# Patient Record
Sex: FEMALE | Race: WHITE | NOT HISPANIC OR LATINO | Employment: OTHER | URBAN - METROPOLITAN AREA
[De-identification: names, ages, dates, MRNs, and addresses within clinical notes are randomized per-mention and may not be internally consistent; named-entity substitution may affect disease eponyms.]

---

## 2020-01-16 ENCOUNTER — OFFICE VISIT (OUTPATIENT)
Dept: FAMILY MEDICINE | Facility: CLINIC | Age: 75
End: 2020-01-16
Payer: MEDICARE

## 2020-01-16 VITALS
SYSTOLIC BLOOD PRESSURE: 100 MMHG | BODY MASS INDEX: 23.92 KG/M2 | WEIGHT: 110.88 LBS | HEART RATE: 80 BPM | DIASTOLIC BLOOD PRESSURE: 60 MMHG | RESPIRATION RATE: 18 BRPM | HEIGHT: 57 IN | TEMPERATURE: 98 F | OXYGEN SATURATION: 98 %

## 2020-01-16 DIAGNOSIS — G89.29 CHRONIC RADICULAR LUMBAR PAIN: ICD-10-CM

## 2020-01-16 DIAGNOSIS — M54.16 CHRONIC RADICULAR LUMBAR PAIN: ICD-10-CM

## 2020-01-16 DIAGNOSIS — N32.81 OAB (OVERACTIVE BLADDER): ICD-10-CM

## 2020-01-16 DIAGNOSIS — F31.9 BIPOLAR AFFECTIVE DISORDER, REMISSION STATUS UNSPECIFIED: ICD-10-CM

## 2020-01-16 DIAGNOSIS — E78.2 MIXED HYPERLIPIDEMIA: Primary | ICD-10-CM

## 2020-01-16 PROCEDURE — 1126F AMNT PAIN NOTED NONE PRSNT: CPT | Mod: HCNC,S$GLB,, | Performed by: INTERNAL MEDICINE

## 2020-01-16 PROCEDURE — 1159F MED LIST DOCD IN RCRD: CPT | Mod: HCNC,S$GLB,, | Performed by: INTERNAL MEDICINE

## 2020-01-16 PROCEDURE — 99999 PR PBB SHADOW E&M-NEW PATIENT-LVL III: CPT | Mod: PBBFAC,HCNC,, | Performed by: INTERNAL MEDICINE

## 2020-01-16 PROCEDURE — 99499 RISK ADDL DX/OHS AUDIT: ICD-10-PCS | Mod: HCNC,S$GLB,, | Performed by: INTERNAL MEDICINE

## 2020-01-16 PROCEDURE — 99999 PR PBB SHADOW E&M-NEW PATIENT-LVL III: ICD-10-PCS | Mod: PBBFAC,HCNC,, | Performed by: INTERNAL MEDICINE

## 2020-01-16 PROCEDURE — 1101F PT FALLS ASSESS-DOCD LE1/YR: CPT | Mod: HCNC,CPTII,S$GLB, | Performed by: INTERNAL MEDICINE

## 2020-01-16 PROCEDURE — 1159F PR MEDICATION LIST DOCUMENTED IN MEDICAL RECORD: ICD-10-PCS | Mod: HCNC,S$GLB,, | Performed by: INTERNAL MEDICINE

## 2020-01-16 PROCEDURE — 1101F PR PT FALLS ASSESS DOC 0-1 FALLS W/OUT INJ PAST YR: ICD-10-PCS | Mod: HCNC,CPTII,S$GLB, | Performed by: INTERNAL MEDICINE

## 2020-01-16 PROCEDURE — 1126F PR PAIN SEVERITY QUANTIFIED, NO PAIN PRESENT: ICD-10-PCS | Mod: HCNC,S$GLB,, | Performed by: INTERNAL MEDICINE

## 2020-01-16 PROCEDURE — 99214 PR OFFICE/OUTPT VISIT, EST, LEVL IV, 30-39 MIN: ICD-10-PCS | Mod: HCNC,S$GLB,, | Performed by: INTERNAL MEDICINE

## 2020-01-16 PROCEDURE — 99214 OFFICE O/P EST MOD 30 MIN: CPT | Mod: HCNC,S$GLB,, | Performed by: INTERNAL MEDICINE

## 2020-01-16 PROCEDURE — 99499 UNLISTED E&M SERVICE: CPT | Mod: HCNC,S$GLB,, | Performed by: INTERNAL MEDICINE

## 2020-01-16 RX ORDER — OXYBUTYNIN CHLORIDE 5 MG/1
5 TABLET ORAL 2 TIMES DAILY
COMMUNITY
Start: 2019-11-06 | End: 2020-04-06

## 2020-01-16 RX ORDER — HYDROCODONE BITARTRATE AND ACETAMINOPHEN 10; 325 MG/1; MG/1
TABLET ORAL
Status: ON HOLD | COMMUNITY
Start: 2011-12-09 | End: 2020-03-03 | Stop reason: HOSPADM

## 2020-01-16 RX ORDER — SPIRONOLACTONE 50 MG/1
TABLET, FILM COATED ORAL
Status: ON HOLD | COMMUNITY
Start: 2011-12-02 | End: 2020-01-20 | Stop reason: HOSPADM

## 2020-01-16 RX ORDER — HYDROCODONE BITARTRATE AND ACETAMINOPHEN 10; 325 MG/1; MG/1
TABLET ORAL
Status: ON HOLD | COMMUNITY
Start: 2019-12-16 | End: 2020-01-20 | Stop reason: HOSPADM

## 2020-01-16 RX ORDER — FENOFIBRATE 145 MG/1
TABLET, FILM COATED ORAL
COMMUNITY
Start: 2019-12-26 | End: 2020-06-24

## 2020-01-16 RX ORDER — OXCARBAZEPINE 300 MG/1
300 TABLET, FILM COATED ORAL DAILY
COMMUNITY
Start: 2011-03-26

## 2020-01-16 RX ORDER — TOLTERODINE 4 MG/1
4 CAPSULE, EXTENDED RELEASE ORAL DAILY
Status: ON HOLD | COMMUNITY
Start: 2013-01-29 | End: 2020-02-14 | Stop reason: HOSPADM

## 2020-01-16 RX ORDER — CETIRIZINE HYDROCHLORIDE 10 MG/1
TABLET ORAL
COMMUNITY
Start: 2011-12-05

## 2020-01-16 NOTE — PROGRESS NOTES
[facility] Note    Chief Complaint      Chief Complaint   Patient presents with    Follow-up     6 MONTH FOLLOW        History of Present Illness      Marycruz Ramirez is a 74 y.o. female who presents today for   Chief Complaint   Patient presents with    Follow-up     6 MONTH FOLLOW    .  Patient comes to appointment HERE FOR 6 m checkup     Problem List Items Addressed This Visit        Neuro    Chronic radicular lumbar pain    Overview     Dr allen is managing pain cont current regimen             Psychiatric    Bipolar disorder    Overview     Psych managing cont current trileptal            Cardiac/Vascular    Mixed hyperlipidemia - Primary    Overview     Is on tricor currently cont current needs cmp and lipid            Renal/    OAB (overactive bladder)    Overview     Cont ditropan working well                  Past Medical History:  History reviewed. No pertinent past medical history.    Past Surgical History:  Past Surgical History:   Procedure Laterality Date    BACK SURGERY      KNEE SURGERY Bilateral        Family History:  family history is not on file.    Social History:  Social History     Socioeconomic History    Marital status:      Spouse name: Not on file    Number of children: Not on file    Years of education: Not on file    Highest education level: Not on file   Occupational History    Not on file   Social Needs    Financial resource strain: Not on file    Food insecurity:     Worry: Not on file     Inability: Not on file    Transportation needs:     Medical: Not on file     Non-medical: Not on file   Tobacco Use    Smoking status: Current Every Day Smoker    Smokeless tobacco: Never Used   Substance and Sexual Activity    Alcohol use: Yes     Frequency: Never     Drinks per session: 1 or 2    Drug use: Never    Sexual activity: Not Currently   Lifestyle    Physical activity:     Days per week: Not on file     Minutes per session: Not on  file    Stress: Not at all   Relationships    Social connections:     Talks on phone: Not on file     Gets together: Not on file     Attends [employer] service: Not on file     Active member of club or organization: Not on file     Attends meetings of clubs or organizations: Not on file     Relationship status: Not on file   Other Topics Concern    Not on file   Social History Narrative    Not on file       Review of Systems:   ROS      Medications:  Outpatient Encounter Medications as of 1/16/2020   Medication Sig Dispense Refill    cetirizine (ZYRTEC) 10 MG tablet Take by mouth.      fenofibrate (TRICOR) 145 MG tablet       HYDROcodone-acetaminophen (NORCO)  mg per tablet Take by mouth.      HYDROcodone-acetaminophen (NORCO)  mg per tablet       OXcarbazepine (TRILEPTAL) 300 MG Tab Take by mouth.      oxybutynin (DITROPAN) 5 MG Tab       spironolactone (ALDACTONE) 50 MG tablet Take by mouth.      tolterodine (DETROL LA) 4 MG 24 hr capsule Take by mouth.       No facility-administered encounter medications on file as of 1/16/2020.         Allergies:  Review of patient's allergies indicates:  No Known Allergies      Physical Exam      Vitals:    01/16/20 1100   BP: 100/60   Pulse: 80   Resp: 18   Temp: 98.1 °F (36.7 °C)      Body mass index is 24 kg/m².    Physical Exam   Constitutional: She is oriented to person, place, and time. She appears well-developed and well-nourished.   HENT:   Mouth/Throat: Oropharynx is clear and moist.   Eyes: Pupils are equal, round, and reactive to light. EOM are normal.   Neck: Normal range of motion. No thyromegaly present.   Cardiovascular: Normal rate. Exam reveals no gallop and no friction rub.   Murmur heard.   Systolic murmur is present with a grade of 3/6.  Pulmonary/Chest: Breath sounds normal.   Abdominal: Soft. Bowel sounds are normal.   Musculoskeletal: Normal range of motion.   Lymphadenopathy:     She has no cervical adenopathy.   Neurological: She  is alert and oriented to person, place, and time. No cranial nerve deficit.   Skin: Skin is warm. No rash noted.   Psychiatric: She has a normal mood and affect. Her behavior is normal.        Laboratory:  CBC:  No results for input(s): WBC, RBC, HGB, HCT, PLT, MCV, MCH, MCHC in the last 2160 hours.  CMP:  No results for input(s): GLU, CALCIUM, ALBUMIN, PROT, NA, K, CO2, CL, BUN, ALKPHOS, ALT, AST, BILITOT in the last 2160 hours.    Invalid input(s): CREATININ  URINALYSIS:  No results for input(s): COLORU, CLARITYU, SPECGRAV, PHUR, PROTEINUA, GLUCOSEU, BILIRUBINCON, BLOODU, WBCU, RBCU, BACTERIA, MUCUS, NITRITE, LEUKOCYTESUR, UROBILINOGEN, HYALINECASTS in the last 2160 hours.   LIPIDS:  No results for input(s): TSH, HDL, CHOL, TRIG, LDLCALC, CHOLHDL, NONHDLCHOL, TOTALCHOLEST in the last 2160 hours.  TSH:  No results for input(s): TSH in the last 2160 hours.  A1C:  No results for input(s): HGBA1C in the last 2160 hours.    Radiology:        Assessment:     Marycruz Ramirez is a 74 y.o.female with:    Mixed hyperlipidemia  -     Comprehensive metabolic panel; Future; Expected date: 01/16/2020  -     Lipid panel; Future; Expected date: 01/16/2020    Bipolar affective disorder, remission status unspecified    OAB (overactive bladder)  -     CBC auto differential; Future; Expected date: 01/16/2020    Chronic radicular lumbar pain          Plan:     Problem List Items Addressed This Visit        Neuro    Chronic radicular lumbar pain    Overview     Dr allen is managing pain cont current regimen             Psychiatric    Bipolar disorder    Overview     Psych managing cont current trileptal            Cardiac/Vascular    Mixed hyperlipidemia - Primary    Overview     Is on tricor currently cont current needs cmp and lipid            Renal/    OAB (overactive bladder)    Overview     Cont ditropan working well                As above, continue current medications and maintain follow up with specialists.  Return to clinic  in 6 months.    Marcelino Calle  [facility] [place]

## 2020-01-17 ENCOUNTER — TELEPHONE (OUTPATIENT)
Dept: FAMILY MEDICINE | Facility: CLINIC | Age: 75
End: 2020-01-17

## 2020-01-17 ENCOUNTER — HOSPITAL ENCOUNTER (OUTPATIENT)
Facility: HOSPITAL | Age: 75
Discharge: HOME | DRG: 379 | End: 2020-01-20
Attending: EMERGENCY MEDICINE | Admitting: HOSPITALIST
Payer: MEDICARE

## 2020-01-17 DIAGNOSIS — R14.0 ABDOMINAL DISTENSION: ICD-10-CM

## 2020-01-17 DIAGNOSIS — D50.0 IRON DEFICIENCY ANEMIA DUE TO CHRONIC BLOOD LOSS: ICD-10-CM

## 2020-01-17 DIAGNOSIS — D50.9 MICROCYTIC ANEMIA: Primary | ICD-10-CM

## 2020-01-17 DIAGNOSIS — K92.2 GASTROINTESTINAL HEMORRHAGE, UNSPECIFIED GASTROINTESTINAL HEMORRHAGE TYPE: ICD-10-CM

## 2020-01-17 DIAGNOSIS — R73.02 IGT (IMPAIRED GLUCOSE TOLERANCE): Primary | ICD-10-CM

## 2020-01-17 DIAGNOSIS — D64.9 SYMPTOMATIC ANEMIA: ICD-10-CM

## 2020-01-17 LAB
2ND TRIMESTER 4 SCREEN SERPL-IMP: ABNORMAL
ABO + RH BLD: NORMAL
ALBUMIN SERPL BCP-MCNC: 3.6 G/DL (ref 3.5–5.2)
ALBUMIN: 4.4 GRAM/DL (ref 3.5–5)
ALP SERPL-CCNC: 47 UNIT/L (ref 38–126)
ALP SERPL-CCNC: 51 U/L (ref 55–135)
ALT SERPL W P-5'-P-CCNC: 11 UNIT/L (ref 7–56)
ALT SERPL W/O P-5'-P-CCNC: 10 U/L (ref 10–44)
ANION GAP SERPL CALC-SCNC: 18 MEQ/L (ref 9–18)
ANION GAP SERPL CALC-SCNC: 8 MMOL/L (ref 8–16)
ANISOCYTOSIS BLD QL SMEAR: SLIGHT
AST SERPL-CCNC: 15 U/L (ref 10–40)
AST SERPL-CCNC: 16 UNIT/L (ref 7–40)
BASO STIPL BLD QL SMEAR: ABNORMAL
BASOPHILS # BLD AUTO: 0.03 K/UL (ref 0–0.2)
BASOPHILS NFR BLD: 0.3 % (ref 0–1.9)
BILIRUB SERPL-MCNC: 0.3 MG/DL (ref 0.1–1)
BILIRUB SERPL-MCNC: 0.4 MG/DL (ref 0–1.2)
BLD GP AB SCN CELLS X3 SERPL QL: NORMAL
BUN BLD-MCNC: 18 MG/DL (ref 7–21)
BUN SERPL-MCNC: 17 MG/DL (ref 8–23)
BUN/CREAT SERPL: 26 RATIO (ref 6–22)
CALC OSMOLALITY: 282 MOSM/KG (ref 275–295)
CALCIUM SERPL-MCNC: 10.2 MG/DL (ref 8.5–10.3)
CALCIUM SERPL-MCNC: 9.9 MG/DL (ref 8.7–10.5)
CHLORIDE SERPL-SCNC: 102 MEQ/L (ref 98–107)
CHLORIDE SERPL-SCNC: 107 MMOL/L (ref 95–110)
CHOL/HDLC RATIO: 2
CHOLEST SERPL-MSCNC: 102 MG/DL (ref 100–200)
CO2 SERPL-SCNC: 23 MEQ/L (ref 21–31)
CO2 SERPL-SCNC: 24 MMOL/L (ref 23–29)
CREAT SERPL-MCNC: 0.7 MG/DL (ref 0.5–1)
CREAT SERPL-MCNC: 0.7 MG/DL (ref 0.5–1.4)
DACRYOCYTES BLD QL SMEAR: ABNORMAL
DAT IGG-SP REAG RBC-IMP: NORMAL
DIFFERENTIAL METHOD: ABNORMAL
DIFFERENTIAL TYPE: ABNORMAL
EOSINOPHIL # BLD AUTO: 0.2 K/UL (ref 0–0.5)
EOSINOPHIL NFR BLD: 2.4 % (ref 0–8)
ERYTHROCYTE [DISTWIDTH] IN BLOOD BY AUTOMATED COUNT: 20 % (ref 11.5–14.5)
ERYTHROCYTE [DISTWIDTH] IN BLOOD BY AUTOMATED COUNT: 20.8 GRAM/DL (ref 12–15.3)
EST. GFR  (AFRICAN AMERICAN): >60 ML/MIN/1.73 M^2
EST. GFR  (NON AFRICAN AMERICAN): >60 ML/MIN/1.73 M^2
FOLATE SERPL-MCNC: 11.8 NG/ML (ref 4–24)
GFR: 86 ML/MIN/1.73M2
GIANT PLATELETS BLD QL SMEAR: PRESENT
GLUCOSE SERPL-MCNC: 151 MG/DL (ref 70–100)
GLUCOSE SERPL-MCNC: 167 MG/DL (ref 70–110)
HCT VFR BLD AUTO: 21 % (ref 37–47)
HCT VFR BLD AUTO: 21.3 % (ref 37–48.5)
HDLC SERPL-MCNC: 52 MG/DL (ref 40–75)
HGB BLD-MCNC: 5.5 G/DL (ref 12–16)
HGB BLD-MCNC: 6.1 GRAM/DL (ref 12–16)
HYPO: ABNORMAL
HYPOCHROMIA BLD QL SMEAR: ABNORMAL
IMM GRANULOCYTES # BLD AUTO: 0.02 K/UL (ref 0–0.04)
IMM GRANULOCYTES NFR BLD AUTO: 0.2 % (ref 0–0.5)
IRON SERPL-MCNC: 25 UG/DL (ref 30–160)
LDH SERPL L TO P-CCNC: 160 U/L (ref 110–260)
LDLC SERPL CALC-MCNC: 41 MG/DL (ref 0–125)
LYMPHOCYTES # BLD AUTO: 3.1 K/UL (ref 1–4.8)
LYMPHOCYTES NFR BLD: 32.6 % (ref 18–48)
MCH RBC QN AUTO: 19.6 PG (ref 27–31)
MCH RBC QN AUTO: 20 PICOGRAM (ref 27–33)
MCHC RBC AUTO-ENTMCNC: 25.8 G/DL (ref 32–36)
MCHC RBC AUTO-ENTMCNC: 29.2 GRAM/DL (ref 32–36)
MCV RBC AUTO: 68.6 FEMTOLITER (ref 81–99)
MCV RBC AUTO: 76 FL (ref 82–98)
MICROCYTES BLD QL SMEAR: ABNORMAL
MONOCYTES # BLD AUTO: 0.9 K/UL (ref 0.3–1)
MONOCYTES NFR BLD: 9.6 % (ref 4–15)
NEUTROPHILS # BLD AUTO: 5.2 K/UL (ref 1.8–7.7)
NEUTROPHILS NFR BLD: 54.9 % (ref 38–73)
NONHDLC SERPL-MCNC: 50 MG/DL (ref 60–125)
NRBC BLD-RTO: 0 /100 WBC
OVALOCYTES BLD QL SMEAR: ABNORMAL
OVALOCYTES BLD QL SMEAR: ABNORMAL
PLATELET # BLD AUTO: 274 K/UL (ref 150–350)
PLATELET # BLD AUTO: 311 K/UL (ref 150–350)
PLATELET BLD QL SMEAR: ABNORMAL
PLT MORPHOLOGY: ABNORMAL
PMV BLD AUTO: 11.3 FL (ref 9.2–12.9)
PMV BLD AUTO: 9.3 FEMTOLITER (ref 7–10.2)
POIKILOCYTOSIS BLD QL SMEAR: ABNORMAL
POIKILOCYTOSIS BLD QL SMEAR: SLIGHT
POLY: ABNORMAL
POLYCHROMASIA BLD QL SMEAR: ABNORMAL
POTASSIUM SERPL-SCNC: 3.9 MMOL/L (ref 3.5–5.1)
POTASSIUM SERPL-SCNC: 4 MEQ/L (ref 3.5–5)
PROT SERPL-MCNC: 6.4 G/DL (ref 6–8.4)
RBC # BLD AUTO: 2.81 M/UL (ref 4–5.4)
RBC # BLD AUTO: 3.07 MIL/UL (ref 4.2–5.4)
RETICS/RBC NFR AUTO: 2.1 % (ref 0.5–2.5)
SATURATED IRON: 4 % (ref 20–50)
SCHISTOCYTES BLD QL SMEAR: ABNORMAL
SCHISTOCYTES: ABNORMAL
SODIUM BLD-SCNC: 139 MEQ/L (ref 135–145)
SODIUM SERPL-SCNC: 139 MMOL/L (ref 136–145)
TARGETS BLD QL SMEAR: ABNORMAL
TEAR DROP CELLS: ABNORMAL
TOTAL IRON BINDING CAPACITY: 704 UG/DL (ref 250–450)
TOTAL PROTEIN: 6.8 GRAM/DL (ref 6.3–8.2)
TRANSFERRIN SERPL-MCNC: 476 MG/DL (ref 200–375)
TRIGL SERPL-MCNC: 59 MG/DL (ref 30–150)
VIT B12 SERPL-MCNC: 611 PG/ML (ref 210–950)
WBC # BLD AUTO: 8.9 K/UL (ref 4.5–11)
WBC # BLD AUTO: 9.4 K/UL (ref 3.9–12.7)

## 2020-01-17 PROCEDURE — 36430 TRANSFUSION BLD/BLD COMPNT: CPT | Mod: HCNC

## 2020-01-17 PROCEDURE — 83615 LACTATE (LD) (LDH) ENZYME: CPT | Mod: HCNC

## 2020-01-17 PROCEDURE — 63600175 PHARM REV CODE 636 W HCPCS: Mod: HCNC | Performed by: EMERGENCY MEDICINE

## 2020-01-17 PROCEDURE — 25000003 PHARM REV CODE 250: Mod: HCNC | Performed by: EMERGENCY MEDICINE

## 2020-01-17 PROCEDURE — 96361 HYDRATE IV INFUSION ADD-ON: CPT | Mod: HCNC

## 2020-01-17 PROCEDURE — 99285 PR EMERGENCY DEPT VISIT,LEVEL V: ICD-10-PCS | Mod: HCNC,,, | Performed by: EMERGENCY MEDICINE

## 2020-01-17 PROCEDURE — 80053 COMPREHEN METABOLIC PANEL: CPT | Mod: HCNC

## 2020-01-17 PROCEDURE — 11000001 HC ACUTE MED/SURG PRIVATE ROOM: Mod: HCNC

## 2020-01-17 PROCEDURE — G0378 HOSPITAL OBSERVATION PER HR: HCPCS | Mod: HCNC

## 2020-01-17 PROCEDURE — P9021 RED BLOOD CELLS UNIT: HCPCS | Mod: HCNC

## 2020-01-17 PROCEDURE — 99222 PR INITIAL HOSPITAL CARE,LEVL II: ICD-10-PCS | Mod: AI,HCNC,, | Performed by: HOSPITALIST

## 2020-01-17 PROCEDURE — 99285 EMERGENCY DEPT VISIT HI MDM: CPT | Mod: 25,HCNC

## 2020-01-17 PROCEDURE — 82746 ASSAY OF FOLIC ACID SERUM: CPT | Mod: HCNC

## 2020-01-17 PROCEDURE — 86880 COOMBS TEST DIRECT: CPT | Mod: HCNC

## 2020-01-17 PROCEDURE — 86920 COMPATIBILITY TEST SPIN: CPT | Mod: HCNC

## 2020-01-17 PROCEDURE — 85045 AUTOMATED RETICULOCYTE COUNT: CPT | Mod: HCNC

## 2020-01-17 PROCEDURE — 99285 EMERGENCY DEPT VISIT HI MDM: CPT | Mod: HCNC,,, | Performed by: EMERGENCY MEDICINE

## 2020-01-17 PROCEDURE — 85025 COMPLETE CBC W/AUTO DIFF WBC: CPT | Mod: HCNC

## 2020-01-17 PROCEDURE — 83540 ASSAY OF IRON: CPT | Mod: HCNC

## 2020-01-17 PROCEDURE — 86850 RBC ANTIBODY SCREEN: CPT | Mod: HCNC

## 2020-01-17 PROCEDURE — 99222 1ST HOSP IP/OBS MODERATE 55: CPT | Mod: AI,HCNC,, | Performed by: HOSPITALIST

## 2020-01-17 PROCEDURE — 96374 THER/PROPH/DIAG INJ IV PUSH: CPT | Mod: HCNC

## 2020-01-17 PROCEDURE — 82607 VITAMIN B-12: CPT | Mod: HCNC

## 2020-01-17 PROCEDURE — C9113 INJ PANTOPRAZOLE SODIUM, VIA: HCPCS | Mod: HCNC | Performed by: EMERGENCY MEDICINE

## 2020-01-17 RX ORDER — HYDROCODONE BITARTRATE AND ACETAMINOPHEN 10; 325 MG/1; MG/1
1 TABLET ORAL EVERY 6 HOURS PRN
Status: DISCONTINUED | OUTPATIENT
Start: 2020-01-18 | End: 2020-01-20 | Stop reason: HOSPADM

## 2020-01-17 RX ORDER — HYDROCODONE BITARTRATE AND ACETAMINOPHEN 5; 325 MG/1; MG/1
1 TABLET ORAL
Status: COMPLETED | OUTPATIENT
Start: 2020-01-17 | End: 2020-01-17

## 2020-01-17 RX ORDER — SODIUM CHLORIDE 0.9 % (FLUSH) 0.9 %
10 SYRINGE (ML) INJECTION
Status: DISCONTINUED | OUTPATIENT
Start: 2020-01-17 | End: 2020-01-20 | Stop reason: HOSPADM

## 2020-01-17 RX ORDER — OXYBUTYNIN CHLORIDE 5 MG/1
5 TABLET ORAL 2 TIMES DAILY
Status: DISCONTINUED | OUTPATIENT
Start: 2020-01-18 | End: 2020-01-20 | Stop reason: HOSPADM

## 2020-01-17 RX ORDER — PANTOPRAZOLE SODIUM 40 MG/10ML
80 INJECTION, POWDER, LYOPHILIZED, FOR SOLUTION INTRAVENOUS
Status: COMPLETED | OUTPATIENT
Start: 2020-01-17 | End: 2020-01-17

## 2020-01-17 RX ORDER — ONDANSETRON 2 MG/ML
4 INJECTION INTRAMUSCULAR; INTRAVENOUS EVERY 8 HOURS PRN
Status: DISCONTINUED | OUTPATIENT
Start: 2020-01-18 | End: 2020-01-20 | Stop reason: HOSPADM

## 2020-01-17 RX ORDER — HYDROCODONE BITARTRATE AND ACETAMINOPHEN 500; 5 MG/1; MG/1
TABLET ORAL
Status: DISCONTINUED | OUTPATIENT
Start: 2020-01-17 | End: 2020-01-20 | Stop reason: HOSPADM

## 2020-01-17 RX ORDER — TALC
3 POWDER (GRAM) TOPICAL NIGHTLY PRN
Status: DISCONTINUED | OUTPATIENT
Start: 2020-01-18 | End: 2020-01-20 | Stop reason: HOSPADM

## 2020-01-17 RX ORDER — OXCARBAZEPINE 300 MG/1
300 TABLET, FILM COATED ORAL DAILY
Status: DISCONTINUED | OUTPATIENT
Start: 2020-01-18 | End: 2020-01-20 | Stop reason: HOSPADM

## 2020-01-17 RX ORDER — PANTOPRAZOLE SODIUM 40 MG/1
40 TABLET, DELAYED RELEASE ORAL
Status: DISCONTINUED | OUTPATIENT
Start: 2020-01-18 | End: 2020-01-20 | Stop reason: HOSPADM

## 2020-01-17 RX ADMIN — SODIUM CHLORIDE 1000 ML: 0.9 INJECTION, SOLUTION INTRAVENOUS at 05:01

## 2020-01-17 RX ADMIN — HYDROCODONE BITARTRATE AND ACETAMINOPHEN 1 TABLET: 5; 325 TABLET ORAL at 08:01

## 2020-01-17 RX ADMIN — PANTOPRAZOLE SODIUM 80 MG: 40 INJECTION, POWDER, FOR SOLUTION INTRAVENOUS at 08:01

## 2020-01-17 NOTE — ED PROVIDER NOTES
Encounter Date: 1/17/2020       History     Chief Complaint   Patient presents with    Abnormal Lab     low h&h, told to come get transfusion     74-year-old woman with comorbidities of chronic arthritis/pain as well as bipolar disorder presents by direction of her primary care to the ED for further evaluation management of severe anemia noted on outpatient labs today.  The patient describes worsening exertional dyspnea and generalized weakness without any report of hematochezia, hematemesis, or chronic anemia.  She denies any associated fevers, chills, night sweats, diarrhea, or dysuria as well as any melena or abdominal discomfort.        Review of patient's allergies indicates:  No Known Allergies  No past medical history on file.  Past Surgical History:   Procedure Laterality Date    BACK SURGERY      KNEE SURGERY Bilateral      No family history on file.  Social History     Tobacco Use    Smoking status: Current Every Day Smoker    Smokeless tobacco: Never Used   Substance Use Topics    Alcohol use: Yes     Frequency: Never     Drinks per session: 1 or 2    Drug use: Never     Review of Systems   Constitutional: Positive for fatigue. Negative for chills and fever.   HENT: Negative for nosebleeds and trouble swallowing.    Respiratory: Positive for shortness of breath (Exertional). Negative for chest tightness.    Cardiovascular: Positive for leg swelling (Mild, chronic). Negative for chest pain.   Gastrointestinal: Negative for abdominal pain, nausea and vomiting.   Genitourinary: Negative for flank pain and hematuria.   Musculoskeletal: Positive for back pain (Chronic) and neck pain (Chronic).   Skin: Negative for rash and wound.   Neurological: Negative for seizures, syncope and speech difficulty.   Hematological: Does not bruise/bleed easily.   Psychiatric/Behavioral: Negative for confusion and decreased concentration.       Physical Exam     Initial Vitals [01/17/20 1518]   BP Pulse Resp Temp SpO2    (!) 119/57 84 18 98.1 °F (36.7 °C) 99 %      MAP       --         Physical Exam    Vitals reviewed.  Constitutional:   74-year-old  woman, frail, no acute distress noted, mild jaundice   HENT:   Head: Normocephalic and atraumatic.   Dentures in place without evidence of acute intraoral injury   Eyes: EOM are normal. Pupils are equal, round, and reactive to light.   Cardiovascular: Regular rhythm.   4/ 6 CADEN loudest at the left proximal sternal border   Pulmonary/Chest: Breath sounds normal. No stridor. No respiratory distress.   Abdominal: Soft. There is no tenderness. There is no rebound.   Genitourinary: : Acceptable.  Genitourinary Comments: No external hemorrhoids noted; largely empty rectal vault with scant guaiac positive stool   Musculoskeletal:   0 to 1+ nonpitting bilateral lower extremity edema; chronic scoliotic changes noted   Neurological: She is alert and oriented to person, place, and time. GCS score is 15. GCS eye subscore is 4. GCS verbal subscore is 5. GCS motor subscore is 6.   Skin: Skin is warm and dry.   +/- jaundice, mild pallor: desquamation and chronic vascular changes noted to right lower extremity with vascular changes only noted on left   Psychiatric: She has a normal mood and affect. Thought content normal.         ED Course   Procedures  Labs Reviewed   COMPREHENSIVE METABOLIC PANEL - Abnormal; Notable for the following components:       Result Value    Glucose 167 (*)     Alkaline Phosphatase 51 (*)     All other components within normal limits    Narrative:     ADD ON CBCWD [id number] & GUSTABO [id number] PER DR. HANCOCK.  01/17/2020    17:42  ADD ON FOLAT [id number], B12 [id number] & LDH [id number] PER DR HANCOCK.    01/17/2020  17:44     CBC W/ AUTO DIFFERENTIAL - Abnormal; Notable for the following components:    RBC 2.81 (*)     Hemoglobin 5.5 (*)     Hematocrit 21.3 (*)     Mean Corpuscular Volume 76 (*)     Mean Corpuscular Hemoglobin 19.6 (*)     Mean  Corpuscular Hemoglobin Conc 25.8 (*)     RDW 20.0 (*)     All other components within normal limits    Narrative:     ADD ON CBCWD [id number] & GUSTABO [id number] PER DR. MISTRY  01/17/2020    17:42  ADD ON FOLAT [id number], B12 [id number] & LDH [id number] PER DR MISTRY    01/17/2020  17:44    add on ironp [id number] PER DR HANCOCK  01/17/2020  18:04   HGB critical result(s) called and verbal readback obtained from   Dorothy Moulton RN.  by RNS 01/17/2020 18:30   IRON AND TIBC - Abnormal; Notable for the following components:    Iron 25 (*)     Transferrin 476 (*)     TIBC 704 (*)     Saturated Iron 4 (*)     All other components within normal limits    Narrative:     ADD ON CBCWD [id number] & GUSTABO [id number] PER DR. MISTRY  01/17/2020    17:42  ADD ON FOLAT [id number], B12 [id number] & LDH [id number] PER DR MISTRY    01/17/2020  17:44    add on ironp [id number] PER DR HANCOCK  01/17/2020  18:04    CBC W/ AUTO DIFFERENTIAL   FOLATE   IRON AND TIBC   LACTATE DEHYDROGENASE   RETICULOCYTES   VITAMIN B12   RETICULOCYTES    Narrative:     ADD ON CBCWD [id number] & GUSTABO [id number] PER DR. MISTRY  01/17/2020    17:42  ADD ON FOLAT [id number], B12 [id number] & LDH [id number] PER DR MISTRY    01/17/2020  17:44    add on ironp [id number] PER DR HANCOCK  01/17/2020  18:04    FOLATE    Narrative:     ADD ON CBCWD [id number] & GUSTABO [id number] PER DR. MISTRY  01/17/2020    17:42  ADD ON FOLAT [id number], B12 [id number] & LDH [id number] PER DR MISTRY    01/17/2020  17:44    add on ironp [id number] PER DR HANCOCK  01/17/2020  18:04    VITAMIN B12    Narrative:     ADD ON CBCWD [id number] & GUSTABO [id number] PER DR. MISTRY  01/17/2020    17:42  ADD ON FOLAT [id number], B12 [id number] & LDH [id number] PER DR MISTRY    01/17/2020  17:44    add on ironp [id number] PER DR HANCOCK  01/17/2020  18:04    LACTATE DEHYDROGENASE    Narrative:     ADD ON CBCWD [id number] & GUSTABO [id number] PER DR. MISTRY  01/17/2020    17:42  ADD ON FOLAT [id number], B12 [id number] & LDH [id number] PER   KARISSA.    01/17/2020  17:44    add on ironp [id number] PER DR HANCOCK  01/17/2020  18:04    TYPE & SCREEN   DIRECT ANTIGLOBULIN TEST   PREPARE RBC SOFT               Medical Decision Making:   History:   Old Medical Records: I decided to obtain old medical records.  Old Records Summarized: records from clinic visits.  Differential Diagnosis:   Symptomatic anemia, GI bleed, electrolyte derangement, occult malignancy  Clinical Tests:   Lab Tests: Ordered and Reviewed  Other:   I have discussed this case with another health care provider.              Attending Attestation:             Attending ED Notes:   Emergency department evaluation today confirms the presence of significant microcytic anemia in this 74-year-old woman directed here by her managing primary care physician based on outpatient lab findings.  The patient has not in door stay any additional symptoms of hematochezia, melena, or hematemesis, and rectal exam does not reveal evidence of gross melena or hematochezia.  Metabolic profile is largely unremarkable and does not exhibit evidence of grave solid organ dysfunction.  Consent for blood transfusion has been obtained, findings and concerns have been discussed with internal medicine on call, and she will be admitted to their service in fair condition with plans for transfusion of type specific packed RBCs for her presumed acute blood-loss anemia.  Notably, Protonix 80 mg IV has been administered for suspected GI source of bleeding/inflammation.                        Clinical Impression:       ICD-10-CM ICD-9-CM   1. Microcytic anemia D50.9 280.9         Disposition:   Disposition: Admitted  Condition: Fair                     Bunny Hancock MD  01/17/20 2020

## 2020-01-18 PROBLEM — R14.0 ABDOMINAL DISTENSION: Status: ACTIVE | Noted: 2020-01-18

## 2020-01-18 LAB
ANION GAP SERPL CALC-SCNC: 7 MMOL/L (ref 8–16)
BASOPHILS # BLD AUTO: 0.06 K/UL (ref 0–0.2)
BASOPHILS NFR BLD: 0.4 % (ref 0–1.9)
BLD PROD TYP BPU: NORMAL
BLOOD UNIT EXPIRATION DATE: NORMAL
BLOOD UNIT TYPE CODE: 6200
BLOOD UNIT TYPE: NORMAL
BUN SERPL-MCNC: 12 MG/DL (ref 8–23)
CALCIUM SERPL-MCNC: 9.5 MG/DL (ref 8.7–10.5)
CHLORIDE SERPL-SCNC: 112 MMOL/L (ref 95–110)
CO2 SERPL-SCNC: 20 MMOL/L (ref 23–29)
CODING SYSTEM: NORMAL
CREAT SERPL-MCNC: 0.7 MG/DL (ref 0.5–1.4)
DIFFERENTIAL METHOD: ABNORMAL
DISPENSE STATUS: NORMAL
EOSINOPHIL # BLD AUTO: 0.2 K/UL (ref 0–0.5)
EOSINOPHIL NFR BLD: 1.2 % (ref 0–8)
ERYTHROCYTE [DISTWIDTH] IN BLOOD BY AUTOMATED COUNT: 19.9 % (ref 11.5–14.5)
EST. GFR  (AFRICAN AMERICAN): >60 ML/MIN/1.73 M^2
EST. GFR  (NON AFRICAN AMERICAN): >60 ML/MIN/1.73 M^2
GLUCOSE SERPL-MCNC: 110 MG/DL (ref 70–110)
HCT VFR BLD AUTO: 33.6 % (ref 37–48.5)
HGB BLD-MCNC: 9.4 G/DL (ref 12–16)
IMM GRANULOCYTES # BLD AUTO: 0.07 K/UL (ref 0–0.04)
IMM GRANULOCYTES NFR BLD AUTO: 0.4 % (ref 0–0.5)
LYMPHOCYTES # BLD AUTO: 2.4 K/UL (ref 1–4.8)
LYMPHOCYTES NFR BLD: 15.3 % (ref 18–48)
MAGNESIUM SERPL-MCNC: 1.8 MG/DL (ref 1.6–2.6)
MCH RBC QN AUTO: 23.1 PG (ref 27–31)
MCHC RBC AUTO-ENTMCNC: 28 G/DL (ref 32–36)
MCV RBC AUTO: 83 FL (ref 82–98)
MONOCYTES # BLD AUTO: 0.4 K/UL (ref 0.3–1)
MONOCYTES NFR BLD: 2.6 % (ref 4–15)
NEUTROPHILS # BLD AUTO: 12.5 K/UL (ref 1.8–7.7)
NEUTROPHILS NFR BLD: 80.1 % (ref 38–73)
NRBC BLD-RTO: 0 /100 WBC
PHOSPHATE SERPL-MCNC: 2.3 MG/DL (ref 2.7–4.5)
PLATELET # BLD AUTO: 261 K/UL (ref 150–350)
PMV BLD AUTO: 10.8 FL (ref 9.2–12.9)
POTASSIUM SERPL-SCNC: 4.4 MMOL/L (ref 3.5–5.1)
RBC # BLD AUTO: 4.07 M/UL (ref 4–5.4)
SODIUM SERPL-SCNC: 139 MMOL/L (ref 136–145)
TRANS ERYTHROCYTES VOL PATIENT: NORMAL ML
WBC # BLD AUTO: 15.64 K/UL (ref 3.9–12.7)

## 2020-01-18 PROCEDURE — 99231 PR SUBSEQUENT HOSPITAL CARE,LEVL I: ICD-10-PCS | Mod: HCNC,,, | Performed by: INTERNAL MEDICINE

## 2020-01-18 PROCEDURE — 11000001 HC ACUTE MED/SURG PRIVATE ROOM: Mod: HCNC

## 2020-01-18 PROCEDURE — 85025 COMPLETE CBC W/AUTO DIFF WBC: CPT | Mod: HCNC

## 2020-01-18 PROCEDURE — 99232 PR SUBSEQUENT HOSPITAL CARE,LEVL II: ICD-10-PCS | Mod: HCNC,GC,, | Performed by: INTERNAL MEDICINE

## 2020-01-18 PROCEDURE — 94761 N-INVAS EAR/PLS OXIMETRY MLT: CPT | Mod: HCNC

## 2020-01-18 PROCEDURE — 99232 SBSQ HOSP IP/OBS MODERATE 35: CPT | Mod: HCNC,GC,, | Performed by: INTERNAL MEDICINE

## 2020-01-18 PROCEDURE — 25000003 PHARM REV CODE 250: Mod: HCNC | Performed by: PHYSICIAN ASSISTANT

## 2020-01-18 PROCEDURE — 99231 SBSQ HOSP IP/OBS SF/LOW 25: CPT | Mod: HCNC,,, | Performed by: INTERNAL MEDICINE

## 2020-01-18 PROCEDURE — 80048 BASIC METABOLIC PNL TOTAL CA: CPT | Mod: HCNC

## 2020-01-18 PROCEDURE — 83735 ASSAY OF MAGNESIUM: CPT | Mod: HCNC

## 2020-01-18 PROCEDURE — 84100 ASSAY OF PHOSPHORUS: CPT | Mod: HCNC

## 2020-01-18 PROCEDURE — 25000003 PHARM REV CODE 250: Mod: HCNC | Performed by: HOSPITALIST

## 2020-01-18 PROCEDURE — P9021 RED BLOOD CELLS UNIT: HCPCS | Mod: HCNC

## 2020-01-18 PROCEDURE — 25000003 PHARM REV CODE 250: Mod: HCNC | Performed by: INTERNAL MEDICINE

## 2020-01-18 PROCEDURE — G0378 HOSPITAL OBSERVATION PER HR: HCPCS | Mod: HCNC

## 2020-01-18 PROCEDURE — 36415 COLL VENOUS BLD VENIPUNCTURE: CPT | Mod: HCNC

## 2020-01-18 RX ORDER — FENOFIBRATE 145 MG/1
145 TABLET, FILM COATED ORAL DAILY
Status: DISCONTINUED | OUTPATIENT
Start: 2020-01-18 | End: 2020-01-20 | Stop reason: HOSPADM

## 2020-01-18 RX ORDER — AMOXICILLIN 250 MG
1 CAPSULE ORAL 2 TIMES DAILY
Status: DISCONTINUED | OUTPATIENT
Start: 2020-01-18 | End: 2020-01-20 | Stop reason: HOSPADM

## 2020-01-18 RX ORDER — POLYETHYLENE GLYCOL 3350 17 G/17G
17 POWDER, FOR SOLUTION ORAL DAILY
Status: DISCONTINUED | OUTPATIENT
Start: 2020-01-18 | End: 2020-01-20 | Stop reason: HOSPADM

## 2020-01-18 RX ORDER — POLYETHYLENE GLYCOL 3350, SODIUM SULFATE ANHYDROUS, SODIUM BICARBONATE, SODIUM CHLORIDE, POTASSIUM CHLORIDE 236; 22.74; 6.74; 5.86; 2.97 G/4L; G/4L; G/4L; G/4L; G/4L
4000 POWDER, FOR SOLUTION ORAL ONCE
Status: COMPLETED | OUTPATIENT
Start: 2020-01-19 | End: 2020-01-19

## 2020-01-18 RX ORDER — LIDOCAINE 50 MG/G
1 PATCH TOPICAL
Status: DISCONTINUED | OUTPATIENT
Start: 2020-01-18 | End: 2020-01-20 | Stop reason: HOSPADM

## 2020-01-18 RX ORDER — FERROUS SULFATE 325(65) MG
325 TABLET, DELAYED RELEASE (ENTERIC COATED) ORAL DAILY
Status: DISCONTINUED | OUTPATIENT
Start: 2020-01-18 | End: 2020-01-20 | Stop reason: HOSPADM

## 2020-01-18 RX ORDER — SIMETHICONE 80 MG
1 TABLET,CHEWABLE ORAL 3 TIMES DAILY PRN
Status: DISCONTINUED | OUTPATIENT
Start: 2020-01-18 | End: 2020-01-20 | Stop reason: HOSPADM

## 2020-01-18 RX ADMIN — DIBASIC SODIUM PHOSPHATE, MONOBASIC POTASSIUM PHOSPHATE AND MONOBASIC SODIUM PHOSPHATE 2 TABLET: 852; 155; 130 TABLET ORAL at 06:01

## 2020-01-18 RX ADMIN — FERROUS SULFATE TAB EC 325 MG (65 MG FE EQUIVALENT) 325 MG: 325 (65 FE) TABLET DELAYED RESPONSE at 04:01

## 2020-01-18 RX ADMIN — HYDROCODONE BITARTRATE AND ACETAMINOPHEN 1 TABLET: 10; 325 TABLET ORAL at 12:01

## 2020-01-18 RX ADMIN — PANTOPRAZOLE SODIUM 40 MG: 40 TABLET, DELAYED RELEASE ORAL at 04:01

## 2020-01-18 RX ADMIN — OXYBUTYNIN CHLORIDE 5 MG: 5 TABLET ORAL at 09:01

## 2020-01-18 RX ADMIN — LIDOCAINE 1 PATCH: 50 PATCH TOPICAL at 12:01

## 2020-01-18 RX ADMIN — FENOFIBRATE 145 MG: 145 TABLET, FILM COATED ORAL at 04:01

## 2020-01-18 RX ADMIN — SENNOSIDES AND DOCUSATE SODIUM 1 TABLET: 8.6; 5 TABLET ORAL at 04:01

## 2020-01-18 RX ADMIN — POLYETHYLENE GLYCOL 3350 17 G: 17 POWDER, FOR SOLUTION ORAL at 04:01

## 2020-01-18 RX ADMIN — DIBASIC SODIUM PHOSPHATE, MONOBASIC POTASSIUM PHOSPHATE AND MONOBASIC SODIUM PHOSPHATE 2 TABLET: 852; 155; 130 TABLET ORAL at 04:01

## 2020-01-18 RX ADMIN — OXCARBAZEPINE 300 MG: 300 TABLET, FILM COATED ORAL at 09:01

## 2020-01-18 RX ADMIN — SIMETHICONE CHEW TAB 80 MG 80 MG: 80 TABLET ORAL at 12:01

## 2020-01-18 RX ADMIN — DIBASIC SODIUM PHOSPHATE, MONOBASIC POTASSIUM PHOSPHATE AND MONOBASIC SODIUM PHOSPHATE 2 TABLET: 852; 155; 130 TABLET ORAL at 09:01

## 2020-01-18 RX ADMIN — HYDROCODONE BITARTRATE AND ACETAMINOPHEN 1 TABLET: 10; 325 TABLET ORAL at 06:01

## 2020-01-18 RX ADMIN — PANTOPRAZOLE SODIUM 40 MG: 40 TABLET, DELAYED RELEASE ORAL at 06:01

## 2020-01-18 RX ADMIN — SENNOSIDES AND DOCUSATE SODIUM 1 TABLET: 8.6; 5 TABLET ORAL at 09:01

## 2020-01-18 NOTE — ED NOTES
Dr Tang at bedside to reassess pt and discuss results and plan of care. Consent for blood products signed and witnessed.

## 2020-01-18 NOTE — ED TRIAGE NOTES
Pt to the ER from her doctors office after blood she had drawn today during a routine visit revealed a low hemoglobin.  Pt with complaints of generalized weakness, SOB with exertion, and paleness.

## 2020-01-18 NOTE — NURSING
Pt complaining of back pain and severe abdominal fullness. Med team G notified. Dr. Staley to bedside to evaluate. MD to place orders. Will continue to monitor.

## 2020-01-18 NOTE — H&P
"[facility] Medicine  History & Physical    Patient Name: Marycruz Ramirez  MRN: [id number]  Admission Date: 1/17/2020  Attending Physician: Chung Dailey MD   Primary Care Provider: Marcelino Calle MD    [facility] Medicine Team: [facility] MED G Micah Staley MD     Patient information was obtained from patient, spouse/SO and past medical records.    THIS NOTE IS A LATE ENTRY DOCUMENTATION OF CARE PROVIDED PRIOR TO MIDNIGHT 1/17/2020.     Subjective:     Principal Problem:Iron deficiency anemia due to chronic blood loss    Chief Complaint:   Chief Complaint   Patient presents with    Abnormal Lab     low h&h, told to come get transfusion        HPI: 74F sent to ED after outpatient labs revealed anemia.  She denies any classical symptoms but does report having more "senior moments" lately where she loses her train of thought in a conversation or has difficulty finding the right words.  She denies noting any melena or experiencing any abdominal pain.  She has had a colonoscopy before.  Appetite and energy level has been normal.      History reviewed. No pertinent past medical history.    Past Surgical History:   Procedure Laterality Date    BACK SURGERY      KNEE SURGERY Bilateral        Review of patient's allergies indicates:  No Known Allergies    No current facility-administered medications on file prior to encounter.      Current Outpatient Medications on File Prior to Encounter   Medication Sig    cetirizine (ZYRTEC) 10 MG tablet Take by mouth.    fenofibrate (TRICOR) 145 MG tablet     HYDROcodone-acetaminophen (NORCO)  mg per tablet Take by mouth.    HYDROcodone-acetaminophen (NORCO)  mg per tablet     OXcarbazepine (TRILEPTAL) 300 MG Tab Take by mouth.    oxybutynin (DITROPAN) 5 MG Tab     spironolactone (ALDACTONE) 50 MG tablet Take by mouth.    tolterodine (DETROL LA) 4 MG 24 hr capsule Take by mouth.     Family History     None        Tobacco Use "    Smoking status: Current Every Day Smoker    Smokeless tobacco: Never Used   Substance and Sexual Activity    Alcohol use: Yes     Frequency: Never     Drinks per session: 1 or 2    Drug use: Never    Sexual activity: Not Currently     Review of Systems   Constitutional: Negative for activity change, appetite change, fatigue and unexpected weight change.   HENT: Negative for mouth sores, nosebleeds and trouble swallowing.    Eyes: Negative for photophobia and visual disturbance.   Respiratory: Negative for cough, chest tightness and shortness of breath.    Cardiovascular: Negative for chest pain and leg swelling.   Gastrointestinal: Negative for abdominal pain, blood in stool, constipation, nausea and vomiting.   Endocrine: Negative for polydipsia and polyuria.   Genitourinary: Negative for dysuria and hematuria.   Musculoskeletal: Positive for back pain (chronic). Negative for joint swelling.   Skin: Negative for color change and wound.   Neurological: Negative for dizziness, syncope, light-headedness and headaches.     Objective:     Vital Signs (Most Recent):  Temp: 97.8 °F (36.6 °C) (01/18/20 0059)  Pulse: 70 (01/18/20 0059)  Resp: 14 (01/18/20 0059)  BP: 108/66 (01/18/20 0059)  SpO2: 99 % (01/18/20 0059) Vital Signs (24h Range):  Temp:  [97.5 °F (36.4 °C)-98.9 °F (37.2 °C)] 97.8 °F (36.6 °C)  Pulse:  [64-84] 70  Resp:  [14-22] 14  SpO2:  [95 %-100 %] 99 %  BP: (101-121)/(47-66) 108/66     Weight: 54.4 kg (120 lb)  Body mass index is 23.44 kg/m².    Physical Exam   Constitutional: She is oriented to person, place, and time. She appears well-developed and well-nourished. No distress.   HENT:   Head: Normocephalic and atraumatic.   Mouth/Throat: Oropharynx is clear and moist.   Eyes: Pupils are equal, round, and reactive to light. Conjunctivae and EOM are normal. No scleral icterus.   Neck: Normal range of motion. Neck supple. No JVD present.   Cardiovascular: Normal rate, regular rhythm, normal heart  sounds and intact distal pulses. Exam reveals no gallop and no friction rub.   No murmur heard.  Pulmonary/Chest: Effort normal and breath sounds normal.   Abdominal: She exhibits distension. Bowel sounds are decreased. There is generalized tenderness. There is no rigidity, no rebound and no guarding.   On initial evaluation her abdomen was non-tender but appeared slightly distended, and she was about to eat some chicken and rice brought to her from the after-hours tray service.  About an hour later I was called back to see her by her nurse due to complaint of abdominal pain and distension, with notably increased distension and mild diffuse tenderness.   Musculoskeletal: Normal range of motion. She exhibits no edema or tenderness.   Lymphadenopathy:     She has no cervical adenopathy.   Neurological: She is alert and oriented to person, place, and time. No cranial nerve deficit.   Skin: Skin is warm and dry. No erythema.   Nursing note and vitals reviewed.        CRANIAL NERVES     CN III, IV, VI   Pupils are equal, round, and reactive to light.  Extraocular motions are normal.        Significant Labs:   CBC:   Recent Labs   Lab 01/17/20  0833 01/17/20  1707   WBC 8.9 9.40   HGB 6.1* 5.5*   HCT 21.0* 21.3*    274     CMP:   Recent Labs   Lab 01/17/20  0833 01/17/20  1707    139   K 4.0 3.9    107   CO2 23 24   * 167*   BUN 18 17   CREATININE 0.7 0.7   CALCIUM 10.2 9.9   PROT 6.8 6.4   ALBUMIN 4.4 3.6   BILITOT 0.4 0.3   ALKPHOS 47 51*   AST 16 15   ALT 11 10   ANIONGAP 18 8   EGFRNONAA  --  >60.0       Significant Imaging: I have reviewed all pertinent imaging results/findings within the past 24 hours.    Assessment/Plan:     * Iron deficiency anemia due to chronic blood loss  Likely due to slow upper GI bleed.  Being transfused PRBCs; will repeat CBC in am.  Can likely follow daily.  Iron studies consistent with iron depletion, which can be supplemented one oral intake resumed.  Patient  NPO for EGD in event it can be done in am.        Abdominal distension  Likely represents trapped intestinal gas after eating.  Will attempt some simethicone and some local heat to the abdomen, and if this does not work will get X-ray to evaluate for obstruction/ileus      GI bleeding  As likely slow bleed will put on oral PPI bid.  GI consult for EGD.        Chronic radicular lumbar pain  She is on chronic opiate therapy which can be continued as she takes normally; will supplement with local heat as the bed here seems to be less comfortable.        VTE Risk Mitigation (From admission, onward)         Ordered     IP VTE HIGH RISK PATIENT  Once      01/17/20 2347     Place RAMESH hose  Until discontinued      01/17/20 2347     Place RAMESH hose  Until discontinued      01/17/20 2347     Place sequential compression device  Until discontinued      01/17/20 2347     Reason for No Pharmacological VTE Prophylaxis  Once     Question:  Reasons:  Answer:  Active Bleeding    01/17/20 2347                   Micah Staley MD  Department of Hospital Medicine   [facility]

## 2020-01-18 NOTE — ED NOTES
Infusion of blood products initiated as documented.  Pt informed of signs and symptoms of possible adverse reaction to blood products and advised to inform nurse if such symptoms occur.  Pt verbalized understanding.  Nurse to remain at bedside during initial 15 minutes of blood transfusion.

## 2020-01-18 NOTE — CONSULTS
[facility]  Gastroenterology Service  Consult Note    Patient Name: Marycruz Ramirez  MRN: [id number]  Admission Date: 1/17/2020  Hospital Length of Stay: 1 days  Code Status: Full Code   Attending Provider: Chung Dailey MD   Consulting Provider: William Kaur MD  Primary Care Physician: Marcelino Calle MD  Principal Problem:Iron deficiency anemia due to chronic blood loss    Inpatient consult to Gastroenterology  Consult performed by: William Kaur MD  Consult ordered by: Micah Staley MD        Subjective:     HPI: Marycruz Ramirez is a 74 y.o. female with history of bipolar disorder, iron deficiency anemia, chronic back pain on opioids who presents with anemia noted on outpatient labs. GI consulted for anemia.    Noted on labs yesterday to be anemic (do not have those lab values here). Here noted to have hemoglobin down to 5.5 with last value in the 14s in 2007. Patient states she gets labs done every 6 months but was never told she was anemic. Denies any overt bleeding including hematemesis, melena, hematochezia, hematuria, or vaginal bleeding.  Denies lightheadedness, fatigue. Denies any NSAIDs or blood thinners. No abdominal pain. Some weight loss last 3 months, but unable to specify how much. Last colonoscopy was 5-10 years ago, reportedly normal. She is adamant that she does not want one again due to having to take a prep. Has never had an EGD before. She does report some first cousins with thalassemia. On Trileptal but has been on this for many years. No intestinal surgeries.    Here, vital signs normal. Hgb 5.5, receiving blood. MCV 69. BUN 18 and Cr 0.7. Plts 274. Retic 2.1. Iron 25 and % sat 4 (no ferritin). Normal B12 and folate. Tbili 0.3. Joann negative. LD normal.        History reviewed. No pertinent past medical history.    Past Surgical History:   Procedure Laterality Date    BACK SURGERY      KNEE SURGERY Bilateral        History reviewed. No pertinent  family history.    Social History     Socioeconomic History    Marital status:      Spouse name: Not on file    Number of children: Not on file    Years of education: Not on file    Highest education level: Not on file   Occupational History    Not on file   Social Needs    Financial resource strain: Not on file    Food insecurity:     Worry: Not on file     Inability: Not on file    Transportation needs:     Medical: Not on file     Non-medical: Not on file   Tobacco Use    Smoking status: Current Every Day Smoker    Smokeless tobacco: Never Used   Substance and Sexual Activity    Alcohol use: Yes     Frequency: Never     Drinks per session: 1 or 2    Drug use: Never    Sexual activity: Not Currently   Lifestyle    Physical activity:     Days per week: Not on file     Minutes per session: Not on file    Stress: Not at all   Relationships    Social connections:     Talks on phone: Not on file     Gets together: Not on file     Attends Buddhism service: Not on file     Active member of club or organization: Not on file     Attends meetings of clubs or organizations: Not on file     Relationship status: Not on file   Other Topics Concern    Not on file   Social History Narrative    Not on file       No current facility-administered medications on file prior to encounter.      Current Outpatient Medications on File Prior to Encounter   Medication Sig Dispense Refill    cetirizine (ZYRTEC) 10 MG tablet Take by mouth.      fenofibrate (TRICOR) 145 MG tablet       HYDROcodone-acetaminophen (NORCO)  mg per tablet Take by mouth.      HYDROcodone-acetaminophen (NORCO)  mg per tablet       OXcarbazepine (TRILEPTAL) 300 MG Tab Take by mouth.      oxybutynin (DITROPAN) 5 MG Tab       spironolactone (ALDACTONE) 50 MG tablet Take by mouth.      tolterodine (DETROL LA) 4 MG 24 hr capsule Take by mouth.         Review of patient's allergies indicates:  No Known Allergies    Review of  Systems:   Constitutional: no fever, chills or change in weight   Eyes: no visual changes   ENT: no sore throat or dysphagia  Respiratory: no cough or shortness of breath   Cardiovascular: no chest pain or palpitations   Gastrointestinal: as per HPI  Hematologic/Lymphatic: no easy bruising or lymphadenopathy   Musculoskeletal: no arthralgias or myalgias   Neurological: no change in mental status  Behavioral/Psych: no change in mood    Objective:     Vitals:    01/18/20 0700   BP: 103/63   Pulse: 72   Resp: 14   Temp: 99.3 °F (37.4 °C)       General: Alert and Oriented, no distress. Pallor  HEENT: Normocephalic, Atraumatic. No scleral icterus.  Resp: Good air entry bilaterally, no adventitious sounds  Cardiac: S1 and S2 normal  Abdomen: Normoactive bowel sounds. Non-distended. Normal tympany. Soft. Non-tender. No peritoneal signs.  Extremities: No peripheral edema.   Neurologic: No gross neurological Deficits  Psych: Calm, cooperative. Normal mood and affect.    Significant Labs:  Recent Labs   Lab 01/17/20  0833 01/17/20  1707   HGB 6.1* 5.5*       Lab Results   Component Value Date    WBC 9.40 01/17/2020    HGB 5.5 (LL) 01/17/2020    HCT 21.3 (L) 01/17/2020    MCV 76 (L) 01/17/2020     01/17/2020       Lab Results   Component Value Date     01/17/2020    K 3.9 01/17/2020     01/17/2020    CO2 24 01/17/2020    BUN 17 01/17/2020    CREATININE 0.7 01/17/2020    CALCIUM 9.9 01/17/2020    ANIONGAP 8 01/17/2020    ESTGFRAFRICA >60.0 01/17/2020    EGFRNONAA >60.0 01/17/2020       Lab Results   Component Value Date    ALT 10 01/17/2020    AST 15 01/17/2020    ALKPHOS 51 (L) 01/17/2020    BILITOT 0.3 01/17/2020       No results found for: INR    Significant Imaging:  Reviewed pertinent radiology findings.       Assessment/Plan:     Marycruz Ramirez is a 74 y.o. female with history of bipolar disorder, iron deficiency anemia, chronic back pain on opioids who presents with asymptomatic anemia without overt  bleeding. GI consulted for anemia.    Hemodynamically stable and no overt bleeding. Microcytic. Here, Hgb 5.5 with last labs in 2007 hemoglobin 14s. Responded more than appropriately to blood transfusion. Last colonoscopy over 5 years ago and patient is adamant that she does not want one again. Never had EGD.  Evidence of iron deficiency anemia, but would evaluate for other causes of microcytic anemia (thalassemia). Retic count also relatively low.    Problem List:  1. Anemia, microcytic    Plan:  1. Recommend EGD / Colonoscopy. Will plan for Monday. Clear liquids tomorrow. Prep tomorrow evening and NPO at midnight prior to procedure.  2. Further workup of anemia per primary given low retic count.  3. Monitor H&H, if stable / responds appropriately to transfusion, ok to discharge from GI perspective    Thank you for involving us in the care of Marycruz Ramirez. Please call with any additional questions, concerns or changes in the patient's clinical status.    William Kaur MD  Gastroenterology Fellow PGY IV   [facility]

## 2020-01-18 NOTE — ED NOTES
LOC: The patient is awake, alert, and oriented to place, time, situation. Affect is appropriate.  Speech is appropriate and clear.     APPEARANCE: Patient resting comfortably in no acute distress.  Patient is clean and well groomed.    SKIN: The skin is warm and dry; color consistent with ethnicity though pt appears slightly pale.  Patient has normal skin turgor and moist mucus membranes.  Skin intact; no breakdown or bruising noted.     MUSCULOSKELETAL: Patient moving upper and lower extremities without difficulty.  Complains of generalized weakness.     RESPIRATORY: Airway is open and patent. Respirations spontaneous, even, easy, and non-labored.  Patient has a normal effort and rate.  No accessory muscle use noted. Denies cough. Pt reporting some SOB with exertion.     CARDIAC:  Normal rhythm and rate noted.  No peripheral edema noted. No complaints of chest pain.      ABDOMEN: Soft and non tender to palpation.  No distention noted. Pt denies N/V/D; denies melena or hematochezia.    NEUROLOGIC: Eyes open spontaneously.  Behavior appropriate to situation.  Follows commands; facial expression symmetrical.  Purposeful motor response noted; normal sensation in all extremities.

## 2020-01-18 NOTE — ASSESSMENT & PLAN NOTE
She is on chronic opiate therapy which can be continued as she takes normally; will supplement with local heat as the bed here seems to be less comfortable.

## 2020-01-18 NOTE — PLAN OF CARE
Pt turns and repositions independently.  No skin breakdown noted. Pt pain and safety monitored q 1-2 hrs this shift .Bed locked and in lowest position. Rails elevated x 2. Brakes on. Call light and personal belongings in reach. Pt family at bedside. Will continue monitor.

## 2020-01-18 NOTE — H&P (VIEW-ONLY)
[facility]  Gastroenterology Service  Consult Note    Patient Name: Marycruz Ramirez  MRN: [id number]  Admission Date: 1/17/2020  Hospital Length of Stay: 1 days  Code Status: Full Code   Attending Provider: Chung Dailey MD   Consulting Provider: William Kaur MD  Primary Care Physician: Marcelino Calle MD  Principal Problem:Iron deficiency anemia due to chronic blood loss    Inpatient consult to Gastroenterology  Consult performed by: William Kaur MD  Consult ordered by: Micah Staley MD        Subjective:     HPI: Marycruz Ramirez is a 74 y.o. female with history of bipolar disorder, iron deficiency anemia, chronic back pain on opioids who presents with anemia noted on outpatient labs. GI consulted for anemia.    Noted on labs yesterday to be anemic (do not have those lab values here). Here noted to have hemoglobin down to 5.5 with last value in the 14s in 2007. Patient states she gets labs done every 6 months but was never told she was anemic. Denies any overt bleeding including hematemesis, melena, hematochezia, hematuria, or vaginal bleeding.  Denies lightheadedness, fatigue. Denies any NSAIDs or blood thinners. No abdominal pain. Some weight loss last 3 months, but unable to specify how much. Last colonoscopy was 5-10 years ago, reportedly normal. She is adamant that she does not want one again due to having to take a prep. Has never had an EGD before. She does report some first cousins with thalassemia. On Trileptal but has been on this for many years. No intestinal surgeries.    Here, vital signs normal. Hgb 5.5, receiving blood. MCV 69. BUN 18 and Cr 0.7. Plts 274. Retic 2.1. Iron 25 and % sat 4 (no ferritin). Normal B12 and folate. Tbili 0.3. Joann negative. LD normal.        History reviewed. No pertinent past medical history.    Past Surgical History:   Procedure Laterality Date    BACK SURGERY      KNEE SURGERY Bilateral        History reviewed. No pertinent  family history.    Social History     Socioeconomic History    Marital status:      Spouse name: Not on file    Number of children: Not on file    Years of education: Not on file    Highest education level: Not on file   Occupational History    Not on file   Social Needs    Financial resource strain: Not on file    Food insecurity:     Worry: Not on file     Inability: Not on file    Transportation needs:     Medical: Not on file     Non-medical: Not on file   Tobacco Use    Smoking status: Current Every Day Smoker    Smokeless tobacco: Never Used   Substance and Sexual Activity    Alcohol use: Yes     Frequency: Never     Drinks per session: 1 or 2    Drug use: Never    Sexual activity: Not Currently   Lifestyle    Physical activity:     Days per week: Not on file     Minutes per session: Not on file    Stress: Not at all   Relationships    Social connections:     Talks on phone: Not on file     Gets together: Not on file     Attends Gnosticism service: Not on file     Active member of club or organization: Not on file     Attends meetings of clubs or organizations: Not on file     Relationship status: Not on file   Other Topics Concern    Not on file   Social History Narrative    Not on file       No current facility-administered medications on file prior to encounter.      Current Outpatient Medications on File Prior to Encounter   Medication Sig Dispense Refill    cetirizine (ZYRTEC) 10 MG tablet Take by mouth.      fenofibrate (TRICOR) 145 MG tablet       HYDROcodone-acetaminophen (NORCO)  mg per tablet Take by mouth.      HYDROcodone-acetaminophen (NORCO)  mg per tablet       OXcarbazepine (TRILEPTAL) 300 MG Tab Take by mouth.      oxybutynin (DITROPAN) 5 MG Tab       spironolactone (ALDACTONE) 50 MG tablet Take by mouth.      tolterodine (DETROL LA) 4 MG 24 hr capsule Take by mouth.         Review of patient's allergies indicates:  No Known Allergies    Review of  Systems:   Constitutional: no fever, chills or change in weight   Eyes: no visual changes   ENT: no sore throat or dysphagia  Respiratory: no cough or shortness of breath   Cardiovascular: no chest pain or palpitations   Gastrointestinal: as per HPI  Hematologic/Lymphatic: no easy bruising or lymphadenopathy   Musculoskeletal: no arthralgias or myalgias   Neurological: no change in mental status  Behavioral/Psych: no change in mood    Objective:     Vitals:    01/18/20 0700   BP: 103/63   Pulse: 72   Resp: 14   Temp: 99.3 °F (37.4 °C)       General: Alert and Oriented, no distress. Pallor  HEENT: Normocephalic, Atraumatic. No scleral icterus.  Resp: Good air entry bilaterally, no adventitious sounds  Cardiac: S1 and S2 normal  Abdomen: Normoactive bowel sounds. Non-distended. Normal tympany. Soft. Non-tender. No peritoneal signs.  Extremities: No peripheral edema.   Neurologic: No gross neurological Deficits  Psych: Calm, cooperative. Normal mood and affect.    Significant Labs:  Recent Labs   Lab 01/17/20  0833 01/17/20  1707   HGB 6.1* 5.5*       Lab Results   Component Value Date    WBC 9.40 01/17/2020    HGB 5.5 (LL) 01/17/2020    HCT 21.3 (L) 01/17/2020    MCV 76 (L) 01/17/2020     01/17/2020       Lab Results   Component Value Date     01/17/2020    K 3.9 01/17/2020     01/17/2020    CO2 24 01/17/2020    BUN 17 01/17/2020    CREATININE 0.7 01/17/2020    CALCIUM 9.9 01/17/2020    ANIONGAP 8 01/17/2020    ESTGFRAFRICA >60.0 01/17/2020    EGFRNONAA >60.0 01/17/2020       Lab Results   Component Value Date    ALT 10 01/17/2020    AST 15 01/17/2020    ALKPHOS 51 (L) 01/17/2020    BILITOT 0.3 01/17/2020       No results found for: INR    Significant Imaging:  Reviewed pertinent radiology findings.       Assessment/Plan:     Marycruz Ramirez is a 74 y.o. female with history of bipolar disorder, iron deficiency anemia, chronic back pain on opioids who presents with asymptomatic anemia without overt  bleeding. GI consulted for anemia.    Hemodynamically stable and no overt bleeding. Microcytic. Here, Hgb 5.5 with last labs in 2007 hemoglobin 14s. Responded more than appropriately to blood transfusion. Last colonoscopy over 5 years ago and patient is adamant that she does not want one again. Never had EGD.  Evidence of iron deficiency anemia, but would evaluate for other causes of microcytic anemia (thalassemia). Retic count also relatively low.    Problem List:  1. Anemia, microcytic    Plan:  1. Recommend EGD / Colonoscopy. Will plan for Monday. Clear liquids tomorrow. Prep tomorrow evening and NPO at midnight prior to procedure.  2. Further workup of anemia per primary given low retic count.  3. Monitor H&H, if stable / responds appropriately to transfusion, ok to discharge from GI perspective    Thank you for involving us in the care of Marycruz Ramirez. Please call with any additional questions, concerns or changes in the patient's clinical status.    William Kaur MD  Gastroenterology Fellow PGY IV   [facility]

## 2020-01-18 NOTE — ED NOTES
Rectal exam performed per Dr Tang with nurse at bedside to chaperone.  Pt tolerated well; no blood noted per Hemoccult testing.

## 2020-01-18 NOTE — ASSESSMENT & PLAN NOTE
Likely represents trapped intestinal gas after eating.  Will attempt some simethicone and some local heat to the abdomen, and if this does not work will get X-ray to evaluate for obstruction/ileus

## 2020-01-18 NOTE — ED NOTES
Pt ambulatory to the restroom independently accompanied by spouse; steady gait noted.  Pt returned to room 23 without incident.  Pt replaced on continuous cardiac and pulse ox monitoring with non-invasion blood pressure to cycle every 15 minutes.  Bed locked in lowest position; side rails up and locked x 2; call light, bedside table, and personal belongings within reach. Pt instructed to alert nurse for assistance and before attempting to get out of bed; verbalizes understanding. Spouse remains at bedside; will continue to monitor pt.

## 2020-01-18 NOTE — ASSESSMENT & PLAN NOTE
Likely due to slow upper GI bleed.  Being transfused PRBCs; will repeat CBC in am.  Can likely follow daily.  Iron studies consistent with iron depletion, which can be supplemented one oral intake resumed.  Patient NPO for EGD in event it can be done in am.

## 2020-01-18 NOTE — ED NOTES
Pt awake and alert; resting quietly on stretcher.  Pt remains on continuous cardiac and pulse ox monitoring with non-invasive blood pressure to cycle every 30 minutes.  VS stable; NSR noted. Pt denies pain at this time; no acute distress or discomfort reported or observed.  Pt denies restroom needs at this time; is able to reposition self on stretcher. Bed locked in lowest position; side rails up and locked x 2; call light, bedside table, and personal belongings within reach. Room assessed for safety measures and cleanliness; no action needed at this time. Pt updated on wait for lab results; instructed to alert nurse for assistance and before attempting to get out of bed; verbalizes understanding. Pt denies needs or complaints at this time. Spouse remains at bedside; will continue to monitor pt.

## 2020-01-18 NOTE — HPI
"74F sent to ED after outpatient labs revealed anemia.  She denies any classical symptoms but does report having more "senior moments" lately where she loses her train of thought in a conversation or has difficulty finding the right words.  She denies noting any melena or experiencing any abdominal pain.  She has had a colonoscopy before.  Appetite and energy level has been normal.    "

## 2020-01-18 NOTE — PROGRESS NOTES
"                                                    [facility] Hospital Medicine                                                                     Progress Note     Team: [facility] MED G Chung Dailey MD   Admit Date: 1/17/2020   Hospital Day: 1  EVONNE: 1/19/2020   Code status: Full Code   Principal Problem: Iron deficiency anemia due to chronic blood loss     SUMMARY:     74F sent to ED after outpatient labs revealed anemia.  She denies any classical symptoms but does report having more "senior moments" lately where she loses her train of thought in a conversation or has difficulty finding the right words.  She denies noting any melena or experiencing any abdominal pain.  She has had a colonoscopy before. Appetite and energy level has been normal.     In the ED patient was HDS and afebrile. Hgb 5.5. Given 3 units of pRBC 1/17. Admitted to hospital medicine for symptomatic anemia requiring transfusion. GI consulted for possible GIB. GI rec EGD/Colonospy. Currently GI discussing options with patient.     SUBJECTIVE:     Pt was seen and examined at bedside. Pt had no acute events overnight, and no new complaints this morning. Pt remained hemodynamically stable and afebrile. Tolerated blood transfusions well. Feels much better this morning. Asking for diet. Denies any melena or bloody BM today, and patient unsure if she noted this OP. GI consulted for possible GIB. GI rec EGD/Colonospy. Currently GI discussing options with patient. Denied any nausea, vomiting, diarrhea, or trouble urinating. Pt denies any fevers, chills, headaches, chest pain, SOB, cough. Pt has been able to ambulate without any distress.     ROS (Positive in Bold, otherwise negative)  Pain Scale: 0 /10   Constitutional: fever, chills, night sweats  CV: chest pain, edema, palpitations  Resp: SOB, cough, sputum production  GI: changes in appetite, NVDC, " pain, melena, hematochezia, GERD, hematemesis  : Dysuria, hematuria, urinary urgency, frequency  MSK: chronic arthralgia/myalgia, joint swelling  SKIN: rashes, pruritis, petechiae   Neuro/Psych: FND, anxiety, depression      OBJECTIVE:     Vitals:  Temp:  [97.5 °F (36.4 °C)-99.3 °F (37.4 °C)]   Pulse:  [64-84]   Resp:  [14-22]   BP: (101-121)/(47-70)   SpO2:  [94 %-100 %]      I & O (Last 24H):     Intake/Output Summary (Last 24 hours) at 1/18/2020 1425  Last data filed at 1/18/2020 0500  Gross per 24 hour   Intake 1884.17 ml   Output --   Net 1884.17 ml         GEN:causcian female  in no acute distress. Nontoxic. Resting in bed. Cooperative.   HEENT: NCAT. PERRL. EOMI. Conjunctivae/corneas clear, sclera Anicteric.  CVS: RRR. Normal s1 s2 no murmur, click, rub or gallop  LUNG: CTAB. Normal respiratory effort. No wheezes, rhonchi, or crackles.  ABD: Normoactive BS, soft, NT, distended, no masses or organomegaly.  EXT: No edema. No cyanosis. Full ROM.  SKIN: color, texture, turgor normal. No rashes or lesions  NEURO: Alert, oriented x 4, Spont mvt of all extremities with no focal deficits noted.      All recent labs and imaging has been reviewed.     Recent Results (from the past 24 hour(s))   Comprehensive metabolic panel    Collection Time: 01/17/20  5:07 PM   Result Value Ref Range    Sodium 139 136 - 145 mmol/L    Potassium 3.9 3.5 - 5.1 mmol/L    Chloride 107 95 - 110 mmol/L    CO2 24 23 - 29 mmol/L    Glucose 167 (H) 70 - 110 mg/dL    BUN, Bld 17 8 - 23 mg/dL    Creatinine 0.7 0.5 - 1.4 mg/dL    Calcium 9.9 8.7 - 10.5 mg/dL    Total Protein 6.4 6.0 - 8.4 g/dL    Albumin 3.6 3.5 - 5.2 g/dL    Total Bilirubin 0.3 0.1 - 1.0 mg/dL    Alkaline Phosphatase 51 (L) 55 - 135 U/L    AST 15 10 - 40 U/L    ALT 10 10 - 44 U/L    Anion Gap 8 8 - 16 mmol/L    eGFR if African American >60.0 >60 mL/min/1.73 m^2    eGFR if non African American >60.0 >60 mL/min/1.73 m^2   Type & Screen    Collection Time: 01/17/20  5:07 PM    Result Value Ref Range    Group & Rh A POS     Indirect Joann NEG    CBC auto differential    Collection Time: 01/17/20  5:07 PM   Result Value Ref Range    WBC 9.40 3.90 - 12.70 K/uL    RBC 2.81 (L) 4.00 - 5.40 M/uL    Hemoglobin 5.5 (LL) 12.0 - 16.0 g/dL    Hematocrit 21.3 (L) 37.0 - 48.5 %    Mean Corpuscular Volume 76 (L) 82 - 98 fL    Mean Corpuscular Hemoglobin 19.6 (L) 27.0 - 31.0 pg    Mean Corpuscular Hemoglobin Conc 25.8 (L) 32.0 - 36.0 g/dL    RDW 20.0 (H) 11.5 - 14.5 %    Platelets 274 150 - 350 K/uL    MPV 11.3 9.2 - 12.9 fL    Immature Granulocytes 0.2 0.0 - 0.5 %    Gran # (ANC) 5.2 1.8 - 7.7 K/uL    Immature Grans (Abs) 0.02 0.00 - 0.04 K/uL    Lymph # 3.1 1.0 - 4.8 K/uL    Mono # 0.9 0.3 - 1.0 K/uL    Eos # 0.2 0.0 - 0.5 K/uL    Baso # 0.03 0.00 - 0.20 K/uL    nRBC 0 0 /100 WBC    Gran% 54.9 38.0 - 73.0 %    Lymph% 32.6 18.0 - 48.0 %    Mono% 9.6 4.0 - 15.0 %    Eosinophil% 2.4 0.0 - 8.0 %    Basophil% 0.3 0.0 - 1.9 %    Platelet Estimate Appears normal     Aniso Slight     Poik Slight     Poly Occasional     Hypo Occasional     Ovalocytes Occasional     Target Cells Occasional     Tear Drop Cells Occasional     Basophilic Stippling Occasional     Large/Giant Platelets Present     Fragmented Cells Occasional     Differential Method Automated    Reticulocytes    Collection Time: 01/17/20  5:07 PM   Result Value Ref Range    Retic 2.1 0.5 - 2.5 %   Folate    Collection Time: 01/17/20  5:07 PM   Result Value Ref Range    Folate 11.8 4.0 - 24.0 ng/mL   Vitamin B12    Collection Time: 01/17/20  5:07 PM   Result Value Ref Range    Vitamin B-12 611 210 - 950 pg/mL   Lactate dehydrogenase    Collection Time: 01/17/20  5:07 PM   Result Value Ref Range     110 - 260 U/L   Iron and TIBC    Collection Time: 01/17/20  5:07 PM   Result Value Ref Range    Iron 25 (L) 30 - 160 ug/dL    Transferrin 476 (H) 200 - 375 mg/dL    TIBC 704 (H) 250 - 450 ug/dL    Saturated Iron 4 (L) 20 - 50 %   Direct  antiglobulin test    Collection Time: 01/17/20  5:07 PM   Result Value Ref Range    Direct Joann (DONTE) NEG    Prepare RBC 3 Units; Emergency    Collection Time: 01/17/20  5:07 PM   Result Value Ref Range    UNIT NUMBER [id number]     Product Code E6970Y56     DISPENSE STATUS ISSUED     CODING SYSTEM [id number]     Unit Blood Type Code 6200     Unit Blood Type A POS     Unit Expiration [id number]     UNIT NUMBER [id number]     Product Code M8679T36     DISPENSE STATUS TRANSFUSED     CODING SYSTEM LPKQ578     Unit Blood Type Code 6200     Unit Blood Type A POS     Unit Expiration [id number]     UNIT NUMBER [id number]     Product Code Y2320I24     DISPENSE STATUS ISSUED     CODING SYSTEM GURS351     Unit Blood Type Code 6200     Unit Blood Type A POS     Unit Expiration [id number]    Basic Metabolic Panel (BMP)    Collection Time: 01/18/20  8:37 AM   Result Value Ref Range    Sodium 139 136 - 145 mmol/L    Potassium 4.4 3.5 - 5.1 mmol/L    Chloride 112 (H) 95 - 110 mmol/L    CO2 20 (L) 23 - 29 mmol/L    Glucose 110 70 - 110 mg/dL    BUN, Bld 12 8 - 23 mg/dL    Creatinine 0.7 0.5 - 1.4 mg/dL    Calcium 9.5 8.7 - 10.5 mg/dL    Anion Gap 7 (L) 8 - 16 mmol/L    eGFR if African American >60.0 >60 mL/min/1.73 m^2    eGFR if non African American >60.0 >60 mL/min/1.73 m^2   Magnesium    Collection Time: 01/18/20  8:37 AM   Result Value Ref Range    Magnesium 1.8 1.6 - 2.6 mg/dL   Phosphorus    Collection Time: 01/18/20  8:37 AM   Result Value Ref Range    Phosphorus 2.3 (L) 2.7 - 4.5 mg/dL   CBC with Automated Differential    Collection Time: 01/18/20  8:37 AM   Result Value Ref Range    WBC 15.64 (H) 3.90 - 12.70 K/uL    RBC 4.07 4.00 - 5.40 M/uL    Hemoglobin 9.4 (L) 12.0 - 16.0 g/dL    Hematocrit 33.6 (L) 37.0 - 48.5 %    Mean Corpuscular Volume 83 82 - 98 fL    Mean Corpuscular Hemoglobin 23.1 (L) 27.0 - 31.0 pg    Mean Corpuscular Hemoglobin Conc 28.0 (L) 32.0 - 36.0 g/dL    RDW 19.9 (H) 11.5 - 14.5 %     Platelets 261 150 - 350 K/uL    MPV 10.8 9.2 - 12.9 fL    Immature Granulocytes 0.4 0.0 - 0.5 %    Gran # (ANC) 12.5 (H) 1.8 - 7.7 K/uL    Immature Grans (Abs) 0.07 (H) 0.00 - 0.04 K/uL    Lymph # 2.4 1.0 - 4.8 K/uL    Mono # 0.4 0.3 - 1.0 K/uL    Eos # 0.2 0.0 - 0.5 K/uL    Baso # 0.06 0.00 - 0.20 K/uL    nRBC 0 0 /100 WBC    Gran% 80.1 (H) 38.0 - 73.0 %    Lymph% 15.3 (L) 18.0 - 48.0 %    Mono% 2.6 (L) 4.0 - 15.0 %    Eosinophil% 1.2 0.0 - 8.0 %    Basophil% 0.4 0.0 - 1.9 %    Differential Method Automated        No results for input(s): POCTGLUCOSE in the last 168 hours.    No results found for: HGBA1C     Active Hospital Problems    Diagnosis  POA    *Iron deficiency anemia due to chronic blood loss [D50.0]  Yes    Abdominal distension [R14.0]  Yes    GI bleeding [K92.2]  Yes    Chronic radicular lumbar pain [M54.16, G89.29]  Yes     Dr allen is managing pain cont current regimen         Resolved Hospital Problems   No resolved problems to display.          ASSESSMENT AND PLAN:     Symptomatic anemia   Iron deficiency anemia  Suspected chronic blood loss anemia  Suspected possible GI bleeding  - Presenting with Hgb 5.5 s/p 3 units of pRBC 1/17. Repeat hgb 9.4  - Patient unsure if she has melena or bloody stool at this time  - BUN/Cr ratio preserved at this time  - Iron panel consistent with SANDRO, last colonoscopy as per patient was ~6 years ago and wnl as per patient   - Continue oral PPI for now  - FOBT pending  - DONTE neg, TB, LDH, B12, Folate wnl  - Retic index low - BM hypoproliferation   - GI consulted, rec tentative EGD/colonoscopy  - Okay for diet as per GI for today, GI to discuss with patient regarding inpatient vs outpatient colonoscopy  - Daily iron tabs, will not give IV iron since patient received 3 units of blood here   - Serial Hgb and transfuse as needed    Leukocytosis  - suspecting reactive at this time   - no overt signs of infection at this time    Hypophosphatemia  - replete and recheck      Abdominal distension  - Simethicone PRN  - KUB ordered  - She is passing gas and abdomen is benign  - Note she is on chronic narcotics at home ho     Chronic radicular lumbar pain  - She is on chronic opiate therapy which can be continued as she takes normally; will supplement with local heat as the bed here seems to be less comfortable.  - Scheduled stool softeners     Bipolar disorder  - continue home trileptal  - OP psych     Mixed hyperlipemia   - on home tricor, continue        Prophylaxis- SCDs  Code Status- Full Code   Discharge plan and follow up - d/c home once medically stable    Chung Dailey MD  Hospital Medicine Staff  Pager [phone]

## 2020-01-18 NOTE — SUBJECTIVE & OBJECTIVE
History reviewed. No pertinent past medical history.    Past Surgical History:   Procedure Laterality Date    BACK SURGERY      KNEE SURGERY Bilateral        Review of patient's allergies indicates:  No Known Allergies    No current facility-administered medications on file prior to encounter.      Current Outpatient Medications on File Prior to Encounter   Medication Sig    cetirizine (ZYRTEC) 10 MG tablet Take by mouth.    fenofibrate (TRICOR) 145 MG tablet     HYDROcodone-acetaminophen (NORCO)  mg per tablet Take by mouth.    HYDROcodone-acetaminophen (NORCO)  mg per tablet     OXcarbazepine (TRILEPTAL) 300 MG Tab Take by mouth.    oxybutynin (DITROPAN) 5 MG Tab     spironolactone (ALDACTONE) 50 MG tablet Take by mouth.    tolterodine (DETROL LA) 4 MG 24 hr capsule Take by mouth.     Family History     None        Tobacco Use    Smoking status: Current Every Day Smoker    Smokeless tobacco: Never Used   Substance and Sexual Activity    Alcohol use: Yes     Frequency: Never     Drinks per session: 1 or 2    Drug use: Never    Sexual activity: Not Currently     Review of Systems   Constitutional: Negative for activity change, appetite change, fatigue and unexpected weight change.   HENT: Negative for mouth sores, nosebleeds and trouble swallowing.    Eyes: Negative for photophobia and visual disturbance.   Respiratory: Negative for cough, chest tightness and shortness of breath.    Cardiovascular: Negative for chest pain and leg swelling.   Gastrointestinal: Negative for abdominal pain, blood in stool, constipation, nausea and vomiting.   Endocrine: Negative for polydipsia and polyuria.   Genitourinary: Negative for dysuria and hematuria.   Musculoskeletal: Positive for back pain (chronic). Negative for joint swelling.   Skin: Negative for color change and wound.   Neurological: Negative for dizziness, syncope, light-headedness and headaches.     Objective:     Vital Signs (Most  Recent):  Temp: 97.8 °F (36.6 °C) (01/18/20 0059)  Pulse: 70 (01/18/20 0059)  Resp: 14 (01/18/20 0059)  BP: 108/66 (01/18/20 0059)  SpO2: 99 % (01/18/20 0059) Vital Signs (24h Range):  Temp:  [97.5 °F (36.4 °C)-98.9 °F (37.2 °C)] 97.8 °F (36.6 °C)  Pulse:  [64-84] 70  Resp:  [14-22] 14  SpO2:  [95 %-100 %] 99 %  BP: (101-121)/(47-66) 108/66     Weight: 54.4 kg (120 lb)  Body mass index is 23.44 kg/m².    Physical Exam   Constitutional: She is oriented to person, place, and time. She appears well-developed and well-nourished. No distress.   HENT:   Head: Normocephalic and atraumatic.   Mouth/Throat: Oropharynx is clear and moist.   Eyes: Pupils are equal, round, and reactive to light. Conjunctivae and EOM are normal. No scleral icterus.   Neck: Normal range of motion. Neck supple. No JVD present.   Cardiovascular: Normal rate, regular rhythm, normal heart sounds and intact distal pulses. Exam reveals no gallop and no friction rub.   No murmur heard.  Pulmonary/Chest: Effort normal and breath sounds normal.   Abdominal: She exhibits distension. Bowel sounds are decreased. There is generalized tenderness. There is no rigidity, no rebound and no guarding.   On initial evaluation her abdomen was non-tender but appeared slightly distended, and she was about to eat some chicken and rice brought to her from the after-hours tray service.  About an hour later I was called back to see her by her nurse due to complaint of abdominal pain and distension, with notably increased distension and mild diffuse tenderness.   Musculoskeletal: Normal range of motion. She exhibits no edema or tenderness.   Lymphadenopathy:     She has no cervical adenopathy.   Neurological: She is alert and oriented to person, place, and time. No cranial nerve deficit.   Skin: Skin is warm and dry. No erythema.   Nursing note and vitals reviewed.        CRANIAL NERVES     CN III, IV, VI   Pupils are equal, round, and reactive to light.  Extraocular motions  are normal.        Significant Labs:   CBC:   Recent Labs   Lab 01/17/20  0833 01/17/20  1707   WBC 8.9 9.40   HGB 6.1* 5.5*   HCT 21.0* 21.3*    274     CMP:   Recent Labs   Lab 01/17/20  0833 01/17/20  1707    139   K 4.0 3.9    107   CO2 23 24   * 167*   BUN 18 17   CREATININE 0.7 0.7   CALCIUM 10.2 9.9   PROT 6.8 6.4   ALBUMIN 4.4 3.6   BILITOT 0.4 0.3   ALKPHOS 47 51*   AST 16 15   ALT 11 10   ANIONGAP 18 8   EGFRNONAA  --  >60.0       Significant Imaging: I have reviewed all pertinent imaging results/findings within the past 24 hours.

## 2020-01-18 NOTE — ED NOTES
Pt placed on continuous cardiac and pulse ox monitoring with blood pressure to cycle every 30 minutes.  VS stable; NSR noted.  Bed locked in lowest position; side rails up and locked x 2; call light, bedside table, and personal belongings within reach.  Pt instructed to alert nurse for assistance before attempting to get out of bed; verbalizes understanding.  Pt denies needs or complaints at this time. Spouse remains at bedside; will continue to monitor pt.

## 2020-01-19 LAB
ANION GAP SERPL CALC-SCNC: 8 MMOL/L (ref 8–16)
BASOPHILS # BLD AUTO: 0.04 K/UL (ref 0–0.2)
BASOPHILS NFR BLD: 0.4 % (ref 0–1.9)
BUN SERPL-MCNC: 9 MG/DL (ref 8–23)
CALCIUM SERPL-MCNC: 8.7 MG/DL (ref 8.7–10.5)
CHLORIDE SERPL-SCNC: 109 MMOL/L (ref 95–110)
CO2 SERPL-SCNC: 22 MMOL/L (ref 23–29)
CREAT SERPL-MCNC: 0.6 MG/DL (ref 0.5–1.4)
DIFFERENTIAL METHOD: ABNORMAL
EOSINOPHIL # BLD AUTO: 0.2 K/UL (ref 0–0.5)
EOSINOPHIL NFR BLD: 1.6 % (ref 0–8)
ERYTHROCYTE [DISTWIDTH] IN BLOOD BY AUTOMATED COUNT: 19.5 % (ref 11.5–14.5)
EST. GFR  (AFRICAN AMERICAN): >60 ML/MIN/1.73 M^2
EST. GFR  (NON AFRICAN AMERICAN): >60 ML/MIN/1.73 M^2
GLUCOSE SERPL-MCNC: 103 MG/DL (ref 70–110)
HCT VFR BLD AUTO: 30.9 % (ref 37–48.5)
HGB BLD-MCNC: 8.9 G/DL (ref 12–16)
IMM GRANULOCYTES # BLD AUTO: 0.04 K/UL (ref 0–0.04)
IMM GRANULOCYTES NFR BLD AUTO: 0.4 % (ref 0–0.5)
LYMPHOCYTES # BLD AUTO: 1.7 K/UL (ref 1–4.8)
LYMPHOCYTES NFR BLD: 16.5 % (ref 18–48)
MAGNESIUM SERPL-MCNC: 1.8 MG/DL (ref 1.6–2.6)
MCH RBC QN AUTO: 23.2 PG (ref 27–31)
MCHC RBC AUTO-ENTMCNC: 28.8 G/DL (ref 32–36)
MCV RBC AUTO: 81 FL (ref 82–98)
MONOCYTES # BLD AUTO: 0.8 K/UL (ref 0.3–1)
MONOCYTES NFR BLD: 7.7 % (ref 4–15)
NEUTROPHILS # BLD AUTO: 7.4 K/UL (ref 1.8–7.7)
NEUTROPHILS NFR BLD: 73.4 % (ref 38–73)
NRBC BLD-RTO: 0 /100 WBC
PHOSPHATE SERPL-MCNC: 3.2 MG/DL (ref 2.7–4.5)
PLATELET # BLD AUTO: 223 K/UL (ref 150–350)
PMV BLD AUTO: 11.4 FL (ref 9.2–12.9)
POTASSIUM SERPL-SCNC: 3.5 MMOL/L (ref 3.5–5.1)
RBC # BLD AUTO: 3.83 M/UL (ref 4–5.4)
SODIUM SERPL-SCNC: 139 MMOL/L (ref 136–145)
WBC # BLD AUTO: 10.06 K/UL (ref 3.9–12.7)

## 2020-01-19 PROCEDURE — 85025 COMPLETE CBC W/AUTO DIFF WBC: CPT | Mod: HCNC

## 2020-01-19 PROCEDURE — 99231 SBSQ HOSP IP/OBS SF/LOW 25: CPT | Mod: HCNC,,, | Performed by: INTERNAL MEDICINE

## 2020-01-19 PROCEDURE — 25000003 PHARM REV CODE 250: Mod: HCNC | Performed by: PHYSICIAN ASSISTANT

## 2020-01-19 PROCEDURE — 83735 ASSAY OF MAGNESIUM: CPT | Mod: HCNC

## 2020-01-19 PROCEDURE — 84100 ASSAY OF PHOSPHORUS: CPT | Mod: HCNC

## 2020-01-19 PROCEDURE — G0378 HOSPITAL OBSERVATION PER HR: HCPCS | Mod: HCNC

## 2020-01-19 PROCEDURE — 11000001 HC ACUTE MED/SURG PRIVATE ROOM: Mod: HCNC

## 2020-01-19 PROCEDURE — 25000003 PHARM REV CODE 250: Mod: HCNC | Performed by: HOSPITALIST

## 2020-01-19 PROCEDURE — 82272 OCCULT BLD FECES 1-3 TESTS: CPT | Mod: HCNC

## 2020-01-19 PROCEDURE — 80048 BASIC METABOLIC PNL TOTAL CA: CPT | Mod: HCNC

## 2020-01-19 PROCEDURE — 36415 COLL VENOUS BLD VENIPUNCTURE: CPT | Mod: HCNC

## 2020-01-19 PROCEDURE — 25000003 PHARM REV CODE 250: Mod: HCNC | Performed by: INTERNAL MEDICINE

## 2020-01-19 PROCEDURE — 99231 PR SUBSEQUENT HOSPITAL CARE,LEVL I: ICD-10-PCS | Mod: HCNC,,, | Performed by: INTERNAL MEDICINE

## 2020-01-19 PROCEDURE — 63700000 PHARM REV CODE 250 ALT 637 W/O HCPCS: Mod: HCNC | Performed by: INTERNAL MEDICINE

## 2020-01-19 RX ORDER — POTASSIUM CHLORIDE 20 MEQ/15ML
40 SOLUTION ORAL ONCE
Status: COMPLETED | OUTPATIENT
Start: 2020-01-19 | End: 2020-01-19

## 2020-01-19 RX ORDER — GLYCERIN 1 G/1
1 SUPPOSITORY RECTAL ONCE
Status: COMPLETED | OUTPATIENT
Start: 2020-01-19 | End: 2020-01-19

## 2020-01-19 RX ADMIN — POLYETHYLENE GLYCOL 3350, SODIUM SULFATE ANHYDROUS, SODIUM BICARBONATE, SODIUM CHLORIDE, POTASSIUM CHLORIDE 4000 ML: 236; 22.74; 6.74; 5.86; 2.97 POWDER, FOR SOLUTION ORAL at 03:01

## 2020-01-19 RX ADMIN — GLYCERIN 1 SUPPOSITORY: 2 SUPPOSITORY RECTAL at 04:01

## 2020-01-19 RX ADMIN — HYDROCODONE BITARTRATE AND ACETAMINOPHEN 1 TABLET: 10; 325 TABLET ORAL at 02:01

## 2020-01-19 RX ADMIN — FENOFIBRATE 145 MG: 145 TABLET, FILM COATED ORAL at 08:01

## 2020-01-19 RX ADMIN — OXYBUTYNIN CHLORIDE 5 MG: 5 TABLET ORAL at 08:01

## 2020-01-19 RX ADMIN — SENNOSIDES AND DOCUSATE SODIUM 1 TABLET: 8.6; 5 TABLET ORAL at 08:01

## 2020-01-19 RX ADMIN — POTASSIUM CHLORIDE 40 MEQ: 20 SOLUTION ORAL at 04:01

## 2020-01-19 RX ADMIN — HYDROCODONE BITARTRATE AND ACETAMINOPHEN 1 TABLET: 10; 325 TABLET ORAL at 07:01

## 2020-01-19 RX ADMIN — HYDROCODONE BITARTRATE AND ACETAMINOPHEN 1 TABLET: 10; 325 TABLET ORAL at 01:01

## 2020-01-19 RX ADMIN — POLYETHYLENE GLYCOL 3350 17 G: 17 POWDER, FOR SOLUTION ORAL at 08:01

## 2020-01-19 RX ADMIN — LIDOCAINE 1 PATCH: 50 PATCH TOPICAL at 12:01

## 2020-01-19 RX ADMIN — SENNOSIDES AND DOCUSATE SODIUM 1 TABLET: 8.6; 5 TABLET ORAL at 09:01

## 2020-01-19 RX ADMIN — HYDROCODONE BITARTRATE AND ACETAMINOPHEN 1 TABLET: 10; 325 TABLET ORAL at 09:01

## 2020-01-19 RX ADMIN — PANTOPRAZOLE SODIUM 40 MG: 40 TABLET, DELAYED RELEASE ORAL at 07:01

## 2020-01-19 RX ADMIN — OXYBUTYNIN CHLORIDE 5 MG: 5 TABLET ORAL at 09:01

## 2020-01-19 RX ADMIN — OXCARBAZEPINE 300 MG: 300 TABLET, FILM COATED ORAL at 08:01

## 2020-01-19 RX ADMIN — FERROUS SULFATE TAB EC 325 MG (65 MG FE EQUIVALENT) 325 MG: 325 (65 FE) TABLET DELAYED RESPONSE at 08:01

## 2020-01-19 RX ADMIN — PANTOPRAZOLE SODIUM 40 MG: 40 TABLET, DELAYED RELEASE ORAL at 03:01

## 2020-01-19 NOTE — PLAN OF CARE
Pt turns and repositions independently. No skin breakdown noted. Pt pain and safety monitored q 1-2 hrs this shift . Bed locked and in lowest position. Rails elevated x 3. Brakes on. Call light and personal belongings in reach. Pt is frequently ambulates for a walk to the [place].  at bedside. Will continue monitor.

## 2020-01-19 NOTE — PROGRESS NOTES
"                                                    [facility] Hospital Medicine                                                                     Progress Note     Team: [facility] HOSP MED G Chung Dailey MD   Admit Date: 1/17/2020   Hospital Day: 2  EVONNE: 1/19/2020   Code status: Full Code   Principal Problem: Iron deficiency anemia due to chronic blood loss     SUMMARY:     74F sent to ED after outpatient labs revealed anemia.  She denies any classical symptoms but does report having more "senior moments" lately where she loses her train of thought in a conversation or has difficulty finding the right words.  She denies noting any melena or experiencing any abdominal pain.  She has had a colonoscopy before. Appetite and energy level has been normal.     In the ED patient was HDS and afebrile. Hgb 5.5. Given 3 units of pRBC 1/17. Admitted to hospital medicine for symptomatic anemia requiring transfusion. GI consulted for possible GIB. GI rec EGD/Colonospy, planned for 1/20. Hgb remains 5.5 --> 9.4 post transfusion --> 8.9. HDS and afebrile.      SUBJECTIVE:     Pt was seen and examined at bedside. Pt had no acute events overnight, and no new complaints this morning. Pt remained hemodynamically stable and afebrile. Had mild panic attack up on waking up today, otherwise stable. No BM for the past 2 days. No bleeding endorsed. Planned for endoscopy tomorrow, clears today and npo at midnight.     ROS (Positive in Bold, otherwise negative)  Pain Scale: 0 /10   Constitutional: fever, chills, night sweats  CV: chest pain, edema, palpitations  Resp: SOB, cough, sputum production  GI: changes in appetite, NVDC, pain, melena, hematochezia, GERD, hematemesis  : Dysuria, hematuria, urinary urgency, frequency  MSK: chronic arthralgia/myalgia, joint swelling  SKIN: rashes, pruritis, petechiae   Neuro/Psych: FND, anxiety, " depression      OBJECTIVE:     Vitals:  Temp:  [98.1 °F (36.7 °C)-98.5 °F (36.9 °C)]   Pulse:  [59-83]   Resp:  [8-21]   BP: (111-122)/(57-70)   SpO2:  [95 %-98 %]      I & O (Last 24H):     Intake/Output Summary (Last 24 hours) at 1/19/2020 1411  Last data filed at 1/19/2020 0600  Gross per 24 hour   Intake 240 ml   Output --   Net 240 ml         GEN:causcian female  in no acute distress. Nontoxic. Resting in bed. Cooperative.   HEENT: NCAT. PERRL. EOMI. Conjunctivae/corneas clear, sclera Anicteric.  CVS: RRR. Normal s1 s2 no murmur, click, rub or gallop  LUNG: CTAB. Normal respiratory effort. No wheezes, rhonchi, or crackles.  ABD: Normoactive BS, soft, NT, distended, no masses or organomegaly.  EXT: No edema. No cyanosis. Full ROM.  SKIN: color, texture, turgor normal. No rashes or lesions  NEURO: Alert, oriented x 4, Spont mvt of all extremities with no focal deficits noted.      All recent labs and imaging has been reviewed.     Recent Results (from the past 24 hour(s))   Basic Metabolic Panel (BMP)    Collection Time: 01/19/20  6:53 AM   Result Value Ref Range    Sodium 139 136 - 145 mmol/L    Potassium 3.5 3.5 - 5.1 mmol/L    Chloride 109 95 - 110 mmol/L    CO2 22 (L) 23 - 29 mmol/L    Glucose 103 70 - 110 mg/dL    BUN, Bld 9 8 - 23 mg/dL    Creatinine 0.6 0.5 - 1.4 mg/dL    Calcium 8.7 8.7 - 10.5 mg/dL    Anion Gap 8 8 - 16 mmol/L    eGFR if African American >60.0 >60 mL/min/1.73 m^2    eGFR if non African American >60.0 >60 mL/min/1.73 m^2   Magnesium    Collection Time: 01/19/20  6:53 AM   Result Value Ref Range    Magnesium 1.8 1.6 - 2.6 mg/dL   Phosphorus    Collection Time: 01/19/20  6:53 AM   Result Value Ref Range    Phosphorus 3.2 2.7 - 4.5 mg/dL   CBC with Automated Differential    Collection Time: 01/19/20  6:53 AM   Result Value Ref Range    WBC 10.06 3.90 - 12.70 K/uL    RBC 3.83 (L) 4.00 - 5.40 M/uL    Hemoglobin 8.9 (L) 12.0 - 16.0 g/dL    Hematocrit 30.9 (L) 37.0 - 48.5 %    Mean Corpuscular  Volume 81 (L) 82 - 98 fL    Mean Corpuscular Hemoglobin 23.2 (L) 27.0 - 31.0 pg    Mean Corpuscular Hemoglobin Conc 28.8 (L) 32.0 - 36.0 g/dL    RDW 19.5 (H) 11.5 - 14.5 %    Platelets 223 150 - 350 K/uL    MPV 11.4 9.2 - 12.9 fL    Immature Granulocytes 0.4 0.0 - 0.5 %    Gran # (ANC) 7.4 1.8 - 7.7 K/uL    Immature Grans (Abs) 0.04 0.00 - 0.04 K/uL    Lymph # 1.7 1.0 - 4.8 K/uL    Mono # 0.8 0.3 - 1.0 K/uL    Eos # 0.2 0.0 - 0.5 K/uL    Baso # 0.04 0.00 - 0.20 K/uL    nRBC 0 0 /100 WBC    Gran% 73.4 (H) 38.0 - 73.0 %    Lymph% 16.5 (L) 18.0 - 48.0 %    Mono% 7.7 4.0 - 15.0 %    Eosinophil% 1.6 0.0 - 8.0 %    Basophil% 0.4 0.0 - 1.9 %    Differential Method Automated        No results for input(s): POCTGLUCOSE in the last 168 hours.    No results found for: HGBA1C     Active Hospital Problems    Diagnosis  POA    *Iron deficiency anemia due to chronic blood loss [D50.0]  Yes    Abdominal distension [R14.0]  Yes    GI bleeding [K92.2]  Yes    Chronic radicular lumbar pain [M54.16, G89.29]  Yes     Dr allen is managing pain cont current regimen         Resolved Hospital Problems   No resolved problems to display.          ASSESSMENT AND PLAN:     Symptomatic anemia   Iron deficiency anemia  Suspected chronic blood loss anemia  Suspected possible GI bleeding  - Presenting with Hgb 5.5 s/p 3 units of pRBC 1/17. Repeat hgb 9.4  - Patient unsure if she has melena or bloody stool at this time, no BM in the past 2 days  - BUN/Cr ratio preserved at this time  - Iron panel consistent with SANDRO, last colonoscopy as per patient was ~6 years ago and wnl as per patient   - Continue oral PPI for now  - FOBT pending  - DONTE neg, TB, LDH, B12, Folate wnl  - Retic index low - BM hypoproliferation  - GI consulted, planned for EGD/colonoscopy 1/20  - Daily iron tabs, will not give IV iron since patient received 3 units of blood here   - Serial Hgb and transfuse as needed    Leukocytosis - resolved  - suspecting reactive at this  time   - no overt signs of infection at this time    Hypophosphatemia - resolved  - replete and recheck     Abdominal distension  - Simethicone PRN  - KUB ordered - non acute  - She is passing gas and abdomen is benign  - Note she is on chronic narcotics at home ho  - Stool softeners, stimulants and suppository ordered PRN  - Bowel prep this evening     Chronic radicular lumbar pain  - She is on chronic opiate therapy which can be continued as she takes normally; will supplement with local heat as the bed here seems to be less comfortable.  - Scheduled stool softeners     Bipolar disorder  - continue home trileptal  - OP psych     Mixed hyperlipemia   - on home tricor, continue        Prophylaxis- SCDs  Code Status- Full Code   Discharge plan and follow up - d/c home once medically stable    Chung Dailey MD  Hospital Medicine Staff  Pager [phone]

## 2020-01-19 NOTE — PROGRESS NOTES
Alert and oriented. Fall and injury free. Complain of pain to lower back. Pain medicine given. Relief noted. Bed low and locked. Side rails up x 2. Call bell in reach.

## 2020-01-20 ENCOUNTER — ANESTHESIA (OUTPATIENT)
Dept: ENDOSCOPY | Facility: HOSPITAL | Age: 75
DRG: 379 | End: 2020-01-20
Payer: MEDICARE

## 2020-01-20 ENCOUNTER — ANESTHESIA EVENT (OUTPATIENT)
Dept: ENDOSCOPY | Facility: HOSPITAL | Age: 75
DRG: 379 | End: 2020-01-20
Payer: MEDICARE

## 2020-01-20 VITALS
SYSTOLIC BLOOD PRESSURE: 140 MMHG | OXYGEN SATURATION: 100 % | HEART RATE: 65 BPM | BODY MASS INDEX: 23.56 KG/M2 | DIASTOLIC BLOOD PRESSURE: 63 MMHG | TEMPERATURE: 98 F | WEIGHT: 120 LBS | HEIGHT: 60 IN | RESPIRATION RATE: 18 BRPM

## 2020-01-20 LAB
ANION GAP SERPL CALC-SCNC: 4 MMOL/L (ref 8–16)
BASOPHILS # BLD AUTO: 0.03 K/UL (ref 0–0.2)
BASOPHILS NFR BLD: 0.4 % (ref 0–1.9)
BUN SERPL-MCNC: 6 MG/DL (ref 8–23)
CALCIUM SERPL-MCNC: 9.1 MG/DL (ref 8.7–10.5)
CHLORIDE SERPL-SCNC: 109 MMOL/L (ref 95–110)
CO2 SERPL-SCNC: 26 MMOL/L (ref 23–29)
CREAT SERPL-MCNC: 0.6 MG/DL (ref 0.5–1.4)
DIFFERENTIAL METHOD: ABNORMAL
EOSINOPHIL # BLD AUTO: 0.2 K/UL (ref 0–0.5)
EOSINOPHIL NFR BLD: 2.9 % (ref 0–8)
ERYTHROCYTE [DISTWIDTH] IN BLOOD BY AUTOMATED COUNT: 19.9 % (ref 11.5–14.5)
EST. GFR  (AFRICAN AMERICAN): >60 ML/MIN/1.73 M^2
EST. GFR  (NON AFRICAN AMERICAN): >60 ML/MIN/1.73 M^2
GLUCOSE SERPL-MCNC: 85 MG/DL (ref 70–110)
HCT VFR BLD AUTO: 30.9 % (ref 37–48.5)
HGB BLD-MCNC: 8.6 G/DL (ref 12–16)
IMM GRANULOCYTES # BLD AUTO: 0.03 K/UL (ref 0–0.04)
IMM GRANULOCYTES NFR BLD AUTO: 0.4 % (ref 0–0.5)
LYMPHOCYTES # BLD AUTO: 1.9 K/UL (ref 1–4.8)
LYMPHOCYTES NFR BLD: 25.5 % (ref 18–48)
MAGNESIUM SERPL-MCNC: 1.9 MG/DL (ref 1.6–2.6)
MCH RBC QN AUTO: 23 PG (ref 27–31)
MCHC RBC AUTO-ENTMCNC: 27.8 G/DL (ref 32–36)
MCV RBC AUTO: 83 FL (ref 82–98)
MONOCYTES # BLD AUTO: 0.9 K/UL (ref 0.3–1)
MONOCYTES NFR BLD: 12.1 % (ref 4–15)
NEUTROPHILS # BLD AUTO: 4.3 K/UL (ref 1.8–7.7)
NEUTROPHILS NFR BLD: 58.7 % (ref 38–73)
NRBC BLD-RTO: 0 /100 WBC
OB PNL STL: POSITIVE
PHOSPHATE SERPL-MCNC: 2.8 MG/DL (ref 2.7–4.5)
PLATELET # BLD AUTO: 198 K/UL (ref 150–350)
PMV BLD AUTO: 10.5 FL (ref 9.2–12.9)
POTASSIUM SERPL-SCNC: 3.9 MMOL/L (ref 3.5–5.1)
RBC # BLD AUTO: 3.74 M/UL (ref 4–5.4)
SODIUM SERPL-SCNC: 139 MMOL/L (ref 136–145)
WBC # BLD AUTO: 7.29 K/UL (ref 3.9–12.7)

## 2020-01-20 PROCEDURE — 27201012 HC FORCEPS, HOT/COLD, DISP: Mod: HCNC | Performed by: INTERNAL MEDICINE

## 2020-01-20 PROCEDURE — D9220A PRA ANESTHESIA: Mod: HCNC,CRNA,, | Performed by: NURSE ANESTHETIST, CERTIFIED REGISTERED

## 2020-01-20 PROCEDURE — 88305 TISSUE EXAM BY PATHOLOGIST: ICD-10-PCS | Mod: 26,HCNC,, | Performed by: PATHOLOGY

## 2020-01-20 PROCEDURE — G0378 HOSPITAL OBSERVATION PER HR: HCPCS | Mod: HCNC

## 2020-01-20 PROCEDURE — 11000001 HC ACUTE MED/SURG PRIVATE ROOM: Mod: HCNC

## 2020-01-20 PROCEDURE — 45385 PR COLONOSCOPY,REMV LESN,SNARE: ICD-10-PCS | Mod: HCNC,GC,, | Performed by: INTERNAL MEDICINE

## 2020-01-20 PROCEDURE — 99239 PR HOSPITAL DISCHARGE DAY,>30 MIN: ICD-10-PCS | Mod: HCNC,,, | Performed by: INTERNAL MEDICINE

## 2020-01-20 PROCEDURE — 83735 ASSAY OF MAGNESIUM: CPT | Mod: HCNC

## 2020-01-20 PROCEDURE — 43239 EGD BIOPSY SINGLE/MULTIPLE: CPT | Mod: HCNC,51,, | Performed by: INTERNAL MEDICINE

## 2020-01-20 PROCEDURE — 99239 HOSP IP/OBS DSCHRG MGMT >30: CPT | Mod: HCNC,,, | Performed by: INTERNAL MEDICINE

## 2020-01-20 PROCEDURE — 85025 COMPLETE CBC W/AUTO DIFF WBC: CPT | Mod: HCNC

## 2020-01-20 PROCEDURE — D9220A PRA ANESTHESIA: Mod: HCNC,ANES,, | Performed by: ANESTHESIOLOGY

## 2020-01-20 PROCEDURE — 27201089 HC SNARE, DISP (ANY): Mod: HCNC | Performed by: INTERNAL MEDICINE

## 2020-01-20 PROCEDURE — 36415 COLL VENOUS BLD VENIPUNCTURE: CPT | Mod: HCNC

## 2020-01-20 PROCEDURE — 45385 COLONOSCOPY W/LESION REMOVAL: CPT | Mod: HCNC,GC,, | Performed by: INTERNAL MEDICINE

## 2020-01-20 PROCEDURE — D9220A PRA ANESTHESIA: ICD-10-PCS | Mod: HCNC,ANES,, | Performed by: ANESTHESIOLOGY

## 2020-01-20 PROCEDURE — 37000009 HC ANESTHESIA EA ADD 15 MINS: Mod: HCNC | Performed by: INTERNAL MEDICINE

## 2020-01-20 PROCEDURE — 25000003 PHARM REV CODE 250: Mod: HCNC | Performed by: NURSE ANESTHETIST, CERTIFIED REGISTERED

## 2020-01-20 PROCEDURE — 25000003 PHARM REV CODE 250: Mod: HCNC | Performed by: HOSPITALIST

## 2020-01-20 PROCEDURE — D9220A PRA ANESTHESIA: ICD-10-PCS | Mod: HCNC,CRNA,, | Performed by: NURSE ANESTHETIST, CERTIFIED REGISTERED

## 2020-01-20 PROCEDURE — 84100 ASSAY OF PHOSPHORUS: CPT | Mod: HCNC

## 2020-01-20 PROCEDURE — 63600175 PHARM REV CODE 636 W HCPCS: Mod: HCNC | Performed by: NURSE ANESTHETIST, CERTIFIED REGISTERED

## 2020-01-20 PROCEDURE — 25000003 PHARM REV CODE 250: Mod: HCNC | Performed by: PHYSICIAN ASSISTANT

## 2020-01-20 PROCEDURE — 80048 BASIC METABOLIC PNL TOTAL CA: CPT | Mod: HCNC

## 2020-01-20 PROCEDURE — 88305 TISSUE EXAM BY PATHOLOGIST: CPT | Mod: 26,HCNC,, | Performed by: PATHOLOGY

## 2020-01-20 PROCEDURE — 25000003 PHARM REV CODE 250: Mod: HCNC | Performed by: INTERNAL MEDICINE

## 2020-01-20 PROCEDURE — 45385 COLONOSCOPY W/LESION REMOVAL: CPT | Mod: HCNC | Performed by: INTERNAL MEDICINE

## 2020-01-20 PROCEDURE — 37000008 HC ANESTHESIA 1ST 15 MINUTES: Mod: HCNC | Performed by: INTERNAL MEDICINE

## 2020-01-20 PROCEDURE — 43239 EGD BIOPSY SINGLE/MULTIPLE: CPT | Mod: HCNC | Performed by: INTERNAL MEDICINE

## 2020-01-20 PROCEDURE — 43239 PR EGD, FLEX, W/BIOPSY, SGL/MULTI: ICD-10-PCS | Mod: HCNC,51,, | Performed by: INTERNAL MEDICINE

## 2020-01-20 PROCEDURE — 88305 TISSUE EXAM BY PATHOLOGIST: CPT | Mod: HCNC | Performed by: PATHOLOGY

## 2020-01-20 RX ORDER — SODIUM CHLORIDE 9 MG/ML
INJECTION, SOLUTION INTRAVENOUS CONTINUOUS PRN
Status: DISCONTINUED | OUTPATIENT
Start: 2020-01-20 | End: 2020-01-20

## 2020-01-20 RX ORDER — PROPOFOL 10 MG/ML
VIAL (ML) INTRAVENOUS
Status: DISCONTINUED | OUTPATIENT
Start: 2020-01-20 | End: 2020-01-20

## 2020-01-20 RX ORDER — FERROUS SULFATE 325(65) MG
325 TABLET ORAL DAILY
Qty: 60 TABLET | Refills: 9 | Status: SHIPPED | OUTPATIENT
Start: 2020-01-21

## 2020-01-20 RX ORDER — PANTOPRAZOLE SODIUM 40 MG/1
40 TABLET, DELAYED RELEASE ORAL
Qty: 60 TABLET | Refills: 11 | Status: SHIPPED | OUTPATIENT
Start: 2020-01-20 | End: 2021-01-19

## 2020-01-20 RX ORDER — LIDOCAINE HCL/PF 100 MG/5ML
SYRINGE (ML) INTRAVENOUS
Status: DISCONTINUED | OUTPATIENT
Start: 2020-01-20 | End: 2020-01-20

## 2020-01-20 RX ORDER — ONDANSETRON 2 MG/ML
4 INJECTION INTRAMUSCULAR; INTRAVENOUS ONCE
Status: DISCONTINUED | OUTPATIENT
Start: 2020-01-20 | End: 2020-01-20 | Stop reason: HOSPADM

## 2020-01-20 RX ORDER — GLYCOPYRROLATE 0.2 MG/ML
INJECTION INTRAMUSCULAR; INTRAVENOUS
Status: DISCONTINUED | OUTPATIENT
Start: 2020-01-20 | End: 2020-01-20

## 2020-01-20 RX ADMIN — PROPOFOL 30 MG: 10 INJECTION, EMULSION INTRAVENOUS at 03:01

## 2020-01-20 RX ADMIN — FERROUS SULFATE TAB EC 325 MG (65 MG FE EQUIVALENT) 325 MG: 325 (65 FE) TABLET DELAYED RESPONSE at 09:01

## 2020-01-20 RX ADMIN — OXYBUTYNIN CHLORIDE 5 MG: 5 TABLET ORAL at 09:01

## 2020-01-20 RX ADMIN — PROPOFOL 30 MG: 10 INJECTION, EMULSION INTRAVENOUS at 02:01

## 2020-01-20 RX ADMIN — PROPOFOL 70 MG: 10 INJECTION, EMULSION INTRAVENOUS at 02:01

## 2020-01-20 RX ADMIN — LIDOCAINE 1 PATCH: 50 PATCH TOPICAL at 01:01

## 2020-01-20 RX ADMIN — PROPOFOL 20 MG: 10 INJECTION, EMULSION INTRAVENOUS at 03:01

## 2020-01-20 RX ADMIN — GLYCOPYRROLATE 0.2 MG: 0.2 INJECTION, SOLUTION INTRAMUSCULAR; INTRAVENOUS at 02:01

## 2020-01-20 RX ADMIN — PROPOFOL 40 MG: 10 INJECTION, EMULSION INTRAVENOUS at 03:01

## 2020-01-20 RX ADMIN — FENOFIBRATE 145 MG: 145 TABLET, FILM COATED ORAL at 09:01

## 2020-01-20 RX ADMIN — SODIUM CHLORIDE: 0.9 INJECTION, SOLUTION INTRAVENOUS at 01:01

## 2020-01-20 RX ADMIN — SENNOSIDES AND DOCUSATE SODIUM 1 TABLET: 8.6; 5 TABLET ORAL at 09:01

## 2020-01-20 RX ADMIN — HYDROCODONE BITARTRATE AND ACETAMINOPHEN 1 TABLET: 10; 325 TABLET ORAL at 05:01

## 2020-01-20 RX ADMIN — HYDROCODONE BITARTRATE AND ACETAMINOPHEN 1 TABLET: 10; 325 TABLET ORAL at 12:01

## 2020-01-20 RX ADMIN — LIDOCAINE HYDROCHLORIDE 50 MG: 20 INJECTION, SOLUTION INTRAVENOUS at 02:01

## 2020-01-20 RX ADMIN — OXCARBAZEPINE 300 MG: 300 TABLET, FILM COATED ORAL at 09:01

## 2020-01-20 NOTE — PROVATION PATIENT INSTRUCTIONS
Discharge Summary/Instructions after an Endoscopic Procedure  Patient Name: Marycruz Ramirez  Patient MRN: [id number]  Patient YOB: 1945 Monday, January 20, 2020  Micah Patel MD  RESTRICTIONS:  During your procedure today, you received medications for sedation.  These   medications may affect your judgment, balance and coordination.  Therefore,   for 24 hours, you have the following restrictions:   - DO NOT drive a car, operate machinery, make legal/financial decisions,   sign important papers or drink alcohol.    ACTIVITY:  Today: no heavy lifting, straining or running due to procedural   sedation/anesthesia.  The following day: return to full activity including work.  DIET:  Eat and drink normally unless instructed otherwise.     TREATMENT FOR COMMON SIDE EFFECTS:  - Mild abdominal pain, nausea, belching, bloating or excessive gas:  rest,   eat lightly and use a heating pad.  - Sore Throat: treat with throat lozenges and/or gargle with warm salt   water.  - Because air was used during the procedure, expelling large amounts of air   from your rectum or belching is normal.  - If a bowel prep was taken, you may not have a bowel movement for 1-3 days.    This is normal.  SYMPTOMS TO WATCH FOR AND REPORT TO YOUR PHYSICIAN:  1. Abdominal pain or bloating, other than gas cramps.  2. Chest pain.  3. Back pain.  4. Signs of infection such as: chills or fever occurring within 24 hours   after the procedure.  5. Rectal bleeding, which would show as bright red, maroon, or black stools.   (A tablespoon of blood from the rectum is not serious, especially if   hemorrhoids are present.)  6. Vomiting.  7. Weakness or dizziness.  GO DIRECTLY TO THE NEAREST EMERGENCY ROOM IF YOU HAVE ANY OF THE FOLLOWING:      Difficulty breathing              Chills and/or fever over 101 F   Persistent vomiting and/or vomiting blood   Severe abdominal pain   Severe chest pain   Black, tarry stools   Bleeding- more than one  tablespoon   Any other symptom or condition that you feel may need urgent attention  Your doctor recommends these additional instructions:  If any biopsies were taken, your doctors clinic will contact you in 1 to 2   weeks with any results.  - Return patient to hospital crum for ongoing care.   - Advance diet as tolerated.   - Continue present medications.   - Await pathology results.   - Telephone GI clinic for pathology results in 2 weeks.   - Repeat colonoscopy in 5 years for surveillance.   - Return to GI clinic at appointment to be scheduled to discuss follow-up of   anemia.   For questions, problems or results please call your physician - Micah Patel MD at Work:  [phone].  [facility], EMERGENCY ROOM PHONE NUMBER: [phone]  IF A COMPLICATION OR EMERGENCY SITUATION ARISES AND YOU ARE UNABLE TO REACH   YOUR PHYSICIAN - GO DIRECTLY TO THE EMERGENCY ROOM.  Micah Patel MD  1/20/2020 4:17:57 PM  This report has been verified and signed electronically.  [employer]

## 2020-01-20 NOTE — ANESTHESIA POSTPROCEDURE EVALUATION
Anesthesia Post Evaluation    Patient: Marycruz Ramirez    Procedure(s) Performed: Procedure(s) (LRB):  EGD (ESOPHAGOGASTRODUODENOSCOPY) (N/A)  COLONOSCOPY (N/A)    Final Anesthesia Type: general    Patient location during evaluation: [facility]  Patient participation: Yes- Able to Participate  Level of consciousness: awake and alert  Post-procedure vital signs: reviewed and stable  Pain management: adequate  Airway patency: patent    PONV status at discharge: No PONV  Anesthetic complications: no      Cardiovascular status: blood pressure returned to baseline  Respiratory status: unassisted  Hydration status: euvolemic  Follow-up not needed.          Vitals Value Taken Time   /63 1/20/2020  5:01 PM   Temp 36.6 °C (97.9 °F) 1/20/2020  5:00 PM   Pulse 68 1/20/2020  5:02 PM   Resp 26 1/20/2020  5:01 PM   SpO2 100 % 1/20/2020  5:02 PM   Vitals shown include unvalidated device data.      Event Time     Out of Recovery 16:02:00          Pain/Chapin Score: Pain Rating Prior to Med Admin: 8 (1/20/2020 12:42 PM)  Pain Rating Post Med Admin: 4 (1/20/2020  1:42 PM)

## 2020-01-20 NOTE — ANESTHESIA PREPROCEDURE EVALUATION
[facility]  Anesthesia Pre-Operative Evaluation       Patient Name: Marycruz Ramirez  YOB: 1945  MRN: [id number]  [facility]: [id number]      Code Status: Full Code   Date of Procedure: 1/20/2020  Procedure: Procedure(s) (LRB):  EGD (ESOPHAGOGASTRODUODENOSCOPY) (N/A)  COLONOSCOPY (N/A)  Anesthesia: General  Pre-Operative Diagnosis: Final diagnoses:  [D50.9] Microcytic anemia (Primary)  Proceduralist/Surgeon(s) and Role:     * Micah Patel MD - Primary    SUBJECTIVE:   Marycruz Ramirez is a 74 y.o. female w/ a significant PMHx of bipolar disorder, iron deficiency anemia, chronic back pain on opioids who presents with anemia noted on outpatient labs. GI consulted for anemia.    Noted to have hemoglobin down to 5.5 with last value in the 14s in 2007. Patient states she gets labs done every 6 months but was never told she was anemic. Denies any overt bleeding including hematemesis, melena, hematochezia, hematuria, or vaginal bleeding.  Denies lightheadedness, fatigue. Denies any NSAIDs or blood thinners. No abdominal pain. Some weight loss last 3 months, but unable to specify how much. Last colonoscopy was 5-10 years ago, reportedly normal. Has never had an EGD before. She does report some first cousins with thalassemia. On Trileptal but has been on this for many years. No intestinal surgeries.    Patient now presents for the above procedure(s). Pt appropriately NPO.     LDA:        Peripheral IV - Single Lumen 01/17/20 1707 18 G Right Forearm (Active)   Number of days: 2            Peripheral IV - Single Lumen 01/17/20 2059 18 G Right Antecubital (Active)   Number of days: 2      Anesthesia Evaluation      Airway   Mallampati: III  TM distance: Normal, at least 6 cm  Neck ROM: Normal ROM  Dental    (+) In tact    Pulmonary    Cardiovascular   Exercise tolerance: good    Neuro/Psych    (+) psychiatric history    GI/Hepatic/Renal      Endo/Other    Abdominal                      ALLERGIES:   Review of patient's allergies indicates:  No Known Allergies  MEDICATIONS:     Current Outpatient Medications on File Prior to Encounter   Medication Sig Dispense Refill Last Dose    cetirizine (ZYRTEC) 10 MG tablet Take by mouth.   Taking    fenofibrate (TRICOR) 145 MG tablet    Taking    HYDROcodone-acetaminophen (NORCO)  mg per tablet Take by mouth.   Taking    HYDROcodone-acetaminophen (NORCO)  mg per tablet    Taking    OXcarbazepine (TRILEPTAL) 300 MG Tab Take by mouth.   Taking    oxybutynin (DITROPAN) 5 MG Tab    Taking    spironolactone (ALDACTONE) 50 MG tablet Take by mouth.   Taking    tolterodine (DETROL LA) 4 MG 24 hr capsule Take by mouth.   Taking     Current Facility-Administered Medications   Medication Dose Route Frequency Provider Last Rate Last Dose    0.9%  NaCl infusion (for blood administration)   Intravenous Q24H PRN Bunny Tang MD        fenofibrate tablet 145 mg  145 mg Oral Daily Chung Dailey MD   145 mg at 01/20/20 0958    ferrous sulfate EC tablet 325 mg  325 mg Oral Daily Chung Dailey MD   325 mg at 01/20/20 0958    HYDROcodone-acetaminophen  mg per tablet 1 tablet  1 tablet Oral Q6H PRN Micah Staley MD   1 tablet at 01/20/20 1242    lidocaine 5 % patch 1 patch  1 patch Transdermal Q24H Hammad Acosta PA-C   1 patch at 01/20/20 0119    melatonin tablet 3 mg  3 mg Oral Nightly PRN Micah Staley MD        ondansetron injection 4 mg  4 mg Intravenous Q8H PRN Micah Staley MD        OXcarbazepine tablet 300 mg  300 mg Oral Daily Micah Staley MD   300 mg at 01/20/20 0958    oxybutynin tablet 5 mg  5 mg Oral BID Micah Staley MD   5 mg at 01/20/20 0958    pantoprazole EC tablet 40 mg  40 mg Oral BID AC Micah Staley MD   40 mg at 01/19/20 1544    polyethylene glycol packet 17 g  17 g Oral Daily Chung Dailey MD   17 g at 01/19/20 0833    senna-docusate  "8.6-50 mg per tablet 1 tablet  1 tablet Oral BID Chung Dailey MD   1 tablet at 01/20/20 0959    simethicone chewable tablet 80 mg  1 tablet Oral TID PRN Micah Staley MD   80 mg at 01/18/20 0040    sodium chloride 0.9% flush 10 mL  10 mL Intravenous PRN Bunny Tang MD              History:     Active Hospital Problems    Diagnosis  POA    *Iron deficiency anemia due to chronic blood loss [D50.0]  Yes    Abdominal distension [R14.0]  Yes    GI bleeding [K92.2]  Yes    Chronic radicular lumbar pain [M54.16, G89.29]  Yes     Dr allen is managing pain cont current regimen         Resolved Hospital Problems   No resolved problems to display.     Patient Active Problem List   Diagnosis    Mixed hyperlipidemia    Chronic radicular lumbar pain    OAB (overactive bladder)    Bipolar disorder    Iron deficiency anemia due to chronic blood loss    GI bleeding    Abdominal distension      History reviewed. No pertinent past medical history.  Past Surgical History:   Procedure Laterality Date    BACK SURGERY      KNEE SURGERY Bilateral      Alcohol use:    Social History     Substance and Sexual Activity   Alcohol Use Yes    Frequency: Never    Drinks per session: 1 or 2          OBJECTIVE:   Last 3 sets of Vitals    Vitals - 1 value per visit 1/17/2020 1/18/2020 1/20/2020   SYSTOLIC - - 129   DIASTOLIC - - 60   PULSE - - 63   TEMPERATURE - - 97.4   RESPIRATIONS - - 17   SPO2 - - 99   Weight (lb) - 120 -   Weight (kg) - 54.43 -   HEIGHT - 5' 0" -   BODY MASS INDEX 23.44 - -   VISIT REPORT - - -   Pain Score  - - -       Vital Signs (Most Recent):  Temp: 36.3 °C (97.4 °F) (01/20/20 1111)  Pulse: 63 (01/20/20 1111)  Resp: 17 (01/20/20 1111)  BP: 129/60 (01/20/20 1111)  SpO2: 99 % (01/20/20 1111) Vital Signs Range (Last 24H):  Temp:  [36.3 °C (97.4 °F)-36.9 °C (98.4 °F)]   Pulse:  [56-75]   Resp:  [14-20]   BP: (106-129)/(51-61)   SpO2:  [98 %-99 %]          Intake/Output Summary (Last 24 " hours) at 1/20/2020 1410  Last data filed at 1/20/2020 0514  Gross per 24 hour   Intake --   Output 1720 ml   Net -1720 ml       Body mass index is 23.44 kg/m².     Significant Labs:  Lab Results   Component Value Date    WBC 7.29 01/20/2020    HGB 8.6 (L) 01/20/2020    HCT 30.9 (L) 01/20/2020     01/20/2020    CHOL 102 01/17/2020    TRIG 59 01/17/2020    HDL 52 01/17/2020    ALT 10 01/17/2020    AST 15 01/17/2020     01/20/2020    K 3.9 01/20/2020     01/20/2020    CREATININE 0.6 01/20/2020    BUN 6 (L) 01/20/2020    CO2 26 01/20/2020    TSH 0.31 (L) 08/31/2007     No results found for: PREGTESTUR, PREGSERUM, HCG, HCGQUANT   No LMP recorded. Patient is postmenopausal.    EKG:   No results found for this or any previous visit.    TTE:  No results found for this or any previous visit.  No results found for: EF  No results found for this or any previous visit.  SHO:  No results found for this or any previous visit.  Stress Test:  No results found for this or any previous visit.   LHC:  No results found for this or any previous visit.   PFT:  No results found for: FEV1, FVC, ELD5UVN, TLC, DLCO     ASSESSMENT/PLAN:     Anesthesia Evaluation    I have reviewed the Patient Summary Reports.    I have reviewed the Nursing Notes.   I have reviewed the Medications.     Review of Systems  Anesthesia Hx:  No problems with previous Anesthesia    Hematology/Oncology:  Hematology Normal   Oncology Normal     EENT/Dental:EENT/Dental Normal   Cardiovascular:  Cardiovascular Normal Exercise tolerance: good     Pulmonary:  Pulmonary Normal    Renal/:  Renal/ Normal     Hepatic/GI:  Hepatic/GI Normal    Musculoskeletal:  Musculoskeletal Normal    Neurological:  Neurology Normal    Endocrine:  Endocrine Normal    Dermatological:  Skin Normal    Psych:   Psychiatric History Bipolar disorder         Physical Exam  General:  Well nourished    Airway/Jaw/Neck:  Airway Findings: Mouth Opening: Normal Tongue: Normal   General Airway Assessment: Adult  Mallampati: III  Improves to II with phonation.  TM Distance: Normal, at least 6 cm  Jaw/Neck Findings:  Neck ROM: Normal ROM     Eyes/Ears/Nose:  EYES/EARS/NOSE FINDINGS: Normal   Dental:  Dental Findings: In tact   Chest/Lungs:  Chest/Lungs Findings: Clear to auscultation     Heart/Vascular:  Heart Findings: Rhythm: Regular Rhythm  Sounds: Normal  Heart murmur: negative Vascular Findings: Normal    Abdomen:  Abdomen Findings: Normal    Musculoskeletal:  Musculoskeletal Findings: Normal   Skin:  Skin Findings: Normal    Mental Status:  Mental Status Findings:  Cooperative, Alert and Oriented         Anesthesia Plan  Type of Anesthesia, risks & benefits discussed:  Anesthesia Type:  general, MAC  Patient's Preference:   Intra-op Monitoring Plan: standard ASA monitors  Intra-op Monitoring Plan Comments:   Post Op Pain Control Plan: per primary service following discharge from PACU  Post Op Pain Control Plan Comments:   Induction:   IV  Beta Blocker:  Patient is not currently on a Beta-Blocker (No further documentation required).       Informed Consent: Patient understands risks and agrees with Anesthesia plan.  Questions answered. Anesthesia consent signed with patient.  ASA Score: 2     Day of Surgery Review of History & Physical:     H&P completed by Anesthesiologist.   Anesthesia Plan Notes: Chart reviewed, patient interviewed and examined.  The anesthetic plan was explained.  Risks, benefits, and alternatives were discussed. Questions were answered and the consent was signed.        LATASHA Grayson M.D.         Ready For Surgery From Anesthesia Perspective.

## 2020-01-20 NOTE — NURSING TRANSFER
Nursing Transfer Note      1/20/2020     Transfer DOSC    Transfer via stretcher      Transported by transporter    Medicines sent: none    Chart send with patient: yes  Notified: EDWINA Blackwell

## 2020-01-20 NOTE — PROGRESS NOTES
"                                                    [facility] Hospital Medicine                                                                     Progress Note     Team: [facility] HOSP MED G Chung Dailey MD   Admit Date: 1/17/2020   Hospital Day: 3  EVONNE: 1/20/2020   Code status: Full Code   Principal Problem: Iron deficiency anemia due to chronic blood loss     SUMMARY:     74F sent to ED after outpatient labs revealed anemia.  She denies any classical symptoms but does report having more "senior moments" lately where she loses her train of thought in a conversation or has difficulty finding the right words.  She denies noting any melena or experiencing any abdominal pain.  She has had a colonoscopy before. Appetite and energy level has been normal.     In the ED patient was HDS and afebrile. Hgb 5.5. Given 3 units of pRBC 1/17. Admitted to hospital medicine for symptomatic anemia requiring transfusion. GI consulted for possible GIB. GI rec EGD/Colonospy, planned for 1/20. Hgb remains 5.5 --> 9.4 post transfusion --> 8.9 --> 8.6. HDS and afebrile.      SUBJECTIVE:     Pt was seen and examined at bedside. Pt had no acute events overnight, and no new complaints this morning. Pt remained hemodynamically stable and afebrile. Had another panic attack this AM around 4, easily redirectable, otherwise stable. Multiple BMs overnight from bowel prep. No blood or melena in stool. No bleeding endorsed. Planned for endoscopy todau, remains NPO.     ROS (Positive in Bold, otherwise negative)  Pain Scale: 0 /10   Constitutional: fever, chills, night sweats  CV: chest pain, edema, palpitations  Resp: SOB, cough, sputum production  GI: changes in appetite, NVDC, pain, melena, hematochezia, GERD, hematemesis  : Dysuria, hematuria, urinary urgency, frequency  MSK: chronic arthralgia/myalgia, joint swelling  SKIN: rashes, pruritis, " petechiae   Neuro/Psych: FND, anxiety, depression      OBJECTIVE:     Vitals:  Temp:  [97.4 °F (36.3 °C)-98.4 °F (36.9 °C)]   Pulse:  [56-75]   Resp:  [14-20]   BP: (106-129)/(51-61)   SpO2:  [98 %-99 %]      I & O (Last 24H):     Intake/Output Summary (Last 24 hours) at 1/20/2020 1335  Last data filed at 1/20/2020 0514  Gross per 24 hour   Intake --   Output 1720 ml   Net -1720 ml         GEN:causcian female  in no acute distress. Nontoxic. Resting in bed. Cooperative.   HEENT: NCAT. PERRL. EOMI. Conjunctivae/corneas clear, sclera Anicteric.  CVS: RRR. Normal s1 s2 no murmur, click, rub or gallop  LUNG: CTAB. Normal respiratory effort. No wheezes, rhonchi, or crackles.  ABD: Normoactive BS, soft, NT, distended, no masses or organomegaly.  EXT: No edema. No cyanosis. Full ROM.  SKIN: color, texture, turgor normal. No rashes or lesions  NEURO: Alert, oriented x 4, Spont mvt of all extremities with no focal deficits noted.      All recent labs and imaging has been reviewed.     Recent Results (from the past 24 hour(s))   Occult blood x 1, stool    Collection Time: 01/19/20  9:04 PM   Result Value Ref Range    Occult Blood Positive (A) Negative   Basic Metabolic Panel (BMP)    Collection Time: 01/20/20  2:51 AM   Result Value Ref Range    Sodium 139 136 - 145 mmol/L    Potassium 3.9 3.5 - 5.1 mmol/L    Chloride 109 95 - 110 mmol/L    CO2 26 23 - 29 mmol/L    Glucose 85 70 - 110 mg/dL    BUN, Bld 6 (L) 8 - 23 mg/dL    Creatinine 0.6 0.5 - 1.4 mg/dL    Calcium 9.1 8.7 - 10.5 mg/dL    Anion Gap 4 (L) 8 - 16 mmol/L    eGFR if African American >60.0 >60 mL/min/1.73 m^2    eGFR if non African American >60.0 >60 mL/min/1.73 m^2   Magnesium    Collection Time: 01/20/20  2:51 AM   Result Value Ref Range    Magnesium 1.9 1.6 - 2.6 mg/dL   Phosphorus    Collection Time: 01/20/20  2:51 AM   Result Value Ref Range    Phosphorus 2.8 2.7 - 4.5 mg/dL   CBC with Automated Differential    Collection Time: 01/20/20  2:51 AM   Result  Value Ref Range    WBC 7.29 3.90 - 12.70 K/uL    RBC 3.74 (L) 4.00 - 5.40 M/uL    Hemoglobin 8.6 (L) 12.0 - 16.0 g/dL    Hematocrit 30.9 (L) 37.0 - 48.5 %    Mean Corpuscular Volume 83 82 - 98 fL    Mean Corpuscular Hemoglobin 23.0 (L) 27.0 - 31.0 pg    Mean Corpuscular Hemoglobin Conc 27.8 (L) 32.0 - 36.0 g/dL    RDW 19.9 (H) 11.5 - 14.5 %    Platelets 198 150 - 350 K/uL    MPV 10.5 9.2 - 12.9 fL    Immature Granulocytes 0.4 0.0 - 0.5 %    Gran # (ANC) 4.3 1.8 - 7.7 K/uL    Immature Grans (Abs) 0.03 0.00 - 0.04 K/uL    Lymph # 1.9 1.0 - 4.8 K/uL    Mono # 0.9 0.3 - 1.0 K/uL    Eos # 0.2 0.0 - 0.5 K/uL    Baso # 0.03 0.00 - 0.20 K/uL    nRBC 0 0 /100 WBC    Gran% 58.7 38.0 - 73.0 %    Lymph% 25.5 18.0 - 48.0 %    Mono% 12.1 4.0 - 15.0 %    Eosinophil% 2.9 0.0 - 8.0 %    Basophil% 0.4 0.0 - 1.9 %    Differential Method Automated        No results for input(s): POCTGLUCOSE in the last 168 hours.    No results found for: HGBA1C     Active Hospital Problems    Diagnosis  POA    *Iron deficiency anemia due to chronic blood loss [D50.0]  Yes    Abdominal distension [R14.0]  Yes    GI bleeding [K92.2]  Yes    Chronic radicular lumbar pain [M54.16, G89.29]  Yes     Dr allen is managing pain cont current regimen         Resolved Hospital Problems   No resolved problems to display.          ASSESSMENT AND PLAN:     Symptomatic anemia   Iron deficiency anemia  Suspected chronic blood loss anemia  Suspected possible GI bleeding  - Presenting with Hgb 5.5 s/p 3 units of pRBC 1/17. Repeat hgb 9.4  - Patient unsure if she has melena or bloody stool at this time  - BUN/Cr ratio preserved at this time  - Iron panel consistent with SANDRO, last colonoscopy as per patient was ~6 years ago and wnl as per patient   - Continue oral PPI for now  - FOBT positive   - DONTE neg, TB, LDH, B12, Folate wnl  - Retic index low - BM hypoproliferation  - GI consulted, planned for EGD/colonoscopy 1/20  - Daily iron tabs, will not give IV iron  since patient received 3 units of blood here   - Serial Hgb and transfuse as needed    Leukocytosis - resolved  - suspecting reactive at this time   - no overt signs of infection at this time    Hypophosphatemia - resolved  - replete and recheck     Abdominal distension  - Simethicone PRN  - KUB ordered - non acute  - She is passing gas and abdomen is benign  - Note she is on chronic narcotics at home ho  - Stool softeners, stimulants and suppository ordered PRN  - Bowel prep as above     Chronic radicular lumbar pain  - She is on chronic opiate therapy which can be continued as she takes normally; will supplement with local heat as the bed here seems to be less comfortable.  - Scheduled stool softeners     Bipolar disorder  - continue home trileptal  - OP psych     Mixed hyperlipemia   - on home tricor, continue        Prophylaxis- SCDs  Code Status- Full Code   Discharge plan and follow up - d/c home once medically stable    Chung Dailey MD  Hospital Medicine Staff  Pager [phone]

## 2020-01-20 NOTE — DISCHARGE INSTRUCTIONS

## 2020-01-20 NOTE — PLAN OF CARE
1220  Patient off the unit when CM rounded.     1600  Patient off the unit having an EGD/c-scope done when CM rounded.     Will continue to follow.

## 2020-01-20 NOTE — PLAN OF CARE
Problem: Fall Injury Risk  Goal: Absence of Fall and Fall-Related Injury  Outcome: Met     Problem: Adult Inpatient Plan of Care  Goal: Plan of Care Review  Outcome: Met     Problem: Adult Inpatient Plan of Care  Goal: Plan of Care Review  Outcome: Met     Problem: Adult Inpatient Plan of Care  Goal: Plan of Care Review  Outcome: Met  Goal: Patient-Specific Goal (Individualization)  Outcome: Met  Goal: Absence of Hospital-Acquired Illness or Injury  Outcome: Met  Goal: Optimal Comfort and Wellbeing  Outcome: Met  Goal: Readiness for Transition of Care  Outcome: Met  Goal: Rounds/Family Conference  Outcome: Met    Patient discharged to home with spouse. Iv removed with tip intact. Discharge papers reviewed with patient and spouse. Both verbalized understanding. Awaiting transport

## 2020-01-20 NOTE — TRANSFER OF CARE
Anesthesia Transfer of Care Note    Patient: Marycruz Ramirez    Procedure(s) Performed: Procedure(s) (LRB):  EGD (ESOPHAGOGASTRODUODENOSCOPY) (N/A)  COLONOSCOPY (N/A)    Patient location: [facility]    Anesthesia Type: general    Transport from OR: Transported from OR on 2-3 L/min O2 by NC with adequate spontaneous ventilation    Post pain: adequate analgesia    Post assessment: no apparent anesthetic complications    Post vital signs: stable    Level of consciousness: awake    Nausea/Vomiting: no nausea/vomiting    Complications: none    Transfer of care protocol was followed      Last vitals:   Visit Vitals  BP (!) 129/59   Pulse (!) 53   Temp 36.7 °C (98.1 °F)   Resp 15   Ht 5' (1.524 m)   Wt 54.4 kg (119 lb 15.9 oz)   SpO2 99%   Breastfeeding? No   BMI 23.44 kg/m²

## 2020-01-20 NOTE — INTERVAL H&P NOTE
Pre-Procedure H and P Addendum    Patient seen and examined.  History and exam unchanged from prior history and physical.      Procedure: EGD and colonoscopy  Indication: SANDRO  ASA Class: per anesthesiology  Airway: normal  Neck Mobility: full range of motion  Mallampatti score: per anesthesia  History of anesthesia problems: no  Family history of anesthesia problems: no  Anesthesia Plan: MAC    Anesthesia/Surgery risks, benefits and alternative options discussed and understood by patient/family.          Active Hospital Problems    Diagnosis  POA    *Iron deficiency anemia due to chronic blood loss [D50.0]  Yes    Abdominal distension [R14.0]  Yes    GI bleeding [K92.2]  Yes    Chronic radicular lumbar pain [M54.16, G89.29]  Yes     Dr allen is managing pain cont current regimen         Resolved Hospital Problems   No resolved problems to display.

## 2020-01-20 NOTE — PLAN OF CARE
Pt slept throughout evening.  0400, pt awoke, disoriented to place, yelling, and anxious.  Pt easily reoriented.      Pt c/o chronic back pain, controlled with PRN Norco and lidocaine patch.      In PM, pt drank 1000 mls of the bowel prep.  Pt had two liquid brown BMs, not clear.  Pt and  educated that pt's BMs must be clear for the colonoscopy in AM.  Pt reports she will do the best she can with the prep.  Will wake pt up at 0400 to begin second half of bowel prep.    0600 Pt drank additional 1000 mls bowel prep.  BMs not yet clear, but becoming more watery.    Pt ambulating to bathroom with standby assist and cane.

## 2020-01-20 NOTE — [employer] PATIENT INSTRUCTIONS
Discharge Summary/Instructions after an Endoscopic Procedure  Patient Name: Marycruz Ramirez  Patient MRN: [id number]  Patient YOB: 1945 Monday, January 20, 2020  Micah Patel MD  RESTRICTIONS:  During your procedure today, you received medications for sedation.  These   medications may affect your judgment, balance and coordination.  Therefore,   for 24 hours, you have the following restrictions:   - DO NOT drive a car, operate machinery, make legal/financial decisions,   sign important papers or drink alcohol.    ACTIVITY:  Today: no heavy lifting, straining or running due to procedural   sedation/anesthesia.  The following day: return to full activity including work.  DIET:  Eat and drink normally unless instructed otherwise.     TREATMENT FOR COMMON SIDE EFFECTS:  - Mild abdominal pain, nausea, belching, bloating or excessive gas:  rest,   eat lightly and use a heating pad.  - Sore Throat: treat with throat lozenges and/or gargle with warm salt   water.  - Because air was used during the procedure, expelling large amounts of air   from your rectum or belching is normal.  - If a bowel prep was taken, you may not have a bowel movement for 1-3 days.    This is normal.  SYMPTOMS TO WATCH FOR AND REPORT TO YOUR PHYSICIAN:  1. Abdominal pain or bloating, other than gas cramps.  2. Chest pain.  3. Back pain.  4. Signs of infection such as: chills or fever occurring within 24 hours   after the procedure.  5. Rectal bleeding, which would show as bright red, maroon, or black stools.   (A tablespoon of blood from the rectum is not serious, especially if   hemorrhoids are present.)  6. Vomiting.  7. Weakness or dizziness.  GO DIRECTLY TO THE NEAREST EMERGENCY ROOM IF YOU HAVE ANY OF THE FOLLOWING:      Difficulty breathing              Chills and/or fever over 101 F   Persistent vomiting and/or vomiting blood   Severe abdominal pain   Severe chest pain   Black, tarry stools   Bleeding- more than one  tablespoon   Any other symptom or condition that you feel may need urgent attention  Your doctor recommends these additional instructions:  If any biopsies were taken, your doctors clinic will contact you in 1 to 2   weeks with any results.  - Await pathology results.   - Telephone GI clinic for pathology results in 2 weeks.   - Use a proton pump inhibitor PO daily.   - Perform a colonoscopy now.   - See colonoscopy report for further details.  For questions, problems or results please call your physician - Micah Patel MD at Work:  [phone].  [facility], EMERGENCY ROOM PHONE NUMBER: [phone]  IF A COMPLICATION OR EMERGENCY SITUATION ARISES AND YOU ARE UNABLE TO REACH   YOUR PHYSICIAN - GO DIRECTLY TO THE EMERGENCY ROOM.  Micah Patel MD  1/20/2020 4:10:20 PM  This report has been verified and signed electronically.  [employer]

## 2020-01-21 NOTE — PLAN OF CARE
01/21/20 0744   Final Note   Assessment Type Final Discharge Note     Patient discharged home with no needs on 1/20/2020 prior to this CM completing a discharge planning assessment. CM informed the patient of a hospital follow up appointment scheduled with Dr. Calle (PCP) on 1/27/2020 at 0930.

## 2020-01-22 ENCOUNTER — PATIENT OUTREACH (OUTPATIENT)
Dept: ADMINISTRATIVE | Facility: CLINIC | Age: 75
End: 2020-01-22

## 2020-01-22 NOTE — TELEPHONE ENCOUNTER
C3 nurse attempted to contact patient. No answer.  C3 nurse attempted to contact Marycruz Ramirez for a TCC post hospital discharge follow up call. No answer at phone number listed and no voicemail available. The patient has a HOSFU appointment with Marcelino Calle MD on 1/27/2020 @ 0930. Message sent to Physician staff.    1511  C3 nurse attempted to contact patient. No answer. The following message was left for the patient to return the call:  Good afternoon, my name is Dorota and I am a registered nurse calling on behalf of [facility] from the [facility]ination [facility].  This is a Transitional Care call for Marycruz Ramirez.  When you have a moment please contact us at [phone] between 8 and 4, Monday through Friday. If you have questions or issues, a nurse is available 24 hours every day at our ON CALL # thats [phone]. On behalf of [facility], thank you, and have a nice day.     The patient has a HOSFU appointment with Marcelino Calle MD on 1/27/2020 @ 0930.

## 2020-01-22 NOTE — DISCHARGE SUMMARY
"[facility]  Discharge Summary  Hospital Medicine    Patient Name: Marycruz Ramirez  YOB: 1945    Admit Date: 1/17/2020    Discharge Date and Time: 1/20/2020  5:52 PM    Discharge Attending Physician: Chung Dailey MD     Team: [facility]    Reason for Admission:   Chief Complaint   Patient presents with    Abnormal Lab     low h&h, told to come get transfusion       Active Hospital Problems    Diagnosis  POA    *Iron deficiency anemia due to chronic blood loss [D50.0]  Yes    Abdominal distension [R14.0]  Yes    GI bleeding [K92.2]  Yes    Chronic radicular lumbar pain [M54.16, G89.29]  Yes     Dr allen is managing pain cont current regimen         Resolved Hospital Problems   No resolved problems to display.       HPI:   74F sent to ED after outpatient labs revealed anemia.  She denies any classical symptoms but does report having more "senior moments" lately where she loses her train of thought in a conversation or has difficulty finding the right words.  She denies noting any melena or experiencing any abdominal pain.  She has had a colonoscopy before. Appetite and energy level has been normal.     Hospital Course:   In the ED patient was HDS and afebrile. Hgb 5.5. Given 3 units of pRBC 1/17. Admitted to hospital medicine for symptomatic anemia requiring transfusion. GI consulted for possible GIB. GI rec EGD/Colonospy, planned for 1/20. Hgb remains 5.5 --> 9.4 post transfusion --> 8.9 --> 8.6. HDS and afebrile. Hgb relatively stable. Underwent EGD and colonoscopy 1/20    Colonoscopy noted - Diverticulosis in the sigmoid colon and in the descending colon,  One 5 mm polyp in the sigmoid colon, removed with a cold snare. Resected and retrieved. A single non-bleeding colonic angioectasa. A few non-bleeding colonic angioectasias.    EGD - Inflamed mucosa in the esophagus. May be reflux related with esophageal spasms. Benign-appearing esophageal stenosis. Normal gastric " body.vNon-bleeding erosive gastropathy. Biopsied.    Gi rec PPI and okay for dc home with PCP fu and GI fu. If patient continues to slowly bleed OP may need VCE. Otherwise stable for dc.     Principal Problem: Iron deficiency anemia due to chronic blood loss    Other Problems Addressed:  Symptomatic anemia   Iron deficiency anemia  Suspected chronic blood loss anemia  Suspected possible GI bleeding  Leukocytosis - resolved  Hypophosphatemia - resolved   Abdominal distension - resolved  Chronic radicular lumbar pain  Bipolar disorder  Mixed hyperlipemia     Procedures Performed: Procedure(s) (LRB):  EGD (ESOPHAGOGASTRODUODENOSCOPY) (N/A)  COLONOSCOPY (N/A)    Special Care, Treatment, and Services Provided: None    Consults: GI    Significant Diagnostic Studies: Reviewed, in chart    Final Diagnoses: Same as principal problem.    Discharged Condition: stable  Face to face services were provided on 1/22/2020   Time Spent:  I spent > 30 minutes on the discharge, which included reviewing hospital course with patient/family, reviewing discharge medications, and arranging follow-up care.  Physical Exam on 1/22/2020:  BP (!) 140/63   Pulse 65   Temp 97.9 °F (36.6 °C) (Skin)   Resp 18   Ht 5' (1.524 m)   Wt 54.4 kg (119 lb 15.9 oz)   SpO2 100%   Breastfeeding? No   BMI 23.44 kg/m²      GEN:causcian female  in no acute distress. Nontoxic. Resting in bed. Cooperative.   HEENT: NCAT. PERRL. EOMI. Conjunctivae/corneas clear, sclera Anicteric.  CVS: RRR. Normal s1 s2 no murmur, click, rub or gallop  LUNG: CTAB. Normal respiratory effort. No wheezes, rhonchi, or crackles.  ABD: Normoactive BS, soft, NT, ND, no masses or organomegaly.  EXT: No edema. No cyanosis. Full ROM.  SKIN: color, texture, turgor normal. No rashes or lesions  NEURO: Alert, oriented x 4, Spont mvt of all extremities with no focal deficits noted.      Disposition: Home or Self Care    Follow Up Instructions:   Follow-up Information     Marcelino REECE  MD Luc On 1/27/2020.    Specialty:  Internal Medicine  Why:  at 9:30 AM; PCP hospital follow up appointment  Contact information:  [id number]  [address]  [place]  [phone]                 Future Appointments   Date Time Provider Department Center   1/27/2020  9:30 AM MD [facility] [place]   7/9/2020 11:00 AM Marcelino Calle MD [facility] [place]       Medications:    Discharge Medication List as of 1/20/2020  5:19 PM      START taking these medications    Details   ferrous sulfate (FEOSOL) 325 mg (65 mg iron) Tab tablet Take 1 tablet (325 mg total) by mouth once daily., Starting Tue 1/21/2020, Normal      pantoprazole (PROTONIX) 40 MG tablet Take 1 tablet (40 mg total) by mouth 2 (two) times daily before meals., Starting Mon 1/20/2020, Until Tue 1/19/2021, Normal         CONTINUE these medications which have NOT CHANGED    Details   cetirizine (ZYRTEC) 10 MG tablet Take by mouth., Starting Mon 12/5/2011, Historical Med      fenofibrate (TRICOR) 145 MG tablet Starting Thu 12/26/2019, Historical Med      HYDROcodone-acetaminophen (NORCO)  mg per tablet Take by mouth., Starting Fri 12/9/2011, Historical Med      OXcarbazepine (TRILEPTAL) 300 MG Tab Take by mouth., Starting Sat 3/26/2011, Historical Med      oxybutynin (DITROPAN) 5 MG Tab Starting Wed 11/6/2019, Historical Med      tolterodine (DETROL LA) 4 MG 24 hr capsule Take by mouth., Starting Tue 1/29/2013, Historical Med         STOP taking these medications       spironolactone (ALDACTONE) 50 MG tablet Comments:   Reason for Stopping:               Discharge Instructions  Discussed in length with patient. If his presenting symptoms were to worsen, or if febrile, he should return to the ED. Patient voiced understanding. He needs to fu with PCP and GI with appropriate labs.      Discharge Procedure Orders   Ambulatory Referral to Gastroenterology   Referral Priority: Routine Referral Type:  Consultation   Referral Reason: Specialty Services Required   Requested Specialty: Gastroenterology   Number of Visits Requested: 1         Chung Dailey MD  Department of [facility] Medicine

## 2020-01-25 ENCOUNTER — HOSPITAL ENCOUNTER (INPATIENT)
Facility: HOSPITAL | Age: 75
LOS: 4 days | Discharge: HOME HEALTH | DRG: 871 | End: 2020-01-29
Attending: EMERGENCY MEDICINE | Admitting: INTERNAL MEDICINE
Payer: MEDICARE

## 2020-01-25 DIAGNOSIS — R50.9 FEVER: ICD-10-CM

## 2020-01-25 DIAGNOSIS — I50.9 CHF (CONGESTIVE HEART FAILURE): ICD-10-CM

## 2020-01-25 DIAGNOSIS — A41.9 SEPSIS, DUE TO UNSPECIFIED ORGANISM, UNSPECIFIED WHETHER ACUTE ORGAN DYSFUNCTION PRESENT: ICD-10-CM

## 2020-01-25 DIAGNOSIS — R78.81 E COLI BACTEREMIA: ICD-10-CM

## 2020-01-25 DIAGNOSIS — Z72.0 TOBACCO ABUSE: ICD-10-CM

## 2020-01-25 DIAGNOSIS — B96.20 E COLI BACTEREMIA: ICD-10-CM

## 2020-01-25 DIAGNOSIS — I50.42 CHRONIC COMBINED SYSTOLIC AND DIASTOLIC HEART FAILURE: ICD-10-CM

## 2020-01-25 DIAGNOSIS — I35.0 AORTIC STENOSIS, SEVERE: ICD-10-CM

## 2020-01-25 DIAGNOSIS — D50.9 MICROCYTIC ANEMIA: ICD-10-CM

## 2020-01-25 DIAGNOSIS — R65.21 SEPTIC SHOCK: Primary | ICD-10-CM

## 2020-01-25 DIAGNOSIS — R41.82 ALTERED MENTAL STATUS, UNSPECIFIED ALTERED MENTAL STATUS TYPE: ICD-10-CM

## 2020-01-25 DIAGNOSIS — A41.9 SEPTIC SHOCK: Primary | ICD-10-CM

## 2020-01-25 DIAGNOSIS — N20.0 NEPHROLITHIASIS: ICD-10-CM

## 2020-01-25 DIAGNOSIS — N12 PYELONEPHRITIS: ICD-10-CM

## 2020-01-25 LAB
ALBUMIN SERPL BCP-MCNC: 3.3 G/DL (ref 3.5–5.2)
ALLENS TEST: ABNORMAL
ALP SERPL-CCNC: 77 U/L (ref 55–135)
ALT SERPL W/O P-5'-P-CCNC: 22 U/L (ref 10–44)
ANION GAP SERPL CALC-SCNC: 20 MMOL/L (ref 8–16)
ANISOCYTOSIS BLD QL SMEAR: SLIGHT
AST SERPL-CCNC: 23 U/L (ref 10–40)
BASO STIPL BLD QL SMEAR: ABNORMAL
BASOPHILS # BLD AUTO: 0.07 K/UL (ref 0–0.2)
BASOPHILS NFR BLD: 0.3 % (ref 0–1.9)
BILIRUB SERPL-MCNC: 0.8 MG/DL (ref 0.1–1)
BUN SERPL-MCNC: 21 MG/DL (ref 6–30)
BUN SERPL-MCNC: 21 MG/DL (ref 8–23)
BURR CELLS BLD QL SMEAR: ABNORMAL
CALCIUM SERPL-MCNC: 10.8 MG/DL (ref 8.7–10.5)
CHLORIDE SERPL-SCNC: 100 MMOL/L (ref 95–110)
CHLORIDE SERPL-SCNC: 101 MMOL/L (ref 95–110)
CK SERPL-CCNC: 61 U/L (ref 20–180)
CO2 SERPL-SCNC: 15 MMOL/L (ref 23–29)
CREAT SERPL-MCNC: 0.6 MG/DL (ref 0.5–1.4)
CREAT SERPL-MCNC: 1 MG/DL (ref 0.5–1.4)
DIFFERENTIAL METHOD: ABNORMAL
EOSINOPHIL # BLD AUTO: 0.1 K/UL (ref 0–0.5)
EOSINOPHIL NFR BLD: 0.3 % (ref 0–8)
ERYTHROCYTE [DISTWIDTH] IN BLOOD BY AUTOMATED COUNT: 23.9 % (ref 11.5–14.5)
EST. GFR  (AFRICAN AMERICAN): >60 ML/MIN/1.73 M^2
EST. GFR  (NON AFRICAN AMERICAN): 55.6 ML/MIN/1.73 M^2
GIANT PLATELETS BLD QL SMEAR: PRESENT
GLUCOSE SERPL-MCNC: 326 MG/DL (ref 70–110)
GLUCOSE SERPL-MCNC: 336 MG/DL (ref 70–110)
HCO3 UR-SCNC: 16.7 MMOL/L (ref 24–28)
HCT VFR BLD AUTO: 34.5 % (ref 37–48.5)
HCT VFR BLD CALC: 34 %PCV (ref 36–54)
HGB BLD-MCNC: 9.8 G/DL (ref 12–16)
HYPOCHROMIA BLD QL SMEAR: ABNORMAL
IMM GRANULOCYTES # BLD AUTO: 0.22 K/UL (ref 0–0.04)
IMM GRANULOCYTES NFR BLD AUTO: 1 % (ref 0–0.5)
INFLUENZA A, MOLECULAR: NEGATIVE
INFLUENZA B, MOLECULAR: NEGATIVE
INR PPP: 1.2 (ref 0.8–1.2)
LACTATE SERPL-SCNC: 11 MMOL/L (ref 0.5–2.2)
LYMPHOCYTES # BLD AUTO: 2.3 K/UL (ref 1–4.8)
LYMPHOCYTES NFR BLD: 10.1 % (ref 18–48)
MAGNESIUM SERPL-MCNC: 2.1 MG/DL (ref 1.6–2.6)
MCH RBC QN AUTO: 23.7 PG (ref 27–31)
MCHC RBC AUTO-ENTMCNC: 28.4 G/DL (ref 32–36)
MCV RBC AUTO: 83 FL (ref 82–98)
MONOCYTES # BLD AUTO: 2.5 K/UL (ref 0.3–1)
MONOCYTES NFR BLD: 10.7 % (ref 4–15)
NEUTROPHILS # BLD AUTO: 17.8 K/UL (ref 1.8–7.7)
NEUTROPHILS NFR BLD: 77.6 % (ref 38–73)
NRBC BLD-RTO: 0 /100 WBC
OVALOCYTES BLD QL SMEAR: ABNORMAL
PCO2 BLDA: 29.1 MMHG (ref 35–45)
PH SMN: 7.37 [PH] (ref 7.35–7.45)
PHOSPHATE SERPL-MCNC: 1.4 MG/DL (ref 2.7–4.5)
PLATELET # BLD AUTO: 294 K/UL (ref 150–350)
PLATELET BLD QL SMEAR: ABNORMAL
PMV BLD AUTO: 11 FL (ref 9.2–12.9)
PO2 BLDA: 29 MMHG (ref 40–60)
POC BE: -9 MMOL/L
POC IONIZED CALCIUM: 1.23 MMOL/L (ref 1.06–1.42)
POC SATURATED O2: 55 % (ref 95–100)
POC TCO2 (MEASURED): 17 MMOL/L (ref 23–29)
POC TCO2: 18 MMOL/L (ref 24–29)
POIKILOCYTOSIS BLD QL SMEAR: SLIGHT
POLYCHROMASIA BLD QL SMEAR: ABNORMAL
POTASSIUM BLD-SCNC: 4.9 MMOL/L (ref 3.5–5.1)
POTASSIUM SERPL-SCNC: 5.2 MMOL/L (ref 3.5–5.1)
PROCALCITONIN SERPL IA-MCNC: 1.2 NG/ML
PROT SERPL-MCNC: 6.9 G/DL (ref 6–8.4)
PROTHROMBIN TIME: 12 SEC (ref 9–12.5)
RBC # BLD AUTO: 4.14 M/UL (ref 4–5.4)
SAMPLE: ABNORMAL
SAMPLE: ABNORMAL
SCHISTOCYTES BLD QL SMEAR: ABNORMAL
SCHISTOCYTES BLD QL SMEAR: PRESENT
SITE: ABNORMAL
SODIUM BLD-SCNC: 132 MMOL/L (ref 136–145)
SODIUM SERPL-SCNC: 135 MMOL/L (ref 136–145)
SPECIMEN SOURCE: NORMAL
WBC # BLD AUTO: 22.93 K/UL (ref 3.9–12.7)

## 2020-01-25 PROCEDURE — 12000002 HC ACUTE/MED SURGE SEMI-PRIVATE ROOM: Mod: HCNC

## 2020-01-25 PROCEDURE — 99291 PR CRITICAL CARE, E/M 30-74 MINUTES: ICD-10-PCS | Mod: ,,, | Performed by: EMERGENCY MEDICINE

## 2020-01-25 PROCEDURE — 82550 ASSAY OF CK (CPK): CPT | Mod: HCNC

## 2020-01-25 PROCEDURE — 63600175 PHARM REV CODE 636 W HCPCS: Mod: HCNC | Performed by: EMERGENCY MEDICINE

## 2020-01-25 PROCEDURE — 87086 URINE CULTURE/COLONY COUNT: CPT | Mod: HCNC

## 2020-01-25 PROCEDURE — 96367 TX/PROPH/DG ADDL SEQ IV INF: CPT | Mod: HCNC

## 2020-01-25 PROCEDURE — 93010 EKG 12-LEAD: ICD-10-PCS | Mod: HCNC,,, | Performed by: INTERNAL MEDICINE

## 2020-01-25 PROCEDURE — 87077 CULTURE AEROBIC IDENTIFY: CPT | Mod: HCNC

## 2020-01-25 PROCEDURE — 99291 CRITICAL CARE FIRST HOUR: CPT | Mod: 25,HCNC

## 2020-01-25 PROCEDURE — 87186 SC STD MICRODIL/AGAR DIL: CPT | Mod: 59,HCNC

## 2020-01-25 PROCEDURE — 82800 BLOOD PH: CPT | Mod: HCNC

## 2020-01-25 PROCEDURE — 96365 THER/PROPH/DIAG IV INF INIT: CPT | Mod: HCNC

## 2020-01-25 PROCEDURE — 93005 ELECTROCARDIOGRAM TRACING: CPT | Mod: HCNC

## 2020-01-25 PROCEDURE — 25000003 PHARM REV CODE 250: Mod: HCNC | Performed by: STUDENT IN AN ORGANIZED HEALTH CARE EDUCATION/TRAINING PROGRAM

## 2020-01-25 PROCEDURE — 84145 PROCALCITONIN (PCT): CPT | Mod: HCNC

## 2020-01-25 PROCEDURE — 27000221 HC OXYGEN, UP TO 24 HOURS: Mod: HCNC

## 2020-01-25 PROCEDURE — 87088 URINE BACTERIA CULTURE: CPT | Mod: HCNC

## 2020-01-25 PROCEDURE — 99900035 HC TECH TIME PER 15 MIN (STAT): Mod: HCNC

## 2020-01-25 PROCEDURE — 94761 N-INVAS EAR/PLS OXIMETRY MLT: CPT | Mod: HCNC

## 2020-01-25 PROCEDURE — 63600175 PHARM REV CODE 636 W HCPCS: Mod: HCNC | Performed by: STUDENT IN AN ORGANIZED HEALTH CARE EDUCATION/TRAINING PROGRAM

## 2020-01-25 PROCEDURE — 87502 INFLUENZA DNA AMP PROBE: CPT | Mod: HCNC

## 2020-01-25 PROCEDURE — 83735 ASSAY OF MAGNESIUM: CPT | Mod: HCNC

## 2020-01-25 PROCEDURE — 87040 BLOOD CULTURE FOR BACTERIA: CPT | Mod: 59,HCNC

## 2020-01-25 PROCEDURE — 93010 ELECTROCARDIOGRAM REPORT: CPT | Mod: HCNC,,, | Performed by: INTERNAL MEDICINE

## 2020-01-25 PROCEDURE — 99291 CRITICAL CARE FIRST HOUR: CPT | Mod: ,,, | Performed by: EMERGENCY MEDICINE

## 2020-01-25 PROCEDURE — 80053 COMPREHEN METABOLIC PANEL: CPT | Mod: HCNC

## 2020-01-25 PROCEDURE — 82803 BLOOD GASES ANY COMBINATION: CPT | Mod: HCNC

## 2020-01-25 PROCEDURE — 80047 BASIC METABLC PNL IONIZED CA: CPT | Mod: HCNC

## 2020-01-25 PROCEDURE — 83605 ASSAY OF LACTIC ACID: CPT | Mod: HCNC

## 2020-01-25 PROCEDURE — 25500020 PHARM REV CODE 255: Mod: HCNC | Performed by: EMERGENCY MEDICINE

## 2020-01-25 PROCEDURE — 96375 TX/PRO/DX INJ NEW DRUG ADDON: CPT | Mod: HCNC

## 2020-01-25 PROCEDURE — 84100 ASSAY OF PHOSPHORUS: CPT | Mod: HCNC

## 2020-01-25 PROCEDURE — 81001 URINALYSIS AUTO W/SCOPE: CPT | Mod: HCNC

## 2020-01-25 PROCEDURE — 85025 COMPLETE CBC W/AUTO DIFF WBC: CPT | Mod: HCNC

## 2020-01-25 PROCEDURE — 85610 PROTHROMBIN TIME: CPT | Mod: HCNC

## 2020-01-25 PROCEDURE — 83880 ASSAY OF NATRIURETIC PEPTIDE: CPT | Mod: HCNC

## 2020-01-25 RX ORDER — VANCOMYCIN HCL IN 5 % DEXTROSE 1G/250ML
1000 PLASTIC BAG, INJECTION (ML) INTRAVENOUS
Status: COMPLETED | OUTPATIENT
Start: 2020-01-25 | End: 2020-01-26

## 2020-01-25 RX ORDER — KETOROLAC TROMETHAMINE 30 MG/ML
10 INJECTION, SOLUTION INTRAMUSCULAR; INTRAVENOUS
Status: COMPLETED | OUTPATIENT
Start: 2020-01-25 | End: 2020-01-25

## 2020-01-25 RX ORDER — ACETAMINOPHEN 650 MG/1
650 SUPPOSITORY RECTAL
Status: COMPLETED | OUTPATIENT
Start: 2020-01-25 | End: 2020-01-25

## 2020-01-25 RX ORDER — NOREPINEPHRINE BITARTRATE/D5W 4MG/250ML
0.05 PLASTIC BAG, INJECTION (ML) INTRAVENOUS CONTINUOUS
Status: DISCONTINUED | OUTPATIENT
Start: 2020-01-26 | End: 2020-01-26

## 2020-01-25 RX ORDER — LORAZEPAM 2 MG/ML
0.5 INJECTION INTRAMUSCULAR
Status: COMPLETED | OUTPATIENT
Start: 2020-01-25 | End: 2020-01-25

## 2020-01-25 RX ADMIN — LORAZEPAM 0.5 MG: 2 INJECTION INTRAMUSCULAR; INTRAVENOUS at 10:01

## 2020-01-25 RX ADMIN — SODIUM CHLORIDE 1000 ML: 0.9 INJECTION, SOLUTION INTRAVENOUS at 10:01

## 2020-01-25 RX ADMIN — IOHEXOL 75 ML: 350 INJECTION, SOLUTION INTRAVENOUS at 11:01

## 2020-01-25 RX ADMIN — Medication 0.02 MCG/KG/MIN: at 11:01

## 2020-01-25 RX ADMIN — VANCOMYCIN HYDROCHLORIDE 1000 MG: 1 INJECTION, POWDER, LYOPHILIZED, FOR SOLUTION INTRAVENOUS at 11:01

## 2020-01-25 RX ADMIN — PIPERACILLIN AND TAZOBACTAM 4.5 G: 4; .5 INJECTION, POWDER, LYOPHILIZED, FOR SOLUTION INTRAVENOUS; PARENTERAL at 11:01

## 2020-01-25 RX ADMIN — ACETAMINOPHEN 650 MG: 650 SUPPOSITORY RECTAL at 10:01

## 2020-01-25 RX ADMIN — KETOROLAC TROMETHAMINE 10 MG: 30 INJECTION, SOLUTION INTRAMUSCULAR; INTRAVENOUS at 11:01

## 2020-01-26 PROBLEM — R65.21 SEPTIC SHOCK: Status: ACTIVE | Noted: 2020-01-26

## 2020-01-26 PROBLEM — A41.9 SEPSIS: Status: RESOLVED | Noted: 2020-01-26 | Resolved: 2020-01-26

## 2020-01-26 PROBLEM — N12 PYELONEPHRITIS: Status: ACTIVE | Noted: 2020-01-26

## 2020-01-26 PROBLEM — A41.9 SEPSIS: Status: ACTIVE | Noted: 2020-01-26

## 2020-01-26 PROBLEM — A41.9 SEPTIC SHOCK: Status: ACTIVE | Noted: 2020-01-26

## 2020-01-26 LAB
ADENOVIRUS: NOT DETECTED
ALBUMIN SERPL BCP-MCNC: 2.6 G/DL (ref 3.5–5.2)
ALP SERPL-CCNC: 60 U/L (ref 55–135)
ALT SERPL W/O P-5'-P-CCNC: 23 U/L (ref 10–44)
ANION GAP SERPL CALC-SCNC: 7 MMOL/L (ref 8–16)
ANION GAP SERPL CALC-SCNC: 8 MMOL/L (ref 8–16)
ANISOCYTOSIS BLD QL SMEAR: SLIGHT
ASCENDING AORTA: 3 CM
AST SERPL-CCNC: 63 U/L (ref 10–40)
AV INDEX (PROSTH): 0.2
AV MEAN GRADIENT: 43 MMHG
AV PEAK GRADIENT: 62 MMHG
AV VALVE AREA: 0.64 CM2
AV VELOCITY RATIO: 0.19
BACTERIA #/AREA URNS AUTO: ABNORMAL /HPF
BASOPHILS # BLD AUTO: 0.02 K/UL (ref 0–0.2)
BASOPHILS # BLD AUTO: 0.02 K/UL (ref 0–0.2)
BASOPHILS NFR BLD: 0.1 % (ref 0–1.9)
BASOPHILS NFR BLD: 0.2 % (ref 0–1.9)
BILIRUB SERPL-MCNC: 0.6 MG/DL (ref 0.1–1)
BILIRUB UR QL STRIP: NEGATIVE
BNP SERPL-MCNC: 589 PG/ML (ref 0–99)
BORDETELLA PARAPERTUSSIS (IS1001): NOT DETECTED
BORDETELLA PERTUSSIS (PTXP): NOT DETECTED
BSA FOR ECHO PROCEDURE: 1.51 M2
BUN SERPL-MCNC: 20 MG/DL (ref 8–23)
BUN SERPL-MCNC: 21 MG/DL (ref 8–23)
CALCIUM SERPL-MCNC: 9.4 MG/DL (ref 8.7–10.5)
CALCIUM SERPL-MCNC: 9.4 MG/DL (ref 8.7–10.5)
CHLAMYDIA PNEUMONIAE: NOT DETECTED
CHLORIDE SERPL-SCNC: 102 MMOL/L (ref 95–110)
CHLORIDE SERPL-SCNC: 103 MMOL/L (ref 95–110)
CLARITY UR REFRACT.AUTO: ABNORMAL
CO2 SERPL-SCNC: 23 MMOL/L (ref 23–29)
CO2 SERPL-SCNC: 24 MMOL/L (ref 23–29)
COLOR UR AUTO: ABNORMAL
CORONAVIRUS 229E, COMMON COLD VIRUS: NOT DETECTED
CORONAVIRUS HKU1, COMMON COLD VIRUS: NOT DETECTED
CORONAVIRUS NL63, COMMON COLD VIRUS: NOT DETECTED
CORONAVIRUS OC43, COMMON COLD VIRUS: NOT DETECTED
CREAT SERPL-MCNC: 0.8 MG/DL (ref 0.5–1.4)
CREAT SERPL-MCNC: 0.9 MG/DL (ref 0.5–1.4)
CV ECHO LV RWT: 0.36 CM
DIFFERENTIAL METHOD: ABNORMAL
DIFFERENTIAL METHOD: ABNORMAL
DOP CALC AO PEAK VEL: 3.94 M/S
DOP CALC AO VTI: 84.14 CM
DOP CALC LVOT AREA: 3.2 CM2
DOP CALC LVOT DIAMETER: 2.01 CM
DOP CALC LVOT PEAK VEL: 0.76 M/S
DOP CALC LVOT STROKE VOLUME: 53.95 CM3
DOP CALCLVOT PEAK VEL VTI: 17.01 CM
E WAVE DECELERATION TIME: 123.77 MSEC
E/A RATIO: 0.67
E/E' RATIO: 21.75 M/S
ECHO LV POSTERIOR WALL: 0.87 CM (ref 0.6–1.1)
EOSINOPHIL # BLD AUTO: 0 K/UL (ref 0–0.5)
EOSINOPHIL # BLD AUTO: 0 K/UL (ref 0–0.5)
EOSINOPHIL NFR BLD: 0 % (ref 0–8)
EOSINOPHIL NFR BLD: 0 % (ref 0–8)
ERYTHROCYTE [DISTWIDTH] IN BLOOD BY AUTOMATED COUNT: 23.5 % (ref 11.5–14.5)
ERYTHROCYTE [DISTWIDTH] IN BLOOD BY AUTOMATED COUNT: 23.9 % (ref 11.5–14.5)
EST. GFR  (AFRICAN AMERICAN): >60 ML/MIN/1.73 M^2
EST. GFR  (AFRICAN AMERICAN): >60 ML/MIN/1.73 M^2
EST. GFR  (NON AFRICAN AMERICAN): >60 ML/MIN/1.73 M^2
EST. GFR  (NON AFRICAN AMERICAN): >60 ML/MIN/1.73 M^2
FLUBV RNA NPH QL NAA+NON-PROBE: NOT DETECTED
FRACTIONAL SHORTENING: 28 % (ref 28–44)
GLUCOSE SERPL-MCNC: 326 MG/DL (ref 70–110)
GLUCOSE SERPL-MCNC: 362 MG/DL (ref 70–110)
GLUCOSE UR QL STRIP: ABNORMAL
HCT VFR BLD AUTO: 22.8 % (ref 37–48.5)
HCT VFR BLD AUTO: 30.4 % (ref 37–48.5)
HGB BLD-MCNC: 6.6 G/DL (ref 12–16)
HGB BLD-MCNC: 8.5 G/DL (ref 12–16)
HGB UR QL STRIP: NEGATIVE
HPIV1 RNA NPH QL NAA+NON-PROBE: NOT DETECTED
HPIV2 RNA NPH QL NAA+NON-PROBE: NOT DETECTED
HPIV3 RNA NPH QL NAA+NON-PROBE: NOT DETECTED
HPIV4 RNA NPH QL NAA+NON-PROBE: NOT DETECTED
HUMAN METAPNEUMOVIRUS: NOT DETECTED
HYALINE CASTS UR QL AUTO: 3 /LPF
HYPOCHROMIA BLD QL SMEAR: ABNORMAL
IMM GRANULOCYTES # BLD AUTO: 0.08 K/UL (ref 0–0.04)
IMM GRANULOCYTES # BLD AUTO: 0.11 K/UL (ref 0–0.04)
IMM GRANULOCYTES NFR BLD AUTO: 0.6 % (ref 0–0.5)
IMM GRANULOCYTES NFR BLD AUTO: 0.8 % (ref 0–0.5)
INFLUENZA A (SUBTYPES H1,H1-2009,H3): NOT DETECTED
INTERVENTRICULAR SEPTUM: 0.84 CM (ref 0.6–1.1)
IVRT: 0.09 MSEC
KETONES UR QL STRIP: NEGATIVE
LA MAJOR: 5.95 CM
LA MINOR: 5.89 CM
LA WIDTH: 3.89 CM
LACTATE SERPL-SCNC: 1 MMOL/L (ref 0.5–2.2)
LACTATE SERPL-SCNC: 1.2 MMOL/L (ref 0.5–2.2)
LEFT ATRIUM SIZE: 3.21 CM
LEFT ATRIUM VOLUME INDEX: 42 ML/M2
LEFT ATRIUM VOLUME: 62.83 CM3
LEFT INTERNAL DIMENSION IN SYSTOLE: 3.45 CM (ref 2.1–4)
LEFT VENTRICLE DIASTOLIC VOLUME INDEX: 71.26 ML/M2
LEFT VENTRICLE DIASTOLIC VOLUME: 106.67 ML
LEFT VENTRICLE MASS INDEX: 92 G/M2
LEFT VENTRICLE SYSTOLIC VOLUME INDEX: 32.9 ML/M2
LEFT VENTRICLE SYSTOLIC VOLUME: 49.25 ML
LEFT VENTRICULAR INTERNAL DIMENSION IN DIASTOLE: 4.78 CM (ref 3.5–6)
LEFT VENTRICULAR MASS: 137.17 G
LEUKOCYTE ESTERASE UR QL STRIP: ABNORMAL
LV LATERAL E/E' RATIO: 21.75 M/S
LV SEPTAL E/E' RATIO: 21.75 M/S
LYMPHOCYTES # BLD AUTO: 1.3 K/UL (ref 1–4.8)
LYMPHOCYTES # BLD AUTO: 1.3 K/UL (ref 1–4.8)
LYMPHOCYTES NFR BLD: 9.5 % (ref 18–48)
LYMPHOCYTES NFR BLD: 9.6 % (ref 18–48)
MAGNESIUM SERPL-MCNC: 2 MG/DL (ref 1.6–2.6)
MCH RBC QN AUTO: 23.5 PG (ref 27–31)
MCH RBC QN AUTO: 24.2 PG (ref 27–31)
MCHC RBC AUTO-ENTMCNC: 28 G/DL (ref 32–36)
MCHC RBC AUTO-ENTMCNC: 28.9 G/DL (ref 32–36)
MCV RBC AUTO: 84 FL (ref 82–98)
MCV RBC AUTO: 84 FL (ref 82–98)
MICROSCOPIC COMMENT: ABNORMAL
MONOCYTES # BLD AUTO: 1 K/UL (ref 0.3–1)
MONOCYTES # BLD AUTO: 1 K/UL (ref 0.3–1)
MONOCYTES NFR BLD: 7.4 % (ref 4–15)
MONOCYTES NFR BLD: 7.6 % (ref 4–15)
MV PEAK A VEL: 1.29 M/S
MV PEAK E VEL: 0.87 M/S
MYCOPLASMA PNEUMONIAE: NOT DETECTED
NEUTROPHILS # BLD AUTO: 10.8 K/UL (ref 1.8–7.7)
NEUTROPHILS # BLD AUTO: 11.5 K/UL (ref 1.8–7.7)
NEUTROPHILS NFR BLD: 81.8 % (ref 38–73)
NEUTROPHILS NFR BLD: 82.4 % (ref 38–73)
NITRITE UR QL STRIP: POSITIVE
NRBC BLD-RTO: 0 /100 WBC
NRBC BLD-RTO: 0 /100 WBC
OVALOCYTES BLD QL SMEAR: ABNORMAL
PH UR STRIP: 5 [PH] (ref 5–8)
PHOSPHATE SERPL-MCNC: 3.5 MG/DL (ref 2.7–4.5)
PISA TR MAX VEL: 2.5 M/S
PLATELET # BLD AUTO: 173 K/UL (ref 150–350)
PLATELET # BLD AUTO: 189 K/UL (ref 150–350)
PLATELET BLD QL SMEAR: ABNORMAL
PMV BLD AUTO: 10.7 FL (ref 9.2–12.9)
PMV BLD AUTO: 11.2 FL (ref 9.2–12.9)
POCT GLUCOSE: 164 MG/DL (ref 70–110)
POCT GLUCOSE: 178 MG/DL (ref 70–110)
POCT GLUCOSE: 270 MG/DL (ref 70–110)
POCT GLUCOSE: 273 MG/DL (ref 70–110)
POIKILOCYTOSIS BLD QL SMEAR: SLIGHT
POLYCHROMASIA BLD QL SMEAR: ABNORMAL
POTASSIUM SERPL-SCNC: 3.3 MMOL/L (ref 3.5–5.1)
POTASSIUM SERPL-SCNC: 4.5 MMOL/L (ref 3.5–5.1)
PROT SERPL-MCNC: 5.6 G/DL (ref 6–8.4)
PROT UR QL STRIP: ABNORMAL
RA MAJOR: 4.85 CM
RA PRESSURE: 15 MMHG
RA WIDTH: 3.09 CM
RBC # BLD AUTO: 2.73 M/UL (ref 4–5.4)
RBC # BLD AUTO: 3.61 M/UL (ref 4–5.4)
RBC #/AREA URNS AUTO: 3 /HPF (ref 0–4)
RESPIRATORY INFECTION PANEL SOURCE: NORMAL
RIGHT VENTRICULAR END-DIASTOLIC DIMENSION: 3.46 CM
RSV RNA NPH QL NAA+NON-PROBE: NOT DETECTED
RV TISSUE DOPPLER FREE WALL SYSTOLIC VELOCITY 1 (APICAL 4 CHAMBER VIEW): 8.37 CM/S
RV+EV RNA NPH QL NAA+NON-PROBE: NOT DETECTED
SCHISTOCYTES BLD QL SMEAR: ABNORMAL
SINUS: 2.85 CM
SODIUM SERPL-SCNC: 132 MMOL/L (ref 136–145)
SODIUM SERPL-SCNC: 135 MMOL/L (ref 136–145)
SP GR UR STRIP: 1.02 (ref 1–1.03)
SQUAMOUS #/AREA URNS AUTO: 0 /HPF
STJ: 2.45 CM
TDI LATERAL: 0.04 M/S
TDI SEPTAL: 0.04 M/S
TDI: 0.04 M/S
TR MAX PG: 25 MMHG
TRICUSPID ANNULAR PLANE SYSTOLIC EXCURSION: 1.96 CM
TV REST PULMONARY ARTERY PRESSURE: 40 MMHG
URN SPEC COLLECT METH UR: ABNORMAL
WBC # BLD AUTO: 13.14 K/UL (ref 3.9–12.7)
WBC # BLD AUTO: 13.96 K/UL (ref 3.9–12.7)
WBC #/AREA URNS AUTO: >100 /HPF (ref 0–5)
WBC CLUMPS UR QL AUTO: ABNORMAL
YEAST UR QL AUTO: ABNORMAL

## 2020-01-26 PROCEDURE — 25000003 PHARM REV CODE 250: Mod: HCNC | Performed by: EMERGENCY MEDICINE

## 2020-01-26 PROCEDURE — 86334 PATHOLOGIST INTERPRETATION IFE: ICD-10-PCS | Mod: 26,HCNC,, | Performed by: PATHOLOGY

## 2020-01-26 PROCEDURE — 83605 ASSAY OF LACTIC ACID: CPT | Mod: 91,HCNC

## 2020-01-26 PROCEDURE — 99291 PR CRITICAL CARE, E/M 30-74 MINUTES: ICD-10-PCS | Mod: HCNC,,, | Performed by: INTERNAL MEDICINE

## 2020-01-26 PROCEDURE — 63600175 PHARM REV CODE 636 W HCPCS: Mod: HCNC | Performed by: INTERNAL MEDICINE

## 2020-01-26 PROCEDURE — 97116 GAIT TRAINING THERAPY: CPT | Mod: HCNC

## 2020-01-26 PROCEDURE — 97166 OT EVAL MOD COMPLEX 45 MIN: CPT | Mod: HCNC

## 2020-01-26 PROCEDURE — 63600175 PHARM REV CODE 636 W HCPCS: Mod: HCNC | Performed by: STUDENT IN AN ORGANIZED HEALTH CARE EDUCATION/TRAINING PROGRAM

## 2020-01-26 PROCEDURE — 63600175 PHARM REV CODE 636 W HCPCS: Mod: HCNC | Performed by: NURSE PRACTITIONER

## 2020-01-26 PROCEDURE — 94761 N-INVAS EAR/PLS OXIMETRY MLT: CPT | Mod: HCNC

## 2020-01-26 PROCEDURE — 97161 PT EVAL LOW COMPLEX 20 MIN: CPT | Mod: HCNC

## 2020-01-26 PROCEDURE — 25000003 PHARM REV CODE 250: Mod: HCNC | Performed by: NURSE PRACTITIONER

## 2020-01-26 PROCEDURE — 80048 BASIC METABOLIC PNL TOTAL CA: CPT | Mod: HCNC

## 2020-01-26 PROCEDURE — 27000221 HC OXYGEN, UP TO 24 HOURS: Mod: HCNC

## 2020-01-26 PROCEDURE — 84165 PROTEIN E-PHORESIS SERUM: CPT | Mod: 26,HCNC,, | Performed by: PATHOLOGY

## 2020-01-26 PROCEDURE — 80053 COMPREHEN METABOLIC PANEL: CPT | Mod: HCNC

## 2020-01-26 PROCEDURE — 84165 PATHOLOGIST INTERPRETATION SPE: ICD-10-PCS | Mod: 26,HCNC,, | Performed by: PATHOLOGY

## 2020-01-26 PROCEDURE — 83735 ASSAY OF MAGNESIUM: CPT | Mod: HCNC

## 2020-01-26 PROCEDURE — 84165 PROTEIN E-PHORESIS SERUM: CPT | Mod: HCNC

## 2020-01-26 PROCEDURE — 99291 CRITICAL CARE FIRST HOUR: CPT | Mod: HCNC,,, | Performed by: INTERNAL MEDICINE

## 2020-01-26 PROCEDURE — 86334 IMMUNOFIX E-PHORESIS SERUM: CPT | Mod: HCNC

## 2020-01-26 PROCEDURE — 86334 IMMUNOFIX E-PHORESIS SERUM: CPT | Mod: 26,HCNC,, | Performed by: PATHOLOGY

## 2020-01-26 PROCEDURE — 20000000 HC ICU ROOM: Mod: HCNC

## 2020-01-26 PROCEDURE — 25000003 PHARM REV CODE 250: Mod: HCNC | Performed by: STUDENT IN AN ORGANIZED HEALTH CARE EDUCATION/TRAINING PROGRAM

## 2020-01-26 PROCEDURE — 97535 SELF CARE MNGMENT TRAINING: CPT | Mod: HCNC

## 2020-01-26 PROCEDURE — 87633 RESP VIRUS 12-25 TARGETS: CPT | Mod: HCNC

## 2020-01-26 PROCEDURE — 85025 COMPLETE CBC W/AUTO DIFF WBC: CPT | Mod: 91,HCNC

## 2020-01-26 PROCEDURE — 84100 ASSAY OF PHOSPHORUS: CPT | Mod: HCNC

## 2020-01-26 RX ORDER — OXYBUTYNIN CHLORIDE 5 MG/1
5 TABLET ORAL DAILY
Status: DISCONTINUED | OUTPATIENT
Start: 2020-01-26 | End: 2020-01-29 | Stop reason: HOSPADM

## 2020-01-26 RX ORDER — POTASSIUM CHLORIDE 7.45 MG/ML
10 INJECTION INTRAVENOUS
Status: DISCONTINUED | OUTPATIENT
Start: 2020-01-26 | End: 2020-01-26

## 2020-01-26 RX ORDER — HYDROCODONE BITARTRATE AND ACETAMINOPHEN 5; 325 MG/1; MG/1
1 TABLET ORAL EVERY 6 HOURS PRN
Status: DISCONTINUED | OUTPATIENT
Start: 2020-01-26 | End: 2020-01-26

## 2020-01-26 RX ORDER — IBUPROFEN 200 MG
16 TABLET ORAL
Status: DISCONTINUED | OUTPATIENT
Start: 2020-01-26 | End: 2020-01-29 | Stop reason: HOSPADM

## 2020-01-26 RX ORDER — OXYCODONE HYDROCHLORIDE 5 MG/1
5 TABLET ORAL EVERY 6 HOURS PRN
Status: DISCONTINUED | OUTPATIENT
Start: 2020-01-26 | End: 2020-01-29 | Stop reason: HOSPADM

## 2020-01-26 RX ORDER — ACETAMINOPHEN 325 MG/1
650 TABLET ORAL EVERY 6 HOURS PRN
Status: DISCONTINUED | OUTPATIENT
Start: 2020-01-26 | End: 2020-01-29 | Stop reason: HOSPADM

## 2020-01-26 RX ORDER — SODIUM CHLORIDE 0.9 % (FLUSH) 0.9 %
10 SYRINGE (ML) INJECTION
Status: DISCONTINUED | OUTPATIENT
Start: 2020-01-26 | End: 2020-01-29 | Stop reason: HOSPADM

## 2020-01-26 RX ORDER — FERROUS SULFATE 325(65) MG
325 TABLET, DELAYED RELEASE (ENTERIC COATED) ORAL DAILY
Status: DISCONTINUED | OUTPATIENT
Start: 2020-01-26 | End: 2020-01-29 | Stop reason: HOSPADM

## 2020-01-26 RX ORDER — INSULIN ASPART 100 [IU]/ML
0-5 INJECTION, SOLUTION INTRAVENOUS; SUBCUTANEOUS
Status: DISCONTINUED | OUTPATIENT
Start: 2020-01-26 | End: 2020-01-29 | Stop reason: HOSPADM

## 2020-01-26 RX ORDER — ENOXAPARIN SODIUM 100 MG/ML
40 INJECTION SUBCUTANEOUS EVERY 24 HOURS
Status: DISCONTINUED | OUTPATIENT
Start: 2020-01-26 | End: 2020-01-29

## 2020-01-26 RX ORDER — GLUCAGON 1 MG
1 KIT INJECTION
Status: DISCONTINUED | OUTPATIENT
Start: 2020-01-26 | End: 2020-01-29 | Stop reason: HOSPADM

## 2020-01-26 RX ORDER — PANTOPRAZOLE SODIUM 40 MG/1
40 TABLET, DELAYED RELEASE ORAL DAILY
Status: DISCONTINUED | OUTPATIENT
Start: 2020-01-26 | End: 2020-01-29 | Stop reason: HOSPADM

## 2020-01-26 RX ORDER — FENOFIBRATE 145 MG/1
145 TABLET, FILM COATED ORAL DAILY
Status: DISCONTINUED | OUTPATIENT
Start: 2020-01-26 | End: 2020-01-29 | Stop reason: HOSPADM

## 2020-01-26 RX ORDER — CETIRIZINE HYDROCHLORIDE 10 MG/1
10 TABLET ORAL DAILY
Status: DISCONTINUED | OUTPATIENT
Start: 2020-01-26 | End: 2020-01-29 | Stop reason: HOSPADM

## 2020-01-26 RX ORDER — IBUPROFEN 200 MG
24 TABLET ORAL
Status: DISCONTINUED | OUTPATIENT
Start: 2020-01-26 | End: 2020-01-29 | Stop reason: HOSPADM

## 2020-01-26 RX ORDER — OXCARBAZEPINE 150 MG/1
300 TABLET, FILM COATED ORAL DAILY
Status: DISCONTINUED | OUTPATIENT
Start: 2020-01-26 | End: 2020-01-29 | Stop reason: HOSPADM

## 2020-01-26 RX ADMIN — POTASSIUM CHLORIDE 10 MEQ: 10 INJECTION, SOLUTION INTRAVENOUS at 07:01

## 2020-01-26 RX ADMIN — VANCOMYCIN HYDROCHLORIDE 750 MG: 750 INJECTION, POWDER, LYOPHILIZED, FOR SOLUTION INTRAVENOUS at 10:01

## 2020-01-26 RX ADMIN — OXYCODONE HYDROCHLORIDE 5 MG: 5 TABLET ORAL at 07:01

## 2020-01-26 RX ADMIN — PIPERACILLIN AND TAZOBACTAM 4.5 G: 4; .5 INJECTION, POWDER, FOR SOLUTION INTRAVENOUS at 04:01

## 2020-01-26 RX ADMIN — SODIUM CHLORIDE, SODIUM LACTATE, POTASSIUM CHLORIDE, AND CALCIUM CHLORIDE 500 ML: .6; .31; .03; .02 INJECTION, SOLUTION INTRAVENOUS at 01:01

## 2020-01-26 RX ADMIN — CETIRIZINE HYDROCHLORIDE 10 MG: 10 TABLET, FILM COATED ORAL at 08:01

## 2020-01-26 RX ADMIN — HYDROCODONE BITARTRATE AND ACETAMINOPHEN 1 TABLET: 5; 325 TABLET ORAL at 07:01

## 2020-01-26 RX ADMIN — ENOXAPARIN SODIUM 40 MG: 100 INJECTION SUBCUTANEOUS at 04:01

## 2020-01-26 RX ADMIN — ACETAMINOPHEN 650 MG: 325 TABLET ORAL at 09:01

## 2020-01-26 RX ADMIN — FERROUS SULFATE TAB EC 325 MG (65 MG FE EQUIVALENT) 325 MG: 325 (65 FE) TABLET DELAYED RESPONSE at 08:01

## 2020-01-26 RX ADMIN — INSULIN ASPART 3 UNITS: 100 INJECTION, SOLUTION INTRAVENOUS; SUBCUTANEOUS at 01:01

## 2020-01-26 RX ADMIN — FENOFIBRATE 145 MG: 145 TABLET, FILM COATED ORAL at 08:01

## 2020-01-26 RX ADMIN — POTASSIUM PHOSPHATE, MONOBASIC AND POTASSIUM PHOSPHATE, DIBASIC 30 MMOL: 224; 236 INJECTION, SOLUTION, CONCENTRATE INTRAVENOUS at 01:01

## 2020-01-26 RX ADMIN — POTASSIUM CHLORIDE 10 MEQ: 10 INJECTION, SOLUTION INTRAVENOUS at 04:01

## 2020-01-26 RX ADMIN — PIPERACILLIN AND TAZOBACTAM 4.5 G: 4; .5 INJECTION, POWDER, FOR SOLUTION INTRAVENOUS at 11:01

## 2020-01-26 RX ADMIN — PIPERACILLIN AND TAZOBACTAM 4.5 G: 4; .5 INJECTION, POWDER, FOR SOLUTION INTRAVENOUS at 07:01

## 2020-01-26 RX ADMIN — OXCARBAZEPINE 300 MG: 300 TABLET, FILM COATED ORAL at 08:01

## 2020-01-26 RX ADMIN — POTASSIUM CHLORIDE 10 MEQ: 10 INJECTION, SOLUTION INTRAVENOUS at 05:01

## 2020-01-26 RX ADMIN — PANTOPRAZOLE SODIUM 40 MG: 40 TABLET, DELAYED RELEASE ORAL at 08:01

## 2020-01-26 RX ADMIN — OXYBUTYNIN CHLORIDE 5 MG: 5 TABLET ORAL at 08:01

## 2020-01-26 NOTE — SUBJECTIVE & OBJECTIVE
No past medical history on file.    Past Surgical History:   Procedure Laterality Date    BACK SURGERY      COLONOSCOPY N/A 1/20/2020    Procedure: COLONOSCOPY;  Surgeon: Micah Patel MD;  Location: [facility] ([address] [place] [address]);  Service: Endoscopy;  Laterality: N/A;    ESOPHAGOGASTRODUODENOSCOPY N/A 1/20/2020    Procedure: EGD (ESOPHAGOGASTRODUODENOSCOPY);  Surgeon: Micah Patel MD;  Location: [facility] ([address] [place] [address]);  Service: Endoscopy;  Laterality: N/A;    KNEE SURGERY Bilateral      Review of patient's allergies indicates:  No Known Allergies    Family History     None        Tobacco Use    Smoking status: Current Every Day Smoker    Smokeless tobacco: Never Used   Substance and Sexual Activity    Alcohol use: Yes     Frequency: Never     Drinks per session: 1 or 2    Drug use: Never    Sexual activity: Not Currently      Review of Systems   Constitutional: Positive for activity change, appetite change, chills, fatigue and fever. Negative for unexpected weight change.   HENT: Negative for rhinorrhea, sinus pressure, sinus pain and trouble swallowing.    Eyes: Negative for photophobia and visual disturbance.   Respiratory: Negative for cough, shortness of breath and wheezing.    Cardiovascular: Positive for leg swelling. Negative for chest pain and palpitations.   Gastrointestinal: Negative for abdominal distention, abdominal pain, constipation, diarrhea, nausea and vomiting.   Endocrine: Negative for polyuria.   Musculoskeletal: Negative for arthralgias, back pain and gait problem.   Skin: Negative for color change and rash.   Hematological: Negative for adenopathy. Does not bruise/bleed easily.     Objective:     Vital Signs (Most Recent):  Temp: 99.5 °F (37.5 °C) (01/26/20 0200)  Pulse: 101 (01/26/20 0258)  Resp: 20 (01/26/20 0200)  BP: (!) 101/58 (01/26/20 0257)  SpO2: 98 % (01/26/20 0259) Vital Signs (24h Range):  Temp:  [99.5 °F (37.5 °C)-103.2 °F (39.6 °C)] 99.5 °F (37.5 °C)  Pulse:  [101-171]  101  Resp:  [20-26] 20  SpO2:  [89 %-100 %] 98 %  BP: ()/(52-80) 101/58   Weight: 54.4 kg (119 lb 14.9 oz)  Body mass index is 23.42 kg/m².      Intake/Output Summary (Last 24 hours) at 1/26/2020 0308  Last data filed at 1/26/2020 0201  Gross per 24 hour   Intake 1850 ml   Output 400 ml   Net 1450 ml       Physical Exam   Constitutional: No distress.   HENT:   Mouth/Throat: Oropharynx is clear and moist. No oropharyngeal exudate.   Eyes: Conjunctivae are normal. No scleral icterus.   Neck: Normal range of motion. Neck supple. No JVD present. No tracheal deviation present. No thyromegaly present.   Cardiovascular: Normal rate, regular rhythm and intact distal pulses. Exam reveals no gallop and no friction rub.   Murmur (CADEN grade II/VI ) heard.  Pulmonary/Chest: Effort normal and breath sounds normal. No stridor. No respiratory distress. She has no wheezes. She has no rales.   Abdominal: Soft. Bowel sounds are normal. She exhibits no distension and no mass. There is no tenderness. There is no guarding.   Musculoskeletal: Normal range of motion. She exhibits edema. She exhibits no tenderness or deformity.   Bilateral lower extremity eczema 1+ edema   Lymphadenopathy:     She has no cervical adenopathy.   Skin: Skin is warm. Capillary refill takes less than 2 seconds. No rash noted. She is not diaphoretic. No erythema.       Vents:  Oxygen Concentration (%): 35 (01/26/20 0200)  Lines/Drains/Airways     Peripheral Intravenous Line                 Peripheral IV - Single Lumen 01/25/20 20 G Left Antecubital 1 day         Peripheral IV - Single Lumen 01/25/20 2235 18 G Right Antecubital less than 1 day         Peripheral IV - Single Lumen 01/25/20 2239 18 G Left Upper Arm less than 1 day              Significant Labs:    CBC/Anemia Profile:  Recent Labs   Lab 01/25/20 2223 01/25/20 2229   WBC 22.93*  --    HGB 9.8*  --    HCT 34.5* 34*     --    MCV 83  --    RDW 23.9*  --         Chemistries:  Recent Labs    Lab 01/25/20  2223 01/26/20  0134   * 135*   K 5.2* 3.3*    103   CO2 15* 24   BUN 21 21   CREATININE 1.0 0.9   CALCIUM 10.8* 9.4   ALBUMIN 3.3*  --    PROT 6.9  --    BILITOT 0.8  --    ALKPHOS 77  --    ALT 22  --    AST 23  --    MG 2.1  --    PHOS 1.4*  --        All pertinent labs within the past 24 hours have been reviewed.    Significant Imaging: I have reviewed all pertinent imaging results/findings within the past 24 hours.

## 2020-01-26 NOTE — HPI
Ms. Ramirez is a 74 years old female with chronic radicular pain, bipolar disorder, mixed hyperlipidemia, overactive bladder brought in by ambulance as she was noted to be found on the floor in her kitchen with back on the floor.     Per the family, patient was recently discharged on 01/20/20 after being admitted for microcytic anemia hemoglobin of 5.5 without any evidence of gastrointestinal bleed on EGD or colonoscopy. She was doing fine until two days ago when she developed subjective fevers and chills. At baseline, she is able to perform her ADL's and IADL's. She has been noted to be more lethargic and fatigues over the past few days.    On arrival, her vitals were noted to be temp of 103, , RR 26, On 10 L non-rebreather with FiO2 of 30%, with BP 67-71 mmHg. She was given 1 L lactated ringer bolus, responded transiently and was started on low dose levophed 0.02 mcg/kg/min. Patient is oriented x 3, and responding to verbal commands.  Labs consistent with leukocytosis of 23k, Hb 9.8 and platelets 194/ Sodium 135, bicarb 15, Cr 0.8. , lactate 11, procal 1.2. UA positive 3+ LE, positive nitrites and > 100 wbc's. CT Head negative for intracranial abnormality, hemorage. CXR consistent with borderline cardiomegaly with diffuse interstitial opacities. CT abdomen suggestive of left-sided pyelonephritis.

## 2020-01-26 NOTE — PLAN OF CARE
Problem: Physical Therapy Goal  Goal: Physical Therapy Goal  Description  Goals to be met by: 20    Patient will increase functional independence with mobility by performin. Supine to sit with Contact Guard Assistance -not met  2. Sit to stand transfer with Contact Guard Assistance with AD -not met  3. Gait  x 100 feet with Contact Guard Assistance using AD. -not met     Outcome: Ongoing, Progressing   Evaluation completed and goals appropriate. Sherri Osborne, PT  2020

## 2020-01-26 NOTE — ED PROVIDER NOTES
Encounter Date: 1/25/2020       History     Chief Complaint   Patient presents with    Possible Sepsis     Patient had fall at home tonight, not on blood thinners, injuries notes to knuckles, did not hit head. Patient has fever, HR in 170s,      The history is provided by the patient, the EMS personnel, the spouse and a relative. The history is limited by the condition of the patient.      Marycruz Ramirez is a 74 y.o. female who was brought in by EMS for evaluation of possible sepsis.    Per EMS patient had a witnessed fall.  EMS said when they went to pick her up she was very rigid.  They said that she was febrile to 103 and tachycardic as high as 170s.  Patient is altered and is unable to contribute to her medical history.    Her family tells me that she was recently admitted and discharged on the 20th after being worked up for anemia; she underwent both upper and lower endoscopy.  On Thursday they noted that she had shakes/rigors.  Her son then noticed that she looked much worse yesterday.  They did not notice any fevers prior to today.    Again, my history is very limited as the patient is unable to answer questions in the family is unable to elaborate any details of her recent medical complaints.     Review of patient's allergies indicates:  No Known Allergies  No past medical history on file.  Past Surgical History:   Procedure Laterality Date    BACK SURGERY      COLONOSCOPY N/A 1/20/2020    Procedure: COLONOSCOPY;  Surgeon: Micah Patel MD;  Location: [address];  Service: Endoscopy;  Laterality: N/A;    ESOPHAGOGASTRODUODENOSCOPY N/A 1/20/2020    Procedure: EGD (ESOPHAGOGASTRODUODENOSCOPY);  Surgeon: Micah Patel MD;  Location: [address]);  Service: Endoscopy;  Laterality: N/A;    KNEE SURGERY Bilateral      No family history on file.  Social History     Tobacco Use    Smoking status: Current Every Day Smoker    Smokeless tobacco: Never Used   Substance Use Topics    Alcohol  use: Yes     Frequency: Never     Drinks per session: 1 or 2    Drug use: Never     Review of Systems   Unable to perform ROS: Mental status change       Physical Exam     Initial Vitals   BP Pulse Resp Temp SpO2   01/25/20 2206 01/25/20 2206 01/25/20 2206 01/25/20 2201 01/25/20 2214   (!) 140/80 (!) 162 (!) 26 (!) 103.2 °F (39.6 °C) 95 %      MAP       --                Physical Exam    Nursing note and vitals reviewed.  Constitutional: She is diaphoretic. She appears distressed.   Tachycardic, hypertensive.  Toxic appearing.  Arms splayed out; rigid, rigors.  Patient unable to answer questions.   HENT:   Head: Normocephalic and atraumatic.   Parched mucous membranes.   Eyes: EOM are normal. Pupils are equal, round, and reactive to light.   Neck: Normal range of motion.   Cardiovascular: Normal heart sounds and intact distal pulses.   Tachycardic to 160s   Pulmonary/Chest: She is in respiratory distress.   Tachypneic. Normal breath sounds. Using accessory muscles.    Abdominal: Soft. She exhibits no distension. There is no tenderness.   Musculoskeletal: Normal range of motion. She exhibits no edema or tenderness.   Neurological: She is alert.   Disoriented. Moves all extremities.   Skin: Skin is warm. Capillary refill takes less than 2 seconds. No rash noted.   Psychiatric:   Disoriented, confused.         ED Course   Procedures  Labs Reviewed   CBC W/ AUTO DIFFERENTIAL - Abnormal; Notable for the following components:       Result Value    WBC 22.93 (*)     Hemoglobin 9.8 (*)     Hematocrit 34.5 (*)     Mean Corpuscular Hemoglobin 23.7 (*)     Mean Corpuscular Hemoglobin Conc 28.4 (*)     RDW 23.9 (*)     Immature Granulocytes 1.0 (*)     Gran # (ANC) 17.8 (*)     Immature Grans (Abs) 0.22 (*)     Mono # 2.5 (*)     Gran% 77.6 (*)     Lymph% 10.1 (*)     All other components within normal limits   COMPREHENSIVE METABOLIC PANEL - Abnormal; Notable for the following components:    Sodium 135 (*)     Potassium 5.2  (*)     CO2 15 (*)     Glucose 336 (*)     Calcium 10.8 (*)     Albumin 3.3 (*)     Anion Gap 20 (*)     eGFR if non  55.6 (*)     All other components within normal limits   LACTIC ACID, PLASMA - Abnormal; Notable for the following components:    Lactate (Lactic Acid) 11.0 (*)     All other components within normal limits   URINALYSIS, REFLEX TO URINE CULTURE - Abnormal; Notable for the following components:    Appearance, UA Cloudy (*)     Protein, UA 2+ (*)     Glucose, UA 3+ (*)     Nitrite, UA Positive (*)     Leukocytes, UA 3+ (*)     All other components within normal limits    Narrative:     Preferred Collection Type->Urine, Clean Catch   PHOSPHORUS - Abnormal; Notable for the following components:    Phosphorus 1.4 (*)     All other components within normal limits   PROCALCITONIN - Abnormal; Notable for the following components:    Procalcitonin 1.20 (*)     All other components within normal limits    Narrative:     add on CPK order [id number] per Dr. Jared Trevizo 01/25/2020  23:08    B-TYPE NATRIURETIC PEPTIDE - Abnormal; Notable for the following components:     (*)     All other components within normal limits    Narrative:     ADD ON BNP PER DR JARED MATTHEW/ORDER# [id number] @ 23:59   1/25/2020  add on CPK order [id number] per Dr. Jared Trevizo 01/25/2020  23:08    URINALYSIS MICROSCOPIC - Abnormal; Notable for the following components:    WBC, UA >100 (*)     WBC Clumps, UA Occasional (*)     Bacteria Moderate (*)     Yeast, UA Rare (*)     Hyaline Casts, UA 3 (*)     All other components within normal limits    Narrative:     Preferred Collection Type->Urine, Clean Catch   ISTAT PROCEDURE - Abnormal; Notable for the following components:    POC PCO2 29.1 (*)     POC PO2 29 (*)     POC HCO3 16.7 (*)     POC SATURATED O2 55 (*)     POC TCO2 18 (*)     All other components within normal limits   ISTAT PROCEDURE - Abnormal; Notable for the following components:    POC  Glucose 326 (*)     POC Sodium 132 (*)     POC TCO2 (MEASURED) 17 (*)     POC Hematocrit 34 (*)     All other components within normal limits   INFLUENZA A & B BY MOLECULAR   CULTURE, BLOOD   CULTURE, BLOOD   CULTURE, URINE   RESPIRATORY INFECTION PANEL, NASOPHARYNGEAL   MAGNESIUM   PROTIME-INR   CK   CK    Narrative:     add on CPK order [id number] per Dr. Jared Trevizo 01/25/2020  23:08    B-TYPE NATRIURETIC PEPTIDE   BASIC METABOLIC PANEL   COMPREHENSIVE METABOLIC PANEL   MAGNESIUM   PHOSPHORUS   CBC W/ AUTO DIFFERENTIAL   LACTIC ACID, PLASMA   LACTIC ACID, PLASMA   ISTAT CHEM8   POCT GLUCOSE MONITORING CONTINUOUS     EKG Readings: (Independently Interpreted)   Initial Reading: No STEMI. Rhythm: Sinus Tachycardia. Heart Rate: 157. ST Segment Depression: V4, V5 and V6.     ECG Results          EKG 12-lead (In process)  Result time 01/25/20 22:49:33    In process by Interface, Lab In [facility] (01/25/20 22:49:33)                 Narrative:    Test Reason : R50.9,    Vent. Rate : 155 BPM     Atrial Rate : 156 BPM     P-R Int : 000 ms          QRS Dur : 088 ms      QT Int : 282 ms       P-R-T Axes : 000 118 072 degrees     QTc Int : 453 ms    Supraventricular tachycardia  Right axis deviation  Anteroseptal infarct ,age undetermined  Abnormal ECG  When compared with ECG of 31-AUG-2007 16:28,  Vent. rate has increased BY  69 BPM  Incomplete right bundle branch block is no longer Present  Anteroseptal infarct is now Present    Referred By: [email or web address]   SELF           Confirmed By:                             Imaging Results          CT Head Without Contrast (Final result)  Result time 01/25/20 23:06:36    Final result by Сергей Pickering MD (01/25/20 23:06:36)                 Impression:      Artifact limited examination without CT findings of acute intracranial abnormality.      Electronically signed by: Сергей Pickering MD  Date:    01/25/2020  Time:    23:06             Narrative:    EXAMINATION:  CT HEAD WITHOUT  CONTRAST    CLINICAL HISTORY:  Confusion/delirium, altered LOC, unexplained;    TECHNIQUE:  Low dose axial images were obtained through the head.  Coronal and sagittal reformations were also performed. Contrast was not administered.    COMPARISON:  None.    FINDINGS:  Suboptimal positioning and artifact related to motion and low-dose technique limits evaluation.    No evidence of acute territorial infarct, hemorrhage, mass effect, or midline shift.    Ventricles are normal in size and configuration.    No displaced calvarial fracture.    Mucosal thickening is noted in the inferior right maxillary sinus.  Visualized paranasal sinuses and mastoid air cells are otherwise essentially clear.                                CT Abdomen Pelvis With Contrast (Final result)  Result time 01/25/20 23:39:11    Final result by Сергей Pickering MD (01/25/20 23:39:11)                 Impression:      Findings suggestive of left-sided pyelonephritis.  Recommend correlation with urinalysis.    Overall limited study secondary to significant artifact and motion.    No evidence of bowel obstruction or perforation.  Moderate volume stool throughout the colon and rectum.    Nonobstructing right-sided renal stone.    Bibasilar interstitial opacities with interlobular septal thickening and subsegmental atelectasis.  Findings could reflect interstitial edema, pneumonitis, or aspiration.    Coronary artery and aortic atherosclerotic calcification.  Findings compatible with left ventricular hypertrophy.    This report was flagged in Epic as abnormal.    Electronically signed by resident: Indigo Mcgovern  Date:    01/25/2020  Time:    23:08    Electronically signed by: Сергей Pickering MD  Date:    01/25/2020  Time:    23:39             Narrative:    EXAMINATION:  CT ABDOMEN PELVIS WITH CONTRAST    CLINICAL HISTORY:  Abdominal distension;Recent colonoscopy. Septic.;    TECHNIQUE:  Low dose axial images, sagittal and coronal reformations were  obtained from the lung bases to the pubic symphysis following the IV administration of 75 mL of Omnipaque 350.  Oral contrast was not administered.    COMPARISON:  Radiograph abdomen AP one view 01/18/2020    FINDINGS:  Evaluation is limited by extensive streak artifact due to the patient's arms overlying the field of view.  Examination is also limited by significant respiratory motion in the upper abdomen.    Heart: Normal in size without effusion.  Aortic valve and coronary artery atherosclerotic calcification.  Postoperative changes of aortic valve replacement.  There is left ventricular wall thickening consistent with ventricular hypertrophy.    Lung Bases: Symmetrically expanded.  Bronchovascular thickening and/or small hilar lymph nodes, incompletely included within the field of view.  Lung bases demonstrate diffuse interstitial opacities with interlobular septal thickening and bibasilar subsegmental atelectasis, left side greater than right.  No pleural fluid or focal consolidation.    Liver: Normal size and attenuation. No focal lesions.    Gallbladder: No calcified gallstones.    Bile Ducts: No significant intra or extrahepatic ductal dilatation.    Pancreas: Mild fatty infiltration of the pancreas without evidence of focal mass or adjacent lucie pancreatic fat stranding.    Spleen: Unremarkable.    Adrenals: Unremarkable.    Kidneys/Ureters: Left-sided striated nephrogram with areas of patchy decreased cortical enhancement and mild perinephric fat stranding.  Mild left ureteral wall thickening and fat stranding.  There is a 5.2 cm right renal cyst in addition to bilateral subcentimeter renal hypodensities, too small to characterize but probable cysts.  No hydronephrosis.  There is a nonobstructing punctate right renal nephrolith.    Bladder: No wall thickening.    Reproductive organs: Uterus is surgically absent.    GI Tract/Mesentery: Stomach is unremarkable.  Portions of small and large bowel are  unremarkable without evidence of wall thickening, pneumatosis, or obstruction.  There is a moderate volume stool throughout the colon and rectum.    Peritoneal Space: No ascites or free air.    Retroperitoneum: No significant adenopathy.    Abdominal wall: Normal.    Vasculature: Extensive abdominal aortic atherosclerotic calcification without evidence of focal occlusion..    Bones: Postoperative changes of posterior fusion of L4 and L5.  Degenerative changes of the spine with fusion of the L1 and L2 vertebral bodies and grade 1 retrolisthesis of L4 on L3.  No evidence of acute fracture.                               X-Ray Chest AP Portable (Final result)  Result time 01/25/20 22:46:42    Final result by Aldo Echols MD (01/25/20 22:46:42)                 Impression:      Borderline cardiomegaly with diffuse interstitial opacities.  The findings most likely represent fluid overload state.  The interstitial opacities may also represent pneumonitis.    No focal consolidation.      Electronically signed by: Aldo Echols MD  Date:    01/25/2020  Time:    22:46             Narrative:    EXAMINATION:  XR CHEST AP PORTABLE    CLINICAL HISTORY:  Sepsis;    TECHNIQUE:  Single frontal view of the chest was performed.    COMPARISON:  08/31/2007.    FINDINGS:  Monitoring EKG leads are present.  The trachea is unremarkable.  There is borderline enlargement of the cardiomediastinal silhouette.  The hemidiaphragms are unremarkable.  There is no evidence of free air beneath the hemidiaphragms.  There are no pleural effusions.  There is no evidence of a pneumothorax.  There is no evidence of pneumomediastinum.  There is pulmonary vascular congestion.  There are diffuse interstitial opacities.  There are degenerative changes in the osseous structures.                              X-Rays:   Independently Interpreted Readings:   Chest X-Ray: Increased vascular markings consistent with CHF are present.  Cardiomegaly present.     Medical  Decision Making:   History:   I obtained history from: someone other than patient.       <> Summary of History: Family provides additional history on arrival  Old Medical Records: I decided to obtain old medical records.  Old Records Summarized: records from previous admission(s).       <> Summary of Records: Recent admit for anemia, negative EGD and colonoscopy   Discharged 1/20   Initial Assessment:   PGY-3 MDM  Patient arrives septic.  Initially hypertensive and tachycardic to the 160s.  Initially febrile to 103.  History significant for recent hospitalization where she received upper and lower endoscopy.  Her abdomen is soft.  Is no obvious source of infection on external exam.  Patient is unable to give story due to altered mental status.    Septic workup ordered.  Initially only given 1 L of IV fluids.  Started on vanc and Zosyn  Pancultured.    Ordered CT head, CT abdomen/pelvis.  Chest x-ray.    CT head with no evidence of brain bleed.  CT abdomen/pelvis concerning for left-sided pyelonephritis.    Labs reviewed and UA concerning for urinary tract infection.  Noted to have leukocytosis to 23,000 and a lactic acid of 11.    Medical ICU consulted to admit for septic shock in the setting of pyelonephritis.  After initial resuscitation patient much more alert oriented. Now answer questions.  Tachycardia improved significantly improved.  She is however noted to have decreased oxygen saturations.  Chest x-ray is concerning for pulmonary edema. Will refrain from giving 30 cc/kilogram bolus due to concern for possible heart failure secondary to sepsis and fluid overload.  Starting on norepinephrine due to blood pressure slowly declining with maps approaching 65.        Jared Trevizo MD, Emergency Medicine, PGY-3, 12:16 AM 1/26/2020   Independently Interpreted Test(s):   I have ordered and independently interpreted X-rays - see prior notes.  I have ordered and independently interpreted EKG Reading(s) - see prior  notes  Clinical Tests:   Lab Tests: Ordered and Reviewed  Radiological Study: Ordered and Reviewed  Medical Tests: Ordered and Reviewed  Sepsis Perfusion Assessment: I attest, a sepsis perfusion exam was performed within 6 hours of Septic Shock presentation, following fluid resuscitation.  Other:   I have discussed this case with another health care provider.              Attending Attestation:   Physician Attestation Statement for Resident:  As the supervising MD   Physician Attestation Statement: I have personally seen and examined this patient.   I agree with the above history. -:   As the supervising MD I agree with the above PE.    As the supervising MD I agree with the above treatment, course, plan, and disposition.        Attending Critical Care:   Critical Care Times:   Direct Patient Care (initial evaluation, reassessments, and time considering the case)................................................................15 minutes.   Additional History from reviewing old medical records or taking additional history from the family, EMS, PCP, etc.......................10 minutes.   Ordering, Reviewing, and Interpreting Diagnostic Studies...............................................................................................................5 minutes.   Documentation..................................................................................................................................................................................5 minutes.   Consultation with other Physicians. .................................................................................................................................................5 minutes.   Consultation with the patient's family directly relating to the patient's condition, care, and DNR status (when patient unable)......5 minutes.   ==============================================================  · Total Critical Care Time - exclusive of procedural time:  45 minutes.  ==============================================================  Critical care was necessary to treat or prevent imminent or life-threatening deterioration of the following conditions: sepsis.       Attending ED Notes:   74-year-old female presenting with altered mental status.  She is febrile on arrival.  Initial concerns for acute infectious process.  Did have a fall at home.  Low suspicion for acute intracranial process, traumatic intracranial process or stroke.  Initially tachycardic, but normotensive.  During initial resuscitation, patient's blood pressure began to drop.  Her oxygen saturations began to drop.  Her chest x-ray was noted to be will consistent with edema. Reviewed her medical record and there is no prior echocardiogram.  No history of CHF.  Likely due to her severe sepsis, she is having this dysfunction.  I do not feel comfortable giving her additional IV fluids as her oxygen saturations are dropping and she was requiring venti mask.  Will support blood pressure with Levophed.  Multiple lab work abnormalities include elevated white blood cell count, elevated pro calcitonin, elevated lactic acid.  Urinalysis consistent with infection.  A CT of her abdomen was obtained due to recent colonoscopy.  This demonstrated pyelonephritis.  Empirc antibiotics have been started.  The ICU was been consulted.  Family updated on her status.                        Clinical Impression:       ICD-10-CM ICD-9-CM   1. Sepsis, due to unspecified organism, unspecified whether acute organ dysfunction present A41.9 038.9     995.91   2. Fever R50.9 780.60   3. Altered mental status, unspecified altered mental status type R41.82 780.97   4. Pyelonephritis N12 590.80   5. CHF (congestive heart failure) I50.9 428.0         Disposition:   Disposition: Admitted  Condition: Critical                     Jared Trevizo MD  Resident  01/26/20 0023       Sravan Barcenas MD  01/26/20 0202

## 2020-01-26 NOTE — PLAN OF CARE
SICU PLAN OF CARE NOTE    Dx: Septic shock    Shift Events: Patient and patient's family updated on plan of care. No acute events during shift. Levo turned off around 1000 this AM. Oxygen weaned and patient is currently on room air with O2 saturation of 100%. Transfer orders to the floor in place. All questions and concerns acknowledged and answered. See flow sheets for full assessment details. Will continue to monitor patient closely.     Goals of Care: Continue antibiotic therapy. PT/OT.    Neuro: AAO x4    Vital Signs: BP (!) 95/52 (BP Location: Left arm, Patient Position: Lying)   Pulse 86   Temp 98.6 °F (37 °C) (Oral)   Resp 16   Ht 5' (1.524 m)   Wt 54.4 kg (119 lb 14.9 oz)   SpO2 100%   BMI 23.42 kg/m²     Respiratory: Room Air    Diet: Diabetic Diet    Gtts: No continuous gtts.    Urine Output: Voids Spontaneously; patient urinated in toilet. Amount unmeasured.       Labs/Accuchecks: Accuchecks AC/HS and bedtime    Skin: Bruising on arms and hands from fall at home. No skin breakdown to elbows, sacrum, or heels.

## 2020-01-26 NOTE — ASSESSMENT & PLAN NOTE
Ms. Ramirez is a 74 years old female with chronic radicular pain, bipolar disorder, mixed hyperlipidemia, overactive bladder brought in by ambulance as she was noted to be found on the floor in her kitchen with back on the floor.   -- On arrival, her vitals were noted to be temp of 103, , RR 26, On 10 L non-rebreather with FiO2 of 30%, with BP 67-71 mmHg. She was given 1 L lactated ringer bolus, responded transiently and was started on low dose levophed 0.02 mcg/kg/min. Patient is oriented x 3, and responding to verbal commands.   -- Labs consistent with leukocytosis of 23k, Hb 9.8 and platelets 194/ Sodium 135, bicarb 15, Cr 0.8. , lactate 11, procal 1.2.  -- UA positive 3+ LE, positive nitrites and > 100 wbc's. CT Head negative for intracranial abnormality, hemorage.   -- CXR consistent with borderline cardiomegaly with diffuse interstitial opacities. Flu negative. RIP pending  -- CT abdomen suggestive of left-sided pyelonephritis.   Assessment: Septic shock requiring vasopressor support due to underlying pyelonephritis; possible community acquired pneumonia and possibly cellulitis of the bilateral lower extremities  Plan:  -- admit to MICU; continue vasopressor support to maintain MAP > 65 mmHg  -- UA positive; will obtain urine cultures to identify the organism and susceptibilities  -- BC x 2  -- RIP pending  -- Broad spectrum antibiotic coverage for gram negatives, enterococci for UTI, anaerobic and Staph/strep for cellulitis and CAP coverage with Zosyn and Vanc. Patient lactic acidosis resolved after administering these antibiotics.

## 2020-01-26 NOTE — PT/OT/SLP EVAL
Occupational Therapy   Evaluation    Name: Marycruz Ramirez  MRN: [id number]  Admitting Diagnosis:  Septic shock      Recommendations:     Discharge Recommendations: home health OT  Discharge Equipment Recommendations:  none  Barriers to discharge:  None    Assessment:     Marycruz Ramirez is a 74 y.o. female with a medical diagnosis of Septic shock.  She presents with motivation to participate, positive attitude, and acceptable mobility. Performance deficits affecting function: weakness, impaired endurance, impaired self care skills, impaired functional mobilty, decreased upper extremity function, decreased lower extremity function, decreased ROM, impaired coordination, gait instability, impaired balance.      Rehab Prognosis: Fair; patient would benefit from acute skilled OT services to address these deficits and reach maximum level of function.       Plan:     Patient to be seen 3 x/week to address the above listed problems via self-care/home management, therapeutic activities, therapeutic exercises  · Plan of Care Expires: 02/24/20  · Plan of Care Reviewed with: patient, family    Subjective     Chief Complaint: None  Patient/Family Comments/goals: Medical management and discharge.     Occupational Profile:  Living Environment: Pt lives with her  and 22 yo grandson in [address] [place] [address].   Previous level of function: Independent w/ ADLs/IADLs  Roles and Routines: Active, enjoys karaoke, dancing, and spending time w/ family   Equipment Used at Home:  cane, straight, rollator  Assistance upon Discharge: Yes, from family    Pain/Comfort:  · Pain Rating 1: 0/10  · Pain Rating Post-Intervention 1: 0/10    Patients cultural, spiritual, Jain conflicts given the current situation: no    Objective:     Communicated with: RN prior to session.  Patient found HOB elevated with telemetry, pulse ox (continuous), blood pressure cuff, peripheral IV upon OT entry to room.    General Precautions: Standard, fall   Orthopedic  Precautions:N/A   Braces: N/A     Occupational Performance:    Bed Mobility:    · Patient completed Rolling/Turning to Right with moderate assistance  · Patient completed Scooting/Bridging with moderate assistance  · Patient completed Supine to Sit with moderate assistance    Functional Mobility/Transfers:  · Patient completed Sit <> Stand Transfer with minimum assistance  with  hand-held assist and rolling walker   · Patient completed Bed <> Chair Transfer using Step Transfer technique with moderate assistance with hand-held assist and rolling walker  · Patient completed Toilet Transfer Step Transfer technique with moderate assistance with  hand-held assist and rolling walker  Functional Mobility:  Pt was able to walk to and from toilet in room w/ HHA, and CGA.      Activities of Daily Living:  · Grooming: stand by assistance standing at sink w/ setup  · Bathing: stand by assistance  standing at sink w/ setup  · Upper Body Dressing: minimum assistance don gown  · Lower Body Dressing: minimum assistance don sock  · Toileting: moderate assistance clothing management and hygiene    Cognitive/Visual Perceptual:  Completely cognitively intact adult w/ no glasses worn or present during eval.       Physical Exam:  Balance:    -       seated: Fair + standing: Fair  Dominant hand:    -       RH  Upper Extremity Range of Motion:     -       Right Upper Extremity: WFL  -       Left Upper Extremity: WFL  Upper Extremity Strength:    -       Right Upper Extremity: WFL  -       Left Upper Extremity: WFL   Strength:    -       Right Upper Extremity: WFL  -       Left Upper Extremity: WFL  Fine Motor Coordination:    -       Intact  Gross motor coordination:   WFL    AMPAC 6 Click ADL:  AMPAC Total Score: 19    Treatment & Education:  -Pt edu on OT role/POC  -Importance of OOB activity with staff assistance  -Safety during functional t/f and mobility  - White board updated  - Multiple self care tasks/functional mobility  completed-- assistance level noted above  - All questions/concerns answered within OT scope of practice    Education:    Patient left up in chair with all lines intact, call button in reach, RN notified and Family present    GOALS:   Multidisciplinary Problems     Occupational Therapy Goals        Problem: Occupational Therapy Goal    Goal Priority Disciplines Outcome Interventions   Occupational Therapy Goal     OT, PT/OT Ongoing, Progressing    Description:  Goals to be met by: 2/24/2020     Patient will increase functional independence with ADLs by performing:    UE Dressing with Set-up Assistance.  LE Dressing with Supervision and Assistive Devices as needed.  Grooming while standing at sink with Modified [place] and Assistive Devices as needed.  Toileting from toilet with Modified [place] for hygiene and clothing management.   Bathing from  standing at sink with Modified [place] and Assistive Devices as needed.  Toilet transfer to toilet with Supervisionand Assistive Devices as needed.                      History:     No past medical history on file.    Past Surgical History:   Procedure Laterality Date    BACK SURGERY      COLONOSCOPY N/A 1/20/2020    Procedure: COLONOSCOPY;  Surgeon: Micah Patel MD;  Location: [address]);  Service: Endoscopy;  Laterality: N/A;    ESOPHAGOGASTRODUODENOSCOPY N/A 1/20/2020    Procedure: EGD (ESOPHAGOGASTRODUODENOSCOPY);  Surgeon: Micah Patel MD;  Location: [address]);  Service: Endoscopy;  Laterality: N/A;    KNEE SURGERY Bilateral        Time Tracking:     OT Date of Treatment: 01/26/20  OT Start Time: 1146  OT Stop Time: 1220  OT Total Time (min): 34 min    Billable Minutes:Evaluation 10 mins  Self Care/Home Management 24 mins    Tristan Lin, OT  1/26/2020

## 2020-01-26 NOTE — PLAN OF CARE
Bisial complete. Pt tolerated session well. Initiate OT POC.  Discharge Recommendation: Home Health OT  DME rec: None    Problem: Occupational Therapy Goal  Goal: Occupational Therapy Goal  Description  Goals to be met by: 2/24/2020     Patient will increase functional independence with ADLs by performing:    UE Dressing with Set-up Assistance.  LE Dressing with Supervision and Assistive Devices as needed.  Grooming while standing at sink with Modified [place] and Assistive Devices as needed.  Toileting from toilet with Modified [place] for hygiene and clothing management.   Bathing from  standing at sink with Modified [place] and Assistive Devices as needed.  Toilet transfer to toilet with Supervisionand Assistive Devices as needed.     Outcome: Ongoing, Progressing

## 2020-01-26 NOTE — PROGRESS NOTES
Patient arrived to SICU room 51502 at 0330. Placed on central monitor, 3L oxygen nasal cannula, and on 0.02mcg/kg/min levophed gtt. VSS. See doc flowsheet for assessment.

## 2020-01-26 NOTE — CONSULTS
Patient evaluated by and admitted to Critical Care Medicine. Full H&P to follow.    Alice Duffy NP  Critical Care Medicine

## 2020-01-26 NOTE — PT/OT/SLP EVAL
Physical Therapy Evaluation and treatment    Patient Name:  Marycruz Ramirez   MRN:  [id number]    Recommendations:     Discharge Recommendations:  home health PT   Discharge Equipment Recommendations: (will determine DME needs closer to discharge. )   Barriers to discharge: None    Assessment:     Marycruz Ramirez is a 74 y.o. female admitted with a medical diagnosis of Septic shock.  She presents with the following impairments/functional limitations:  weakness, impaired functional mobilty, gait instability, impaired endurance, decreased lower extremity function, impaired balance pt antonio treatment well and will benefit from skilled PT 4x/wk, to progress physically. Pt should be able to discharge home with HHPT when medically stable. Pt came to ED after fall.     Rehab Prognosis: Good; patient would benefit from acute skilled PT services to address these deficits and reach maximum level of function.    Recent Surgery: * No surgery found *      Plan:     During this hospitalization, patient to be seen 4x/week to address the identified rehab impairments via gait training, therapeutic activities, therapeutic exercises and progress toward the following goals:    · Plan of Care Expires:  02/25/20    Subjective     Chief Complaint: pt had no complaints during treatment.   Patient/Family Comments/goals:  To get better and go home.   Pain/Comfort:  · Pain Rating Post-Intervention 1: 0/10    Patients cultural, spiritual, Mu-ism conflicts given the current situation: no    Living Environment:  Pt lives with her  and 24 yo grandson in [address] [place] [address].   Prior to admission, patients level of function was modified Independent using rollator prior to admit.   Equipment used at home: cane, straight, rollator.  DME owned (not currently used): none.  Upon discharge, patient will have assistance from .    Objective:     Communicated with nurse prior to session.  Patient found supine with telemetry, pulse ox (continuous), blood  pressure cuff, peripheral IV(B Tue Laura boots)  upon PT entry to room.    General Precautions: Standard, fall   Orthopedic Precautions:    Braces:       Exams:  · Cognitive Exam:  Patient is oriented to Person and Place, situation, time  · RLE ROM: WFL  · RLE Strength: WFL  · LLE ROM: WFL  · LLE Strength: WFL    Functional Mobility:  · Bed Mobility: pt needed verbal cues for hand placement and sequencing for functional mobility.     · Rolling Right: moderate assistance  · Supine to sit: moderate assistance  ·   · Transfers:     · Sit to Stand:  minimum assistance with hand-held assist from chair and moderate assist to stand from toilet.   ·   · Gait: pt received gait training ~ 16 ft x 2 reps with O2 intact. pt used toilet between distance ambulated. pt was CGA standing at sink performing ADLS with OT.       Therapeutic Activities and Exercises:   pt and family received verbal instructions in PT POC and discharge recommendations. All verbally expressed understanding of such.     AM-PAC 6 CLICK MOBILITY  Total Score:13     Patient left up in chair with all lines intact, call button in reach and son and pt sister  present.    GOALS:   Multidisciplinary Problems     Physical Therapy Goals        Problem: Physical Therapy Goal    Goal Priority Disciplines Outcome Goal Variances Interventions   Physical Therapy Goal     PT, PT/OT Ongoing, Progressing     Description:  Goals to be met by: 20    Patient will increase functional independence with mobility by performin. Supine to sit with Contact Guard Assistance -not met  2. Sit to stand transfer with Contact Guard Assistance with AD -not met  3. Gait  x 100 feet with Contact Guard Assistance using AD. -not met                      History:     No past medical history on file.    Past Surgical History:   Procedure Laterality Date    BACK SURGERY      COLONOSCOPY N/A 2020    Procedure: COLONOSCOPY;  Surgeon: Micah Patel MD;  [facility] ([facility]);  Service: Endoscopy;  Laterality: N/A;    ESOPHAGOGASTRODUODENOSCOPY N/A 1/20/2020    Procedure: EGD (ESOPHAGOGASTRODUODENOSCOPY);  Surgeon: Micah Patel MD;  [facility] ([facility]);  Service: Endoscopy;  Laterality: N/A;    KNEE SURGERY Bilateral        Time Tracking:     PT Received On: 01/26/20  PT Start Time: 1145     PT Stop Time: 1218  PT Total Time (min): 33 min     Billable Minutes: Evaluation 10 min and Gait Training 23 min      Sherri Osborne, PT  01/26/2020

## 2020-01-26 NOTE — PLAN OF CARE
Handoff     Primary Team: Networked reference to record [facility]  Room Number: 07883/60860 A     Patient Name: Marycruz Ramirez MRN: [id number]     Date of Birth: [id number] Allergies: Patient has no known allergies.     Age: 74 y.o. Admit Date: 1/25/2020     Sex: female  BMI: Body mass index is 23.42 kg/m².     Code Status: Full Code        Illness Level (current clinical status): Watcher - No    Reason for Admission: Septic shock    Brief HPI (pertinent PMH and diagnosis or differential diagnosis):    74 y.o. F with chronic radicular pain, bipolar disorder, mixed hyperlipidemia, overactive bladder admitted for septic shock 2/2 pyelonephritis currently on Vanc and Zosyn.       Hospital Course (updated, brief assessment by system or problem, significant events): Started on BS abx, IVFs and vasopressors for septic shock. Off pressor support with stable BPs. Blood cultures with GNRs and on contact isolation. S/s pending.     Tasks (specific, using if-then statements): Echo pending     Estimated Discharge Date: tbd    Discharge Disposition: Home or Self Care    Mentored By: Dr. Ramos

## 2020-01-26 NOTE — H&P
[facility]  Critical Care Medicine  History & Physical    Patient Name: Marycruz Ramirez  MRN: [id number]  Admission Date: 1/25/2020  Hospital Length of Stay: 0 days  Code Status: Full Code  Attending Physician: Beth Ramos DO   Primary Care Provider: Marcelino Calle MD   Principal Problem: Septic shock    Subjective:     HPI:  Ms. Ramirez is a 74 years old female with chronic radicular pain, bipolar disorder, mixed hyperlipidemia, overactive bladder brought in by ambulance as she was noted to be found on the floor in her kitchen with back on the floor.     Per the family, patient was recently discharged on 01/20/20 after being admitted for microcytic anemia hemoglobin of 5.5 without any evidence of gastrointestinal bleed on EGD or colonoscopy. She was doing fine until two days ago when she developed subjective fevers and chills. At baseline, she is able to perform her ADL's and IADL's. She has been noted to be more lethargic and fatigues over the past few days.    On arrival, her vitals were noted to be temp of 103, , RR 26, On 10 L non-rebreather with FiO2 of 30%, with BP 67-71 mmHg. She was given 1 L lactated ringer bolus, responded transiently and was started on low dose levophed 0.02 mcg/kg/min. Patient is oriented x 3, and responding to verbal commands.  Labs consistent with leukocytosis of 23k, Hb 9.8 and platelets 194/ Sodium 135, bicarb 15, Cr 0.8. , lactate 11, procal 1.2. UA positive 3+ LE, positive nitrites and > 100 wbc's. CT Head negative for intracranial abnormality, hemorage. CXR consistent with borderline cardiomegaly with diffuse interstitial opacities. CT abdomen suggestive of left-sided pyelonephritis.     Hospital/ICU Course:  No notes on file     No past medical history on file.    Past Surgical History:   Procedure Laterality Date    BACK SURGERY      COLONOSCOPY N/A 1/20/2020    Procedure: COLONOSCOPY;  Surgeon: Micah Patel MD;  Location: [facility]  ENDO (2ND FLR);  Service: Endoscopy;  Laterality: N/A;    ESOPHAGOGASTRODUODENOSCOPY N/A 1/20/2020    Procedure: EGD (ESOPHAGOGASTRODUODENOSCOPY);  Surgeon: Micah Patel MD;  Location: [facility] (2ND FLR);  Service: Endoscopy;  Laterality: N/A;    KNEE SURGERY Bilateral      Review of patient's allergies indicates:  No Known Allergies    Family History     None        Tobacco Use    Smoking status: Current Every Day Smoker    Smokeless tobacco: Never Used   Substance and Sexual Activity    Alcohol use: Yes     Frequency: Never     Drinks per session: 1 or 2    Drug use: Never    Sexual activity: Not Currently      Review of Systems   Constitutional: Positive for activity change, appetite change, chills, fatigue and fever. Negative for unexpected weight change.   HENT: Negative for rhinorrhea, sinus pressure, sinus pain and trouble swallowing.    Eyes: Negative for photophobia and visual disturbance.   Respiratory: Negative for cough, shortness of breath and wheezing.    Cardiovascular: Positive for leg swelling. Negative for chest pain and palpitations.   Gastrointestinal: Negative for abdominal distention, abdominal pain, constipation, diarrhea, nausea and vomiting.   Endocrine: Negative for polyuria.   Musculoskeletal: Negative for arthralgias, back pain and gait problem.   Skin: Negative for color change and rash.   Hematological: Negative for adenopathy. Does not bruise/bleed easily.     Objective:     Vital Signs (Most Recent):  Temp: 99.5 °F (37.5 °C) (01/26/20 0200)  Pulse: 101 (01/26/20 0258)  Resp: 20 (01/26/20 0200)  BP: (!) 101/58 (01/26/20 0257)  SpO2: 98 % (01/26/20 0259) Vital Signs (24h Range):  Temp:  [99.5 °F (37.5 °C)-103.2 °F (39.6 °C)] 99.5 °F (37.5 °C)  Pulse:  [101-171] 101  Resp:  [20-26] 20  SpO2:  [89 %-100 %] 98 %  BP: ()/(52-80) 101/58   Weight: 54.4 kg (119 lb 14.9 oz)  Body mass index is 23.42 kg/m².      Intake/Output Summary (Last 24 hours) at 1/26/2020 0308  Last data  filed at 1/26/2020 0201  Gross per 24 hour   Intake 1850 ml   Output 400 ml   Net 1450 ml       Physical Exam   Constitutional: No distress.   HENT:   Mouth/Throat: Oropharynx is clear and moist. No oropharyngeal exudate.   Eyes: Conjunctivae are normal. No scleral icterus.   Neck: Normal range of motion. Neck supple. No JVD present. No tracheal deviation present. No thyromegaly present.   Cardiovascular: Normal rate, regular rhythm and intact distal pulses. Exam reveals no gallop and no friction rub.   Murmur (CADEN grade II/VI ) heard.  Pulmonary/Chest: Effort normal and breath sounds normal. No stridor. No respiratory distress. She has no wheezes. She has no rales.   Abdominal: Soft. Bowel sounds are normal. She exhibits no distension and no mass. There is no tenderness. There is no guarding.   Musculoskeletal: Normal range of motion. She exhibits edema. She exhibits no tenderness or deformity.   Bilateral lower extremity eczema 1+ edema   Lymphadenopathy:     She has no cervical adenopathy.   Skin: Skin is warm. Capillary refill takes less than 2 seconds. No rash noted. She is not diaphoretic. No erythema.       Vents:  Oxygen Concentration (%): 35 (01/26/20 0200)  Lines/Drains/Airways     Peripheral Intravenous Line                 Peripheral IV - Single Lumen 01/25/20 20 G Left Antecubital 1 day         Peripheral IV - Single Lumen 01/25/20 2235 18 G Right Antecubital less than 1 day         Peripheral IV - Single Lumen 01/25/20 2239 18 G Left Upper Arm less than 1 day              Significant Labs:    CBC/Anemia Profile:  Recent Labs   Lab 01/25/20 2223 01/25/20 2229   WBC 22.93*  --    HGB 9.8*  --    HCT 34.5* 34*     --    MCV 83  --    RDW 23.9*  --         Chemistries:  Recent Labs   Lab 01/25/20 2223 01/26/20  0134   * 135*   K 5.2* 3.3*    103   CO2 15* 24   BUN 21 21   CREATININE 1.0 0.9   CALCIUM 10.8* 9.4   ALBUMIN 3.3*  --    PROT 6.9  --    BILITOT 0.8  --    ALKPHOS 77  --     ALT 22  --    AST 23  --    MG 2.1  --    PHOS 1.4*  --        All pertinent labs within the past 24 hours have been reviewed.    Significant Imaging: I have reviewed all pertinent imaging results/findings within the past 24 hours.    Assessment/Plan:      ID  * Septic shock  Ms. Ramirez is a 74 years old female with chronic radicular pain, bipolar disorder, mixed hyperlipidemia, overactive bladder brought in by ambulance as she was noted to be found on the floor in her kitchen with back on the floor.   -- On arrival, her vitals were noted to be temp of 103, , RR 26, On 10 L non-rebreather with FiO2 of 30%, with BP 67-71 mmHg. She was given 1 L lactated ringer bolus, responded transiently and was started on low dose levophed 0.02 mcg/kg/min. Patient is oriented x 3, and responding to verbal commands.   -- Labs consistent with leukocytosis of 23k, Hb 9.8 and platelets 194/ Sodium 135, bicarb 15, Cr 0.8. , lactate 11, procal 1.2.  -- UA positive 3+ LE, positive nitrites and > 100 wbc's. CT Head negative for intracranial abnormality, hemorage.   -- CXR consistent with borderline cardiomegaly with diffuse interstitial opacities. Flu negative. RIP pending  -- CT abdomen suggestive of left-sided pyelonephritis.   Assessment: Septic shock requiring vasopressor support due to underlying pyelonephritis; possible community acquired pneumonia and possibly cellulitis of the bilateral lower extremities  Plan:  -- admit to MICU; continue vasopressor support to maintain MAP > 65 mmHg  -- UA positive; will obtain urine cultures to identify the organism and susceptibilities  -- BC x 2  -- RIP pending  -- Broad spectrum antibiotic coverage for gram negatives, enterococci for UTI, anaerobic and Staph/strep for cellulitis and CAP coverage with Zosyn and Vanc. Patient lactic acidosis resolved after administering these antibiotics.       Oncology  Microcytic anemia  Likely iron deficiency and chronic GI losses  Continue  with iron sulfate supplements     Neuro  Chronic radicular lumbar pain  Given patient is hypotensive, hold narcotics  -- continue tylenol PRN for pain relief    Renal/  Pyelonephritis  See septic shock  Pending urine cultures  Continue antibiotics Zosyn     OAB (overactive bladder)  Continue home oxybutynin     Critical Care Daily Checklist:    A: Awake: RASS Goal/Actual Goal:    Actual:     B: Spontaneous Breathing Trial Performed?     C: SAT & SBT Coordinated?  Not indicated                       D: Delirium: CAM-ICU     E: Early Mobility Performed? Yes   F: Feeding Goal:    Status:     Current Diet Order   Procedures    Diet NPO      AS: Analgesia/Sedation Not indicated    T: Thromboembolic Prophylaxis Lovenox 40 mg daily   H: HOB > 300 Yes   U: Stress Ulcer Prophylaxis (if needed) Pantoprazole    G: Glucose Control Glucose QACHS   B: Bowel Function     I: Indwelling Catheter (Lines & Ac) Necessity PIV   D: De-escalation of Antimicrobials/Pharmacotherapies Vanc and Zosyn    Plan for the day/ETD Wean off pressors   PT/OT  Possible step down if HD stable     Code Status:  Family/Goals of Care: Full Code         Critical secondary to Patient has a condition that poses threat to life and bodily function: Septic shock requiring vasopressor support     Critical care was time spent personally by me on the following activities: development of treatment plan with patient or surrogate and bedside caregivers, discussions with consultants, evaluation of patient's response to treatment, examination of patient, ordering and performing treatments and interventions, ordering and review of laboratory studies, ordering and review of radiographic studies, pulse oximetry, re-evaluation of patient's condition. This critical care time did not overlap with that of any other provider or involve time for any procedures.     Brennan Sánchez MD   [facility] [address]  Internal Medicine PGY 3  Critical Care Medicine  [facility]

## 2020-01-26 NOTE — PROGRESS NOTES
Pharmacokinetic Initial Assessment: IV Vancomycin    Assessment/Plan:    Initiate intravenous vancomycin with loading dose of 1000 mg once (received in ED) followed by a maintenance dose of vancomycin 750mg IV every 24 hours.  Desired empiric serum trough concentration is 15 to 20 mcg/mL.  Draw vancomycin trough level 30 min prior to third dose on 01/27/2020 at approximately 2215.  Pharmacy will continue to follow and monitor vancomycin.      Please contact pharmacy at extension [phone] with any questions regarding this assessment.     Thank you for the consult,   Yasmeen Robles       Patient brief summary:  Marycruz Ramirez is a 74 y.o. female initiated on antimicrobial therapy with IV Vancomycin for treatment of suspected bacteremia    Drug Allergies:   Review of patient's allergies indicates:  No Known Allergies    Actual Body Weight:   54.4 kg    Renal Function:   Estimated Creatinine Clearance: 35.5 mL/min (based on SCr of 1 mg/dL).,     Dialysis Method (if applicable):  N/A    CBC (last 72 hours):  Recent Labs   Lab Result Units 01/25/20  2223   WBC K/uL 22.93*   Hemoglobin g/dL 9.8*   Hematocrit % 34.5*   Platelets K/uL 294   Gran% % 77.6*   Lymph% % 10.1*   Mono% % 10.7   Eosinophil% % 0.3   Basophil% % 0.3   Differential Method  Automated       Metabolic Panel (last 72 hours):  Recent Labs   Lab Result Units 01/25/20  2223 01/25/20  2317   Sodium mmol/L 135*  --    Potassium mmol/L 5.2*  --    Chloride mmol/L 100  --    CO2 mmol/L 15*  --    Glucose mg/dL 336*  --    Glucose, UA   --  3+*   BUN, Bld mg/dL 21  --    Creatinine mg/dL 1.0  --    Albumin g/dL 3.3*  --    Total Bilirubin mg/dL 0.8  --    Alkaline Phosphatase U/L 77  --    AST U/L 23  --    ALT U/L 22  --    Magnesium mg/dL 2.1  --    Phosphorus mg/dL 1.4*  --        Drug levels (last 3 results):  No results for input(s): VANCOMYCINRA, VANCOMYCINPE, VANCOMYCINTR in the last 72 hours.    Microbiologic Results:  Microbiology Results (last 7 days)      Procedure Component Value Units Date/Time    Respiratory Infection Panel, Nasopharyngeal [[id number]]     Order Status:  No result Specimen:  Nasopharyngeal Swab     Urine culture [[id number]] Collected:  01/25/20 2317    Order Status:  No result Specimen:  Urine Updated:  01/26/20 0018    Influenza A & B by Molecular [[id number]] Collected:  01/25/20 2233    Order Status:  Completed Specimen:  Nasopharyngeal Swab Updated:  01/25/20 2325     Influenza A, Molecular Negative     Influenza B, Molecular Negative     Flu A & B Source Nasal swab    Blood culture x two cultures. Draw prior to antibiotics. [[id number]] Collected:  01/25/20 2241    Order Status:  Sent Specimen:  Blood from Peripheral, Forearm, Left Updated:  01/25/20 2247    Blood culture x two cultures. Draw prior to antibiotics. [[id number]] Collected:  01/25/20 2224    Order Status:  Sent Specimen:  Blood from Peripheral, Antecubital, Right Updated:  01/25/20 2235

## 2020-01-26 NOTE — PROVIDER TRANSFER
[facility] Medicine ICU Stepdown Acceptance Note    Date of Admission: 1/25/2020  Date of Transfer / Stepdown: 1/26/2020  Crystal, C/J, L, Onc (IV chemo w/in 1 month), Gyn/Onc, or other special case?: no   ICU team stepping patient down: [facility]  ICU team member giving verbal handoff:  [phone]  Accepting  team: IMD    Brief History of Present Illness:      Marycruz Ramirez is a 74 y.o. female with chronic radicular pain, bipolar disorder, mixed hyperlipidemia, overactive bladder brought in by ambulance as she was noted to be found on the floor in her kitchen with back on the floor. Per the family, patient was recently discharged on 1/20/20 after being admitted for microcytic anemia hemoglobin of 5.5 without any evidence of gastrointestinal bleed on EGD or colonoscopy. She was doing fine until two days ago when she developed subjective fevers and chills. At baseline, she is able to perform her ADL's. She has been noted to be more lethargic and fatigued over the past few days.  On arrival, her vitals were noted to be temp of 103, , RR 26, On 10 L non-rebreather with FiO2 of 30%, with BP 67-71 mmHg. She was given 1 L lactated ringer bolus, responded transiently and was started on low dose levophed 0.02 mcg/kg/min. Patient oriented x 3, and responding to verbal commands.  Labs consistent with leukocytosis of 23K, Hb 9.8 and platelets 194/ Sodium 135, bicarb 15, Cr 0.8. , lactate 11, procal 1.2. UA positive 3+ LE, positive nitrites and > 100 wbc's. CT Head negative for intracranial abnormality, hemorrhage. CXR consistent with borderline cardiomegaly with diffuse interstitial opacities. CT abdomen suggestive of left-sided pyelonephritis.     Hospital/ICU Course:     Patient admitted to the ICU for septic shock due to pyelonephritis and GN bacteremia. Treated with vancomycin and Zosyn initially; required IVFs and vasopressors for septic shock. Off pressor support with stable BPs. Blood cultures with GNRs and on  contact isolation.  Continued with empiric Zosyn until cultures finalize.  Vanc can likely be de-escalated if no evidence of gram positives.  Appropriately fluid resuscitated. Anemia: stable.  S/p recent colonoscopy and EGD.       Consultants and Procedures:     Consultants:   Consults (From admission, onward)        Status Ordering Provider     Inpatient consult to Critical Care Medicine  Once     Provider:  (Not yet assigned)    Completed LUIS FERNANDO CABALLERO     Pharmacy to dose Vancomycin consult  Once     Provider:  (Not yet assigned)    Acknowledged PRECIOUS GARNER          Procedures:  none    Transfer Information:     Code status: Full Code    Diet: Diet diabetic [facility]; 2000 Calorie    Physical Activity:  OT/PT    To Do / Pending Studies / Follow ups:    Follow up on cultures    Patient has been accepted by Hospital Medicine Team IMD, who will assume care of the patient upon arrival to the floor from the ICU. Please contact ICU team with any concerns prior to arrival. Please contact Hospital Medicine at [phone] or [phone] (please do NOT leave a voicemail) when patient arrives to the floor.    Debo Springer MD  Department of Hospital Medicine  Contact Information 7 AM - 7 PM  Spectralink # [phone]  Pager [phone]

## 2020-01-26 NOTE — ED NOTES
"Marycruz Ramirez, a 74 y.o. female presents to the ED via EMS w/ complaint of fall. Pt  states he was sleeping when she "fell" pt alerted her life alert and the neighbor found her on the floor. Upon EMS arrival pt disoriented, pt in respiratory distress, pt with temp 103. Upon arrival into ED pt noted to be in respiratory distress, pt contracted, not following commands. Pt confused, altered and not completing full sentences.  at bedside to give history.     Triage note:  Chief Complaint   Patient presents with    Possible Sepsis     Patient had fall at home tonight, not on blood thinners, injuries notes to knuckles, did not hit head. Patient has fever, HR in 170s,      Review of patient's allergies indicates:  No Known Allergies  No past medical history on file.       Adult Physical Assessment  LOC: Marycruz Ramirez, 74 y.o. female verified via two identifiers.  The patient is confused, altered, not following commands, not able to be verbally redirected.   APPEARANCE: Patient in respiratory distress, noted to have tremors, restless, in contracted state.   SKIN:The skin is warm and dry, pt noted to have skin tears throughout from fall.  MUSCULOSKELETAL: Patient moving all extremities well, no obvious swelling or deformities noted.  RESPIRATORY: Airway is open and patent, respirations are spontaneous, pt tachypneic, use of accessory muscles noted.   CARDIAC: Patient tachycardic at 180 per monitor, peripheral edema noted, capillary refill < 3 seconds in all extremities  ABDOMEN: Soft and non tender to palpation, abdominal distention noted. Bowel sounds present in all four quadrants.  NEUROLOGIC: Eyes open spontaneously. Due to pt status unable to complete full neuro exam, pt not following commands.  "

## 2020-01-27 PROBLEM — Z72.0 TOBACCO ABUSE: Status: ACTIVE | Noted: 2020-01-27

## 2020-01-27 PROBLEM — I50.43 ACUTE ON CHRONIC COMBINED SYSTOLIC AND DIASTOLIC HEART FAILURE: Status: ACTIVE | Noted: 2020-01-27

## 2020-01-27 PROBLEM — J96.01 ACUTE HYPOXEMIC RESPIRATORY FAILURE: Status: ACTIVE | Noted: 2020-01-27

## 2020-01-27 PROBLEM — I35.0 AORTIC STENOSIS, SEVERE: Status: ACTIVE | Noted: 2020-01-27

## 2020-01-27 PROBLEM — R78.81 E COLI BACTEREMIA: Status: ACTIVE | Noted: 2020-01-27

## 2020-01-27 PROBLEM — B96.20 E COLI BACTEREMIA: Status: ACTIVE | Noted: 2020-01-27

## 2020-01-27 LAB
ALBUMIN SERPL BCP-MCNC: 2.5 G/DL (ref 3.5–5.2)
ALBUMIN SERPL ELPH-MCNC: 2.29 G/DL (ref 3.35–5.55)
ALP SERPL-CCNC: 66 U/L (ref 55–135)
ALPHA1 GLOB SERPL ELPH-MCNC: 0.46 G/DL (ref 0.17–0.41)
ALPHA2 GLOB SERPL ELPH-MCNC: 0.8 G/DL (ref 0.43–0.99)
ALT SERPL W/O P-5'-P-CCNC: 28 U/L (ref 10–44)
ANION GAP SERPL CALC-SCNC: 9 MMOL/L (ref 8–16)
AST SERPL-CCNC: 52 U/L (ref 10–40)
B-GLOBULIN SERPL ELPH-MCNC: 0.52 G/DL (ref 0.5–1.1)
BACTERIA UR CULT: ABNORMAL
BASOPHILS # BLD AUTO: 0.03 K/UL (ref 0–0.2)
BASOPHILS NFR BLD: 0.2 % (ref 0–1.9)
BILIRUB SERPL-MCNC: 0.6 MG/DL (ref 0.1–1)
BUN SERPL-MCNC: 13 MG/DL (ref 8–23)
CALCIUM SERPL-MCNC: 9.3 MG/DL (ref 8.7–10.5)
CHLORIDE SERPL-SCNC: 103 MMOL/L (ref 95–110)
CO2 SERPL-SCNC: 22 MMOL/L (ref 23–29)
CREAT SERPL-MCNC: 0.7 MG/DL (ref 0.5–1.4)
DIFFERENTIAL METHOD: ABNORMAL
EOSINOPHIL # BLD AUTO: 0.1 K/UL (ref 0–0.5)
EOSINOPHIL NFR BLD: 0.6 % (ref 0–8)
ERYTHROCYTE [DISTWIDTH] IN BLOOD BY AUTOMATED COUNT: 24.1 % (ref 11.5–14.5)
EST. GFR  (AFRICAN AMERICAN): >60 ML/MIN/1.73 M^2
EST. GFR  (NON AFRICAN AMERICAN): >60 ML/MIN/1.73 M^2
ESTIMATED AVG GLUCOSE: 120 MG/DL (ref 68–131)
GAMMA GLOB SERPL ELPH-MCNC: 0.54 G/DL (ref 0.67–1.58)
GLUCOSE SERPL-MCNC: 159 MG/DL (ref 70–110)
HBA1C MFR BLD HPLC: 5.8 % (ref 4–5.6)
HCT VFR BLD AUTO: 31.5 % (ref 37–48.5)
HGB BLD-MCNC: 8.9 G/DL (ref 12–16)
IMM GRANULOCYTES # BLD AUTO: 0.06 K/UL (ref 0–0.04)
IMM GRANULOCYTES NFR BLD AUTO: 0.4 % (ref 0–0.5)
INTERPRETATION SERPL IFE-IMP: NORMAL
LYMPHOCYTES # BLD AUTO: 2.2 K/UL (ref 1–4.8)
LYMPHOCYTES NFR BLD: 16.5 % (ref 18–48)
MAGNESIUM SERPL-MCNC: 2 MG/DL (ref 1.6–2.6)
MCH RBC QN AUTO: 23.4 PG (ref 27–31)
MCHC RBC AUTO-ENTMCNC: 28.3 G/DL (ref 32–36)
MCV RBC AUTO: 83 FL (ref 82–98)
MONOCYTES # BLD AUTO: 1.2 K/UL (ref 0.3–1)
MONOCYTES NFR BLD: 8.8 % (ref 4–15)
NEUTROPHILS # BLD AUTO: 9.9 K/UL (ref 1.8–7.7)
NEUTROPHILS NFR BLD: 73.5 % (ref 38–73)
NRBC BLD-RTO: 0 /100 WBC
PHOSPHATE SERPL-MCNC: 2.2 MG/DL (ref 2.7–4.5)
PLATELET # BLD AUTO: 208 K/UL (ref 150–350)
PMV BLD AUTO: 11.3 FL (ref 9.2–12.9)
POCT GLUCOSE: 103 MG/DL (ref 70–110)
POCT GLUCOSE: 210 MG/DL (ref 70–110)
POCT GLUCOSE: 212 MG/DL (ref 70–110)
POCT GLUCOSE: 287 MG/DL (ref 70–110)
POTASSIUM SERPL-SCNC: 4.1 MMOL/L (ref 3.5–5.1)
PROT SERPL-MCNC: 4.6 G/DL (ref 6–8.4)
PROT SERPL-MCNC: 5.7 G/DL (ref 6–8.4)
RBC # BLD AUTO: 3.8 M/UL (ref 4–5.4)
SODIUM SERPL-SCNC: 134 MMOL/L (ref 136–145)
WBC # BLD AUTO: 13.48 K/UL (ref 3.9–12.7)

## 2020-01-27 PROCEDURE — 83036 HEMOGLOBIN GLYCOSYLATED A1C: CPT | Mod: HCNC

## 2020-01-27 PROCEDURE — 25000003 PHARM REV CODE 250: Mod: HCNC | Performed by: STUDENT IN AN ORGANIZED HEALTH CARE EDUCATION/TRAINING PROGRAM

## 2020-01-27 PROCEDURE — 99233 SBSQ HOSP IP/OBS HIGH 50: CPT | Mod: HCNC,,, | Performed by: INTERNAL MEDICINE

## 2020-01-27 PROCEDURE — 36415 COLL VENOUS BLD VENIPUNCTURE: CPT | Mod: HCNC

## 2020-01-27 PROCEDURE — 87040 BLOOD CULTURE FOR BACTERIA: CPT | Mod: HCNC

## 2020-01-27 PROCEDURE — 80053 COMPREHEN METABOLIC PANEL: CPT | Mod: HCNC

## 2020-01-27 PROCEDURE — 99233 PR SUBSEQUENT HOSPITAL CARE,LEVL III: ICD-10-PCS | Mod: HCNC,,, | Performed by: INTERNAL MEDICINE

## 2020-01-27 PROCEDURE — 85025 COMPLETE CBC W/AUTO DIFF WBC: CPT | Mod: HCNC

## 2020-01-27 PROCEDURE — 84100 ASSAY OF PHOSPHORUS: CPT | Mod: HCNC

## 2020-01-27 PROCEDURE — 83735 ASSAY OF MAGNESIUM: CPT | Mod: HCNC

## 2020-01-27 PROCEDURE — 63600175 PHARM REV CODE 636 W HCPCS: Mod: HCNC | Performed by: STUDENT IN AN ORGANIZED HEALTH CARE EDUCATION/TRAINING PROGRAM

## 2020-01-27 PROCEDURE — 94761 N-INVAS EAR/PLS OXIMETRY MLT: CPT | Mod: HCNC

## 2020-01-27 PROCEDURE — 11000001 HC ACUTE MED/SURG PRIVATE ROOM: Mod: HCNC

## 2020-01-27 RX ADMIN — INSULIN ASPART 1 UNITS: 100 INJECTION, SOLUTION INTRAVENOUS; SUBCUTANEOUS at 11:01

## 2020-01-27 RX ADMIN — OXYCODONE HYDROCHLORIDE 5 MG: 5 TABLET ORAL at 06:01

## 2020-01-27 RX ADMIN — INSULIN ASPART 3 UNITS: 100 INJECTION, SOLUTION INTRAVENOUS; SUBCUTANEOUS at 04:01

## 2020-01-27 RX ADMIN — FENOFIBRATE 145 MG: 145 TABLET, FILM COATED ORAL at 09:01

## 2020-01-27 RX ADMIN — PIPERACILLIN AND TAZOBACTAM 4.5 G: 4; .5 INJECTION, POWDER, FOR SOLUTION INTRAVENOUS at 04:01

## 2020-01-27 RX ADMIN — PIPERACILLIN AND TAZOBACTAM 4.5 G: 4; .5 INJECTION, POWDER, FOR SOLUTION INTRAVENOUS at 08:01

## 2020-01-27 RX ADMIN — OXYCODONE HYDROCHLORIDE 5 MG: 5 TABLET ORAL at 05:01

## 2020-01-27 RX ADMIN — ACETAMINOPHEN 650 MG: 325 TABLET ORAL at 11:01

## 2020-01-27 RX ADMIN — PANTOPRAZOLE SODIUM 40 MG: 40 TABLET, DELAYED RELEASE ORAL at 09:01

## 2020-01-27 RX ADMIN — FERROUS SULFATE TAB EC 325 MG (65 MG FE EQUIVALENT) 325 MG: 325 (65 FE) TABLET DELAYED RESPONSE at 09:01

## 2020-01-27 RX ADMIN — ENOXAPARIN SODIUM 40 MG: 100 INJECTION SUBCUTANEOUS at 04:01

## 2020-01-27 RX ADMIN — PIPERACILLIN AND TAZOBACTAM 4.5 G: 4; .5 INJECTION, POWDER, FOR SOLUTION INTRAVENOUS at 11:01

## 2020-01-27 RX ADMIN — INSULIN ASPART 2 UNITS: 100 INJECTION, SOLUTION INTRAVENOUS; SUBCUTANEOUS at 12:01

## 2020-01-27 RX ADMIN — OXCARBAZEPINE 300 MG: 300 TABLET, FILM COATED ORAL at 09:01

## 2020-01-27 RX ADMIN — CETIRIZINE HYDROCHLORIDE 10 MG: 10 TABLET, FILM COATED ORAL at 09:01

## 2020-01-27 RX ADMIN — OXYBUTYNIN CHLORIDE 5 MG: 5 TABLET ORAL at 09:01

## 2020-01-27 NOTE — PLAN OF CARE
PCP ANITRA WATSON  PHARMACY [facility]  POA/SPOUSE DREW Cabrera      01/27/20 1111   Discharge Assessment   Assessment Type Discharge Planning Assessment   Confirmed/corrected address and phone number on facesheet? Yes   Assessment information obtained from? Patient   Expected Length of Stay (days) 3   Communicated expected length of stay with patient/caregiver no   Prior to hospitilization cognitive status: Alert/Oriented   Prior to hospitalization functional status: Assistive Equipment   Current cognitive status: Alert/Oriented   Current Functional Status: Assistive Equipment   Lives With spouse   Able to Return to Prior Arrangements yes   Is patient able to care for self after discharge? Yes   Patient's perception of discharge disposition home or selfcare   Readmission Within the Last 30 Days no previous admission in last 30 days   Patient currently being followed by outpatient case management? No   Patient currently receives any other outside agency services? No   Equipment Currently Used at Home cane, straight;walker, rolling   Do you have any problems affording any of your prescribed medications? No   Is the patient taking medications as prescribed? yes   Does the patient have transportation home? Yes   Transportation Anticipated family or friend will provide   Does the patient receive services at the [facility]? No   Discharge Plan A Home;Home with family   Discharge Plan B Home;Home with family   DME Needed Upon Discharge  none   Patient/Family in Agreement with Plan unable to assess

## 2020-01-27 NOTE — NURSING TRANSFER
Nursing Transfer Note      1/27/2020     Transfer To: [id number]    Transfer via wheelchair    Transfer with N/A    Transported by RN    Medicines sent: insulin pen    Chart send with patient: Yes    Notified: spouse at bedside    Patient reassessed at: 0430    Upon arrival to floor: patient oriented to room and call bell in reach. Patient sitting up in chair. Bedside report handoff to Henrique BOYD

## 2020-01-28 PROBLEM — N20.0 NEPHROLITHIASIS: Status: ACTIVE | Noted: 2020-01-28

## 2020-01-28 LAB
ALBUMIN SERPL BCP-MCNC: 2.3 G/DL (ref 3.5–5.2)
ALP SERPL-CCNC: 63 U/L (ref 55–135)
ALT SERPL W/O P-5'-P-CCNC: 25 U/L (ref 10–44)
ANION GAP SERPL CALC-SCNC: 9 MMOL/L (ref 8–16)
AST SERPL-CCNC: 29 U/L (ref 10–40)
BACTERIA BLD CULT: ABNORMAL
BASOPHILS # BLD AUTO: 0.03 K/UL (ref 0–0.2)
BASOPHILS NFR BLD: 0.3 % (ref 0–1.9)
BILIRUB SERPL-MCNC: 0.6 MG/DL (ref 0.1–1)
BUN SERPL-MCNC: 11 MG/DL (ref 8–23)
CALCIUM SERPL-MCNC: 8.9 MG/DL (ref 8.7–10.5)
CHLORIDE SERPL-SCNC: 104 MMOL/L (ref 95–110)
CO2 SERPL-SCNC: 24 MMOL/L (ref 23–29)
CREAT SERPL-MCNC: 0.7 MG/DL (ref 0.5–1.4)
DIFFERENTIAL METHOD: ABNORMAL
EOSINOPHIL # BLD AUTO: 0.1 K/UL (ref 0–0.5)
EOSINOPHIL NFR BLD: 1.2 % (ref 0–8)
ERYTHROCYTE [DISTWIDTH] IN BLOOD BY AUTOMATED COUNT: 24.2 % (ref 11.5–14.5)
EST. GFR  (AFRICAN AMERICAN): >60 ML/MIN/1.73 M^2
EST. GFR  (NON AFRICAN AMERICAN): >60 ML/MIN/1.73 M^2
GLUCOSE SERPL-MCNC: 147 MG/DL (ref 70–110)
HCT VFR BLD AUTO: 31.4 % (ref 37–48.5)
HGB BLD-MCNC: 8.7 G/DL (ref 12–16)
IMM GRANULOCYTES # BLD AUTO: 0.04 K/UL (ref 0–0.04)
IMM GRANULOCYTES NFR BLD AUTO: 0.4 % (ref 0–0.5)
LYMPHOCYTES # BLD AUTO: 1.9 K/UL (ref 1–4.8)
LYMPHOCYTES NFR BLD: 20.5 % (ref 18–48)
MAGNESIUM SERPL-MCNC: 1.9 MG/DL (ref 1.6–2.6)
MCH RBC QN AUTO: 23.3 PG (ref 27–31)
MCHC RBC AUTO-ENTMCNC: 27.7 G/DL (ref 32–36)
MCV RBC AUTO: 84 FL (ref 82–98)
MONOCYTES # BLD AUTO: 0.9 K/UL (ref 0.3–1)
MONOCYTES NFR BLD: 10.2 % (ref 4–15)
NEUTROPHILS # BLD AUTO: 6.2 K/UL (ref 1.8–7.7)
NEUTROPHILS NFR BLD: 67.4 % (ref 38–73)
NRBC BLD-RTO: 0 /100 WBC
PATHOLOGIST INTERPRETATION IFE: NORMAL
PATHOLOGIST INTERPRETATION SPE: NORMAL
PHOSPHATE SERPL-MCNC: 1.8 MG/DL (ref 2.7–4.5)
PLATELET # BLD AUTO: 226 K/UL (ref 150–350)
PMV BLD AUTO: 11.2 FL (ref 9.2–12.9)
POCT GLUCOSE: 226 MG/DL (ref 70–110)
POTASSIUM SERPL-SCNC: 3.8 MMOL/L (ref 3.5–5.1)
PROT SERPL-MCNC: 5.6 G/DL (ref 6–8.4)
RBC # BLD AUTO: 3.73 M/UL (ref 4–5.4)
SODIUM SERPL-SCNC: 137 MMOL/L (ref 136–145)
WBC # BLD AUTO: 9.19 K/UL (ref 3.9–12.7)

## 2020-01-28 PROCEDURE — 80053 COMPREHEN METABOLIC PANEL: CPT | Mod: HCNC

## 2020-01-28 PROCEDURE — 99223 PR INITIAL HOSPITAL CARE,LEVL III: ICD-10-PCS | Mod: HCNC,,, | Performed by: INTERNAL MEDICINE

## 2020-01-28 PROCEDURE — 99232 SBSQ HOSP IP/OBS MODERATE 35: CPT | Mod: HCNC,,, | Performed by: INTERNAL MEDICINE

## 2020-01-28 PROCEDURE — 25000003 PHARM REV CODE 250: Mod: HCNC | Performed by: STUDENT IN AN ORGANIZED HEALTH CARE EDUCATION/TRAINING PROGRAM

## 2020-01-28 PROCEDURE — 36415 COLL VENOUS BLD VENIPUNCTURE: CPT | Mod: HCNC

## 2020-01-28 PROCEDURE — 87040 BLOOD CULTURE FOR BACTERIA: CPT | Mod: HCNC

## 2020-01-28 PROCEDURE — 84100 ASSAY OF PHOSPHORUS: CPT | Mod: HCNC

## 2020-01-28 PROCEDURE — 85025 COMPLETE CBC W/AUTO DIFF WBC: CPT | Mod: HCNC

## 2020-01-28 PROCEDURE — 63600175 PHARM REV CODE 636 W HCPCS: Mod: HCNC | Performed by: STUDENT IN AN ORGANIZED HEALTH CARE EDUCATION/TRAINING PROGRAM

## 2020-01-28 PROCEDURE — 99223 1ST HOSP IP/OBS HIGH 75: CPT | Mod: HCNC,,, | Performed by: INTERNAL MEDICINE

## 2020-01-28 PROCEDURE — 99232 PR SUBSEQUENT HOSPITAL CARE,LEVL II: ICD-10-PCS | Mod: HCNC,,, | Performed by: INTERNAL MEDICINE

## 2020-01-28 PROCEDURE — 11000001 HC ACUTE MED/SURG PRIVATE ROOM: Mod: HCNC

## 2020-01-28 PROCEDURE — 83735 ASSAY OF MAGNESIUM: CPT | Mod: HCNC

## 2020-01-28 RX ORDER — CIPROFLOXACIN 500 MG/1
500 TABLET ORAL EVERY 12 HOURS
Status: DISCONTINUED | OUTPATIENT
Start: 2020-01-29 | End: 2020-01-29 | Stop reason: HOSPADM

## 2020-01-28 RX ADMIN — OXCARBAZEPINE 300 MG: 300 TABLET, FILM COATED ORAL at 08:01

## 2020-01-28 RX ADMIN — OXYCODONE HYDROCHLORIDE 5 MG: 5 TABLET ORAL at 01:01

## 2020-01-28 RX ADMIN — OXYBUTYNIN CHLORIDE 5 MG: 5 TABLET ORAL at 08:01

## 2020-01-28 RX ADMIN — FERROUS SULFATE TAB EC 325 MG (65 MG FE EQUIVALENT) 325 MG: 325 (65 FE) TABLET DELAYED RESPONSE at 08:01

## 2020-01-28 RX ADMIN — OXYCODONE HYDROCHLORIDE 5 MG: 5 TABLET ORAL at 06:01

## 2020-01-28 RX ADMIN — OXYCODONE HYDROCHLORIDE 5 MG: 5 TABLET ORAL at 08:01

## 2020-01-28 RX ADMIN — PIPERACILLIN AND TAZOBACTAM 4.5 G: 4; .5 INJECTION, POWDER, FOR SOLUTION INTRAVENOUS at 03:01

## 2020-01-28 RX ADMIN — FENOFIBRATE 145 MG: 145 TABLET, FILM COATED ORAL at 10:01

## 2020-01-28 RX ADMIN — PANTOPRAZOLE SODIUM 40 MG: 40 TABLET, DELAYED RELEASE ORAL at 08:01

## 2020-01-28 RX ADMIN — CETIRIZINE HYDROCHLORIDE 10 MG: 10 TABLET, FILM COATED ORAL at 08:01

## 2020-01-28 RX ADMIN — ENOXAPARIN SODIUM 40 MG: 100 INJECTION SUBCUTANEOUS at 05:01

## 2020-01-28 RX ADMIN — ACETAMINOPHEN 650 MG: 325 TABLET ORAL at 03:01

## 2020-01-28 RX ADMIN — OXYCODONE HYDROCHLORIDE 5 MG: 5 TABLET ORAL at 12:01

## 2020-01-28 RX ADMIN — INSULIN ASPART 1 UNITS: 100 INJECTION, SOLUTION INTRAVENOUS; SUBCUTANEOUS at 08:01

## 2020-01-28 RX ADMIN — PIPERACILLIN AND TAZOBACTAM 4.5 G: 4; .5 INJECTION, POWDER, FOR SOLUTION INTRAVENOUS at 08:01

## 2020-01-28 NOTE — PROGRESS NOTES
[facility] Hospital Medicine  Progress Note    Patient Name: Marycruz Ramirez  MRN: [id number]  Patient Class: IP- Inpatient   Admission Date: 1/25/2020  Length of Stay: 1 days  Attending Physician: Debo Springer MD  Primary Care Provider: Marcelino Calle MD    [facility] Medicine Team: [facility] MED D Debo Springer MD  Chief Complaint   Patient presents with    Possible Sepsis     Patient had fall at home tonight, not on blood thinners, injuries notes to knuckles, did not hit head. Patient has fever, HR in 170s,      HPI: 74 y.o. female with chronic radicular pain, bipolar disorder, mixed hyperlipidemia, overactive bladder brought in by ambulance as she was noted to be found on the floor in her kitchen with back on the floor. Per the family, patient was recently discharged on 1/20/20 after being admitted for microcytic anemia hemoglobin of 5.5 without any evidence of gastrointestinal bleed on EGD or colonoscopy. She was doing fine until two days ago when she developed subjective fevers and chills. At baseline, she is able to perform her ADL's. She has been noted to be more lethargic and fatigued over the past few days.  On arrival, her vitals were noted to be temp of 103, , RR 26, On 10 L non-rebreather with FiO2 of 30%, with BP 67-71 mmHg. She was given 1 L lactated ringer bolus, responded transiently and was started on low dose levophed 0.02 mcg/kg/min. Patient oriented x 3, and responding to verbal commands.  Labs consistent with leukocytosis of 23K, Hb 9.8 and platelets 194/ Sodium 135, bicarb 15, Cr 0.8. , lactate 11, procal 1.2. UA positive 3+ LE, positive nitrites and > 100 wbc's. CT Head negative for intracranial abnormality, hemorrhage. CXR consistent with borderline cardiomegaly with diffuse interstitial opacities. CT abdomen suggestive of left-sided pyelonephritis.     Subjective:     Principal Problem:Septic shock    Interval History: Patient with no new  complaints. Eager to go home.    Review of Systems   Constitutional: Negative for fever.   Respiratory: Negative for shortness of breath.      Objective:     Vital Signs (Most Recent):  Temp: 97.3 °F (36.3 °C) (01/27/20 1432)  Pulse: 82 (01/27/20 1432)  Resp: 16 (01/27/20 0805)  BP: 96/61 (01/27/20 1432)  SpO2: 98 % (01/27/20 1114) Vital Signs (24h Range):  Temp:  [97.3 °F (36.3 °C)-100.2 °F (37.9 °C)] 97.3 °F (36.3 °C)  Pulse:  [64-96] 82  Resp:  [10-18] 16  SpO2:  [98 %-100 %] 98 %  BP: ()/(51-61) 96/61     Weight: 54 kg (119 lb)  Body mass index is 23.24 kg/m².    Intake/Output Summary (Last 24 hours) at 1/27/2020 1822  Last data filed at 1/27/2020 0400  Gross per 24 hour   Intake 240 ml   Output 300 ml   Net -60 ml      Physical Exam   Eyes: Conjunctivae and lids are normal.   Cardiovascular: S1 normal and S2 normal.   Murmur heard.  Pulmonary/Chest: Effort normal. She has decreased breath sounds.   Abdominal: Soft. Bowel sounds are normal. She exhibits distension. There is no tenderness.   Musculoskeletal: She exhibits edema.   Neurological: She is alert. She is not disoriented.   Skin: Ecchymosis noted.       Significant Labs:   A1C:   Recent Labs   Lab 01/27/20  0335   HGBA1C 5.8*     ABGs:   Recent Labs   Lab 01/25/20 2229   PH 7.366   PCO2 29.1*   HCO3 16.7*   POCSATURATED 55*   BE -9     CBC:   Recent Labs   Lab 01/26/20  0517 01/26/20  0704 01/27/20  0335   WBC 13.96* 13.14* 13.48*   HGB 6.6* 8.5* 8.9*   HCT 22.8* 30.4* 31.5*    173 208     CMP:   Recent Labs   Lab 01/25/20  2223 01/26/20  0134 01/26/20  0631 01/27/20  0335   * 135* 132* 134*   K 5.2* 3.3* 4.5 4.1    103 102 103   CO2 15* 24 23 22*   * 362* 326* 159*   BUN 21 21 20 13   CREATININE 1.0 0.9 0.8 0.7   CALCIUM 10.8* 9.4 9.4 9.3   PROT 6.9  --  5.6* 5.7*   ALBUMIN 3.3*  --  2.6* 2.5*   BILITOT 0.8  --  0.6 0.6   ALKPHOS 77  --  60 66   AST 23  --  63* 52*   ALT 22  --  23 28   ANIONGAP 20* 8 7* 9   EGFRNONAA  55.6* >60.0 >60.0 >60.0     Cardiac Markers:   Recent Labs   Lab 01/25/20 2223   *     Coagulation:   Recent Labs   Lab 01/25/20  2223   INR 1.2     Lactic Acid:   Recent Labs   Lab 01/25/20  2223 01/26/20  0134 01/26/20  0517   LACTATE 11.0* 1.2 1.0     Magnesium:   Recent Labs   Lab 01/25/20  2223 01/26/20  0631 01/27/20  0335   MG 2.1 2.0 2.0     POCT Glucose:   Recent Labs   Lab 01/26/20  2215 01/27/20  0807 01/27/20  1131   POCTGLUCOSE 178* 103 210*     Urine Studies:   Recent Labs   Lab 01/25/20  2317   COLORU Archana   APPEARANCEUA Cloudy*   PHUR 5.0   SPECGRAV 1.020   PROTEINUA 2+*   GLUCUA 3+*   KETONESU Negative   BILIRUBINUA Negative   OCCULTUA Negative   NITRITE Positive*   LEUKOCYTESUR 3+*   RBCUA 3   WBCUA >100*   BACTERIA Moderate*   SQUAMEPITHEL 0   HYALINECASTS 3*     Microbiology Results (last 7 days)     Procedure Component Value Units Date/Time    Blood culture x two cultures. Draw prior to antibiotics. [[id number]]  (Abnormal) Collected:  01/25/20 2224    Order Status:  Completed Specimen:  Blood from Peripheral, Antecubital, Right Updated:  01/27/20 1015     Blood Culture, Routine Gram stain sharda bottle: Gram negative rods       Positive results previously called 01/26/2020  13:24      ESCHERICHIA COLI  For susceptibility see order # [id number]      Narrative:       Aerobic and anaerobic    Blood culture x two cultures. Draw prior to antibiotics. [[id number]]  (Abnormal) Collected:  01/25/20 2241    Order Status:  Completed Specimen:  Blood from Peripheral, Forearm, Left Updated:  01/27/20 1015     Blood Culture, Routine Gram stain sharda bottle: Gram negative rods       Results called to and read back by: Carmen Alexander RN 01/26/2020  11:43      Gram stain aer bottle: Gram negative rods       Positive results previously called 01/26/2020  13:49      ESCHERICHIA COLI  Susceptibility pending      Narrative:       Aerobic and anaerobic    Urine culture [[id number]]  (Abnormal) Collected:  01/25/20  2317    Order Status:  Completed Specimen:  Urine Updated:  01/26/20 1936     Urine Culture, Routine GRAM NEGATIVE ALEX  >100,000 cfu/ml  Identification and susceptibility pending      Narrative:       Preferred Collection Type->Urine, Clean Catch    Respiratory Infection Panel, Nasopharyngeal [[id number]] Collected:  01/26/20 0133    Order Status:  Completed Specimen:  Nasopharyngeal Swab Updated:  01/26/20 0426     Respiratory Infection Panel Source NP Swab     Adenovirus Not Detected     Coronavirus 229E Not Detected     Coronavirus HKU1 Not Detected     Coronavirus NL63 Not Detected     Coronavirus OC43 Not Detected     Human Metapneumovirus Not Detected     Human Rhinovirus/Enterovirus Not Detected     Influenza A (subtypes H1, H1-2009,H3) Not Detected     Influenza B Not Detected     Parainfluenza Virus 1 Not Detected     Parainfluenza Virus 2 Not Detected     Parainfluenza Virus 3 Not Detected     Parainfluenza Virus 4 Not Detected     Respiratory Syncytial Virus Not Detected     Bordetella Parapertussis (KL8163) Not Detected     Bordetella pertussis (ptxP) Not Detected     Chlamydia pneumoniae Not Detected     Mycoplasma pneumoniae Not Detected     Comment: Respiratory Infection Panel testing performed by Multiplex PCR.       Narrative:       For all other respiratory sources order [id number] Respiratory  Viral Panel by PCR (RVPCR)    Influenza A & B by Molecular [[id number]] Collected:  01/25/20 2233    Order Status:  Completed Specimen:  Nasopharyngeal Swab Updated:  01/25/20 2325     Influenza A, Molecular Negative     Influenza B, Molecular Negative     Flu A & B Source Nasal swab        Significant Imaging: .  Echo:  · Moderate left ventricular enlargement.  · Moderately decreased left ventricular systolic function. The estimated ejection fraction is 30%.  · Grade I (mild) left ventricular diastolic dysfunction consistent with impaired relaxation.  · Normal right ventricular systolic function.  · Moderate  left atrial enlargement.  · Mild-to-moderate mitral regurgitation.  · Severe aortic valve stenosis. Aortic valve area is 0.64 cm2; peak velocity is 3.94 m/s; mean gradient is 43 mmHg.  · The estimated PA systolic pressure is 40 mmHg.  · Elevated central venous pressure (15 mmHg).    Assessment/Plan:      Active Diagnoses:    Diagnosis Date Noted POA    PRINCIPAL PROBLEM:  Septic shock [A41.9, R65.21] 01/26/2020 Yes    Tobacco abuse [Z72.0] 01/27/2020 Yes    E coli bacteremia [R78.81] 01/27/2020 Yes    Acute hypoxemic respiratory failure [J96.01] 01/27/2020 Yes    Acute on chronic combined systolic and diastolic heart failure [I50.43] 01/27/2020 Yes    Aortic stenosis, severe [I35.0] 01/27/2020 Yes    Pyelonephritis [N12] 01/26/2020 Yes    Microcytic anemia [D50.9]  Yes    Mixed hyperlipidemia [E78.2] 01/16/2020 Yes    Chronic radicular lumbar pain [M54.16, G89.29] 01/16/2020 Yes    OAB (overactive bladder) [N32.81] 01/16/2020 Yes    Bipolar disorder [F31.9] 01/16/2020 Yes      Problems Resolved During this Admission:     Overview / Hospital Course:    Patient admitted to the ICU for septic shock due to L pyelonephritis and GNR bacteremia. Treated with vancomycin and Zosyn initially; required IVFs and vasopressors for septic shock. Off pressor support with stable BPs. Blood cultures with GNRs and on contact isolation.  Continued with empiric Zosyn until cultures finalize.  Vancomycin can be de-escalated if no evidence of gram positives.  Appropriately fluid resuscitated. Anemia: stable.  S/p recent colonoscopy and EGD.      Inpatient Medications prescribed for management of Current Problems:   Scheduled Meds:    cetirizine  10 mg Oral Daily    enoxaparin  40 mg Subcutaneous Daily    fenofibrate  145 mg Oral Daily    ferrous sulfate  325 mg Oral Daily    OXcarbazepine  300 mg Oral Daily    oxybutynin  5 mg Oral Daily    pantoprazole  40 mg Oral Daily    piperacillin-tazobactam (ZOSYN) IVPB  4.5 g  Intravenous Q8H    vancomycin (VANCOCIN) IVPB  750 mg Intravenous Q24H     Continuous Infusions:   As Needed: acetaminophen, Dextrose 10% Bolus, Dextrose 10% Bolus, glucagon (human recombinant), glucose, glucose, insulin aspart U-100, oxyCODONE, sodium chloride 0.9%    Assessment and Plan by Problem    * Septic shock due to pyelonephritis and E. Coli bacteremia  Acute hypoxemic respiratory failure  -- CT abdomen suggestive of left-sided pyelonephritis.   -- Required vasopressor support due to underlying pyelonephritis; possible community acquired pneumonia and possibly cellulitis of the bilateral lower extremities  -- admitted to MICU; continued vasopressor support to maintain MAP > 65 mmHg  -- UA positive for UTI  -- BC x 2 and Urine culture positive for E.coli  -- RIP negative  -- Broad spectrum antibiotic coverage with Zosyn and vanc. Patient lactic acidosis resolved after administering antibiotics and fluid resuscitation.   Continue Zosyn; discontinue vancomycin. Repeat blood cultures. Wean O2. Consult ID     Microcytic anemia  Likely iron deficiency and chronic GI losses  Continue with iron sulfate supplements      Chronic radicular lumbar pain  Given patient is hypotensive, held opioids initially  -- continue Tylenol PRN for pain relief  -- resumed suboxone     OAB (overactive bladder)     - Continue home oxybutynin    Tobacco abuse      - Referral to Smoking cessation program    Acute on chronic combined systolic and diastolic heart failure  Aortic stenosis, severe     - EF 30%, severe AS.     - Consider referral for TAVR evaluation    Bipolar disorder      - Continuing current management with Trileptal    Diet diabetic [facility]; 2000 Calorie    Significant LDAs:     HIGH RISK CONDITION(S):   Patient is currently on drug therapy requiring intensive monitoring for toxicity: Vancomycin     Discharge plan and follow up  Home-Health Care Svc    VTE Risk Mitigation (From admission, onward)          Ordered     enoxaparin injection 40 mg  Daily      01/26/20 0043     IP VTE HIGH RISK PATIENT  Once      01/26/20 0043                   Debo Springer MD  Department of Hospital Medicine   [facility]

## 2020-01-28 NOTE — CONSULTS
[facility]  Infectious Disease  Consult Note    Patient Name: Marycruz Ramirez  MRN: [id number]  Admission Date: 1/25/2020  Hospital Length of Stay: 2 days  Attending Physician: Debo Springer MD  Primary Care Provider: Marcelino Calle MD     Isolation Status: No active isolations        Inpatient consult to Infectious Diseases  Consult performed by: Bunny Polanco PA-C  Consult ordered by: Debo Springer MD      ID consult received. Chart being reviewed. Full consult note with recommendations to follow.      Thank you,  Bunny Polanco PA-C  [phone]

## 2020-01-28 NOTE — HPI
Pt is a 75 yo F with chronic radicular pain, bipolar disorder, mixed hyperlipidemia, overactive bladder brought in after being found down in kitchen.  Pt recently discharged from [facility] [place] [place] after being admitedt w/ anemia w/ hgb 5.5 EGD and colonoscopy unrevealing.  Pt stabilized after transfusion.  Pt reported doing fine until two days ago when she developed subjective fevers and chills. At baseline, she is able to perform her ADL's and IADL's. She has been noted to be more lethargic and fatigues over the past few days.     On arrival, her vitals were noted to be temp of 103, , RR 26, On 10 L non-rebreather with FiO2 of 30%, with BP 67-71 mmHg. She was given 1 L lactated ringer bolus, responded transiently and was started on low dose levophed 0.02 mcg/kg/min. Patient is oriented x 3, and responding to verbal commands.  Labs consistent with leukocytosis of 23k,, lactate 11, procal 1.2.   UA positive 3+ LE, positive nitrites and > 100 wbc's. CT Head negative for intracranial abnormality, hemorage. CXR consistent with borderline cardiomegaly with diffuse interstitial opacities.  Fluid overload vs pneumonitis. CT abdomen suggestive of left-sided pyelonephritis.   Urine Culture growing- E. Coli (pan-sensitive).  Blood cxs-1/25-E.Coli.  Repeats 1/27-NGTD.

## 2020-01-28 NOTE — PLAN OF CARE
Pt aaox4. V/s stable. Family at bedside. Pt up in chair most of shift. Complained of generalized pain. Prn med adm. Moderate relief obtained. No complaints at this time. Will continue to monitor.

## 2020-01-28 NOTE — PT/OT/SLP PROGRESS
"Occupational Therapy      Patient Name:  Marycruz Ramirez   MRN:  [id number]    Occupational therapy visit attempted in AM. Pt stated, " My son is a PTA. I just want to go home and he will do my therapy". Educated pt on the importance of OOB activity and participation in therapy services during acute care stay. Will follow-up as scheduled.     Annie Roberto, OT  1/28/2020  "

## 2020-01-28 NOTE — PROGRESS NOTES
[facility] Hospital Medicine  Progress Note    Patient Name: Marycruz Ramirez  MRN: [id number]  Patient Class: IP- Inpatient   Admission Date: 1/25/2020  Length of Stay: 2 days  Attending Physician: Debo Springer MD  Primary Care Provider: Marcelino Calle MD    [facility] Medicine Team: [facility] [place] Debo Springer MD  Chief Complaint   Patient presents with    Possible Sepsis     Patient had fall at home tonight, not on blood thinners, injuries notes to knuckles, did not hit head. Patient has fever, HR in 170s,      HPI: 74 y.o. female with chronic radicular pain, bipolar disorder, mixed hyperlipidemia, overactive bladder brought in by ambulance as she was noted to be found on the floor in her kitchen with back on the floor. Per the family, patient was recently discharged on 1/20/20 after being admitted for microcytic anemia hemoglobin of 5.5 without any evidence of gastrointestinal bleed on EGD or colonoscopy. She was doing fine until two days ago when she developed subjective fevers and chills. At baseline, she is able to perform her ADL's. She has been noted to be more lethargic and fatigued over the past few days.  On arrival, her vitals were noted to be temp of 103, , RR 26, On 10 L non-rebreather with FiO2 of 30%, with BP 67-71 mmHg. She was given 1 L lactated ringer bolus, responded transiently and was started on low dose levophed 0.02 mcg/kg/min. Patient oriented x 3, and responding to verbal commands.  Labs consistent with leukocytosis of 23K, Hb 9.8 and platelets 194/ Sodium 135, bicarb 15, Cr 0.8. , lactate 11, procal 1.2. UA positive 3+ LE, positive nitrites and > 100 wbc's. CT Head negative for intracranial abnormality, hemorrhage. CXR consistent with borderline cardiomegaly with diffuse interstitial opacities. CT abdomen suggestive of left-sided pyelonephritis.     Subjective:     Principal Problem:Septic shock    Interval History: Patient with no new  complaints. No significant events reported by Nursing.    Review of Systems   Constitutional: Negative for fever.   Respiratory: Negative for shortness of breath.      Objective:     Vital Signs (Most Recent):  Temp: 97.7 °F (36.5 °C) (01/28/20 1603)  Pulse: 96 (01/28/20 1603)  Resp: 18 (01/28/20 1603)  BP: 102/63 (01/28/20 1603)  SpO2: (!) 91 % (01/28/20 1603) Vital Signs (24h Range):  Temp:  [97.6 °F (36.4 °C)-99.2 °F (37.3 °C)] 97.7 °F (36.5 °C)  Pulse:  [73-96] 96  Resp:  [12-20] 18  SpO2:  [91 %-99 %] 91 %  BP: (101-112)/(54-63) 102/63     Weight: 54 kg (119 lb)  Body mass index is 23.24 kg/m².    Intake/Output Summary (Last 24 hours) at 1/28/2020 1758  Last data filed at 1/28/2020 0646  Gross per 24 hour   Intake 240 ml   Output --   Net 240 ml      Physical Exam   Eyes: Conjunctivae and lids are normal.   Cardiovascular: S1 normal and S2 normal.   Murmur heard.  Pulmonary/Chest: Effort normal. She has decreased breath sounds.   Abdominal: Soft. Bowel sounds are normal. She exhibits distension. There is no tenderness.   Musculoskeletal: She exhibits edema.   Neurological: She is alert. She is not disoriented.   Skin: Ecchymosis noted.       Significant Labs:   A1C:   Recent Labs   Lab 01/27/20 0335   HGBA1C 5.8*     CBC:   Recent Labs   Lab 01/27/20 0335 01/28/20  0441   WBC 13.48* 9.19   HGB 8.9* 8.7*   HCT 31.5* 31.4*    226     CMP:   Recent Labs   Lab 01/27/20 0335 01/28/20  0441   * 137   K 4.1 3.8    104   CO2 22* 24   * 147*   BUN 13 11   CREATININE 0.7 0.7   CALCIUM 9.3 8.9   PROT 5.7* 5.6*   ALBUMIN 2.5* 2.3*   BILITOT 0.6 0.6   ALKPHOS 66 63   AST 52* 29   ALT 28 25   ANIONGAP 9 9   EGFRNONAA >60.0 >60.0     Magnesium:   Recent Labs   Lab 01/27/20  0335 01/28/20  0441   MG 2.0 1.9     POCT Glucose:   Recent Labs   Lab 01/27/20  1131 01/27/20  1653 01/27/20  2319   POCTGLUCOSE 210* 287* 212*     Microbiology Results (last 7 days)     Procedure Component Value Units  Date/Time    Blood culture [[id number]] Collected:  01/28/20 0441    Order Status:  Completed Specimen:  Blood Updated:  01/28/20 1515     Blood Culture, Routine No Growth to date    Blood culture x two cultures. Draw prior to antibiotics. [[id number]]  (Abnormal)  (Susceptibility) Collected:  01/25/20 2241    Order Status:  Completed Specimen:  Blood from Peripheral, Forearm, Left Updated:  01/28/20 1017     Blood Culture, Routine Gram stain sharda bottle: Gram negative rods       Results called to and read back by: Carmen Alexander RN 01/26/2020  11:43      Gram stain aer bottle: Gram negative rods       Positive results previously called 01/26/2020  13:49      ESCHERICHIA COLI    Narrative:       Aerobic and anaerobic    Blood culture x two cultures. Draw prior to antibiotics. [[id number]]  (Abnormal) Collected:  01/25/20 2224    Order Status:  Completed Specimen:  Blood from Peripheral, Antecubital, Right Updated:  01/28/20 1016     Blood Culture, Routine Gram stain sharda bottle: Gram negative rods       Positive results previously called 01/26/2020  13:24      ESCHERICHIA COLI  For susceptibility see order # [id number]      Narrative:       Aerobic and anaerobic    Blood culture [[id number]] Collected:  01/27/20 2128    Order Status:  Completed Specimen:  Blood Updated:  01/28/20 0515     Blood Culture, Routine No Growth to date    Urine culture [[id number]]  (Abnormal)  (Susceptibility) Collected:  01/25/20 2317    Order Status:  Completed Specimen:  Urine Updated:  01/27/20 2115     Urine Culture, Routine ESCHERICHIA COLI  >100,000 cfu/ml      Narrative:       Preferred Collection Type->Urine, Clean Catch    Respiratory Infection Panel, Nasopharyngeal [[id number]] Collected:  01/26/20 0133    Order Status:  Completed Specimen:  Nasopharyngeal Swab Updated:  01/26/20 0426     Respiratory Infection Panel Source NP Swab     Adenovirus Not Detected     Coronavirus 229E Not Detected     Coronavirus HKU1 Not Detected      Coronavirus NL63 Not Detected     Coronavirus OC43 Not Detected     Human Metapneumovirus Not Detected     Human Rhinovirus/Enterovirus Not Detected     Influenza A (subtypes H1, H1-2009,H3) Not Detected     Influenza B Not Detected     Parainfluenza Virus 1 Not Detected     Parainfluenza Virus 2 Not Detected     Parainfluenza Virus 3 Not Detected     Parainfluenza Virus 4 Not Detected     Respiratory Syncytial Virus Not Detected     Bordetella Parapertussis (BZ1020) Not Detected     Bordetella pertussis (ptxP) Not Detected     Chlamydia pneumoniae Not Detected     Mycoplasma pneumoniae Not Detected     Comment: Respiratory Infection Panel testing performed by Multiplex PCR.       Narrative:       For all other respiratory sources order [id number] Respiratory  Viral Panel by PCR (RVPCR)    Influenza A & B by Molecular [[id number]] Collected:  01/25/20 2233    Order Status:  Completed Specimen:  Nasopharyngeal Swab Updated:  01/25/20 2325     Influenza A, Molecular Negative     Influenza B, Molecular Negative     Flu A & B Source Nasal swab        Significant Imaging: .  Echo:  · Moderate left ventricular enlargement.  · Moderately decreased left ventricular systolic function. The estimated ejection fraction is 30%.  · Grade I (mild) left ventricular diastolic dysfunction consistent with impaired relaxation.  · Normal right ventricular systolic function.  · Moderate left atrial enlargement.  · Mild-to-moderate mitral regurgitation.  · Severe aortic valve stenosis. Aortic valve area is 0.64 cm2; peak velocity is 3.94 m/s; mean gradient is 43 mmHg.  · The estimated PA systolic pressure is 40 mmHg.  · Elevated central venous pressure (15 mmHg).    Assessment/Plan:      Active Diagnoses:    Diagnosis Date Noted POA    PRINCIPAL PROBLEM:  Septic shock [A41.9, R65.21] 01/26/2020 Yes    Nephrolithiasis [N20.0] 01/28/2020 Yes    Tobacco abuse [Z72.0] 01/27/2020 Yes    E coli bacteremia [R78.81] 01/27/2020 Yes    Acute  hypoxemic respiratory failure [J96.01] 01/27/2020 Yes    Acute on chronic combined systolic and diastolic heart failure [I50.43] 01/27/2020 Yes    Aortic stenosis, severe [I35.0] 01/27/2020 Yes    Pyelonephritis [N12] 01/26/2020 Yes    Microcytic anemia [D50.9]  Yes    Mixed hyperlipidemia [E78.2] 01/16/2020 Yes    Chronic radicular lumbar pain [M54.16, G89.29] 01/16/2020 Yes    OAB (overactive bladder) [N32.81] 01/16/2020 Yes    Bipolar disorder [F31.9] 01/16/2020 Yes      Problems Resolved During this Admission:     Overview / Hospital Course:    Patient admitted to the ICU for septic shock due to L pyelonephritis and GNR bacteremia. Treated with vancomycin and Zosyn initially; required IVFs and vasopressors for septic shock. Off pressor support with stable BPs. Blood cultures with GNRs and on contact isolation.  Continued with empiric Zosyn until cultures finalize.  Vancomycin can be de-escalated if no evidence of gram positives.  Appropriately fluid resuscitated. Anemia: stable.  S/p recent colonoscopy and EGD.      Inpatient Medications prescribed for management of Current Problems:   Scheduled Meds:    cetirizine  10 mg Oral Daily    [START ON 1/29/2020] ciprofloxacin HCl  500 mg Oral Q12H    enoxaparin  40 mg Subcutaneous Daily    fenofibrate  145 mg Oral Daily    ferrous sulfate  325 mg Oral Daily    OXcarbazepine  300 mg Oral Daily    oxybutynin  5 mg Oral Daily    pantoprazole  40 mg Oral Daily    [START ON 1/29/2020] piperacillin-tazobactam (ZOSYN) IVPB  4.5 g Intravenous Q8H     Continuous Infusions:   As Needed: acetaminophen, Dextrose 10% Bolus, Dextrose 10% Bolus, glucagon (human recombinant), glucose, glucose, insulin aspart U-100, oxyCODONE, sodium chloride 0.9%    Assessment and Plan by Problem    * Septic shock due to pyelonephritis and E. Coli bacteremia  Acute hypoxemic respiratory failure  -- CT abdomen suggestive of left-sided pyelonephritis.   -- Required vasopressor support  due to underlying pyelonephritis; possible community acquired pneumonia and possibly cellulitis of the bilateral lower extremities  -- admitted to MICU; continued vasopressor support to maintain MAP > 65 mmHg  -- UA positive for UTI  -- BC x 2 and Urine culture positive for E.coli  -- RIP negative  -- Broad spectrum antibiotic coverage with Zosyn and vanc. Patient lactic acidosis resolved after administering antibiotics and fluid resuscitation.  - Continued Zosyn; discontinue vancomycin. Repeat blood cultures. Wean O2. Consulted ID.  - Plan for transition to oral Cipro pending repeat blood cultures. Discontinue vancomycin.     Microcytic anemia  Likely iron deficiency and chronic GI losses  Continue with iron sulfate supplements      Chronic radicular lumbar pain  Given patient is hypotensive, held opioids initially  -- continue Tylenol PRN for pain relief  -- resumed suboxone     OAB (overactive bladder)     - Continue home oxybutynin    Tobacco abuse      - Referral to Smoking cessation program    Acute on chronic combined systolic and diastolic heart failure  Aortic stenosis, severe     - EF 30%, severe AS.     - Consider referral for TAVR evaluation    Bipolar disorder      - Continuing current management with Trileptal    Diet diabetic [facility]; 2000 Calorie    Significant LDAs:     Discharge plan and follow up  [facility]    VTE Risk Mitigation (From admission, onward)         Ordered     enoxaparin injection 40 mg  Daily      01/26/20 0043     IP VTE HIGH RISK PATIENT  Once      01/26/20 0043                Debo Springer MD  Department of Hospital Medicine   [facility]

## 2020-01-28 NOTE — ASSESSMENT & PLAN NOTE
Pt is a 73 yo F with chronic radicular pain, bipolar disorder, mixed hyperlipidemia, overactive bladder brought in after being found down in kitchen.  Pt recently discharge from [facility] after being admitedt w/ anemia w/ hgb 5.5 EGD and colonoscopy unrevealing.  Pt reported doing fine until two days ago when she developed subjective fevers and chills. On arrival, tachy, hypotensive, tachypneic.   Labs consistent with leukocytosis and lactate 11.  Pt admitted to ICU for septic shock on pressors.  Started on broad spectrum Vanc + Zosyn.  UA positive 3+ LE, positive nitrites and > 100 wbc's.   CT abdomen suggestive of left-sided pyelonephritis.   Urine Culture growing- E. Coli (pan-sensitive).  Blood cxs-1/25-E.Coli.  Repeats 1/27-NGTD.  Vanc Discontinued.    Plan  -Discontinue Zosyn and start on PO Ciprofloxacin 500 mg BID  -Recommend 10 days of abx from date of blood cx clearance  -Please take ciprofloxacin 2 hours before or after mineral supplements, oral multivitamins, dairy products, or calcium-fortified juices due to food-drug interactions.  -Urology outpt for renal stone  -ID f/u in 2 weeks  -ID will sign off

## 2020-01-28 NOTE — PROGRESS NOTES
[facility]  Infectious Disease  Progress Note    Patient Name: Marycruz Ramirez  MRN: [id number]  Admission Date: 1/25/2020  Length of Stay: 2 days  Attending Physician: Debo Springer MD  Primary Care Provider: Marcelino Calle MD    Isolation Status: No active isolations  Assessment/Plan:      Pyelonephritis  Pt is a 75 yo F with chronic radicular pain, bipolar disorder, mixed hyperlipidemia, overactive bladder brought in after being found down in kitchen.  [place] [facility] [place] w/ anemia w/ hgb 5.5 EGD and colonoscopy unrevealing.  Pt reported doing fine until two days ago when she developed subjective fevers and chills. On arrival, tachy, hypotensive, tachypneic.   Labs consistent with leukocytosis and lactate 11.  Pt admitted to ICU for septic shock on pressors.  Started on broad spectrum Vanc + Zosyn.  UA positive 3+ LE, positive nitrites and > 100 wbc's.   CT abdomen suggestive of left-sided pyelonephritis.   Urine Culture growing- E. Coli (pan-sensitive).  Blood cxs-1/25-E.Coli.  Repeats 1/27-NGTD.  Vanc Discontinued.    Plan  -Discontinue Zosyn and start on PO Ciprofloxacin 500 mg BID  -Recommend 10 days of abx from date of blood cx clearance  -Please take ciprofloxacin 2 hours before or after mineral supplements, oral multivitamins, dairy products, or calcium-fortified juices due to food-drug interactions.  -Urology outpt for renal stone  -ID f/u in 2 weeks  -ID will sign off      Pt discussed with primary team and staff.  Thank you for your consult. I will sign off. Please contact us if you have any additional questions.    Bunny Polanco PA-C  Infectious Disease  [facility]    Subjective:     Principal Problem:Septic shock    HPI: Pt is a 75 yo F with chronic radicular pain, bipolar disorder, mixed hyperlipidemia, overactive bladder brought in after being found down in kitchen.  [place] [facility] [place]  [place]ited[place] w/ anemia w/ hgb 5.5 EGD and colonoscopy unrevealing.  Pt stabilized after transfusion.  Pt reported doing fine until two days ago when she developed subjective fevers and chills. At baseline, she is able to perform her ADL's and IADL's. She has been noted to be more lethargic and fatigues over the past few days.     On arrival, her vitals were noted to be temp of 103, , RR 26, On 10 L non-rebreather with FiO2 of 30%, with BP 67-71 mmHg. She was given 1 L lactated ringer bolus, responded transiently and was started on low dose levophed 0.02 mcg/kg/min. Patient is oriented x 3, and responding to verbal commands.  Labs consistent with leukocytosis of 23k,, lactate 11, procal 1.2.   UA positive 3+ LE, positive nitrites and > 100 wbc's. CT Head negative for intracranial abnormality, hemorage. CXR consistent with borderline cardiomegaly with diffuse interstitial opacities.  Fluid overload vs pneumonitis. CT abdomen suggestive of left-sided pyelonephritis.   Urine Culture growing- E. Coli (pan-sensitive).  Blood cxs-1/25-E.Coli.  Repeats 1/27-NGTD.  No past medical history on file.    Past Surgical History:   Procedure Laterality Date    BACK SURGERY      COLONOSCOPY N/A 1/20/2020    Procedure: COLONOSCOPY;  Surgeon: Micah Patel MD;  Location: [address]);  Service: Endoscopy;  Laterality: N/A;    ESOPHAGOGASTRODUODENOSCOPY N/A 1/20/2020    Procedure: EGD (ESOPHAGOGASTRODUODENOSCOPY);  Surgeon: Micah Patel MD;  Location: [address]);  Service: Endoscopy;  Laterality: N/A;    KNEE SURGERY Bilateral        Review of patient's allergies indicates:  No Known Allergies    Medications:  Medications Prior to Admission   Medication Sig    cetirizine (ZYRTEC) 10 MG tablet Take by mouth.    fenofibrate (TRICOR) 145 MG tablet     ferrous sulfate (FEOSOL) 325 mg (65 mg iron) Tab tablet Take 1 tablet (325 mg total) by mouth once daily.    HYDROcodone-acetaminophen (NORCO)  mg per  tablet Take by mouth.    OXcarbazepine (TRILEPTAL) 300 MG Tab Take by mouth.    oxybutynin (DITROPAN) 5 MG Tab     pantoprazole (PROTONIX) 40 MG tablet Take 1 tablet (40 mg total) by mouth 2 (two) times daily before meals.    tolterodine (DETROL LA) 4 MG 24 hr capsule Take by mouth.     Antibiotics (From admission, onward)    Start     Stop Route Frequency Ordered    01/26/20 0815  piperacillin-tazobactam 4.5 g in sodium chloride 0.9% 100 mL IVPB (ready to mix system)      -- IV Every 8 hours (non-standard times) 01/26/20 0106        Antifungals (From admission, onward)    None        Antivirals (From admission, onward)    None           Immunization History   Administered Date(s) Administered    Influenza - High Dose - PF (65 years and older) 10/01/2013    Pneumococcal Polysaccharide - 23 Valent 12/09/2011    Rabies 04/22/2019, 04/25/2019, 05/07/2019    Tdap 04/22/2019    Zoster 10/01/2013       Family History     None        Social History     Socioeconomic History    Marital status:      Spouse name: Not on file    Number of children: Not on file    Years of education: Not on file    Highest education level: Not on file   Occupational History    Not on file   Social Needs    Financial resource strain: Not on file    Food insecurity:     Worry: Not on file     Inability: Not on file    Transportation needs:     Medical: Not on file     Non-medical: Not on file   Tobacco Use    Smoking status: Current Every Day Smoker    Smokeless tobacco: Never Used   Substance and Sexual Activity    Alcohol use: Yes     Frequency: Never     Drinks per session: 1 or 2    Drug use: Never    Sexual activity: Not Currently   Lifestyle    Physical activity:     Days per week: Not on file     Minutes per session: Not on file    Stress: Not at all   Relationships    Social connections:     Talks on phone: Not on file     Gets together: Not on file     Attends Confucianist service: Not on file     Active  member of club or organization: Not on file     Attends meetings of clubs or organizations: Not on file     Relationship status: Not on file   Other Topics Concern    Not on file   Social History Narrative    Not on file     Review of Systems   Constitutional: Negative for activity change, appetite change, chills, fatigue, fever and unexpected weight change.   HENT: Negative for rhinorrhea, sinus pressure, sinus pain and trouble swallowing.    Eyes: Negative for photophobia and visual disturbance.   Respiratory: Negative for cough, shortness of breath and wheezing.    Cardiovascular: Positive for leg swelling. Negative for chest pain and palpitations.   Gastrointestinal: Negative for abdominal distention, abdominal pain, constipation, diarrhea, nausea and vomiting.   Endocrine: Negative for polyuria.   Musculoskeletal: Negative for arthralgias, back pain and gait problem.   Skin: Negative for color change and rash.   Hematological: Negative for adenopathy. Does not bruise/bleed easily.     Objective:     Vital Signs (Most Recent):  Temp: 99.2 °F (37.3 °C) (01/28/20 1137)  Pulse: 75 (01/28/20 1137)  Resp: 16 (01/28/20 1137)  BP: (!) 101/58 (01/28/20 1137)  SpO2: 99 % (01/28/20 1137) Vital Signs (24h Range):  Temp:  [97.6 °F (36.4 °C)-99.2 °F (37.3 °C)] 99.2 °F (37.3 °C)  Pulse:  [73-77] 75  Resp:  [12-20] 16  SpO2:  [94 %-99 %] 99 %  BP: (101-112)/(54-59) 101/58     Weight: 54 kg (119 lb)  Body mass index is 23.24 kg/m².    Estimated Creatinine Clearance: 50.6 mL/min (based on SCr of 0.7 mg/dL).    Physical Exam   Constitutional: No distress.   HENT:   Mouth/Throat: Oropharynx is clear and moist. No oropharyngeal exudate.   Eyes: Conjunctivae are normal. No scleral icterus.   Neck: Normal range of motion. Neck supple. No JVD present. No tracheal deviation present. No thyromegaly present.   Cardiovascular: Normal rate, regular rhythm and intact distal pulses. Exam reveals no gallop and no friction rub.   No murmur  heard.  Pulmonary/Chest: Effort normal and breath sounds normal. No stridor. No respiratory distress. She has no wheezes. She has no rales.   Abdominal: Soft. Bowel sounds are normal. She exhibits no distension and no mass. There is no tenderness. There is no guarding.   Musculoskeletal: Normal range of motion. She exhibits edema. She exhibits no tenderness or deformity.   Bilateral   Lymphadenopathy:     She has no cervical adenopathy.   Skin: Skin is warm. Capillary refill takes less than 2 seconds. No rash noted. She is not diaphoretic. No erythema.   R Knee, L knee and right foream wounds visualized without signs of infection       Significant Labs: All pertinent labs within the past 24 hours have been reviewed.    Significant Imaging: I have reviewed all pertinent imaging results/findings within the past 24 hours.

## 2020-01-29 VITALS
RESPIRATION RATE: 18 BRPM | HEIGHT: 60 IN | DIASTOLIC BLOOD PRESSURE: 53 MMHG | SYSTOLIC BLOOD PRESSURE: 103 MMHG | OXYGEN SATURATION: 99 % | HEART RATE: 86 BPM | TEMPERATURE: 97 F | WEIGHT: 119 LBS | BODY MASS INDEX: 23.36 KG/M2

## 2020-01-29 PROBLEM — J96.01 ACUTE HYPOXEMIC RESPIRATORY FAILURE: Status: RESOLVED | Noted: 2020-01-27 | Resolved: 2020-01-29

## 2020-01-29 PROBLEM — I50.42 CHRONIC COMBINED SYSTOLIC AND DIASTOLIC HEART FAILURE: Status: ACTIVE | Noted: 2020-01-27

## 2020-01-29 PROBLEM — R65.21 SEPTIC SHOCK: Status: RESOLVED | Noted: 2020-01-26 | Resolved: 2020-01-29

## 2020-01-29 PROBLEM — A41.9 SEPTIC SHOCK: Status: RESOLVED | Noted: 2020-01-26 | Resolved: 2020-01-29

## 2020-01-29 LAB
ALBUMIN SERPL BCP-MCNC: 2.2 G/DL (ref 3.5–5.2)
ALP SERPL-CCNC: 62 U/L (ref 55–135)
ALT SERPL W/O P-5'-P-CCNC: 22 U/L (ref 10–44)
ANION GAP SERPL CALC-SCNC: 6 MMOL/L (ref 8–16)
ANISOCYTOSIS BLD QL SMEAR: SLIGHT
AST SERPL-CCNC: 20 U/L (ref 10–40)
BASOPHILS # BLD AUTO: 0.04 K/UL (ref 0–0.2)
BASOPHILS NFR BLD: 0.4 % (ref 0–1.9)
BILIRUB SERPL-MCNC: 0.5 MG/DL (ref 0.1–1)
BUN SERPL-MCNC: 10 MG/DL (ref 8–23)
CALCIUM SERPL-MCNC: 8.8 MG/DL (ref 8.7–10.5)
CHLORIDE SERPL-SCNC: 104 MMOL/L (ref 95–110)
CO2 SERPL-SCNC: 25 MMOL/L (ref 23–29)
CREAT SERPL-MCNC: 0.6 MG/DL (ref 0.5–1.4)
DIFFERENTIAL METHOD: ABNORMAL
EOSINOPHIL # BLD AUTO: 0.2 K/UL (ref 0–0.5)
EOSINOPHIL NFR BLD: 1.8 % (ref 0–8)
ERYTHROCYTE [DISTWIDTH] IN BLOOD BY AUTOMATED COUNT: 23.9 % (ref 11.5–14.5)
EST. GFR  (AFRICAN AMERICAN): >60 ML/MIN/1.73 M^2
EST. GFR  (NON AFRICAN AMERICAN): >60 ML/MIN/1.73 M^2
GLUCOSE SERPL-MCNC: 172 MG/DL (ref 70–110)
HCT VFR BLD AUTO: 28.4 % (ref 37–48.5)
HGB BLD-MCNC: 8 G/DL (ref 12–16)
IMM GRANULOCYTES # BLD AUTO: 0.05 K/UL (ref 0–0.04)
IMM GRANULOCYTES NFR BLD AUTO: 0.5 % (ref 0–0.5)
LYMPHOCYTES # BLD AUTO: 1.8 K/UL (ref 1–4.8)
LYMPHOCYTES NFR BLD: 17.7 % (ref 18–48)
MAGNESIUM SERPL-MCNC: 1.9 MG/DL (ref 1.6–2.6)
MCH RBC QN AUTO: 23.5 PG (ref 27–31)
MCHC RBC AUTO-ENTMCNC: 28.2 G/DL (ref 32–36)
MCV RBC AUTO: 84 FL (ref 82–98)
MONOCYTES # BLD AUTO: 0.9 K/UL (ref 0.3–1)
MONOCYTES NFR BLD: 8.4 % (ref 4–15)
NEUTROPHILS # BLD AUTO: 7.2 K/UL (ref 1.8–7.7)
NEUTROPHILS NFR BLD: 71.2 % (ref 38–73)
NRBC BLD-RTO: 0 /100 WBC
PHOSPHATE SERPL-MCNC: 2 MG/DL (ref 2.7–4.5)
PLATELET # BLD AUTO: 260 K/UL (ref 150–350)
PLATELET BLD QL SMEAR: ABNORMAL
PMV BLD AUTO: 11.3 FL (ref 9.2–12.9)
POCT GLUCOSE: 167 MG/DL (ref 70–110)
POTASSIUM SERPL-SCNC: 3.8 MMOL/L (ref 3.5–5.1)
PROT SERPL-MCNC: 5.3 G/DL (ref 6–8.4)
RBC # BLD AUTO: 3.4 M/UL (ref 4–5.4)
SODIUM SERPL-SCNC: 135 MMOL/L (ref 136–145)
WBC # BLD AUTO: 10.11 K/UL (ref 3.9–12.7)

## 2020-01-29 PROCEDURE — 84100 ASSAY OF PHOSPHORUS: CPT | Mod: HCNC

## 2020-01-29 PROCEDURE — 25000003 PHARM REV CODE 250: Mod: HCNC | Performed by: STUDENT IN AN ORGANIZED HEALTH CARE EDUCATION/TRAINING PROGRAM

## 2020-01-29 PROCEDURE — 99239 HOSP IP/OBS DSCHRG MGMT >30: CPT | Mod: HCNC,,, | Performed by: INTERNAL MEDICINE

## 2020-01-29 PROCEDURE — 87040 BLOOD CULTURE FOR BACTERIA: CPT | Mod: HCNC

## 2020-01-29 PROCEDURE — 83735 ASSAY OF MAGNESIUM: CPT | Mod: HCNC

## 2020-01-29 PROCEDURE — 85025 COMPLETE CBC W/AUTO DIFF WBC: CPT | Mod: HCNC

## 2020-01-29 PROCEDURE — 99239 PR HOSPITAL DISCHARGE DAY,>30 MIN: ICD-10-PCS | Mod: HCNC,,, | Performed by: INTERNAL MEDICINE

## 2020-01-29 PROCEDURE — 25000003 PHARM REV CODE 250: Mod: HCNC | Performed by: INTERNAL MEDICINE

## 2020-01-29 PROCEDURE — 80053 COMPREHEN METABOLIC PANEL: CPT | Mod: HCNC

## 2020-01-29 RX ORDER — CIPROFLOXACIN 500 MG/1
500 TABLET ORAL EVERY 12 HOURS
Qty: 16 TABLET | Refills: 0 | Status: SHIPPED | OUTPATIENT
Start: 2020-01-29 | End: 2020-02-06

## 2020-01-29 RX ADMIN — OXCARBAZEPINE 300 MG: 300 TABLET, FILM COATED ORAL at 08:01

## 2020-01-29 RX ADMIN — OXYCODONE HYDROCHLORIDE 5 MG: 5 TABLET ORAL at 03:01

## 2020-01-29 RX ADMIN — PANTOPRAZOLE SODIUM 40 MG: 40 TABLET, DELAYED RELEASE ORAL at 08:01

## 2020-01-29 RX ADMIN — OXYBUTYNIN CHLORIDE 5 MG: 5 TABLET ORAL at 08:01

## 2020-01-29 RX ADMIN — CETIRIZINE HYDROCHLORIDE 10 MG: 10 TABLET, FILM COATED ORAL at 08:01

## 2020-01-29 RX ADMIN — FERROUS SULFATE TAB EC 325 MG (65 MG FE EQUIVALENT) 325 MG: 325 (65 FE) TABLET DELAYED RESPONSE at 08:01

## 2020-01-29 RX ADMIN — CIPROFLOXACIN HYDROCHLORIDE 500 MG: 500 TABLET, FILM COATED ORAL at 08:01

## 2020-01-29 RX ADMIN — FENOFIBRATE 145 MG: 145 TABLET, FILM COATED ORAL at 08:01

## 2020-01-29 NOTE — PLAN OF CARE
01/29/20 1042   Post-Acute Status   Post-Acute Authorization Home Health/Hospice   Home Health/Hospice Status Set-up Complete  ([facility])     Pt accepted by [facility] for admit Friday 1/31/20.    Dominique Melo LMSW  [facility]  [id number]

## 2020-01-29 NOTE — PHYSICIAN QUERY
PT Name: Marycruz Ramirez  MR #: [id number]     CDS: Dorota Feldman RN, CCDS         Contact information :[phone]  [email or web address]     This form is a permanent document in the medical record.     Query Date: January 29, 2020    Physician Query - Neurological Condition Clarification    By submitting this query, we are merely seeking further clarification of documentation to reflect the severity of illness of your patient. Please utilize your independent clinical judgment when addressing the question(s) below.    The Medical record reflects the following:     Indicators   Supporting Clinical Findings Location in Medical Record   x AMS, Confusion,  LOC, etc.  Pt confused, altered and not completing full sentences.   Altered mental status  Patient is altered and is unable to contribute to her medical history  She has been noted to be more lethargic and fatigues over the past few days.   she was noted to be found on the floor in her kitchen with back on the floor.  ED RN Note 1/25/20    ED provider note 1/26/20        H&P 1/26/20   x Acute / Chronic Illness Septic shock due to pyelonephritis and E. Coli bacteremia   Acute hypoxemic respiratory failure Hospital medicine PN 1/27/20   x Radiology Findings CT Head negative for intracranial abnormality H&P 1/26/20     x Electrolyte Imbalance Potassium 5.2  CO2 15  Phosphorus 1.4 Lab 1/25/20   x Medication Treated with vancomycin and Zosyn initially; required IVFs and vasopressors for septic shock. Off pressor support with stable BPs. Blood cultures with GNRs and on contact isolation.  Continued with empiric Zosyn until cultures finalize.  potassium phosphate 30 mmol in dextrose 5 % 500 mL infusion   potassium chloride 10 mEq in 100 mL IVPB   sodium chloride 0.9% bolus 1,000 mL    [facility] medicine PN 1/26/20   x Treatment         Neuro checks   oxygen saturations are dropping and she was requiring venti mask. Will support blood pressure with Audra JEAN order  1/26/20  ED provider note 1/25/20   x Other After initial resuscitation patient much more alert oriented.   Patient oriented x 3, and responding to verbal commands   ED provider note 1/26/20    Hospital medicine PN 1/27/20     Encephalopathy- is a general term for any diffuse disease of the brain that alters brain function or structure. Treatment of the cognitive dysfunction varies but is ultimately dependent on the treatment of the underlying condition.  Major Symptoms of Encephalopathy - Decreased level of consciousness, fluctuating alertness/concentration, confusion, agitation, lethargy, somnolence, drowsiness, obtundation, stupor, or coma.         References: National Institutes of Healths (NIH) National [place] of Neurological Disorders and Strokes;  HCPro 2016; Advisory Board   Clinical Guidelines:   These guidelines will set system standards to assist providers in managing, documentation, and coding of encephalopathy. The intent of this document is to serve as a system guideline, not replace the providers clinical judgment:    Provider, please specify the diagnosis or diagnoses associated with above clinical findings.    [  x  ] Metabolic Encephalopathy - Due to electrolye imbalance, metabolic derangements, or infections processes, includes Septic Encephalopathy (POA)   [   ] Anoxic/Hypoxic Encephalopathy- Brain damage due to anoxia/hypoxia   [   ] Other Encephalopathy - please specify, ______   [   ] Unspecified Encephalopathy      [   ] Transient delirium related to acute medical condition   [   ] Other Neurological Condition- (please specify condition): _________   [   ]  Clinically Undetermined     Please document in your progress notes daily for the duration of treatment until resolved, and include in your discharge summary.

## 2020-01-29 NOTE — DISCHARGE INSTRUCTIONS
Take ciprofloxacin 2 hours before or after mineral supplements, oral multivitamins, dairy products, or calcium-fortified juices due to food-drug interactions.

## 2020-01-29 NOTE — PLAN OF CARE
[facility]    HOME HEALTH ORDERS  FACE TO FACE ENCOUNTER    Patient Name: Marycruz Ramirez  YOB: 1945    PCP: Marcelino Calle MD   PCP Address: [address] [place] [address] [address] / [place] [address]  PCP Phone Number: [phone]  PCP Fax: [phone]    Encounter Date: 01/29/2020    Admit to Home Health    Diagnoses:  Active Hospital Problems    Diagnosis  POA    Nephrolithiasis [N20.0]  Yes    Tobacco abuse [Z72.0]  Yes    E coli bacteremia [R78.81]  Yes    Chronic combined systolic and diastolic heart failure [I50.42]  Yes    Aortic stenosis, severe [I35.0]  Yes    Pyelonephritis [N12]  Yes    Microcytic anemia [D50.9]  Yes    Mixed hyperlipidemia [E78.2]  Yes     Is on tricor currently cont current needs cmp and lipid      Chronic radicular lumbar pain [M54.16, G89.29]  Yes     Dr allen is managing pain cont current regimen       OAB (overactive bladder) [N32.81]  Yes     Cont ditropan working well       Bipolar disorder [F31.9]  Yes     Psych managing cont current trileptal        Resolved Hospital Problems    Diagnosis Date Resolved POA    *Septic shock [A41.9, R65.21] 01/29/2020 Yes    Acute hypoxemic respiratory failure [J96.01] 01/29/2020 Yes       Future Appointments   Date Time Provider Department Center   2/20/2020  3:00 PM Leticia Gastelum MD [facility] ID [address]   7/9/2020 11:00 AM Marcelino Calle MD [facility]     Follow-up Information     Marcelino Calle MD. Schedule an appointment as soon as possible for a visit in 1 week.    Specialty:  Internal Medicine  Why:  For discharge from hospital follow up, Repeat labwork  Contact information:  [address] [facility]  [address]  [place] [address]  [phone]             [facility] GASTROENTEROLOGY. Schedule an appointment as soon as possible for a visit in 1 week.    Specialty:  Gastroenterology  Why:  As previously planned, For biopsy results, Repeat labwork  Contact information:  [phone]  [facility] [address]  [phone]           [facility] CARDIOLOGY. Schedule an appointment as soon as possible for a visit in 1 week.    Specialty:  Cardiology  Why:  To establish care  Contact information:  [address]  [facility] [address]  [phone]           [facility] UROLOGY. Schedule an appointment as soon as possible for a visit in 1 week.    Specialty:  Urology  Why:  For discharge from hospital follow up, To establish care  Contact information:  [address]  [facility] [address]  [phone]           [facility] INFECTIOUS DISEASE. Schedule an appointment as soon as possible for a visit in 2 weeks.    Specialty:  Infectious Diseases  Why:  For discharge from hospital follow up  Contact information:  [address]  [facility] [address]  [phone]                   I have seen and examined this patient face to face today. My clinical findings that support the need for the home health skilled services and home bound status are the following:  Weakness/numbness causing balance and gait disturbance due to Infection and Weakness/Debility making it taxing to leave home.    Allergies:Review of patient's allergies indicates:  No Known Allergies    Diet: diabetic diet: 2200 calorie    Activities: activity as tolerated    Nursing:   SN to complete comprehensive assessment including routine vital signs. Instruct on disease process and s/s of complications to report to MD. Review/verify medication list sent home with the patient at time of discharge  and instruct patient/caregiver as needed. Frequency may be adjusted depending on start of care date.    Notify MD if SBP > 160 or < 90; DBP > 90 or < 50; HR > 120 or < 50; Temp > 101      CONSULTS:    Physical Therapy to evaluate and treat. Evaluate for home safety and equipment needs; Establish/upgrade home exercise program. Perform / instruct on therapeutic exercises, gait training, transfer training, and  Range of Motion.  Occupational Therapy to evaluate and treat. Evaluate home environment for safety and equipment needs. Perform/Instruct on transfers, ADL training, ROM, and therapeutic exercises.    Medications: Review discharge medications with patient and family and provide education.      Current Discharge Medication List      START taking these medications    Details   ciprofloxacin HCl (CIPRO) 500 MG tablet Take 1 tablet (500 mg total) by mouth every 12 (twelve) hours. for 8 days  Qty: 16 tablet, Refills: 0         CONTINUE these medications which have NOT CHANGED    Details   cetirizine (ZYRTEC) 10 MG tablet Take by mouth.      fenofibrate (TRICOR) 145 MG tablet       ferrous sulfate (FEOSOL) 325 mg (65 mg iron) Tab tablet Take 1 tablet (325 mg total) by mouth once daily.  Qty: 60 tablet, Refills: 9      HYDROcodone-acetaminophen (NORCO)  mg per tablet Take by mouth.    Comments: Quantity prescribed more than 7 day supply? Press F2 and select one:[id number]        OXcarbazepine (TRILEPTAL) 300 MG Tab Take by mouth.      oxybutynin (DITROPAN) 5 MG Tab       pantoprazole (PROTONIX) 40 MG tablet Take 1 tablet (40 mg total) by mouth 2 (two) times daily before meals.  Qty: 60 tablet, Refills: 11      tolterodine (DETROL LA) 4 MG 24 hr capsule Take by mouth.             I certify that this patient is confined to her home and needs intermittent skilled nursing care, physical therapy and occupational therapy.      Debo Springer MD

## 2020-01-29 NOTE — DISCHARGE SUMMARY
[facility] Hospital Medicine  Discharge Summary      Patient Name: Marycruz Ramirez  MRN: [id number]  Admission Date: 1/25/2020  Hospital Length of Stay: 3 days  Discharge Date and Time: 1/29/2020 11:36 AM  Attending Physician: Debo Springer MD   Discharging Provider: Debo Springer MD  Primary Care Provider: Marcelino Calle MD    Hospital Medicine Team: [facility] HOSP MED D Debo Springer MD    HPI:  74 y.o. female with chronic radicular pain, bipolar disorder, mixed hyperlipidemia, overactive bladder brought in by ambulance as she was noted to be found on the floor in her kitchen with back on the floor. Per the family, patient was recently discharged on 1/20/20 after being admitted for microcytic anemia hemoglobin of 5.5 without any evidence of gastrointestinal bleed on EGD or colonoscopy. She was doing fine until two days ago when she developed subjective fevers and chills. At baseline, she is able to perform her ADL's. She has been noted to be more lethargic and fatigued over the past few days.  On arrival, her vitals were noted to be temp of 103, , RR 26, On 10 L non-rebreather with FiO2 of 30%, with BP 67-71 mmHg. She was given 1 L lactated ringer bolus, responded transiently and was started on low dose levophed 0.02 mcg/kg/min. Patient oriented x 3, and responding to verbal commands.  Labs consistent with leukocytosis of 23K, Hb 9.8 and platelets 194/ Sodium 135, bicarb 15, Cr 0.8. , lactate 11, procal 1.2. UA positive 3+ LE, positive nitrites and > 100 wbc's. CT Head negative for intracranial abnormality, hemorrhage. CXR consistent with borderline cardiomegaly with diffuse interstitial opacities. CT abdomen suggestive of left-sided pyelonephritis with non-obstructing nephrolithiasis..     * No surgery found *      Hospital Course:  Patient admitted to the ICU for septic shock due to L pyelonephritis and GNR bacteremia. Treated with vancomycin and Zosyn initially;  required IVFs and vasopressors for septic shock. Off pressor support with stable BPs. Blood cultures with GNRs.  Continued with empiric Zosyn until cultures finalized.  Vancomycin de-escalated when no evidence of gram positives.  Appropriately fluid resuscitated. Anemia: stable.  S/p recent colonoscopy and EGD.       * Septic shock due to pyelonephritis and E. Coli bacteremia  Acute hypoxemic respiratory failure  -- CT abdomen suggestive of left-sided pyelonephritis and nonobstructing punctate right renal nephrolith.  -- Required vasopressor support due to underlying pyelonephritis; possible community acquired pneumonia and possibly cellulitis of the bilateral lower extremities  -- admitted to MICU; continued vasopressor support to maintain MAP > 65 mmHg  -- UA positive for UTI  -- BC x 2 and Urine culture positive for E.coli  -- Respiratory viral panel negative  -- Broad spectrum antibiotic coverage with Zosyn and vancomycin. Patient's lactic acidosis resolved after administering antibiotics and fluid resuscitation.  - Continued Zosyn; discontinued vancomycin. Repeated blood cultures. Wean O2. Consulted ID.  - Plan for transition to oral Cipro pending repeat blood cultures to complete 10-day course from date of negative blood culture. Discontinued vancomycin.  - Follow up with Urology     Microcytic anemia  Likely iron deficiency and chronic GI losses  Continue with iron sulfate supplements   Follow up with GI as OP     Chronic radicular lumbar pain  Given patient is hypotensive, held opioids initially  -- continue Tylenol PRN for pain relief  -- resumed suboxone     OAB (overactive bladder)     - Continue home oxybutynin     Tobacco abuse      - Referral to Smoking cessation program     Acute on chronic combined systolic and diastolic heart failure  Aortic stenosis, severe     - EF 30%, severe AS.     - Plan for referral for TAVR evaluation     Bipolar disorder      - Continuing current management with  Trileptal     Consults:   Consults (From admission, onward)        Status Ordering Provider     Inpatient consult to Critical Care Medicine  Once     Provider:  (Not yet assigned)    Completed LUIS FERNANDO CABALLERO     Inpatient consult to Infectious Diseases  Once     Provider:  (Not yet assigned)    Completed KODY MCGINNIS          Final Active Diagnoses:    Diagnosis Date Noted POA    Nephrolithiasis [N20.0] 01/28/2020 Yes    Tobacco abuse [Z72.0] 01/27/2020 Yes    E coli bacteremia [R78.81] 01/27/2020 Yes    Chronic combined systolic and diastolic heart failure [I50.42] 01/27/2020 Yes    Aortic stenosis, severe [I35.0] 01/27/2020 Yes    Pyelonephritis [N12] 01/26/2020 Yes    Microcytic anemia [D50.9]  Yes    Mixed hyperlipidemia [E78.2] 01/16/2020 Yes    Chronic radicular lumbar pain [M54.16, G89.29] 01/16/2020 Yes    OAB (overactive bladder) [N32.81] 01/16/2020 Yes    Bipolar disorder [F31.9] 01/16/2020 Yes      Problems Resolved During this Admission:    Diagnosis Date Noted Date Resolved POA    PRINCIPAL PROBLEM:  Septic shock [A41.9, R65.21] 01/26/2020 01/29/2020 Yes    Acute hypoxemic respiratory failure [J96.01] 01/27/2020 01/29/2020 Yes      Discharged Condition: stable    Disposition: Home-Health Care [facility]    Follow Up:  Follow-up Information     Marcelino Calle MD. Schedule an appointment as soon as possible for a visit in 1 week.    Specialty:  Internal Medicine  Why:  For discharge from hospital follow up, Repeat labwork  Contact information:  [address]  [address]  [facility] [address]  [phone]             [facility] GASTROENTEROLOGY. Schedule an appointment as soon as possible for a visit in 1 week.    Specialty:  Gastroenterology  Why:  As previously planned, For biopsy results, Repeat labwork  Contact information:  [address]  [facility] [address]  [phone]           [facility] CARDIOLOGY. Schedule an appointment as soon as possible for a visit in 1 week.     Specialty:  Cardiology  Why:  To establish care  Contact information:  [address]  [facility] [address]  [phone]           [facility] UROLOGY. Schedule an appointment as soon as possible for a visit in 1 week.    Specialty:  Urology  Why:  For discharge from hospital follow up, To establish care  Contact information:  [address]  [facility] [address]  [phone]           [facility] INFECTIOUS DISEASE. Schedule an appointment as soon as possible for a visit in 2 weeks.    Specialty:  Infectious Diseases  Why:  For discharge from hospital follow up  Contact information:  [address]  [facility] [address]  [phone]               Future Appointments   Date Time Provider Department Center   2/3/2020  1:15 PM Marcelino Calle MD [facility]   2/5/2020  2:00 PM Fito Marquez MD [facility] CARDIO [facility]   2/7/2020  1:20 PM Toby Stoddard MD [facility] UROLOGY [facility]   2/20/2020  3:00 PM Leticia Gastelum MD [facility] ID [facility]   7/9/2020 11:00 AM Marcelino Calle MD [facility]       Patient Instructions:      Ambulatory referral to Urology   Referral Priority: Routine Referral Type: Consultation   Referral Reason: Specialty Services Required   Requested Specialty: Urology   Number of Visits Requested: 1     Ambulatory referral to Cardiology   Referral Priority: Routine Referral Type: Consultation   Referral Reason: Specialty Services Required   Requested Specialty: Cardiology   Number of Visits Requested: 1     Ambulatory referral to Outpatient Case Management   Referral Priority: Routine Referral Type: Consultation   Referral Reason: Specialty Services Required   Number of Visits Requested: 1     Ambulatory referral to Gastroenterology   Referral Priority: Routine Referral Type: Consultation   Referral Reason: Specialty Services Required   Requested Specialty: Gastroenterology   Number of Visits Requested: 1     Diet diabetic     Notify  your health care provider if you experience any of the following:  temperature >100.4     Notify your health care provider if you experience any of the following:  persistent nausea and vomiting or diarrhea     Notify your health care provider if you experience any of the following:  difficulty breathing or increased cough     Notify your health care provider if you experience any of the following:  persistent dizziness, light-headedness, or visual disturbances     Notify your health care provider if you experience any of the following:  increased confusion or weakness     Activity as tolerated     Medications:  Reconciled Home Medications:      Medication List      START taking these medications    ciprofloxacin HCl 500 MG tablet  Commonly known as:  CIPRO  Take 1 tablet (500 mg total) by mouth every 12 (twelve) hours. for 8 days        CONTINUE taking these medications    cetirizine 10 MG tablet  Commonly known as:  ZYRTEC  Take by mouth.     fenofibrate 145 MG tablet  Commonly known as:  TRICOR     ferrous sulfate 325 mg (65 mg iron) Tab tablet  Commonly known as:  FEOSOL  Take 1 tablet (325 mg total) by mouth once daily.     Norco  mg per tablet  Generic drug:  HYDROcodone-acetaminophen  Take by mouth.     OXcarbazepine 300 MG Tab  Commonly known as:  TRILEPTAL  Take by mouth.     oxybutynin 5 MG Tab  Commonly known as:  DITROPAN     pantoprazole 40 MG tablet  Commonly known as:  PROTONIX  Take 1 tablet (40 mg total) by mouth 2 (two) times daily before meals.     tolterodine 4 MG 24 hr capsule  Commonly known as:  DETROL LA  Take by mouth.            Significant Diagnostic Studies: Labs:   CMP   Recent Labs   Lab 01/28/20  0441 01/29/20  0514    135*   K 3.8 3.8    104   CO2 24 25   * 172*   BUN 11 10   CREATININE 0.7 0.6   CALCIUM 8.9 8.8   PROT 5.6* 5.3*   ALBUMIN 2.3* 2.2*   BILITOT 0.6 0.5   ALKPHOS 63 62   AST 29 20   ALT 25 22   ANIONGAP 9 6*   ESTGFRAFRICA >60.0 >60.0   EGFRNONAA  >60.0 >60.0   , CBC   Recent Labs   Lab 01/28/20  0441 01/29/20  0514   WBC 9.19 10.11   HGB 8.7* 8.0*   HCT 31.4* 28.4*    260   , INR   Lab Results   Component Value Date    INR 1.2 01/25/2020   , Lipid Panel   Lab Results   Component Value Date    CHOL 102 01/17/2020    HDL 52 01/17/2020    LDLCALC 41 01/17/2020    TRIG 59 01/17/2020    A1C:   Recent Labs   Lab 01/27/20  0335   HGBA1C 5.8*     Microbiology:   Blood Culture   Lab Results   Component Value Date    LABBLOO No Growth to date 01/28/2020    LABBLOO No Growth to date 01/28/2020   , Sputum Culture No results found for: GSRESP, RESPIRATORYC and Urine Culture    Lab Results   Component Value Date    LABURIN ESCHERICHIA COLI  >100,000 cfu/ml   (A) 01/25/2020     Results for orders placed or performed during the hospital encounter of 01/25/20   CT Abdomen Pelvis With Contrast    Narrative    EXAMINATION:  CT ABDOMEN PELVIS WITH CONTRAST    CLINICAL HISTORY:  Abdominal distension;Recent colonoscopy. Septic.;    TECHNIQUE:  Low dose axial images, sagittal and coronal reformations were obtained from the lung bases to the pubic symphysis following the IV administration of 75 mL of Omnipaque 350.  Oral contrast was not administered.    COMPARISON:  Radiograph abdomen AP one view 01/18/2020    FINDINGS:  Evaluation is limited by extensive streak artifact due to the patient's arms overlying the field of view.  Examination is also limited by significant respiratory motion in the upper abdomen.    Heart: Normal in size without effusion.  Aortic valve and coronary artery atherosclerotic calcification.  Postoperative changes of aortic valve replacement.  There is left ventricular wall thickening consistent with ventricular hypertrophy.    Lung Bases: Symmetrically expanded.  Bronchovascular thickening and/or small hilar lymph nodes, incompletely included within the field of view.  Lung bases demonstrate diffuse interstitial opacities with interlobular septal  thickening and bibasilar subsegmental atelectasis, left side greater than right.  No pleural fluid or focal consolidation.    Liver: Normal size and attenuation. No focal lesions.    Gallbladder: No calcified gallstones.    Bile Ducts: No significant intra or extrahepatic ductal dilatation.    Pancreas: Mild fatty infiltration of the pancreas without evidence of focal mass or adjacent lucie pancreatic fat stranding.    Spleen: Unremarkable.    Adrenals: Unremarkable.    Kidneys/Ureters: Left-sided striated nephrogram with areas of patchy decreased cortical enhancement and mild perinephric fat stranding.  Mild left ureteral wall thickening and fat stranding.  There is a 5.2 cm right renal cyst in addition to bilateral subcentimeter renal hypodensities, too small to characterize but probable cysts.  No hydronephrosis.  There is a nonobstructing punctate right renal nephrolith.    Bladder: No wall thickening.    Reproductive organs: Uterus is surgically absent.    GI Tract/Mesentery: Stomach is unremarkable.  Portions of small and large bowel are unremarkable without evidence of wall thickening, pneumatosis, or obstruction.  There is a moderate volume stool throughout the colon and rectum.    Peritoneal Space: No ascites or free air.    Retroperitoneum: No significant adenopathy.    Abdominal wall: Normal.    Vasculature: Extensive abdominal aortic atherosclerotic calcification without evidence of focal occlusion..    Bones: Postoperative changes of posterior fusion of L4 and L5.  Degenerative changes of the spine with fusion of the L1 and L2 vertebral bodies and grade 1 retrolisthesis of L4 on L3.  No evidence of acute fracture.      Impression    Findings suggestive of left-sided pyelonephritis.  Recommend correlation with urinalysis.    Overall limited study secondary to significant artifact and motion.    No evidence of bowel obstruction or perforation.  Moderate volume stool throughout the colon and  rectum.    Nonobstructing right-sided renal stone.    Bibasilar interstitial opacities with interlobular septal thickening and subsegmental atelectasis.  Findings could reflect interstitial edema, pneumonitis, or aspiration.    Coronary artery and aortic atherosclerotic calcification.  Findings compatible with left ventricular hypertrophy.    This report was flagged in Epic as abnormal.    Electronically signed by resident: Indigo Mcgovern  Date:    01/25/2020  Time:    23:08    Electronically signed by: Сергей Pickering MD  Date:    01/25/2020  Time:    23:39      Results for orders placed or performed during the hospital encounter of 01/25/20   Echo Color Flow Doppler? Yes   Result Value Ref Range    BSA 1.51 m2    TDI SEPTAL 0.04 m/s    LV LATERAL E/E' RATIO 21.75 m/s    LV SEPTAL E/E' RATIO 21.75 m/s    LA WIDTH 3.89 cm    TDI LATERAL 0.04 m/s    LVIDD 4.78 3.5 - 6.0 cm    IVS 0.84 0.6 - 1.1 cm    PW 0.87 0.6 - 1.1 cm    LVIDS 3.45 2.1 - 4.0 cm    FS 28 28 - 44 %    LA volume 62.83 cm3    Sinus 2.85 cm    STJ 2.45 cm    Ascending aorta 3.00 cm    LV mass 137.17 g    LA size 3.21 cm    RVDD 3.46 cm    TAPSE 1.96 cm    RV S' 8.37 cm/s    Left Ventricle Relative Wall Thickness 0.36 cm    AV mean gradient 43 mmHg    AV valve area 0.64 cm2    AV Velocity Ratio 0.19     AV index (prosthetic) 0.20     E/A ratio 0.67     Mean e' 0.04 m/s    E wave decelartion time 123.77 msec    IVRT 0.09 msec    LVOT diameter 2.01 cm    LVOT area 3.2 cm2    LVOT peak dominic 0.76 m/s    LVOT peak VTI 17.01 cm    Ao peak dominic 3.94 m/s    Ao VTI 84.14 cm    LVOT stroke volume 53.95 cm3    AV peak gradient 62 mmHg    E/E' ratio 21.75 m/s    MV Peak E Dominic 0.87 m/s    TR Max Dominic 2.50 m/s    MV Peak A Dominic 1.29 m/s    LV Systolic Volume 49.25 mL    LV Systolic Volume Index 32.9 mL/m2    LV Diastolic Volume 106.67 mL    LV Diastolic Volume Index 71.26 mL/m2    LA Volume Index 42.0 mL/m2    LV Mass Index 92 g/m2    RA Major Axis 4.85 cm    Left Atrium  Minor Axis 5.89 cm    Left Atrium Major Axis 5.95 cm    Triscuspid Valve Regurgitation Peak Gradient 25 mmHg    RA Width 3.09 cm    Right Atrial Pressure (from IVC) 15 mmHg    TV rest pulmonary artery pressure 40 mmHg    Narrative    · Moderate left ventricular enlargement.  · Moderately decreased left ventricular systolic function. The estimated   ejection fraction is 30%.  · Grade I (mild) left ventricular diastolic dysfunction consistent with   impaired relaxation.  · Normal right ventricular systolic function.  · Moderate left atrial enlargement.  · Mild-to-moderate mitral regurgitation.  · Severe aortic valve stenosis. Aortic valve area is 0.64 cm2; peak   velocity is 3.94 m/s; mean gradient is 43 mmHg.  · The estimated PA systolic pressure is 40 mmHg.  · Elevated central venous pressure (15 mmHg).          Pending Diagnostic Studies:     None        Indwelling Lines/Drains at time of discharge: none    Time spent on the discharge of patient: 50 minutes  Patient was seen and examined on the date of discharge and determined to be suitable for discharge.    Debo Springer MD  Department of Hospital Medicine  [facility]

## 2020-01-29 NOTE — PHYSICIAN QUERY
PT Name: Marycruz Ramirez  MR #: [id number]     PHYSICIAN QUERY -  ACUITY OF CONDITION CLARIFICATION      CDS: Dorota Feldman RN, CCDS         Contact information :[phone]  [email or web address]     This form is a permanent document in the medical record.     Query Date: January 29, 2020    By submitting this query, we are merely seeking further clarification of documentation to reflect the severity of illness of your patient. Please utilize your independent clinical judgment when addressing the question(s) below.    The Medical record reflects the following:     Indicators   Supporting Clinical Findings Location in Medical Record   x Documentation of condition pyelonephritis  H&P 1/26/20      Lab Value(s)     x Radiology Findings CT abdomen suggestive of left-sided pyelonephritis H&P 1/26/20   x Treatment                                 Medication Continue antibiotics Zosyn  H&P 1/26/20   x Other Septic shock: from pyelonephritis.  Blood cultures prelim with GNRs.  Continue with empiric zosyn until cultures finalize.  Vanc can likely be de-escalated if no evidence of gram positives.  Remains on low dose levophed.  Appropriately fluid resuscitated.  H&P 1/26/20     Provider, please specify the acuity/chronicity of Pyelonephritis:    [  x ] Acute   [   ] Chronic   [   ] Subacute   [   ] Acute and/on chronic   [   ] Other, _____   [   ]  Clinically Undetermined       Please document in your progress notes daily for the duration of treatment until resolved, and include in your discharge summary.

## 2020-01-29 NOTE — PLAN OF CARE
Pt aaox4. V/s stable. Family at bedside. Pt up in chair most of shift. Iv access d/c prior to discharge. Discharge planning presented to pt and family. Understanding verbalized. Med delivered to bedside. Pt transported off unit via wheelchair w/ belongings. Family at side.No complaints at this time.

## 2020-01-29 NOTE — PLAN OF CARE
01/29/20 0944   Post-Acute Status   Post-Acute Authorization Home Health/Hospice   Home Health/Hospice Status Referrals Sent     SW met with pt and  to discuss discharge planning.  Pt and her  El agreed to HH referral and stated no preference of provider.  HASMUKH sent referral blast to multiple HH providers via RC.    Dominique Melo LMSW  [facility]  [phone]

## 2020-01-30 LAB
POCT GLUCOSE: 164 MG/DL (ref 70–110)
POCT GLUCOSE: 271 MG/DL (ref 70–110)

## 2020-01-31 ENCOUNTER — OUTPATIENT CASE MANAGEMENT (OUTPATIENT)
Dept: ADMINISTRATIVE | Facility: OTHER | Age: 75
End: 2020-01-31

## 2020-01-31 ENCOUNTER — PATIENT OUTREACH (OUTPATIENT)
Dept: ADMINISTRATIVE | Facility: CLINIC | Age: 75
End: 2020-01-31

## 2020-01-31 ENCOUNTER — OFFICE VISIT (OUTPATIENT)
Dept: GASTROENTEROLOGY | Facility: CLINIC | Age: 75
End: 2020-01-31
Payer: MEDICARE

## 2020-01-31 VITALS
BODY MASS INDEX: 21.9 KG/M2 | SYSTOLIC BLOOD PRESSURE: 120 MMHG | HEIGHT: 62 IN | WEIGHT: 119 LBS | DIASTOLIC BLOOD PRESSURE: 65 MMHG | HEART RATE: 101 BPM

## 2020-01-31 DIAGNOSIS — D50.9 IRON DEFICIENCY ANEMIA, UNSPECIFIED IRON DEFICIENCY ANEMIA TYPE: Primary | ICD-10-CM

## 2020-01-31 PROCEDURE — 1101F PR PT FALLS ASSESS DOC 0-1 FALLS W/OUT INJ PAST YR: ICD-10-PCS | Mod: HCNC,CPTII,S$GLB, | Performed by: INTERNAL MEDICINE

## 2020-01-31 PROCEDURE — 1159F PR MEDICATION LIST DOCUMENTED IN MEDICAL RECORD: ICD-10-PCS | Mod: HCNC,S$GLB,, | Performed by: INTERNAL MEDICINE

## 2020-01-31 PROCEDURE — 99214 OFFICE O/P EST MOD 30 MIN: CPT | Mod: HCNC,S$GLB,, | Performed by: INTERNAL MEDICINE

## 2020-01-31 PROCEDURE — 1159F MED LIST DOCD IN RCRD: CPT | Mod: HCNC,S$GLB,, | Performed by: INTERNAL MEDICINE

## 2020-01-31 PROCEDURE — 1101F PT FALLS ASSESS-DOCD LE1/YR: CPT | Mod: HCNC,CPTII,S$GLB, | Performed by: INTERNAL MEDICINE

## 2020-01-31 PROCEDURE — G0180 PR HOME HEALTH MD CERTIFICATION: ICD-10-PCS | Mod: ,,, | Performed by: INTERNAL MEDICINE

## 2020-01-31 PROCEDURE — G0180 MD CERTIFICATION HHA PATIENT: HCPCS | Mod: ,,, | Performed by: INTERNAL MEDICINE

## 2020-01-31 PROCEDURE — 99214 PR OFFICE/OUTPT VISIT, EST, LEVL IV, 30-39 MIN: ICD-10-PCS | Mod: HCNC,S$GLB,, | Performed by: INTERNAL MEDICINE

## 2020-01-31 PROCEDURE — 99999 PR PBB SHADOW E&M-EST. PATIENT-LVL IV: ICD-10-PCS | Mod: PBBFAC,HCNC,, | Performed by: INTERNAL MEDICINE

## 2020-01-31 PROCEDURE — 99999 PR PBB SHADOW E&M-EST. PATIENT-LVL IV: CPT | Mod: PBBFAC,HCNC,, | Performed by: INTERNAL MEDICINE

## 2020-01-31 PROCEDURE — 1126F AMNT PAIN NOTED NONE PRSNT: CPT | Mod: HCNC,S$GLB,, | Performed by: INTERNAL MEDICINE

## 2020-01-31 PROCEDURE — 1126F PR PAIN SEVERITY QUANTIFIED, NO PAIN PRESENT: ICD-10-PCS | Mod: HCNC,S$GLB,, | Performed by: INTERNAL MEDICINE

## 2020-01-31 RX ORDER — OMEGA-3-ACID ETHYL ESTERS 1 G/1
2 CAPSULE, LIQUID FILLED ORAL 2 TIMES DAILY
COMMUNITY
End: 2020-06-24

## 2020-01-31 RX ORDER — VITAMIN B COMPLEX
1 CAPSULE ORAL DAILY
COMMUNITY

## 2020-01-31 NOTE — LETTER
January 31, 2020      Debo Springer MD  [address]  [facility] [address]           [facility] - Gastroenterology  [address]  [facility] [address]  Phone: [phone]  Fax: [phone]          Patient: Marycruz Ramirez   MR Number: [id number]   YOB: 1945   Date of Visit: 1/31/2020       Dear Dr. Debo Springer:    Thank you for referring Marycruz Ramirez to me for evaluation. Attached you will find relevant portions of my assessment and plan of care.    If you have questions, please do not hesitate to call me. I look forward to following Marycruz Ramirez along with you.    Sincerely,    Henrique Heaton MD    Enclosure  CC:  No Recipients    If you would like to receive this communication electronically, please contact [email or web address] or [phone] to request more information on [employer] Link access.    For providers and/or their staff who would like to refer a patient to [facility], please contact us through our one-stop-shop provider referral line, [facility] [place], at [phone].    If you feel you have received this communication in error or would no longer like to receive these types of communications, please e-mail [email or web address]

## 2020-01-31 NOTE — PROGRESS NOTES
Attempt #:  1  This LMSW attempted to reach patient/caregiver to provide resource . Caregiver reports patient is not available at this time. Caregiver request a follow up call on Monday 02/3/20

## 2020-01-31 NOTE — PATIENT INSTRUCTIONS
Sepsis     To treat sepsis, antibiotics and fluids may by given through an intravenous (IV) line.     Sepsis happens when your body responds with widespread inflammation to a bad infection or bacteremia--the presence of bacteria in your bloodstream. Sepsis can be deadly. Blood pressure may drop and the lungs and kidneys may start to fail. Emergency care for sepsis is crucial.  Risk factors  Those most at risk for sepsis are:  · Infants or older adults  · People who have an illness that weakens their immune system, such as cancer, AIDS, or diabetes  · People being treated with chemotherapy medicines or radiation, which weakens the immune system  · People who have had a transplant  · People with a very severe infection such as pneumonia, meningitis, or a urinary tract infection  When to go to the emergency department (ED)  Sepsis is an emergency. Go to the nearest ED if you have a fever with any of these symptoms:  · Chills and shaking  · Rapid heartbeat and breathing  · Trouble breathing  · Severe nausea or uncontrolled vomiting  · Confusion, disorientation, drowsiness, or dizziness  · Decreased urination  · Severe pain, including in the back or joints   What to expect in the ED  · Blood and urine tests are done to look for bacteria. They also check for organ failure.  · Blood, urine, or sputum cultures may be taken. The samples are sent to a lab. They are placed in a special container. Any bacteria should grow in 24 hours.  · X-rays or other imaging tests may be done.  A person with sepsis will be admitted to the hospital and treated with antibiotics. Treatment may also include oxygen and intravenous fluids.  Date Last Reviewed: 10/1/2016  © 8499-3425 [employer]. [address] [place] [address], [place] [address]. All rights reserved. This information is not intended as a substitute for professional medical care. Always follow your healthcare professional's instructions.

## 2020-01-31 NOTE — PROGRESS NOTES
HPI: Marycruz Ramirez is a 74 y.o. female with history of bipolar disorder, iron deficiency anemia, chronic back pain on opioids who presented to the hospital with anemia noted on outpatient labs. GI was consulted inpatient for anemia. Her H/H was noted to be down to 5.5 with last value in the 14s in 2007. Patient states she gets labs done every 6 months but was never told she was anemic. Denies any overt bleeding including hematemesis, melena, hematochezia, hematuria, or vaginal bleeding.  Denies lightheadedness, fatigue. Denies any NSAIDs or blood thinners. No abdominal pain. Some weight loss last 3 months, but unable to specify how much. Last colonoscopy was 5-10 years ago, reportedly normal. She is adamant that she does not want one again due to having to take a prep. Has never had an EGD before. She does report some first cousins with thalassemia. On Trileptal but has been on this for many years. No intestinal surgeries.     Inpatient vital signs normal. Hgb 5.5, receiving blood. MCV 69. BUN 18 and Cr 0.7. Plts 274. Retic 2.1. Iron 25 and % sat 4 (no ferritin). Normal B12 and folate. Tbili 0.3. Joann negative. LD normal.    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  No ablation was done.  Patient has just started taking iron supplementation daily.  Today we discussed about possibility of small-bowel AVMs as well. She will continue to take iron supplementation 1 a day and increase it up to 3 times a day as tolerated.  He needs to follow-up with a primary care physician for monitoring of her blood count and iron panels.  CT imaging of the abdomen was also reviewed.           History reviewed. No pertinent past medical history.           Past Surgical History:   Procedure Laterality Date    BACK SURGERY        KNEE SURGERY Bilateral           History reviewed. No pertinent family history.     Social History                Socioeconomic History    Marital status:        Spouse name: Not on file    Number of children: Not on file    Years of education: Not on file    Highest education level: Not on file   Occupational History    Not on file   Social Needs    Financial resource strain: Not on file    Food insecurity:       Worry: Not on file       Inability: Not on file    Transportation needs:       Medical: Not on file       Non-medical: Not on file   Tobacco Use    Smoking status: Current Every Day Smoker    Smokeless tobacco: Never Used   Substance and Sexual Activity    Alcohol use: Yes       Frequency: Never       Drinks per session: 1 or 2    Drug use: Never    Sexual activity: Not Currently   Lifestyle    Physical activity:       Days per week: Not on file       Minutes per session: Not on file    Stress: Not at all   Relationships    Social connections:       Talks on phone: Not on file       Gets together: Not on file       Attends Caodaism service: Not on file       Active member of club or organization: Not on file       Attends meetings of clubs or organizations: Not on file       Relationship status: Not on file   Other Topics Concern    Not on file   Social History Narrative    Not on file            No current facility-administered medications on file prior to encounter.              Current Outpatient Medications on File Prior to Encounter   Medication Sig Dispense Refill    cetirizine (ZYRTEC) 10 MG tablet Take by mouth.        fenofibrate (TRICOR) 145 MG tablet          HYDROcodone-acetaminophen (NORCO)  mg per tablet Take by mouth.        HYDROcodone-acetaminophen (NORCO)  mg per tablet          OXcarbazepine (TRILEPTAL) 300 MG Tab Take by mouth.        oxybutynin (DITROPAN) 5 MG Tab          spironolactone (ALDACTONE) 50 MG tablet Take by mouth.        tolterodine (DETROL LA) 4 MG 24 hr capsule Take by mouth.             Review of patient's allergies indicates:  No  Known Allergies     Review of Systems:   Constitutional: no fever, chills or change in weight   Eyes: no visual changes   ENT: no sore throat or dysphagia  Respiratory: no cough or shortness of breath   Cardiovascular: no chest pain or palpitations   Gastrointestinal: as per HPI  Hematologic/Lymphatic: easy bruising   Musculoskeletal: has arthralgias and myalgias   Neurological: no change in mental status  Behavioral/Psych: no change in mood     Vitals:  See epic   Alert and Oriented, no distress. Pallor  HEENT: Normocephalic, Atraumatic. No scleral icterus.  Resp: Good air entry bilaterally, no adventitious sounds  Cardiac: S1 and S2 normal  Abdomen: Normoactive bowel sounds. Non-distended, protuberant, normal tympany. Soft. Non-tender. No peritoneal signs.  Extremities:Bilateral pitting edema with right leg erythematous.   Neurologic: No gross neurological Deficits  Psych: Calm, cooperative. Normal mood and affect.     Significant Imaging:  Recent CT imaging of the abdomen report reviewed        Assessment/Plan:      Marycruz Ramirez is a 74 y.o. female with history of bipolar disorder, iron deficiency anemia, chronic back pain on opioids who presents with asymptomatic anemia without overt bleeding.  Colonic AVMs.        Plan:  1. Continue iron supplementation 1 a day with dosage increased to up to 3 times a day with food.  2. Follow-up with primary care physician to follow H&H and iron profile.  3. Follow up in GI if evidence of overt bleeding.     Patient

## 2020-02-01 LAB — BACTERIA BLD CULT: NORMAL

## 2020-02-02 LAB — BACTERIA BLD CULT: NORMAL

## 2020-02-03 ENCOUNTER — OFFICE VISIT (OUTPATIENT)
Dept: FAMILY MEDICINE | Facility: CLINIC | Age: 75
End: 2020-02-03
Payer: MEDICARE

## 2020-02-03 VITALS
TEMPERATURE: 98 F | BODY MASS INDEX: 22.53 KG/M2 | RESPIRATION RATE: 16 BRPM | OXYGEN SATURATION: 99 % | SYSTOLIC BLOOD PRESSURE: 120 MMHG | WEIGHT: 122.44 LBS | HEART RATE: 90 BPM | HEIGHT: 62 IN | DIASTOLIC BLOOD PRESSURE: 62 MMHG

## 2020-02-03 DIAGNOSIS — R78.81 E COLI BACTEREMIA: ICD-10-CM

## 2020-02-03 DIAGNOSIS — D50.9 MICROCYTIC ANEMIA: ICD-10-CM

## 2020-02-03 DIAGNOSIS — B96.20 E COLI BACTEREMIA: ICD-10-CM

## 2020-02-03 DIAGNOSIS — N12 PYELONEPHRITIS: ICD-10-CM

## 2020-02-03 LAB
BACTERIA BLD CULT: NORMAL
BASOPHILS # BLD AUTO: 0.07 K/UL (ref 0–0.2)
BASOPHILS NFR BLD: 0.5 % (ref 0–1.9)
DIFFERENTIAL METHOD: ABNORMAL
EOSINOPHIL # BLD AUTO: 0.1 K/UL (ref 0–0.5)
EOSINOPHIL NFR BLD: 0.8 % (ref 0–8)
ERYTHROCYTE [DISTWIDTH] IN BLOOD BY AUTOMATED COUNT: 26.7 % (ref 11.5–14.5)
HCT VFR BLD AUTO: 32.9 % (ref 37–48.5)
HGB BLD-MCNC: 9 G/DL (ref 12–16)
IMM GRANULOCYTES # BLD AUTO: 0.16 K/UL (ref 0–0.04)
IMM GRANULOCYTES NFR BLD AUTO: 1.1 % (ref 0–0.5)
LYMPHOCYTES # BLD AUTO: 2 K/UL (ref 1–4.8)
LYMPHOCYTES NFR BLD: 13.6 % (ref 18–48)
MCH RBC QN AUTO: 23.9 PG (ref 27–31)
MCHC RBC AUTO-ENTMCNC: 27.4 G/DL (ref 32–36)
MCV RBC AUTO: 87 FL (ref 82–98)
MONOCYTES # BLD AUTO: 1 K/UL (ref 0.3–1)
MONOCYTES NFR BLD: 6.5 % (ref 4–15)
NEUTROPHILS # BLD AUTO: 11.6 K/UL (ref 1.8–7.7)
NEUTROPHILS NFR BLD: 77.5 % (ref 38–73)
NRBC BLD-RTO: 0 /100 WBC
PLATELET # BLD AUTO: 499 K/UL (ref 150–350)
PMV BLD AUTO: 10.9 FL (ref 9.2–12.9)
RBC # BLD AUTO: 3.77 M/UL (ref 4–5.4)
WBC # BLD AUTO: 14.97 K/UL (ref 3.9–12.7)

## 2020-02-03 PROCEDURE — 99999 PR PBB SHADOW E&M-EST. PATIENT-LVL IV: ICD-10-PCS | Mod: PBBFAC,HCNC,, | Performed by: INTERNAL MEDICINE

## 2020-02-03 PROCEDURE — 1100F PTFALLS ASSESS-DOCD GE2>/YR: CPT | Mod: HCNC,CPTII,S$GLB, | Performed by: INTERNAL MEDICINE

## 2020-02-03 PROCEDURE — 3288F PR FALLS RISK ASSESSMENT DOCUMENTED: ICD-10-PCS | Mod: HCNC,CPTII,S$GLB, | Performed by: INTERNAL MEDICINE

## 2020-02-03 PROCEDURE — 1159F MED LIST DOCD IN RCRD: CPT | Mod: HCNC,S$GLB,, | Performed by: INTERNAL MEDICINE

## 2020-02-03 PROCEDURE — 99213 OFFICE O/P EST LOW 20 MIN: CPT | Mod: HCNC,S$GLB,, | Performed by: INTERNAL MEDICINE

## 2020-02-03 PROCEDURE — 1159F PR MEDICATION LIST DOCUMENTED IN MEDICAL RECORD: ICD-10-PCS | Mod: HCNC,S$GLB,, | Performed by: INTERNAL MEDICINE

## 2020-02-03 PROCEDURE — 99999 PR PBB SHADOW E&M-EST. PATIENT-LVL IV: CPT | Mod: PBBFAC,HCNC,, | Performed by: INTERNAL MEDICINE

## 2020-02-03 PROCEDURE — 3288F FALL RISK ASSESSMENT DOCD: CPT | Mod: HCNC,CPTII,S$GLB, | Performed by: INTERNAL MEDICINE

## 2020-02-03 PROCEDURE — 1100F PR PT FALLS ASSESS DOC 2+ FALLS/FALL W/INJURY/YR: ICD-10-PCS | Mod: HCNC,CPTII,S$GLB, | Performed by: INTERNAL MEDICINE

## 2020-02-03 PROCEDURE — 99213 PR OFFICE/OUTPT VISIT, EST, LEVL III, 20-29 MIN: ICD-10-PCS | Mod: HCNC,S$GLB,, | Performed by: INTERNAL MEDICINE

## 2020-02-03 PROCEDURE — 85025 COMPLETE CBC W/AUTO DIFF WBC: CPT | Mod: HCNC

## 2020-02-03 NOTE — PROGRESS NOTES
LMSW attempt to reach patient and spoke with patient spouse. Patient spouse reports patient is unavailable at this time. Spouse reports patient really don't need anything. LMSW advised spouse if any needs arises please have PCP place a referral to [facility]. Spouse voiced understanding.

## 2020-02-03 NOTE — PLAN OF CARE
Patient discharged home to care of family and [facility]  on 1/29/20.     02/03/20 1345   Final Note   Assessment Type Final Discharge Note   Anticipated Discharge Disposition Home-Health   What phone number can be called within the next 1-3 days to see how you are doing after discharge?   ([phone])   Hospital Follow Up  Appt(s) scheduled? Yes   Discharge plans and expectations educations in teach back method with documentation complete? Yes   Right Care Referral Info   Post Acute Recommendation Home-care   Referral Type Home Health   Facility Name [facility]

## 2020-02-03 NOTE — PROGRESS NOTES
[facility] Note    Chief Complaint      Chief Complaint   Patient presents with    Hospital Follow Up       History of Present Illness      Marycruz Ramirez is a 74 y.o. female who presents today for   Chief Complaint   Patient presents with    Hospital Follow Up   .  Patient comes to appointment here for hosp f/u for acute pyelonephritis and sepsis . She was admitted to [facility] treated with abx is at baseline . 1 week prior to that she had been admitted for anemia/gi bleed  . Had workup done with upper and lower gi will  Need to get and review she is now on iron replacement . She is completing a course of cipro for the pyelophritis     Problem List Items Addressed This Visit        Renal/    Pyelonephritis    Overview     Resolving             ID    E coli bacteremia    Overview     Resolved             Oncology    Microcytic anemia    Overview     Cont iron repeat cbc                  Past Medical History:  History reviewed. No pertinent past medical history.    Past Surgical History:  Past Surgical History:   Procedure Laterality Date    BACK SURGERY      COLONOSCOPY N/A 1/20/2020    Procedure: COLONOSCOPY;  Surgeon: Micah Patel MD;  Location: [address]);  Service: Endoscopy;  Laterality: N/A;    ESOPHAGOGASTRODUODENOSCOPY N/A 1/20/2020    Procedure: EGD (ESOPHAGOGASTRODUODENOSCOPY);  Surgeon: Micah Patel MD;  Location: [address]);  Service: Endoscopy;  Laterality: N/A;    KNEE SURGERY Bilateral        Family History:  family history is not on file.    Social History:  Social History     Socioeconomic History    Marital status:      Spouse name: Not on file    Number of children: Not on file    Years of education: Not on file    Highest education level: Not on file   Occupational History    Not on file   Social Needs    Financial resource strain: Not on file    Food insecurity:     Worry: Not on file     Inability: Not on file     Transportation needs:     Medical: Not on file     Non-medical: Not on file   Tobacco Use    Smoking status: Current Every Day Smoker    Smokeless tobacco: Never Used   Substance and Sexual Activity    Alcohol use: Yes     Frequency: Never     Drinks per session: 1 or 2    Drug use: Never    Sexual activity: Not Currently   Lifestyle    Physical activity:     Days per week: Not on file     Minutes per session: Not on file    Stress: Not at all   Relationships    Social connections:     Talks on phone: Not on file     Gets together: Not on file     Attends Advent service: Not on file     Active member of club or organization: Not on file     Attends meetings of clubs or organizations: Not on file     Relationship status: Not on file   Other Topics Concern    Not on file   Social History Narrative    Not on file       Review of Systems:   Review of Systems   Constitutional: Negative for fever and weight loss.   HENT: Negative for congestion, hearing loss and sore throat.    Eyes: Negative for blurred vision.   Respiratory: Negative for cough and shortness of breath.    Cardiovascular: Negative for chest pain, palpitations, claudication and leg swelling.   Gastrointestinal: Negative for abdominal pain, constipation, diarrhea, heartburn, nausea and vomiting.   Genitourinary: Negative for dysuria.   Musculoskeletal: Negative for back pain and myalgias.   Skin: Negative for rash.   Neurological: Negative for focal weakness and headaches.   Psychiatric/Behavioral: Negative for depression, memory loss and suicidal ideas. The patient is not nervous/anxious.          Medications:  Outpatient Encounter Medications as of 2/3/2020   Medication Sig Dispense Refill    b complex vitamins capsule Take 1 capsule by mouth once daily.      calcium/vits D3/C/K2/minerals (BONE ESSENTIALS ORAL) Take by mouth.      cetirizine (ZYRTEC) 10 MG tablet Take by mouth.      ciprofloxacin HCl (CIPRO) 500 MG tablet Take 1 tablet  (500 mg total) by mouth every 12 (twelve) hours. for 8 days 16 tablet 0    fenofibrate (TRICOR) 145 MG tablet       ferrous sulfate (FEOSOL) 325 mg (65 mg iron) Tab tablet Take 1 tablet (325 mg total) by mouth once daily. 60 tablet 9    HYDROcodone-acetaminophen (NORCO)  mg per tablet Take by mouth.      multivitamin with minerals tablet Take by mouth.      omega-3 acid ethyl esters (LOVAZA) 1 gram capsule Take 2 g by mouth 2 (two) times daily.      OXcarbazepine (TRILEPTAL) 300 MG Tab Take 300 mg by mouth once daily. One in the morning, two in the evening      oxybutynin (DITROPAN) 5 MG Tab 5 mg 2 (two) times daily.       pantoprazole (PROTONIX) 40 MG tablet Take 1 tablet (40 mg total) by mouth 2 (two) times daily before meals. 60 tablet 11    tolterodine (DETROL LA) 4 MG 24 hr capsule Take 4 mg by mouth once daily.        No facility-administered encounter medications on file as of 2/3/2020.         Allergies:  Review of patient's allergies indicates:  No Known Allergies      Physical Exam      Vitals:    02/03/20 1303   BP: 120/62   Pulse: 90   Resp: 16   Temp: 98.1 °F (36.7 °C)      Body mass index is 22.4 kg/m².    Physical Exam   Constitutional: She is oriented to person, place, and time. She appears well-developed and well-nourished.   HENT:   Mouth/Throat: Oropharynx is clear and moist.   Eyes: Pupils are equal, round, and reactive to light. EOM are normal.   Neck: Normal range of motion. No thyromegaly present.   Cardiovascular: Normal rate. Exam reveals no gallop and no friction rub.   Murmur heard.   Systolic murmur is present with a grade of 3/6.  Pulmonary/Chest: Breath sounds normal.   Abdominal: Soft. Bowel sounds are normal.   Musculoskeletal: Normal range of motion.   Lymphadenopathy:     She has no cervical adenopathy.   Neurological: She is alert and oriented to person, place, and time. No cranial nerve deficit.   Skin: Skin is warm. No rash noted.   Psychiatric: She has a normal  mood and affect. Her behavior is normal.        Laboratory:  CBC:  Recent Labs   Lab Result Units 01/27/20  0335 01/28/20 0441 01/29/20  0514   WBC K/uL 13.48* 9.19 10.11   RBC M/uL 3.80* 3.73* 3.40*   Hemoglobin g/dL 8.9* 8.7* 8.0*   Hematocrit % 31.5* 31.4* 28.4*   Platelets K/uL 208 226 260   Mean Corpuscular Volume fL 83 84 84   Mean Corpuscular Hemoglobin pg 23.4* 23.3* 23.5*   Mean Corpuscular Hemoglobin Conc g/dL 28.3* 27.7* 28.2*     CMP:  Recent Labs   Lab Result Units 01/27/20  0335 01/28/20 0441 01/29/20  0514   Glucose mg/dL 159* 147* 172*   Calcium mg/dL 9.3 8.9 8.8   Albumin g/dL 2.5* 2.3* 2.2*   Total Protein g/dL 5.7* 5.6* 5.3*   Sodium mmol/L 134* 137 135*   Potassium mmol/L 4.1 3.8 3.8   CO2 mmol/L 22* 24 25   Chloride mmol/L 103 104 104   BUN, Bld mg/dL 13 11 10   Alkaline Phosphatase U/L 66 63 62   ALT U/L 28 25 22   AST U/L 52* 29 20   Total Bilirubin mg/dL 0.6 0.6 0.5     URINALYSIS:  Recent Labs   Lab Result Units 01/25/20  2317   Color, UA  Archana   Specific Gravity, UA  1.020   pH, UA  5.0   Protein, UA  2+*   Bacteria /hpf Moderate*   Nitrite, UA  Positive*   Leukocytes, UA  3+*   Hyaline Casts, UA /lpf 3*      LIPIDS:  Recent Labs   Lab Result Units 01/17/20  0833   HDL mg/dL 52   Cholesterol mg/dL 102   Triglycerides mg/dL 59   LDL Calculated mg/dL 41   Non-HDL Cholesterol mg/dL 50*     TSH:  No results for input(s): TSH in the last 2160 hours.  A1C:  Recent Labs   Lab Result Units 01/27/20  0335   Hemoglobin A1C % 5.8*       Radiology:        Assessment:     Marycruz Ramirez is a 74 y.o.female with:    Microcytic anemia  -     CBC auto differential    Pyelonephritis    E coli bacteremia          Plan:     Problem List Items Addressed This Visit        Renal/    Pyelonephritis    Overview     Resolving             ID    E coli bacteremia    Overview     Resolved             Oncology    Microcytic anemia    Overview     Cont iron repeat cbc                As above, continue current  medications and maintain follow up with specialists.  Return to clinic in 2 weeks     Frederick W Dantagnan [facility]

## 2020-02-05 ENCOUNTER — OFFICE VISIT (OUTPATIENT)
Dept: CARDIOLOGY | Facility: CLINIC | Age: 75
End: 2020-02-05
Payer: MEDICARE

## 2020-02-05 VITALS
DIASTOLIC BLOOD PRESSURE: 53 MMHG | HEIGHT: 60 IN | HEART RATE: 94 BPM | WEIGHT: 120.81 LBS | BODY MASS INDEX: 23.72 KG/M2 | SYSTOLIC BLOOD PRESSURE: 107 MMHG

## 2020-02-05 DIAGNOSIS — F17.200 SMOKING: ICD-10-CM

## 2020-02-05 DIAGNOSIS — I50.22 CHRONIC SYSTOLIC HEART FAILURE: Primary | ICD-10-CM

## 2020-02-05 DIAGNOSIS — I35.0 AORTIC STENOSIS, SEVERE: ICD-10-CM

## 2020-02-05 DIAGNOSIS — E78.2 MIXED HYPERLIPIDEMIA: ICD-10-CM

## 2020-02-05 PROCEDURE — 3288F PR FALLS RISK ASSESSMENT DOCUMENTED: ICD-10-PCS | Mod: HCNC,CPTII,GC,S$GLB | Performed by: STUDENT IN AN ORGANIZED HEALTH CARE EDUCATION/TRAINING PROGRAM

## 2020-02-05 PROCEDURE — 99999 PR PBB SHADOW E&M-EST. PATIENT-LVL IV: CPT | Mod: PBBFAC,HCNC,GC, | Performed by: STUDENT IN AN ORGANIZED HEALTH CARE EDUCATION/TRAINING PROGRAM

## 2020-02-05 PROCEDURE — 1100F PTFALLS ASSESS-DOCD GE2>/YR: CPT | Mod: HCNC,CPTII,GC,S$GLB | Performed by: STUDENT IN AN ORGANIZED HEALTH CARE EDUCATION/TRAINING PROGRAM

## 2020-02-05 PROCEDURE — 1126F AMNT PAIN NOTED NONE PRSNT: CPT | Mod: HCNC,GC,S$GLB, | Performed by: STUDENT IN AN ORGANIZED HEALTH CARE EDUCATION/TRAINING PROGRAM

## 2020-02-05 PROCEDURE — 3288F FALL RISK ASSESSMENT DOCD: CPT | Mod: HCNC,CPTII,GC,S$GLB | Performed by: STUDENT IN AN ORGANIZED HEALTH CARE EDUCATION/TRAINING PROGRAM

## 2020-02-05 PROCEDURE — 99203 OFFICE O/P NEW LOW 30 MIN: CPT | Mod: HCNC,GC,S$GLB, | Performed by: STUDENT IN AN ORGANIZED HEALTH CARE EDUCATION/TRAINING PROGRAM

## 2020-02-05 PROCEDURE — 1126F PR PAIN SEVERITY QUANTIFIED, NO PAIN PRESENT: ICD-10-PCS | Mod: HCNC,GC,S$GLB, | Performed by: STUDENT IN AN ORGANIZED HEALTH CARE EDUCATION/TRAINING PROGRAM

## 2020-02-05 PROCEDURE — 99999 PR PBB SHADOW E&M-EST. PATIENT-LVL IV: ICD-10-PCS | Mod: PBBFAC,HCNC,GC, | Performed by: STUDENT IN AN ORGANIZED HEALTH CARE EDUCATION/TRAINING PROGRAM

## 2020-02-05 PROCEDURE — 1159F MED LIST DOCD IN RCRD: CPT | Mod: HCNC,GC,S$GLB, | Performed by: STUDENT IN AN ORGANIZED HEALTH CARE EDUCATION/TRAINING PROGRAM

## 2020-02-05 PROCEDURE — 99203 PR OFFICE/OUTPT VISIT, NEW, LEVL III, 30-44 MIN: ICD-10-PCS | Mod: HCNC,GC,S$GLB, | Performed by: STUDENT IN AN ORGANIZED HEALTH CARE EDUCATION/TRAINING PROGRAM

## 2020-02-05 PROCEDURE — 1100F PR PT FALLS ASSESS DOC 2+ FALLS/FALL W/INJURY/YR: ICD-10-PCS | Mod: HCNC,CPTII,GC,S$GLB | Performed by: STUDENT IN AN ORGANIZED HEALTH CARE EDUCATION/TRAINING PROGRAM

## 2020-02-05 PROCEDURE — 1159F PR MEDICATION LIST DOCUMENTED IN MEDICAL RECORD: ICD-10-PCS | Mod: HCNC,GC,S$GLB, | Performed by: STUDENT IN AN ORGANIZED HEALTH CARE EDUCATION/TRAINING PROGRAM

## 2020-02-05 NOTE — PROGRESS NOTES
I have personally taken the history and examined this patient and agree with the Fellow's note as stated above.    I think the short DDx is decr ef due to sepsis or AS  Repeat echo in a couple of weeks indicated.  At some point, needs to see structural heart interventionists.

## 2020-02-05 NOTE — PROGRESS NOTES
Cardiology Clinic Note  Reason for Visit: Pyelonephritis; Septic shock; E coli bacteremia; Microcytic anemia; Chronic combined systolic and diastolic heart failure; and Aortic stenosis, severe    HPI:     Ms. Ramirez, 74 years old female with prior history of     For the cardiac standpoint, she underwent TTE during her hospital stay that showed EF 30% as well as severe aortic stenosis; ZURDO is 0.64 cm2; MG 43 mmHg; PG 62 mmHg; PV 3.94 m/s. She had two prior admission, the last month, the first one was on 1/17/2020 - 1/20/20 when she was found to have symptomatic anemia. EGD showed inflamed mucosa in the esophagus and C-scope showed Diverticulosis and colonic angioectasias. The other admission was between 1/25-31/2020 when presented with fever and chills and had septic shock from E. Coli bacteremia and CT showed left-sided pyelonephritis with non-obstructing nephrolithiasis. Currently, she denies chest pain, shortness of breath, dyspnea on exertion (her functional limited by bilateral knee replacement) and no orthopnea. She has chronic bilateral lower extremities swelling, however her weight is stable. LDL 41 [1/2020].     SHx:   Previous   Denies radiation exposure, denies chemotherapy or prior cancer diagnosis   Denies exposure to chemical materials   Current smoker, 0.5 pack/day for 40 yeas    ROS:    Review of Systems   Constitution: Negative for chills and decreased appetite.   HENT: Negative for congestion.    Cardiovascular: Negative for chest pain, irregular heartbeat and leg swelling.   Respiratory: Negative for cough and shortness of breath.    Endocrine: Negative for cold intolerance and heat intolerance.   Skin: Negative for rash.   Musculoskeletal: Negative for arthritis and back pain.   Gastrointestinal: Negative for abdominal pain, constipation and diarrhea.   Genitourinary: Negative for dysuria and hematuria.   Neurological: Negative for dizziness and headaches.   Psychiatric/Behavioral:  Negative for altered mental status.       PMH:   History reviewed. No pertinent past medical history.  Past Surgical History:   Procedure Laterality Date    BACK SURGERY      COLONOSCOPY N/A 1/20/2020    Procedure: COLONOSCOPY;  Surgeon: Micah Patel MD;  Location: [facility] ([address] [place] [address]);  Service: Endoscopy;  Laterality: N/A;    ESOPHAGOGASTRODUODENOSCOPY N/A 1/20/2020    Procedure: EGD (ESOPHAGOGASTRODUODENOSCOPY);  Surgeon: Micah Patel MD;  Location: [address]);  Service: Endoscopy;  Laterality: N/A;    KNEE SURGERY Bilateral      Allergies:   Review of patient's allergies indicates:  No Known Allergies  Medications:     Current Outpatient Medications on File Prior to Visit   Medication Sig Dispense Refill    b complex vitamins capsule Take 1 capsule by mouth once daily.      calcium/vits D3/C/K2/minerals (BONE ESSENTIALS ORAL) Take by mouth.      cetirizine (ZYRTEC) 10 MG tablet Take by mouth.      ciprofloxacin HCl (CIPRO) 500 MG tablet Take 1 tablet (500 mg total) by mouth every 12 (twelve) hours. for 8 days 16 tablet 0    fenofibrate (TRICOR) 145 MG tablet       ferrous sulfate (FEOSOL) 325 mg (65 mg iron) Tab tablet Take 1 tablet (325 mg total) by mouth once daily. 60 tablet 9    HYDROcodone-acetaminophen (NORCO)  mg per tablet Take by mouth.      multivitamin with minerals tablet Take by mouth.      omega-3 acid ethyl esters (LOVAZA) 1 gram capsule Take 2 g by mouth 2 (two) times daily.      OXcarbazepine (TRILEPTAL) 300 MG Tab Take 300 mg by mouth once daily. One in the morning, two in the evening      oxybutynin (DITROPAN) 5 MG Tab 5 mg 2 (two) times daily.       pantoprazole (PROTONIX) 40 MG tablet Take 1 tablet (40 mg total) by mouth 2 (two) times daily before meals. 60 tablet 11    tolterodine (DETROL LA) 4 MG 24 hr capsule Take 4 mg by mouth once daily.        No current facility-administered medications on file prior to visit.      Social History:     Social  History     Tobacco Use    Smoking status: Current Every Day Smoker    Smokeless tobacco: Never Used   Substance Use Topics    Alcohol use: Yes     Frequency: Never     Drinks per session: 1 or 2     Family History:   History reviewed. No pertinent family history.  Physical Exam:   BP (!) 107/53 (BP Location: Left arm, Patient Position: Sitting, BP Method: Medium (Automatic))   Pulse 94   Ht 5' (1.524 m)   Wt 54.8 kg (120 lb 13 oz)   BMI 23.59 kg/m²      Physical Exam   Constitutional: She is oriented to person, place, and time. She appears well-nourished. No distress.   HENT:   Head: Normocephalic.   Eyes: Pupils are equal, round, and reactive to light.   Neck: No JVD present. No thyromegaly present.   Cardiovascular: Normal rate and regular rhythm. Exam reveals no friction rub.   Murmur heard.  Pulmonary/Chest: Effort normal. No respiratory distress. She has no wheezes. She has rales.   Abdominal: Soft. She exhibits no distension.   Musculoskeletal: She exhibits no edema.   Neurological: She is alert and oriented to person, place, and time.   Vitals reviewed.      Labs:     Lab Results   Component Value Date     (L) 01/29/2020    K 3.8 01/29/2020     01/29/2020    CO2 25 01/29/2020    BUN 10 01/29/2020    BUN 18 01/17/2020    CREATININE 0.6 01/29/2020    ANIONGAP 6 (L) 01/29/2020    ANIONGAP 18 01/17/2020     Lab Results   Component Value Date    HGBA1C 5.8 (H) 01/27/2020     Lab Results   Component Value Date     (H) 01/25/2020    Lab Results   Component Value Date    WBC 14.97 (H) 02/03/2020    HGB 9.0 (L) 02/03/2020    HCT 32.9 (L) 02/03/2020    HCT 34 (L) 01/25/2020     (H) 02/03/2020    GRAN 11.6 (H) 02/03/2020    GRAN 77.5 (H) 02/03/2020     Lab Results   Component Value Date    CHOL 102 01/17/2020    HDL 52 01/17/2020    LDLCALC 41 01/17/2020    TRIG 59 01/17/2020          Imaging:     TTE 1/2020  · Moderate left ventricular enlargement.  · Moderately decreased left  ventricular systolic function. The estimated ejection fraction is 30%.  · Grade I (mild) left ventricular diastolic dysfunction consistent with impaired relaxation.  · Normal right ventricular systolic function.  · Moderate left atrial enlargement.  · Mild-to-moderate mitral regurgitation.  · Severe aortic valve stenosis. Aortic valve area is 0.64 cm2; peak velocity is 3.94 m/s; mean gradient is 43 mmHg.    EKG: BAsed on recent ECG, narrow QRS  Assessment:     1. Heart failure with reduced ejection fraction    2. Aortic stenosis, severe    3. Mixed hyperlipidemia    4. Smoking        Plan:     Marycruz was seen today for pyelonephritis, septic shock, e coli bacteremia, microcytic anemia, chronic combined systolic and diastolic heart failure and aortic stenosis, severe.    Diagnoses and all orders for this visit:    Heart failure with reduced ejection fraction   The etiology of her new diagnosed HFrEF is most likely due to sepsis-induced cardiomyopathy in the setting of septic shock requiring pressors and gram negative bacteremia. However, other etiology such as long standing AS or ischemic disease can not be ruled out. We will proceed with repeat TTE in the next couple of weeks (allows the cardiomyopathy to improve before repeat the test).     Aortic stenosis, severe  ZURDO is 0.64 cm2; MG 43 mmHg; PG 62 mmHg; PV 3.94 m/s. She needs to be establish with TAVR clinic once TTE and her physical strength at her baseline     Smoking  She has close to 40 year pack smoking. Ambulatory referral/consult to Smoking Cessation Program and Counseled about smoking cessation, voiced understanding     The patient Marycruz Ramirez was seen, assessed, evaluated and examined with the presence of the attending provider Dr. Rothman.  Return to clinic in 2 months, or as needed.     Wilbur Marquez MD  Cardiology Fellow ([id number])  Pager: [phone]

## 2020-02-06 LAB
FINAL PATHOLOGIC DIAGNOSIS: NORMAL
GROSS: NORMAL

## 2020-02-07 ENCOUNTER — OFFICE VISIT (OUTPATIENT)
Dept: UROLOGY | Facility: CLINIC | Age: 75
End: 2020-02-07
Payer: MEDICARE

## 2020-02-07 ENCOUNTER — PATIENT OUTREACH (OUTPATIENT)
Dept: ADMINISTRATIVE | Facility: OTHER | Age: 75
End: 2020-02-07

## 2020-02-07 VITALS
SYSTOLIC BLOOD PRESSURE: 108 MMHG | DIASTOLIC BLOOD PRESSURE: 60 MMHG | HEART RATE: 87 BPM | WEIGHT: 119 LBS | BODY MASS INDEX: 23.24 KG/M2

## 2020-02-07 DIAGNOSIS — Z12.31 BREAST CANCER SCREENING BY MAMMOGRAM: Primary | ICD-10-CM

## 2020-02-07 DIAGNOSIS — N10 ACUTE PYELONEPHRITIS: ICD-10-CM

## 2020-02-07 DIAGNOSIS — N32.81 OAB (OVERACTIVE BLADDER): Primary | ICD-10-CM

## 2020-02-07 PROCEDURE — 99999 PR PBB SHADOW E&M-EST. PATIENT-LVL II: CPT | Mod: PBBFAC,HCNC,, | Performed by: UROLOGY

## 2020-02-07 PROCEDURE — 1126F AMNT PAIN NOTED NONE PRSNT: CPT | Mod: HCNC,S$GLB,, | Performed by: UROLOGY

## 2020-02-07 PROCEDURE — 1100F PR PT FALLS ASSESS DOC 2+ FALLS/FALL W/INJURY/YR: ICD-10-PCS | Mod: HCNC,CPTII,S$GLB, | Performed by: UROLOGY

## 2020-02-07 PROCEDURE — 99204 PR OFFICE/OUTPT VISIT, NEW, LEVL IV, 45-59 MIN: ICD-10-PCS | Mod: HCNC,S$GLB,, | Performed by: UROLOGY

## 2020-02-07 PROCEDURE — 99999 PR PBB SHADOW E&M-EST. PATIENT-LVL II: ICD-10-PCS | Mod: PBBFAC,HCNC,, | Performed by: UROLOGY

## 2020-02-07 PROCEDURE — 99204 OFFICE O/P NEW MOD 45 MIN: CPT | Mod: HCNC,S$GLB,, | Performed by: UROLOGY

## 2020-02-07 PROCEDURE — 1159F MED LIST DOCD IN RCRD: CPT | Mod: HCNC,S$GLB,, | Performed by: UROLOGY

## 2020-02-07 PROCEDURE — 1126F PR PAIN SEVERITY QUANTIFIED, NO PAIN PRESENT: ICD-10-PCS | Mod: HCNC,S$GLB,, | Performed by: UROLOGY

## 2020-02-07 PROCEDURE — 3288F FALL RISK ASSESSMENT DOCD: CPT | Mod: HCNC,CPTII,S$GLB, | Performed by: UROLOGY

## 2020-02-07 PROCEDURE — 3288F PR FALLS RISK ASSESSMENT DOCUMENTED: ICD-10-PCS | Mod: HCNC,CPTII,S$GLB, | Performed by: UROLOGY

## 2020-02-07 PROCEDURE — 1100F PTFALLS ASSESS-DOCD GE2>/YR: CPT | Mod: HCNC,CPTII,S$GLB, | Performed by: UROLOGY

## 2020-02-07 PROCEDURE — 1159F PR MEDICATION LIST DOCUMENTED IN MEDICAL RECORD: ICD-10-PCS | Mod: HCNC,S$GLB,, | Performed by: UROLOGY

## 2020-02-07 RX ORDER — CYCLOBENZAPRINE HCL 10 MG
TABLET ORAL
Status: ON HOLD | COMMUNITY
Start: 2011-12-09 | End: 2020-02-14 | Stop reason: HOSPADM

## 2020-02-07 RX ORDER — MUPIROCIN 20 MG/G
OINTMENT TOPICAL
COMMUNITY
Start: 2011-10-11 | End: 2020-06-24

## 2020-02-07 RX ORDER — FLUTICASONE PROPIONATE 50 MCG
SPRAY, SUSPENSION (ML) NASAL
COMMUNITY
Start: 2014-10-29

## 2020-02-07 NOTE — PROGRESS NOTES
[facility]      New Overactive Bladder (OAB) Note    2/7/2020    Referred by:  Debo Springer MD    HPI: Marycruz Ramirez is a very pleasant 74 y.o. female who is a new patient to our department referred for evaluation of urinary urgency with occasional urgency incontinence for several years. She reports bother is associated without urinary daytime frequency (4-5x daily), without nocturia (0-1x per night) and with urgency that does not result in urinary incontinence. She reports no stress urinary incontinence associated with exertion. She does not require daily pad use.  She has not made changes to fluid intake.  She reports urinary incontinence is only during the day.    She reports symptoms of irritative voiding including urgency. She reports this is only occasional. She denies symptoms of obstructive voiding including decreased stream, hesitancy, intermittency, post void dribbling and sense of incomplete emptying. Bladder scan PVR was 51 mL. Her history includes no notation of urolithiasis, hematuria, prior pelvic surgery, previous prolapse or incontinence procedures or neurological symptoms/diagnoses. She denies all other prior pelvic surgeries or neurologic diagnoses. She does not report symptoms suggestive of advanced POP. She denies a history of constipation.    She was recently admitted for left pyelonephritis. She has completed a course of antibiotics. CT scan from that admission demonstrates changes consistent with APN but no structural abnormalities of the urinary tract. She denies frequent urinary tract infections. She cannot remember a prior infection.     Previous incontinence therapies:   No Previous Therapies    A review of 10+ systems was conducted with pertinent positive and negative findings documented in HPI with all other systems reviewed and negative.    Past medical, family, surgical and social history reviewed as documented in chart with pertinent positive medical,  family, surgical and social history detailed in HPI.    Exam Findings:    Const: no acute distress, conversant and alert  Eyes: anicteric, extraocular muscles intact  ENMT: normocephalic, Nl oral membranes  Cardio: no cyanosis, nl cap refill  Pulm: no tachypnea; no resp distress  Abd: soft, no tenderness  Musc: no laceration, no tenderness  Neuro: alert; oriented x 3  Skin: warm, dry; no petichiae  Psych: no anxiety; normal speech     Assessment/Plan:    Overactive Bladder with Urgency Incontinence (new, no addt'l workup): Her history is suggestive of Overactive Bladder (OAB) with predominantly Urgency Urinary Incontinence (UUI). However she reports this is rare and is not currently bothersome.      Her reported symptoms today are relatively mild and therefore, I believe it would be inappropriate to begin pharmacologic therapy in addition to behavioral therapies.     1. She will follow up if she has any worsening or increase in bother from her symptoms.     Acute Pyelonephritis: (new, no addt'l workup): Review of her recent upper tract imaging shows no structural abnormalities.  She empties her bladder well today.  She does not report a history of recurrent lower urinary tract infections.  Should she develop complicated or more frequent lower urinary tract infections, we discussed an appropriate workup at that time.

## 2020-02-07 NOTE — LETTER
February 7, 2020      Debo Springer MD  [address]  [facility] [address]           [facility] 4th [facility]  [address]  [facility] [address]  Phone: [phone]          Patient: Marycruz Ramirez   MR Number: [id number]   YOB: 1945   Date of Visit: 2/7/2020       Dear Dr. Debo Springer:    Thank you for referring Marycruz Ramirez to me for evaluation. Attached you will find relevant portions of my assessment and plan of care.    If you have questions, please do not hesitate to call me. I look forward to following Marycruz Ramirez along with you.    Sincerely,    Toby Stoddard MD    Enclosure  CC:  No Recipients    If you would like to receive this communication electronically, please contact [email or web address] or [phone] to request more information on [facility] access.    For providers and/or their staff who would like to refer a patient to [facility], please contact us through our one-stop-shop provider referral line, [facility], at [phone].    If you feel you have received this communication in error or would no longer like to receive these types of communications, please e-mail [email or web address]

## 2020-02-07 NOTE — PHYSICIAN QUERY
PT Name: Marycruz Ramirez  MR #: [id number]     Physician Query Form - Etiology of Condition Clarification      CDS: Floridalma Ferrell RN      Contact information:[email or web address]    This form is a permanent document in the medical record.     Query Date: February 7, 2020    By submitting this query, we are merely seeking further clarification of documentation.  Please utilize your independent clinical judgment when addressing the question(s) below.     The medical record contains the following:    Findings Supporting Clinical Information Location in Medical Record   Chronic Blood Loss Anemia   Symptomatic anemia   Iron deficiency anemia  Suspected chronic blood loss anemia  Suspected possible GI bleeding  - Presenting with Hgb 5.5 s/p 3 units of pRBC 1/17. Repeat hgb 9.4  - Patient unsure if she has melena or bloody stool at this time  - BUN/Cr ratio preserved at this time  - Iron panel consistent with SANDRO, last colonoscopy as per patient was ~6 years ago and wnl as per patient   - Continue oral PPI for now  - FOBT positive   - DONTE neg, TB, LDH, B12, Folate wnl  - Retic index low - BM hypoproliferation  - GI consulted, planned for EGD/colonoscopy 1/20  - Daily iron tabs, will not give IV iron since patient received 3 units of blood here   - Serial Hgb and transfuse as needed    A. Duodenum , Biopsy:   -Duodenal mucosa with no diagnostic abnormality.   -Normal villous architecture; no evidence of Celiac sprue.     B. Gastric , Biopsy:   -Gastric antral type mucosa with reactive gastropathy.   -Gastric body type mucosa with no diagnostic abnormality.   -Immunohistochemical stain for Helicobacter is negative.     C. Sigmoid Colon Polyp, Biopsy:   -Benign polypoid colonic mucosa with changes suggestive of mucosal prolapse.   -Negative for adenoma, dysplasia, or malignancy.    Colonoscopy noted - Diverticulosis in the sigmoid colon and in the descending colon, One 5 mm polyp in the sigmoid colon, removed with a cold  snare. Resected and retrieved. A single non-bleeding colonic angioectasa. A few non-bleeding colonic angioectasias.     EGD - Inflamed mucosa in the esophagus. May be reflux related with esophageal spasms. Benign-appearing esophageal stenosis. Normal gastric body.vNon-bleeding erosive gastropathy. Biopsied.    PN 1/20                                  Path Report 1/20                          D/C Summary 1/20     Please document your best medical opinion regarding the etiology of Chronic Blood Loss Anemia for which treatment is/was directed.     Provider use only      [ X ] Colonic Angioectasa      [  ] Diverticulosis       [  ] Erosive Gastropathy      [  ] Other:_____________________         [  ] Clinically Undetermined     Please document in your progress notes daily for the duration of treatment, until resolved, and include in your discharge summary.

## 2020-02-11 ENCOUNTER — HOSPITAL ENCOUNTER (OUTPATIENT)
Facility: HOSPITAL | Age: 75
Discharge: NURSING, REHAB OR LONG-TERM CARE | End: 2020-02-17
Attending: EMERGENCY MEDICINE | Admitting: EMERGENCY MEDICINE
Payer: MEDICARE

## 2020-02-11 DIAGNOSIS — I50.43 ACUTE ON CHRONIC COMBINED SYSTOLIC AND DIASTOLIC HEART FAILURE: ICD-10-CM

## 2020-02-11 DIAGNOSIS — R60.0 EDEMA, LOWER EXTREMITY: ICD-10-CM

## 2020-02-11 DIAGNOSIS — R53.1 WEAKNESS: ICD-10-CM
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
## 2020-02-11 DIAGNOSIS — S81.819A SKIN TEAR OF LOWER LEG WITHOUT COMPLICATION, UNSPECIFIED LATERALITY, INITIAL ENCOUNTER: Primary | ICD-10-CM

## 2020-02-11 DIAGNOSIS — F11.20 NARCOTIC DEPENDENCE: ICD-10-CM

## 2020-02-11 LAB
ALBUMIN SERPL BCP-MCNC: 3.3 G/DL (ref 3.5–5.2)
ALP SERPL-CCNC: 75 U/L (ref 55–135)
ALT SERPL W/O P-5'-P-CCNC: 15 U/L (ref 10–44)
ANION GAP SERPL CALC-SCNC: 9 MMOL/L (ref 8–16)
AST SERPL-CCNC: 20 U/L (ref 10–40)
BASOPHILS # BLD AUTO: 0.07 K/UL (ref 0–0.2)
BASOPHILS NFR BLD: 0.5 % (ref 0–1.9)
BILIRUB SERPL-MCNC: 0.4 MG/DL (ref 0.1–1)
BILIRUB UR QL STRIP: NEGATIVE
BNP SERPL-MCNC: 612 PG/ML (ref 0–99)
BUN SERPL-MCNC: 17 MG/DL (ref 8–23)
CALCIUM SERPL-MCNC: 9.5 MG/DL (ref 8.7–10.5)
CHLORIDE SERPL-SCNC: 106 MMOL/L (ref 95–110)
CK SERPL-CCNC: 124 U/L (ref 20–180)
CLARITY UR REFRACT.AUTO: CLEAR
CO2 SERPL-SCNC: 24 MMOL/L (ref 23–29)
COLOR UR AUTO: YELLOW
CREAT SERPL-MCNC: 0.7 MG/DL (ref 0.5–1.4)
DIFFERENTIAL METHOD: ABNORMAL
EOSINOPHIL # BLD AUTO: 0.1 K/UL (ref 0–0.5)
EOSINOPHIL NFR BLD: 0.5 % (ref 0–8)
ERYTHROCYTE [DISTWIDTH] IN BLOOD BY AUTOMATED COUNT: 29.6 % (ref 11.5–14.5)
EST. GFR  (AFRICAN AMERICAN): >60 ML/MIN/1.73 M^2
EST. GFR  (NON AFRICAN AMERICAN): >60 ML/MIN/1.73 M^2
GLUCOSE SERPL-MCNC: 155 MG/DL (ref 70–110)
GLUCOSE UR QL STRIP: NEGATIVE
HCT VFR BLD AUTO: 28.8 % (ref 37–48.5)
HGB BLD-MCNC: 8.1 G/DL (ref 12–16)
HGB UR QL STRIP: NEGATIVE
IMM GRANULOCYTES # BLD AUTO: 0.08 K/UL (ref 0–0.04)
IMM GRANULOCYTES NFR BLD AUTO: 0.5 % (ref 0–0.5)
INR PPP: 1.1 (ref 0.8–1.2)
KETONES UR QL STRIP: NEGATIVE
LACTATE SERPL-SCNC: 1.1 MMOL/L (ref 0.5–2.2)
LEUKOCYTE ESTERASE UR QL STRIP: NEGATIVE
LYMPHOCYTES # BLD AUTO: 1.2 K/UL (ref 1–4.8)
LYMPHOCYTES NFR BLD: 8.2 % (ref 18–48)
MAGNESIUM SERPL-MCNC: 1.9 MG/DL (ref 1.6–2.6)
MCH RBC QN AUTO: 26.2 PG (ref 27–31)
MCHC RBC AUTO-ENTMCNC: 28.1 G/DL (ref 32–36)
MCV RBC AUTO: 93 FL (ref 82–98)
MICROSCOPIC COMMENT: NORMAL
MONOCYTES # BLD AUTO: 0.7 K/UL (ref 0.3–1)
MONOCYTES NFR BLD: 4.4 % (ref 4–15)
NEUTROPHILS # BLD AUTO: 12.8 K/UL (ref 1.8–7.7)
NEUTROPHILS NFR BLD: 85.9 % (ref 38–73)
NITRITE UR QL STRIP: NEGATIVE
NRBC BLD-RTO: 0 /100 WBC
PH UR STRIP: 5 [PH] (ref 5–8)
PLATELET # BLD AUTO: 280 K/UL (ref 150–350)
PMV BLD AUTO: 10.4 FL (ref 9.2–12.9)
POTASSIUM SERPL-SCNC: 3.6 MMOL/L (ref 3.5–5.1)
PROT SERPL-MCNC: 6.3 G/DL (ref 6–8.4)
PROT UR QL STRIP: NEGATIVE
PROTHROMBIN TIME: 10.9 SEC (ref 9–12.5)
RBC # BLD AUTO: 3.09 M/UL (ref 4–5.4)
RBC #/AREA URNS AUTO: 0 /HPF (ref 0–4)
SODIUM SERPL-SCNC: 139 MMOL/L (ref 136–145)
SP GR UR STRIP: 1.02 (ref 1–1.03)
SQUAMOUS #/AREA URNS AUTO: 0 /HPF
TROPONIN I SERPL DL<=0.01 NG/ML-MCNC: 0.07 NG/ML (ref 0–0.03)
TROPONIN I SERPL DL<=0.01 NG/ML-MCNC: 0.07 NG/ML (ref 0–0.03)
TROPONIN I SERPL DL<=0.01 NG/ML-MCNC: 0.09 NG/ML (ref 0–0.03)
URN SPEC COLLECT METH UR: NORMAL
WBC # BLD AUTO: 14.89 K/UL (ref 3.9–12.7)
WBC #/AREA URNS AUTO: 2 /HPF (ref 0–5)

## 2020-02-11 PROCEDURE — 94761 N-INVAS EAR/PLS OXIMETRY MLT: CPT | Mod: HCNC

## 2020-02-11 PROCEDURE — 80053 COMPREHEN METABOLIC PANEL: CPT | Mod: HCNC

## 2020-02-11 PROCEDURE — 25000003 PHARM REV CODE 250: Mod: HCNC | Performed by: NURSE PRACTITIONER

## 2020-02-11 PROCEDURE — 83880 ASSAY OF NATRIURETIC PEPTIDE: CPT | Mod: HCNC

## 2020-02-11 PROCEDURE — 96374 THER/PROPH/DIAG INJ IV PUSH: CPT

## 2020-02-11 PROCEDURE — 93010 EKG 12-LEAD: ICD-10-PCS | Mod: HCNC,,, | Performed by: INTERNAL MEDICINE

## 2020-02-11 PROCEDURE — 83735 ASSAY OF MAGNESIUM: CPT | Mod: HCNC

## 2020-02-11 PROCEDURE — 84484 ASSAY OF TROPONIN QUANT: CPT | Mod: 91,HCNC

## 2020-02-11 PROCEDURE — 99220 PR INITIAL OBSERVATION CARE,LEVL III: CPT | Mod: HCNC,,, | Performed by: NURSE PRACTITIONER

## 2020-02-11 PROCEDURE — 99285 EMERGENCY DEPT VISIT HI MDM: CPT | Mod: HCNC,,, | Performed by: EMERGENCY MEDICINE

## 2020-02-11 PROCEDURE — 84484 ASSAY OF TROPONIN QUANT: CPT | Mod: HCNC

## 2020-02-11 PROCEDURE — 87040 BLOOD CULTURE FOR BACTERIA: CPT | Mod: 59,HCNC

## 2020-02-11 PROCEDURE — 93005 ELECTROCARDIOGRAM TRACING: CPT | Mod: HCNC

## 2020-02-11 PROCEDURE — 99220 PR INITIAL OBSERVATION CARE,LEVL III: ICD-10-PCS | Mod: HCNC,,, | Performed by: NURSE PRACTITIONER

## 2020-02-11 PROCEDURE — 85025 COMPLETE CBC W/AUTO DIFF WBC: CPT | Mod: HCNC

## 2020-02-11 PROCEDURE — G0378 HOSPITAL OBSERVATION PER HR: HCPCS | Mod: HCNC

## 2020-02-11 PROCEDURE — 25000003 PHARM REV CODE 250: Mod: HCNC | Performed by: STUDENT IN AN ORGANIZED HEALTH CARE EDUCATION/TRAINING PROGRAM

## 2020-02-11 PROCEDURE — 63600175 PHARM REV CODE 636 W HCPCS: Mod: HCNC | Performed by: STUDENT IN AN ORGANIZED HEALTH CARE EDUCATION/TRAINING PROGRAM

## 2020-02-11 PROCEDURE — 96376 TX/PRO/DX INJ SAME DRUG ADON: CPT

## 2020-02-11 PROCEDURE — 83605 ASSAY OF LACTIC ACID: CPT | Mod: HCNC

## 2020-02-11 PROCEDURE — 85610 PROTHROMBIN TIME: CPT | Mod: HCNC

## 2020-02-11 PROCEDURE — 99285 PR EMERGENCY DEPT VISIT,LEVEL V: ICD-10-PCS | Mod: HCNC,,, | Performed by: EMERGENCY MEDICINE

## 2020-02-11 PROCEDURE — 99285 EMERGENCY DEPT VISIT HI MDM: CPT | Mod: 25,HCNC

## 2020-02-11 PROCEDURE — 36415 COLL VENOUS BLD VENIPUNCTURE: CPT | Mod: HCNC

## 2020-02-11 PROCEDURE — 93010 ELECTROCARDIOGRAM REPORT: CPT | Mod: HCNC,,, | Performed by: INTERNAL MEDICINE

## 2020-02-11 PROCEDURE — 63600175 PHARM REV CODE 636 W HCPCS: Mod: HCNC | Performed by: NURSE PRACTITIONER

## 2020-02-11 PROCEDURE — 81001 URINALYSIS AUTO W/SCOPE: CPT | Mod: HCNC

## 2020-02-11 PROCEDURE — 82550 ASSAY OF CK (CPK): CPT | Mod: HCNC

## 2020-02-11 RX ORDER — PANTOPRAZOLE SODIUM 40 MG/1
40 TABLET, DELAYED RELEASE ORAL
Status: DISCONTINUED | OUTPATIENT
Start: 2020-02-11 | End: 2020-02-17 | Stop reason: HOSPADM

## 2020-02-11 RX ORDER — SODIUM CHLORIDE 0.9 % (FLUSH) 0.9 %
5 SYRINGE (ML) INJECTION
Status: DISCONTINUED | OUTPATIENT
Start: 2020-02-11 | End: 2020-02-17 | Stop reason: HOSPADM

## 2020-02-11 RX ORDER — HYDROCODONE BITARTRATE AND ACETAMINOPHEN 5; 325 MG/1; MG/1
1 TABLET ORAL EVERY 6 HOURS PRN
Status: DISCONTINUED | OUTPATIENT
Start: 2020-02-11 | End: 2020-02-11

## 2020-02-11 RX ORDER — FENOFIBRATE 145 MG/1
145 TABLET, FILM COATED ORAL DAILY
Status: DISCONTINUED | OUTPATIENT
Start: 2020-02-11 | End: 2020-02-17 | Stop reason: HOSPADM

## 2020-02-11 RX ORDER — OXYBUTYNIN CHLORIDE 5 MG/1
5 TABLET ORAL 2 TIMES DAILY
Status: DISCONTINUED | OUTPATIENT
Start: 2020-02-11 | End: 2020-02-17 | Stop reason: HOSPADM

## 2020-02-11 RX ORDER — ONDANSETRON 8 MG/1
8 TABLET, ORALLY DISINTEGRATING ORAL EVERY 8 HOURS PRN
Status: DISCONTINUED | OUTPATIENT
Start: 2020-02-11 | End: 2020-02-17 | Stop reason: HOSPADM

## 2020-02-11 RX ORDER — ACETAMINOPHEN 325 MG/1
650 TABLET ORAL EVERY 4 HOURS PRN
Status: DISCONTINUED | OUTPATIENT
Start: 2020-02-11 | End: 2020-02-17 | Stop reason: HOSPADM

## 2020-02-11 RX ORDER — HYDROCODONE BITARTRATE AND ACETAMINOPHEN 10; 325 MG/1; MG/1
1 TABLET ORAL EVERY 4 HOURS PRN
Status: DISCONTINUED | OUTPATIENT
Start: 2020-02-11 | End: 2020-02-17 | Stop reason: HOSPADM

## 2020-02-11 RX ORDER — GLUCAGON 1 MG
1 KIT INJECTION
Status: DISCONTINUED | OUTPATIENT
Start: 2020-02-11 | End: 2020-02-17 | Stop reason: HOSPADM

## 2020-02-11 RX ORDER — IBUPROFEN 200 MG
16 TABLET ORAL
Status: DISCONTINUED | OUTPATIENT
Start: 2020-02-11 | End: 2020-02-17 | Stop reason: HOSPADM

## 2020-02-11 RX ORDER — BISACODYL 10 MG
10 SUPPOSITORY, RECTAL RECTAL DAILY PRN
Status: DISCONTINUED | OUTPATIENT
Start: 2020-02-11 | End: 2020-02-17 | Stop reason: HOSPADM

## 2020-02-11 RX ORDER — IPRATROPIUM BROMIDE AND ALBUTEROL SULFATE 2.5; .5 MG/3ML; MG/3ML
3 SOLUTION RESPIRATORY (INHALATION) EVERY 4 HOURS PRN
Status: DISCONTINUED | OUTPATIENT
Start: 2020-02-11 | End: 2020-02-17 | Stop reason: HOSPADM

## 2020-02-11 RX ORDER — FUROSEMIDE 10 MG/ML
40 INJECTION INTRAMUSCULAR; INTRAVENOUS
Status: DISCONTINUED | OUTPATIENT
Start: 2020-02-11 | End: 2020-02-13

## 2020-02-11 RX ORDER — METHOCARBAMOL 500 MG/1
500 TABLET, FILM COATED ORAL 4 TIMES DAILY PRN
Status: DISCONTINUED | OUTPATIENT
Start: 2020-02-11 | End: 2020-02-13

## 2020-02-11 RX ORDER — TALC
9 POWDER (GRAM) TOPICAL NIGHTLY PRN
Status: DISCONTINUED | OUTPATIENT
Start: 2020-02-11 | End: 2020-02-17 | Stop reason: HOSPADM

## 2020-02-11 RX ORDER — POLYETHYLENE GLYCOL 3350 17 G/17G
17 POWDER, FOR SOLUTION ORAL DAILY
Status: DISCONTINUED | OUTPATIENT
Start: 2020-02-11 | End: 2020-02-17 | Stop reason: HOSPADM

## 2020-02-11 RX ORDER — HYDROCODONE BITARTRATE AND ACETAMINOPHEN 10; 325 MG/1; MG/1
1 TABLET ORAL
Status: COMPLETED | OUTPATIENT
Start: 2020-02-11 | End: 2020-02-11

## 2020-02-11 RX ORDER — HYDROCODONE BITARTRATE AND ACETAMINOPHEN 5; 325 MG/1; MG/1
1 TABLET ORAL EVERY 4 HOURS PRN
Status: DISCONTINUED | OUTPATIENT
Start: 2020-02-11 | End: 2020-02-17 | Stop reason: HOSPADM

## 2020-02-11 RX ORDER — FERROUS SULFATE 325(65) MG
325 TABLET, DELAYED RELEASE (ENTERIC COATED) ORAL DAILY
Status: DISCONTINUED | OUTPATIENT
Start: 2020-02-11 | End: 2020-02-17 | Stop reason: HOSPADM

## 2020-02-11 RX ORDER — CETIRIZINE HYDROCHLORIDE 10 MG/1
10 TABLET ORAL NIGHTLY
Status: DISCONTINUED | OUTPATIENT
Start: 2020-02-11 | End: 2020-02-17 | Stop reason: HOSPADM

## 2020-02-11 RX ORDER — IBUPROFEN 200 MG
24 TABLET ORAL
Status: DISCONTINUED | OUTPATIENT
Start: 2020-02-11 | End: 2020-02-17 | Stop reason: HOSPADM

## 2020-02-11 RX ORDER — OXCARBAZEPINE 150 MG/1
300 TABLET, FILM COATED ORAL DAILY
Status: DISCONTINUED | OUTPATIENT
Start: 2020-02-11 | End: 2020-02-17 | Stop reason: HOSPADM

## 2020-02-11 RX ORDER — FUROSEMIDE 10 MG/ML
80 INJECTION INTRAMUSCULAR; INTRAVENOUS
Status: COMPLETED | OUTPATIENT
Start: 2020-02-11 | End: 2020-02-11

## 2020-02-11 RX ADMIN — FERROUS SULFATE TAB EC 325 MG (65 MG FE EQUIVALENT) 325 MG: 325 (65 FE) TABLET DELAYED RESPONSE at 03:02

## 2020-02-11 RX ADMIN — FUROSEMIDE 80 MG: 10 INJECTION, SOLUTION INTRAMUSCULAR; INTRAVENOUS at 11:02

## 2020-02-11 RX ADMIN — CETIRIZINE HYDROCHLORIDE 10 MG: 10 TABLET, FILM COATED ORAL at 07:02

## 2020-02-11 RX ADMIN — HYDROCODONE BITARTRATE AND ACETAMINOPHEN 1 TABLET: 10; 325 TABLET ORAL at 11:02

## 2020-02-11 RX ADMIN — PANTOPRAZOLE SODIUM 40 MG: 40 TABLET, DELAYED RELEASE ORAL at 03:02

## 2020-02-11 RX ADMIN — FUROSEMIDE 40 MG: 10 INJECTION, SOLUTION INTRAMUSCULAR; INTRAVENOUS at 04:02

## 2020-02-11 RX ADMIN — OXCARBAZEPINE 300 MG: 150 TABLET, FILM COATED ORAL at 03:02

## 2020-02-11 RX ADMIN — HYDROCODONE BITARTRATE AND ACETAMINOPHEN 1 TABLET: 10; 325 TABLET ORAL at 03:02

## 2020-02-11 RX ADMIN — FENOFIBRATE 145 MG: 145 TABLET, FILM COATED ORAL at 03:02

## 2020-02-11 RX ADMIN — HYDROCODONE BITARTRATE AND ACETAMINOPHEN 1 TABLET: 10; 325 TABLET ORAL at 07:02

## 2020-02-11 RX ADMIN — POLYETHYLENE GLYCOL 3350 17 G: 17 POWDER, FOR SOLUTION ORAL at 11:02

## 2020-02-11 RX ADMIN — OXYBUTYNIN CHLORIDE 5 MG: 5 TABLET ORAL at 07:02

## 2020-02-11 NOTE — ED NOTES
"Pt's  has noticed right leg getting progressively worse "I think it's a lack of blood getting to the legs"    Pt has a hx of prolapsed mitral valve regurgitation.   "

## 2020-02-11 NOTE — ASSESSMENT & PLAN NOTE
· Volume overloaded on exam  · 3+ edema to BLE  · Rales present  · Given 80mg IV in ED with 500 output  · Ordered 40mg iv bid  · Elevation of leg  · Daily weight  · Low NA diet with 1500cc fluid restriction  ·  was 589 on prior admission  · Troponin 0.070 will trend, denies CP.  · Not on medical management, was seen in Cardiology clinic on 2/6 and at that time not recommend to start medication and plans for echo in March. Felt that depressed EF and cardiomyopathy was secondary to septic shock, pressors, and bacteremia.   ECHO 1/26/2020:  · Moderate left ventricular enlargement.  · Moderately decreased left ventricular systolic function. The estimated ejection fraction is 30%.  · Grade I (mild) left ventricular diastolic dysfunction consistent with impaired relaxation.  · Normal right ventricular systolic function.  · Moderate left atrial enlargement.  · Mild-to-moderate mitral regurgitation.  · Severe aortic valve stenosis. Aortic valve area is 0.64 cm2; peak velocity is 3.94 m/s; mean gradient is 43 mmHg.  · The estimated PA systolic pressure is 40 mmHg.  · Elevated central venous pressure (15 mmHg).

## 2020-02-11 NOTE — ASSESSMENT & PLAN NOTE
· Hgb stable and at baseline of 8.1  · Continue to monitor labs  · transfuse if becomes symptomatic or Hgb < 7  · Continue oral supplementation

## 2020-02-11 NOTE — NURSING
Arrived to room 742 via stretcher with spouse and transport staff. Alert and oriented X 4 with no confusion noted. Mood calm/pleasant/cooperative. Speech clear. Able to verbalize needs/complaints. Telemetry intact. NSR. No acute distress noted at this time. Will continue to monitor.

## 2020-02-11 NOTE — SUBJECTIVE & OBJECTIVE
Past Medical History:   Diagnosis Date    Bipolar 1 disorder     CHF (congestive heart failure)     HLD (hyperlipidemia)     Overactive bladder        Past Surgical History:   Procedure Laterality Date    BACK SURGERY      COLONOSCOPY N/A 1/20/2020    Procedure: COLONOSCOPY;  Surgeon: Micah Patel MD;  Location: [address]);  Service: Endoscopy;  Laterality: N/A;    ESOPHAGOGASTRODUODENOSCOPY N/A 1/20/2020    Procedure: EGD (ESOPHAGOGASTRODUODENOSCOPY);  Surgeon: Micah Patel MD;  Location: [address]);  Service: Endoscopy;  Laterality: N/A;    KNEE SURGERY Bilateral        Review of patient's allergies indicates:  No Known Allergies    No current facility-administered medications on file prior to encounter.      Current Outpatient Medications on File Prior to Encounter   Medication Sig    b complex vitamins capsule Take 1 capsule by mouth once daily.    BUMETANIDE ORAL Take 1 mg by mouth.    calcium/vits D3/C/K2/minerals (BONE ESSENTIALS ORAL) Take by mouth.    cetirizine (ZYRTEC) 10 MG tablet Take by mouth.    cyclobenzaprine (FLEXERIL) 10 MG tablet Take by mouth.    fenofibrate (TRICOR) 145 MG tablet     ferrous sulfate (FEOSOL) 325 mg (65 mg iron) Tab tablet Take 1 tablet (325 mg total) by mouth once daily.    fluticasone propionate (FLONASE ALLERGY RELIEF) 50 mcg/actuation nasal spray     HYDROcodone-acetaminophen (NORCO)  mg per tablet Take by mouth.    multivitamin with minerals tablet Take by mouth.    mupirocin (BACTROBAN) 2 % ointment by Nasal route.    omega-3 acid ethyl esters (LOVAZA) 1 gram capsule Take 2 g by mouth 2 (two) times daily.    OXcarbazepine (TRILEPTAL) 300 MG Tab Take 300 mg by mouth once daily. One in the morning, two in the evening    oxybutynin (DITROPAN) 5 MG Tab 5 mg 2 (two) times daily.     pantoprazole (PROTONIX) 40 MG tablet Take 1 tablet (40 mg total) by mouth 2 (two) times daily before meals.    tolterodine (DETROL LA) 4 MG 24  hr capsule Take 4 mg by mouth once daily.      Family History     None        Tobacco Use    Smoking status: Current Every Day Smoker    Smokeless tobacco: Never Used   Substance and Sexual Activity    Alcohol use: Yes     Frequency: Never     Drinks per session: 1 or 2    Drug use: Never    Sexual activity: Not Currently     Review of Systems   Constitutional: Positive for fatigue. Negative for appetite change, chills and fever.   HENT: Negative for trouble swallowing.    Respiratory: Negative for cough, chest tightness, shortness of breath and wheezing.    Cardiovascular: Positive for leg swelling. Negative for chest pain and palpitations.   Gastrointestinal: Positive for abdominal distention. Negative for abdominal pain, constipation, diarrhea and nausea.   Genitourinary: Negative for difficulty urinating, frequency and urgency.   Musculoskeletal: Negative for arthralgias and myalgias.   Skin: Positive for color change. Negative for rash.   Neurological: Positive for weakness. Negative for dizziness, light-headedness and headaches.   Psychiatric/Behavioral: Negative for sleep disturbance.     Objective:     Vital Signs (Most Recent):  Temp: 98.3 °F (36.8 °C) (02/11/20 0851)  Pulse: 95 (02/11/20 0851)  Resp: 19 (02/11/20 0851)  BP: 114/67 (02/11/20 0851)  SpO2: 98 % (02/11/20 0851) Vital Signs (24h Range):  Temp:  [97.8 °F (36.6 °C)-98.3 °F (36.8 °C)] 98.3 °F (36.8 °C)  Pulse:  [] 95  Resp:  [19] 19  SpO2:  [98 %] 98 %  BP: (108-114)/(64-67) 114/67     Weight: 54 kg (119 lb)  Body mass index is 23.24 kg/m².    Physical Exam   Constitutional: She is oriented to person, place, and time. She appears well-developed and well-nourished. No distress.   Cardiovascular: Normal rate, regular rhythm and normal heart sounds. Exam reveals no gallop and no friction rub.   No murmur heard.  Pulmonary/Chest: Effort normal. No respiratory distress. She has no wheezes. She has rales.   Abdominal: Soft. Bowel sounds are  normal. She exhibits no distension. There is no tenderness.   Musculoskeletal: Normal range of motion. She exhibits edema (3+ edema to BLE). She exhibits no tenderness.   Neurological: She is alert and oriented to person, place, and time.   Skin: Skin is warm and dry. No rash noted. She is not diaphoretic. No cyanosis. Nails show no clubbing.   Bruising to BUE and BLE. Tears noted to left arm and left leg   Psychiatric: She has a normal mood and affect. Her behavior is normal.           Significant Labs:   A1C:   Recent Labs   Lab 01/27/20  0335   HGBA1C 5.8*     Bilirubin:   Recent Labs   Lab 01/26/20  0631 01/27/20  0335 01/28/20  0441 01/29/20  0514 02/11/20  0928   BILITOT 0.6 0.6 0.6 0.5 0.4     CBC:   Recent Labs   Lab 02/11/20  0928   WBC 14.89*   HGB 8.1*   HCT 28.8*        CMP:   Recent Labs   Lab 02/11/20  0928      K 3.6      CO2 24   *   BUN 17   CREATININE 0.7   CALCIUM 9.5   PROT 6.3   ALBUMIN 3.3*   BILITOT 0.4   ALKPHOS 75   AST 20   ALT 15   ANIONGAP 9   EGFRNONAA >60.0     Cardiac Markers:   Recent Labs   Lab 02/11/20  0928   *     Coagulation:   Recent Labs   Lab 02/11/20  0928   INR 1.1     Lactic Acid:   Recent Labs   Lab 02/11/20  0928   LACTATE 1.1     Magnesium:   Recent Labs   Lab 02/11/20  0928   MG 1.9     Troponin:   Recent Labs   Lab 02/11/20  0928   TROPONINI 0.070*     Urine Studies:   Recent Labs   Lab 02/11/20  0945   COLORU Yellow   APPEARANCEUA Clear   PHUR 5.0   SPECGRAV 1.020   PROTEINUA Negative   GLUCUA Negative   KETONESU Negative   BILIRUBINUA Negative   OCCULTUA Negative   NITRITE Negative   LEUKOCYTESUR Negative   RBCUA 0   WBCUA 2   SQUAMEPITHEL 0     All pertinent labs within the past 24 hours have been reviewed.    Significant Imaging: I have reviewed all pertinent imaging results/findings within the past 24 hours.

## 2020-02-11 NOTE — ED TRIAGE NOTES
Just got out of [facility], home health nurses have been coming in.     I was walking with my walker, the nurses had  Been coming, I was doing my exercises. This morning the weakeness started, its like my leg gave out.     Legs have only been swollen since she fell.

## 2020-02-11 NOTE — HPI
Patient is a 73 yo female being placed in observation for acute on chronic heart failure. Patient with past medical hx of new CHF (EF 30), aortic stenosis, HLD, bipolar and overactive bladder. Patient was recently discharged from the hospital last month for pyelonephritis which required CC stay for sepsis (sent with home health) and admission before that was for anemia requiring blood transfusion and GI scope. Patient is present to the ED after reports of her legs becoming weak and falling onto her knees. Reports pain to bilateral knees but has some improvement with pain medication. Patient also endured skin tears to left arm and bilateral legs. Patient also endorses large amount of lower extremity swelling the last 3 days making her legs feel heavy. presenting with weakness and a fall. Reports recently seeing cardiology and plans for echo next month with possible angiogram. 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 She denies fever, chills, abdominal pain, chest pain, lower extremity pain, headache, LOC, dizziness, or confusion.    ED: VVS, WBC 14.89 (baseline), Hgb 8.1 (baseline), , Troponin 0.070, UA neg, BC--pending. XR to bilateral hip and knees: without fractures. CXR: Interval diuresis compared to 01/25/2020.  No new disease identified

## 2020-02-11 NOTE — ED PROVIDER NOTES
"Encounter Date: 2/11/2020       History     Chief Complaint   Patient presents with    Weakness     arrived via   EMS from home with c/o BLE weakness, edema noted to BLE     Patient is a 73 yo female with pmhx of new CHF (EF 30), aortic stenosis, and was recently discharged from the hospital last month for pyelonephritis (sent with home health) presenting with weakness and a fall. She states she was up walking down the hallway with her walker when she feels that her legs "gave out" and she fell onto her knees. She states she is not in any pain, but she was unable to get up off the floor. She called for her  who called their nephew who lives across the street. He came over and they were unable to get her up so they called EMS.     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 She denies fever, chills, abdominal pain, chest pain, lower extremity pain, headache, LOC, dizziness, or confusion.        Review of patient's allergies indicates:  No Known Allergies  Past Medical History:   Diagnosis Date    Bipolar 1 disorder     CHF (congestive heart failure)     HLD (hyperlipidemia)     Overactive bladder      Past Surgical History:   Procedure Laterality Date    BACK SURGERY      COLONOSCOPY N/A 1/20/2020    Procedure: COLONOSCOPY;  Surgeon: Micah Patel MD;  Location: [address]);  Service: Endoscopy;  Laterality: N/A;    ESOPHAGOGASTRODUODENOSCOPY N/A 1/20/2020    Procedure: EGD (ESOPHAGOGASTRODUODENOSCOPY);  Surgeon: Micah Patel MD;  Location: [address]);  Service: Endoscopy;  Laterality: N/A;    KNEE SURGERY Bilateral      History reviewed. No pertinent family history.  Social History     Tobacco Use    Smoking status: Current Every Day Smoker     Packs/day: 1.50     Years: 50.00     Pack years: 75.00     Types: Cigarettes    Smokeless tobacco: Never Used   Substance Use Topics    Alcohol use: Yes     Frequency: Never     Drinks per session: 1 or 2    Drug use: Never "     Review of Systems   Constitutional: Positive for activity change. Negative for fatigue and fever.   HENT: Negative for congestion and sore throat.    Respiratory: Negative for choking and shortness of breath.    Cardiovascular: Positive for chest pain and leg swelling.   Gastrointestinal: Positive for abdominal distention. Negative for abdominal pain, nausea and vomiting.   Genitourinary: Negative for difficulty urinating and dyspareunia.   Musculoskeletal: Positive for gait problem (walks with a walker) and joint swelling.   Skin: Positive for color change and rash (bilat LE erythema and bullae).   Neurological: Negative for dizziness and headaches.   Psychiatric/Behavioral: Negative for agitation and confusion.       Physical Exam     Initial Vitals [02/11/20 0831]   BP Pulse Resp Temp SpO2   108/64 102 19 97.8 °F (36.6 °C) 98 %      MAP       --         Physical Exam    Nursing note and vitals reviewed.  Constitutional: She appears well-developed.   Very thin, malnourished   Eyes: Conjunctivae and EOM are normal. Pupils are equal, round, and reactive to light.   Neck: Normal range of motion. Neck supple.   Cardiovascular: Normal rate and regular rhythm.   Murmur (aortic systolic) heard.  Pulmonary/Chest: No respiratory distress. She has rales (crackles lower lobes (L > R)).   Abdominal: Soft. Bowel sounds are normal. She exhibits distension. There is no tenderness.   Musculoskeletal: She exhibits edema and tenderness.        Right knee: She exhibits decreased range of motion, swelling, ecchymosis and erythema. Tenderness found.        Left knee: She exhibits decreased range of motion, swelling, ecchymosis and erythema. Tenderness found.   Patient has swelling bilat lower extremities. Both are erythematous with hemorrhagic bullae that are weeping clear blood tinged fluid. Photo in Media.    Patient has bilat LE weakness able to lift against gravity but not against resistance (R worse than L)   Neurological:  She is alert and oriented to person, place, and time. No cranial nerve deficit.   Skin: Skin is warm. There is erythema.   Psychiatric: She has a normal mood and affect. Her behavior is normal. Thought content normal.             ED Course   Procedures  Labs Reviewed   CBC W/ AUTO DIFFERENTIAL - Abnormal; Notable for the following components:       Result Value    WBC 14.89 (*)     RBC 3.09 (*)     Hemoglobin 8.1 (*)     Hematocrit 28.8 (*)     Mean Corpuscular Hemoglobin 26.2 (*)     Mean Corpuscular Hemoglobin Conc 28.1 (*)     RDW 29.6 (*)     Gran # (ANC) 12.8 (*)     Immature Grans (Abs) 0.08 (*)     Gran% 85.9 (*)     Lymph% 8.2 (*)     All other components within normal limits   COMPREHENSIVE METABOLIC PANEL - Abnormal; Notable for the following components:    Glucose 155 (*)     Albumin 3.3 (*)     All other components within normal limits   TROPONIN I - Abnormal; Notable for the following components:    Troponin I 0.070 (*)     All other components within normal limits   B-TYPE NATRIURETIC PEPTIDE - Abnormal; Notable for the following components:     (*)     All other components within normal limits   MAGNESIUM   PROTIME-INR   LACTIC ACID, PLASMA   CK   URINALYSIS, REFLEX TO URINE CULTURE    Narrative:     Preferred Collection Type->Urine, Clean Catch   URINALYSIS MICROSCOPIC    Narrative:     Preferred Collection Type->Urine, Clean Catch     EKG Readings: (Independently Interpreted)   Initial Reading: No STEMI. Rhythm: Normal Sinus Rhythm. Heart Rate: 94. Conduction: RBBB. ST Segments: Non-Specific ST Segment Depression. Axis: Right Axis Deviation.     ECG Results          EKG 12-lead (Final result)  Result time 02/11/20 21:06:09    Final result by Interface, Lab In [facility] (02/11/20 21:06:09)                 Narrative:    Test Reason : R53.1,    Vent. Rate : 094 BPM     Atrial Rate : 094 BPM     P-R Int : 216 ms          QRS Dur : 108 ms      QT Int : 360 ms       P-R-T Axes : 072 094 101 degrees      QTc Int : 450 ms    Sinus rhythm with 1st degree A-V block  Rightward axis  Incomplete right bundle branch block  Nonspecific T wave abnormality  Abnormal ECG  When compared with ECG of 25-JAN-2020 22:38,  Significant changes have occurred  SVT No longer present  Confirmed by SHMUEL ESPITIA MD ([id number]) on 2/11/2020 9:05:52 PM    Referred By: [email or web address]   SELF           Confirmed By:SHMUEL ESPITIA MD                            Imaging Results          X-Ray Chest 1 View (Final result)  Result time 02/11/20 11:01:55    Final result by Ailyn Ware MD (02/11/20 11:01:55)                 Impression:      Interval diuresis compared to 01/25/2020.  No new disease identified on this bedside chest radiograph.      Electronically signed by: Ailyn Ware MD  Date:    02/11/2020  Time:    11:01             Narrative:    EXAMINATION:  XR CHEST 1 VIEW    TECHNIQUE:  Single frontal view of the chest was performed.    COMPARISON:  01/25/2020.  08/31/2007.    FINDINGS:  Mediastinal structures are midline.  Cardiac silhouette is normal.  Calcific plaque noted at the level of the arch in this 74-year-old woman.    Pulmonary vessels are better defined than on 01/25/2020 compatible with interval diuresis.    Allowing for bedside technique I detect no convincing evidence of focal pulmonary disease, pleural fluid, pneumothorax, pneumomediastinum, pneumoperitoneum or significant osseous abnormality.                               X-Ray Hips Bilateral 2 View Incl AP Pelvis (Final result)  Result time 02/11/20 11:12:26    Final result by Malick Tejada MD (02/11/20 11:12:26)                 Impression:      No acute fracture, findings as noted.      Electronically signed by: Malick Tejada  Date:    02/11/2020  Time:    11:12             Narrative:    EXAMINATION:  XR HIPS BILATERAL 2 VIEW INCL AP PELVIS    TECHNIQUE:  AP view of the pelvis and frogleg lateral views of both hips were  performed.    COMPARISON:  None.    FINDINGS:  Partially visualized of fixating hardware lower lumbar spine.  With respective visualized hardware and lower lumbar spine, no definite acute fractures.  No acute fractures of the bony pelvis or proximal femurs.  Intact right and left SI joints and hip joint spaces.  Right and left greater trochanteric enthesopathic spurring.  Minimal spurring about acetabular regions.  Opaque catheter superimposes portions of the lower pelvis, proximal left thigh, and portions of the perineum.  Clinical correlation.                               X-Ray Knee 1 or 2 View Bilateral (Final result)  Result time 02/11/20 11:12:27    Final result by Farzana Murphy MD (02/11/20 11:12:27)                 Impression:      No acute process seen      Electronically signed by: Farzana Murphy MD  Date:    02/11/2020  Time:    11:12             Narrative:    EXAMINATION:  XR KNEE 1 OR 2 VIEW BILATERAL    TECHNIQUE:  AP and lateral views of both knees    COMPARISON:  None    FINDINGS:  Status post total bilateral knee replacement in good alignment, the hardware is intact.  No periprosthetic fracture.  No osseous lesions.  No joint effusion.                                 Medical Decision Making:   Initial Assessment:   Patient is a 73 yo female with pmhx of new CHF (EF 30), aortic stenosis, and was recently discharged from the hospital last month for pyelonephritis (sent with home health) presenting with weakness and a fall.  Differential Diagnosis:   Differentials include but are not limited to right hip fracture, increased debility, bilat knee injury, recurrent pyelonephritis/sepsis, rhabdomyolysis, CHF exacerbation.   Clinical Tests:   Lab Tests: Ordered  Radiological Study: Ordered  Medical Tests: Ordered  ED Management:  Given history of pyelonephritis and erythema on LE bilat, septic workup ordered including CBC, CMP, CXR, U/A, EKG, and lactic acid. Because patient fell, CPK ordered  to r/o rhabdomyolysis and Xray hips bilat to r/o hip fracture.   - will follow up and reassess.     11:20 AM  - Patient returned from Xray and no abnormal processes seen. Patient to receive 1 dose of IV lasix 80 for fluid overload and will be admitted to the Observation Unit to observe fluid status, be seen by PT/OT, and for wound care evaluation.   Other:   I discussed test(s) with the performing physician.              Attending Attestation:   Physician Attestation Statement for Resident:  As the supervising MD   Physician Attestation Statement: I have personally seen and examined this patient.   I agree with the above history. -:   As the supervising MD I agree with the above PE.    As the supervising MD I agree with the above treatment, course, plan, and disposition.            Attending ED Notes:   A 74-year-old female with history of CHF and lower extremity edema presents after a fall.  Patient is complaining of difficulty walking and increasing debility with her lower extremity edema. Hemorrhagic bulla noted in the edema. Is on aspirin.  Unlikely infectious etiology.  X-ray is unremarkable.  IV Lasix given to begin diuresis.  Patient reports that she can't walk without significant assistance.  Will admit for debility PT OT evaluation.          ED Course as of Feb 12 1410   Tue Feb 11, 2020   1006 WBC(!): 14.89 [DM]   1007 Hemoglobin(!): 8.1 [DM]      ED Course User Index  [DM] Diann Rucker MD                Clinical Impression:       ICD-10-CM ICD-9-CM   1. Skin tear of lower leg without complication, unspecified laterality, initial encounter S81.819A 891.0   2. Weakness R53.1 780.79   3. Fall W19.XXXA E888.9   4. Edema, lower extremity R60.0 782.3   5. Narcotic dependence F11.20 304.90   6. Acute on chronic combined systolic and diastolic heart failure I50.43 428.43         Disposition:   Disposition: Placed in Observation                     Diann Rucker MD  Resident  02/11/20 1154       Sravan De La Garza  MD Narinder  02/12/20 [id number]

## 2020-02-11 NOTE — H&P
[facility] Hospital Medicine  History & Physical    Patient Name: Marycruz Ramirez  MRN: [id number]  Admission Date: 2/11/2020  Attending Physician: Ty Pearson MD   Primary Care Provider: Marcelino Calle MD    [facility] Medicine Team: [facility] MED Y Toshia Winston NP     Patient information was obtained from patient, spouse/SO, past medical records and ER records.     Subjective:     Principal Problem:Acute on chronic combined systolic and diastolic heart failure    Chief Complaint:   Chief Complaint   Patient presents with    Weakness     arrived via   EMS from home with c/o BLE weakness, edema noted to BLE        HPI: Patient is a 75 yo female being placed in observation for acute on chronic heart failure. Patient with past medical hx of new CHF (EF 30), aortic stenosis, HLD, bipolar and overactive bladder. Patient was recently discharged from the hospital last month for pyelonephritis which required CC stay for sepsis (sent with home health) and admission before that was for anemia requiring blood transfusion and GI scope. Patient is present to the ED after reports of her legs becoming weak and falling onto her knees. Reports pain to bilateral knees but has some improvement with pain medication. Patient also endured skin tears to left arm and bilateral legs. Patient also endorses large amount of lower extremity swelling the last 3 days making her legs feel heavy. presenting with weakness and a fall. Reports recently seeing cardiology and plans for echo next month with possible angiogram. 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 She denies fever, chills, abdominal pain, chest pain, lower extremity pain, headache, LOC, dizziness, or confusion.    ED: VVS, WBC 14.89 (baseline), Hgb 8.1 (baseline), , Troponin 0.070, UA neg, BC--pending. XR to bilateral hip and knees: without fractures. CXR: Interval diuresis compared to 01/25/2020.  No new disease identified     Past Medical  History:   Diagnosis Date    Bipolar 1 disorder     CHF (congestive heart failure)     HLD (hyperlipidemia)     Overactive bladder        Past Surgical History:   Procedure Laterality Date    BACK SURGERY      COLONOSCOPY N/A 1/20/2020    Procedure: COLONOSCOPY;  Surgeon: Micah Patel MD;  Location: [facility] ([address] [place] [address]);  Service: Endoscopy;  Laterality: N/A;    ESOPHAGOGASTRODUODENOSCOPY N/A 1/20/2020    Procedure: EGD (ESOPHAGOGASTRODUODENOSCOPY);  Surgeon: Micah Patel MD;  Location: [address]);  Service: Endoscopy;  Laterality: N/A;    KNEE SURGERY Bilateral        Review of patient's allergies indicates:  No Known Allergies    No current facility-administered medications on file prior to encounter.      Current Outpatient Medications on File Prior to Encounter   Medication Sig    b complex vitamins capsule Take 1 capsule by mouth once daily.    BUMETANIDE ORAL Take 1 mg by mouth.    calcium/vits D3/C/K2/minerals (BONE ESSENTIALS ORAL) Take by mouth.    cetirizine (ZYRTEC) 10 MG tablet Take by mouth.    cyclobenzaprine (FLEXERIL) 10 MG tablet Take by mouth.    fenofibrate (TRICOR) 145 MG tablet     ferrous sulfate (FEOSOL) 325 mg (65 mg iron) Tab tablet Take 1 tablet (325 mg total) by mouth once daily.    fluticasone propionate (FLONASE ALLERGY RELIEF) 50 mcg/actuation nasal spray     HYDROcodone-acetaminophen (NORCO)  mg per tablet Take by mouth.    multivitamin with minerals tablet Take by mouth.    mupirocin (BACTROBAN) 2 % ointment by Nasal route.    omega-3 acid ethyl esters (LOVAZA) 1 gram capsule Take 2 g by mouth 2 (two) times daily.    OXcarbazepine (TRILEPTAL) 300 MG Tab Take 300 mg by mouth once daily. One in the morning, two in the evening    oxybutynin (DITROPAN) 5 MG Tab 5 mg 2 (two) times daily.     pantoprazole (PROTONIX) 40 MG tablet Take 1 tablet (40 mg total) by mouth 2 (two) times daily before meals.    tolterodine (DETROL LA) 4 MG 24 hr capsule  Take 4 mg by mouth once daily.      Family History     None        Tobacco Use    Smoking status: Current Every Day Smoker    Smokeless tobacco: Never Used   Substance and Sexual Activity    Alcohol use: Yes     Frequency: Never     Drinks per session: 1 or 2    Drug use: Never    Sexual activity: Not Currently     Review of Systems   Constitutional: Positive for fatigue. Negative for appetite change, chills and fever.   HENT: Negative for trouble swallowing.    Respiratory: Negative for cough, chest tightness, shortness of breath and wheezing.    Cardiovascular: Positive for leg swelling. Negative for chest pain and palpitations.   Gastrointestinal: Positive for abdominal distention. Negative for abdominal pain, constipation, diarrhea and nausea.   Genitourinary: Negative for difficulty urinating, frequency and urgency.   Musculoskeletal: Negative for arthralgias and myalgias.   Skin: Positive for color change. Negative for rash.   Neurological: Positive for weakness. Negative for dizziness, light-headedness and headaches.   Psychiatric/Behavioral: Negative for sleep disturbance.     Objective:     Vital Signs (Most Recent):  Temp: 98.3 °F (36.8 °C) (02/11/20 0851)  Pulse: 95 (02/11/20 0851)  Resp: 19 (02/11/20 0851)  BP: 114/67 (02/11/20 0851)  SpO2: 98 % (02/11/20 0851) Vital Signs (24h Range):  Temp:  [97.8 °F (36.6 °C)-98.3 °F (36.8 °C)] 98.3 °F (36.8 °C)  Pulse:  [] 95  Resp:  [19] 19  SpO2:  [98 %] 98 %  BP: (108-114)/(64-67) 114/67     Weight: 54 kg (119 lb)  Body mass index is 23.24 kg/m².    Physical Exam   Constitutional: She is oriented to person, place, and time. She appears well-developed and well-nourished. No distress.   Cardiovascular: Normal rate, regular rhythm and normal heart sounds. Exam reveals no gallop and no friction rub.   No murmur heard.  Pulmonary/Chest: Effort normal. No respiratory distress. She has no wheezes. She has rales.   Abdominal: Soft. Bowel sounds are normal. She  exhibits no distension. There is no tenderness.   Musculoskeletal: Normal range of motion. She exhibits edema (3+ edema to BLE). She exhibits no tenderness.   Neurological: She is alert and oriented to person, place, and time.   Skin: Skin is warm and dry. No rash noted. She is not diaphoretic. No cyanosis. Nails show no clubbing.   Bruising to BUE and BLE. Tears noted to left arm and left leg   Psychiatric: She has a normal mood and affect. Her behavior is normal.           Significant Labs:   A1C:   Recent Labs   Lab 01/27/20  0335   HGBA1C 5.8*     Bilirubin:   Recent Labs   Lab 01/26/20  0631 01/27/20  0335 01/28/20  0441 01/29/20  0514 02/11/20  0928   BILITOT 0.6 0.6 0.6 0.5 0.4     CBC:   Recent Labs   Lab 02/11/20  0928   WBC 14.89*   HGB 8.1*   HCT 28.8*        CMP:   Recent Labs   Lab 02/11/20  0928      K 3.6      CO2 24   *   BUN 17   CREATININE 0.7   CALCIUM 9.5   PROT 6.3   ALBUMIN 3.3*   BILITOT 0.4   ALKPHOS 75   AST 20   ALT 15   ANIONGAP 9   EGFRNONAA >60.0     Cardiac Markers:   Recent Labs   Lab 02/11/20  0928   *     Coagulation:   Recent Labs   Lab 02/11/20  0928   INR 1.1     Lactic Acid:   Recent Labs   Lab 02/11/20  0928   LACTATE 1.1     Magnesium:   Recent Labs   Lab 02/11/20  0928   MG 1.9     Troponin:   Recent Labs   Lab 02/11/20  0928   TROPONINI 0.070*     Urine Studies:   Recent Labs   Lab 02/11/20  0945   COLORU Yellow   APPEARANCEUA Clear   PHUR 5.0   SPECGRAV 1.020   PROTEINUA Negative   GLUCUA Negative   KETONESU Negative   BILIRUBINUA Negative   OCCULTUA Negative   NITRITE Negative   LEUKOCYTESUR Negative   RBCUA 0   WBCUA 2   SQUAMEPITHEL 0     All pertinent labs within the past 24 hours have been reviewed.    Significant Imaging: I have reviewed all pertinent imaging results/findings within the past 24 hours.    Assessment/Plan:     * Acute on chronic combined systolic and diastolic heart failure  · Volume overloaded on exam  · 3+ edema to  BLE  · Rales present  · Given 80mg IV in ED with 500 output  · Ordered 40mg iv bid  · Elevation of leg  · Daily weight  · Low NA diet with 1500cc fluid restriction  ·  was 589 on prior admission  · Troponin 0.070 will trend, denies CP.  · Not on medical management, was seen in Cardiology clinic on 2/6 and at that time not recommend to start medication and plans for echo in March. Felt that depressed EF and cardiomyopathy was secondary to septic shock, pressors, and bacteremia.   ECHO 1/26/2020:  · Moderate left ventricular enlargement.  · Moderately decreased left ventricular systolic function. The estimated ejection fraction is 30%.  · Grade I (mild) left ventricular diastolic dysfunction consistent with impaired relaxation.  · Normal right ventricular systolic function.  · Moderate left atrial enlargement.  · Mild-to-moderate mitral regurgitation.  · Severe aortic valve stenosis. Aortic valve area is 0.64 cm2; peak velocity is 3.94 m/s; mean gradient is 43 mmHg.  · The estimated PA systolic pressure is 40 mmHg.  · Elevated central venous pressure (15 mmHg).    Microcytic anemia  · Hgb stable and at baseline of 8.1  · Continue to monitor labs  · transfuse if becomes symptomatic or Hgb < 7  · Continue oral supplementation    Bipolar disorder  · Mood stable  · Continue oxcarbazepine    OAB (overactive bladder)  · Continue oxybutynin    Mixed hyperlipidemia  · Continue fenofibrate  · F/u with PCP upon discharge for ongoing outpatient monitoring       VTE Risk Mitigation (From admission, onward)         Ordered     IP VTE HIGH RISK PATIENT  Once      02/11/20 1142     Place RAMESH hose  Until discontinued      02/11/20 1142     Place sequential compression device  Until discontinued      02/11/20 1142                   Toshia Winston NP  Department of Hospital Medicine   [facility]

## 2020-02-12 PROBLEM — R23.9 ALTERATION IN SKIN INTEGRITY: Status: ACTIVE | Noted: 2020-02-12

## 2020-02-12 LAB
ANION GAP SERPL CALC-SCNC: 10 MMOL/L (ref 8–16)
ANISOCYTOSIS BLD QL SMEAR: SLIGHT
BASOPHILS # BLD AUTO: 0.06 K/UL (ref 0–0.2)
BASOPHILS NFR BLD: 0.7 % (ref 0–1.9)
BUN SERPL-MCNC: 12 MG/DL (ref 8–23)
CALCIUM SERPL-MCNC: 9.2 MG/DL (ref 8.7–10.5)
CHLORIDE SERPL-SCNC: 102 MMOL/L (ref 95–110)
CO2 SERPL-SCNC: 26 MMOL/L (ref 23–29)
CREAT SERPL-MCNC: 0.7 MG/DL (ref 0.5–1.4)
DIFFERENTIAL METHOD: ABNORMAL
EOSINOPHIL # BLD AUTO: 0.2 K/UL (ref 0–0.5)
EOSINOPHIL NFR BLD: 1.7 % (ref 0–8)
ERYTHROCYTE [DISTWIDTH] IN BLOOD BY AUTOMATED COUNT: 30.3 % (ref 11.5–14.5)
EST. GFR  (AFRICAN AMERICAN): >60 ML/MIN/1.73 M^2
EST. GFR  (NON AFRICAN AMERICAN): >60 ML/MIN/1.73 M^2
ESTIMATED AVG GLUCOSE: 114 MG/DL (ref 68–131)
GLUCOSE SERPL-MCNC: 206 MG/DL (ref 70–110)
HBA1C MFR BLD HPLC: 5.6 % (ref 4–5.6)
HCT VFR BLD AUTO: 29.2 % (ref 37–48.5)
HGB BLD-MCNC: 8.6 G/DL (ref 12–16)
HYPOCHROMIA BLD QL SMEAR: ABNORMAL
IMM GRANULOCYTES # BLD AUTO: 0.02 K/UL (ref 0–0.04)
IMM GRANULOCYTES NFR BLD AUTO: 0.2 % (ref 0–0.5)
LYMPHOCYTES # BLD AUTO: 2 K/UL (ref 1–4.8)
LYMPHOCYTES NFR BLD: 23.4 % (ref 18–48)
MAGNESIUM SERPL-MCNC: 1.6 MG/DL (ref 1.6–2.6)
MCH RBC QN AUTO: 26.7 PG (ref 27–31)
MCHC RBC AUTO-ENTMCNC: 29.5 G/DL (ref 32–36)
MCV RBC AUTO: 91 FL (ref 82–98)
MONOCYTES # BLD AUTO: 0.6 K/UL (ref 0.3–1)
MONOCYTES NFR BLD: 6.7 % (ref 4–15)
NEUTROPHILS # BLD AUTO: 5.8 K/UL (ref 1.8–7.7)
NEUTROPHILS NFR BLD: 67.3 % (ref 38–73)
NRBC BLD-RTO: 0 /100 WBC
OVALOCYTES BLD QL SMEAR: ABNORMAL
PHOSPHATE SERPL-MCNC: 3.5 MG/DL (ref 2.7–4.5)
PLATELET # BLD AUTO: 253 K/UL (ref 150–350)
PLATELET BLD QL SMEAR: ABNORMAL
PMV BLD AUTO: 10.1 FL (ref 9.2–12.9)
POCT GLUCOSE: 212 MG/DL (ref 70–110)
POIKILOCYTOSIS BLD QL SMEAR: SLIGHT
POLYCHROMASIA BLD QL SMEAR: ABNORMAL
POTASSIUM SERPL-SCNC: 3.1 MMOL/L (ref 3.5–5.1)
RBC # BLD AUTO: 3.22 M/UL (ref 4–5.4)
SODIUM SERPL-SCNC: 138 MMOL/L (ref 136–145)
WBC # BLD AUTO: 8.66 K/UL (ref 3.9–12.7)

## 2020-02-12 PROCEDURE — 97166 OT EVAL MOD COMPLEX 45 MIN: CPT | Mod: HCNC

## 2020-02-12 PROCEDURE — 96361 HYDRATE IV INFUSION ADD-ON: CPT

## 2020-02-12 PROCEDURE — 25000003 PHARM REV CODE 250: Mod: HCNC | Performed by: NURSE PRACTITIONER

## 2020-02-12 PROCEDURE — 36415 COLL VENOUS BLD VENIPUNCTURE: CPT | Mod: HCNC

## 2020-02-12 PROCEDURE — 85025 COMPLETE CBC W/AUTO DIFF WBC: CPT | Mod: HCNC

## 2020-02-12 PROCEDURE — 99217 PR OBSERVATION CARE DISCHARGE: ICD-10-PCS | Mod: HCNC,,, | Performed by: NURSE PRACTITIONER

## 2020-02-12 PROCEDURE — 63600175 PHARM REV CODE 636 W HCPCS: Mod: HCNC | Performed by: NURSE PRACTITIONER

## 2020-02-12 PROCEDURE — 80048 BASIC METABOLIC PNL TOTAL CA: CPT | Mod: HCNC

## 2020-02-12 PROCEDURE — 84100 ASSAY OF PHOSPHORUS: CPT | Mod: HCNC

## 2020-02-12 PROCEDURE — 99217 PR OBSERVATION CARE DISCHARGE: CPT | Mod: HCNC,,, | Performed by: NURSE PRACTITIONER

## 2020-02-12 PROCEDURE — 96375 TX/PRO/DX INJ NEW DRUG ADDON: CPT

## 2020-02-12 PROCEDURE — 83036 HEMOGLOBIN GLYCOSYLATED A1C: CPT | Mod: HCNC

## 2020-02-12 PROCEDURE — 96376 TX/PRO/DX INJ SAME DRUG ADON: CPT

## 2020-02-12 PROCEDURE — G0378 HOSPITAL OBSERVATION PER HR: HCPCS | Mod: HCNC

## 2020-02-12 PROCEDURE — 97161 PT EVAL LOW COMPLEX 20 MIN: CPT | Mod: HCNC

## 2020-02-12 PROCEDURE — 97530 THERAPEUTIC ACTIVITIES: CPT | Mod: HCNC

## 2020-02-12 PROCEDURE — 83735 ASSAY OF MAGNESIUM: CPT | Mod: HCNC

## 2020-02-12 RX ORDER — MAGNESIUM SULFATE HEPTAHYDRATE 40 MG/ML
2 INJECTION, SOLUTION INTRAVENOUS ONCE
Status: COMPLETED | OUTPATIENT
Start: 2020-02-12 | End: 2020-02-12

## 2020-02-12 RX ORDER — ENOXAPARIN SODIUM 100 MG/ML
40 INJECTION SUBCUTANEOUS EVERY 24 HOURS
Status: DISCONTINUED | OUTPATIENT
Start: 2020-02-12 | End: 2020-02-17 | Stop reason: HOSPADM

## 2020-02-12 RX ORDER — POTASSIUM CHLORIDE 20 MEQ/1
40 TABLET, EXTENDED RELEASE ORAL ONCE
Status: COMPLETED | OUTPATIENT
Start: 2020-02-12 | End: 2020-02-12

## 2020-02-12 RX ADMIN — FUROSEMIDE 40 MG: 10 INJECTION, SOLUTION INTRAMUSCULAR; INTRAVENOUS at 04:02

## 2020-02-12 RX ADMIN — FENOFIBRATE 145 MG: 145 TABLET, FILM COATED ORAL at 02:02

## 2020-02-12 RX ADMIN — HYDROCODONE BITARTRATE AND ACETAMINOPHEN 1 TABLET: 10; 325 TABLET ORAL at 05:02

## 2020-02-12 RX ADMIN — HYDROCODONE BITARTRATE AND ACETAMINOPHEN 1 TABLET: 10; 325 TABLET ORAL at 12:02

## 2020-02-12 RX ADMIN — OXCARBAZEPINE 300 MG: 150 TABLET, FILM COATED ORAL at 08:02

## 2020-02-12 RX ADMIN — FUROSEMIDE 40 MG: 10 INJECTION, SOLUTION INTRAMUSCULAR; INTRAVENOUS at 05:02

## 2020-02-12 RX ADMIN — ENOXAPARIN SODIUM 40 MG: 100 INJECTION SUBCUTANEOUS at 05:02

## 2020-02-12 RX ADMIN — POTASSIUM CHLORIDE 40 MEQ: 1500 TABLET, EXTENDED RELEASE ORAL at 08:02

## 2020-02-12 RX ADMIN — OXYBUTYNIN CHLORIDE 5 MG: 5 TABLET ORAL at 08:02

## 2020-02-12 RX ADMIN — FERROUS SULFATE TAB EC 325 MG (65 MG FE EQUIVALENT) 325 MG: 325 (65 FE) TABLET DELAYED RESPONSE at 08:02

## 2020-02-12 RX ADMIN — MAGNESIUM SULFATE IN WATER 2 G: 40 INJECTION, SOLUTION INTRAVENOUS at 09:02

## 2020-02-12 RX ADMIN — PANTOPRAZOLE SODIUM 40 MG: 40 TABLET, DELAYED RELEASE ORAL at 06:02

## 2020-02-12 RX ADMIN — PANTOPRAZOLE SODIUM 40 MG: 40 TABLET, DELAYED RELEASE ORAL at 05:02

## 2020-02-12 RX ADMIN — CETIRIZINE HYDROCHLORIDE 10 MG: 10 TABLET, FILM COATED ORAL at 08:02

## 2020-02-12 RX ADMIN — HYDROCODONE BITARTRATE AND ACETAMINOPHEN 1 TABLET: 10; 325 TABLET ORAL at 08:02

## 2020-02-12 RX ADMIN — POLYETHYLENE GLYCOL 3350 17 G: 17 POWDER, FOR SOLUTION ORAL at 08:02

## 2020-02-12 RX ADMIN — HYDROCODONE BITARTRATE AND ACETAMINOPHEN 1 TABLET: 10; 325 TABLET ORAL at 04:02

## 2020-02-12 NOTE — PT/OT/SLP EVAL
Occupational Therapy   Evaluation    Name: Marycruz Ramirez  MRN: [id number]  Admitting Diagnosis:  Acute on chronic combined systolic and diastolic heart failure      Recommendations:     Discharge Recommendations: nursing facility, skilled  Discharge Equipment Recommendations:  bath bench  Barriers to discharge:  Other (Comment)(Pt required increased assistance at current functional level )    Assessment:     Marycruz Ramirez is a 74 y.o. female with a medical diagnosis of Acute on chronic combined systolic and diastolic heart failure.  She presents with performance deficits affecting function: weakness, impaired endurance, impaired self care skills, impaired functional mobilty, gait instability, impaired balance, decreased upper extremity function, decreased lower extremity function, decreased safety awareness, pain, impaired skin, decreased ROM. Pt declined OOB mobility this date due to increased pain in B LEs and back. Pt reported multiple time during therapy session that she wishes to return home with family assistance despite education provided by therapist in regards to addressing fucntional mobility deficits and impaired (I)ce with performing daily ADLs. Pt educated on benefits of SNF placement in order to facilitate safe return back to the home environment but pt refused to acknowledged OTs recommendations. Pt would benefit from continued skilled acute OT services in order to maximize independence and safety with ADLs and functional mobility to ensure safe return to PLOF in the least restrictive environment.    Rehab Prognosis: Good; patient would benefit from acute skilled OT services to address these deficits and reach maximum level of function.       Plan:     Patient to be seen 3 x/week to address the above listed problems via self-care/home management, therapeutic activities, therapeutic exercises  · Plan of Care Expires: 03/12/20  · Plan of Care Reviewed with: patient    Subjective     Chief Complaint: pain  "in B LEs and back  Patient/Family Comments/goals: pt very adamant in returning him.     Pt stated, "I'm the karao queen of [place].", " I don't care what you recommend, I'm tired of being in the hospital and I want to go home."     Occupational Profile:  Living Environment: Pt reports that she lives with her  in a SSH with threshold to enter. Pt has a tub/shower combo with grab bars present. Pt reports her son is a PTA at [employer] and her daughter is a Massage Therapist at [employer].   Previous level of function: PTA, pt was mod (I) for functional mobility using rollator for household distances. PTA, pt was fairly (I) with ADLs but did require some assistance from .   Roles and Routines: home dweller   Equipment Used at Home:  rollator, grab bar, cane, straight  Assistance upon Discharge: Pt reports she has "a lot" of family support at home to assist pt upon d/c     Pain/Comfort:  · Pain Rating 1: (pt did not rate )  · Location - Side 1: Bilateral  · Location - Orientation 1: lower  · Location 1: leg  · Pain Addressed 1: Pre-medicate for activity, Reposition, Distraction, Cessation of Activity, Nurse notified  · Pain Rating Post-Intervention 1: (pt did not rate )    Patients cultural, spiritual, Muslim conflicts given the current situation: no    Objective:     Communicated with: RN prior to session.  Patient found supine with peripheral IV, telemetry, PureWick upon OT entry to room. Pt stated, " Get me off this bed pan!". Pt agreeable to therapy session.     General Precautions: Standard, fall   Orthopedic Precautions:N/A   Braces: N/A     Occupational Performance:    Bed Mobility:    · Patient completed Rolling/Turning to Left with  total assistance  · Patient completed Scooting/Bridging with total assistance and 2 persons for supine scooting towards HOB   · Pt refused to attempt supine <>sit transfer despite max encouragement from therapist     Functional Mobility/Transfers:  · Pt refused " 2* pain despite max encouragement from therapist.     Activities of Daily Living:  · Feeding:  set-up A  pt took sips of drink from cup while sitting supported in bed   · Toileting: total assistance pt assisted off of bed pan requiring total A for hygiene and clothing management     Cognitive/Visual Perceptual:  Cognitive/Psychosocial Skills:     -       Oriented to: Person, Place and Time; intermittent confusion noted when assisting home environment    -       Follows Commands/attention:Inattentive, Easily distracted and Follows two-step commands  -       Communication: clear/fluent  -       Memory: No Deficits noted but intermittent confusion noted   -       Safety awareness/insight to disability: impaired   -       Mood/Affect/Coping skills/emotional control: Labile  Visual/Perceptual:      -Intact pt wear glasses    Physical Exam:  Balance:    - Pt declined to transfer to EOB or attempt sit<>stand transfers     Postural examination/scapula alignment:    -       Rounded shoulders  -       Forward head  Skin integrity: Bruising of B LEs   Edema:  Moderate BLEs  Sensation:    -       Intact for B UEs   Dominant hand:    -       right  Upper Extremity Range of Motion:     -       Right Upper Extremity: WFL in gravity assisted position   -       Left Upper Extremity: WFL in gravity assisted position   Upper Extremity Strength:    -       Right Upper Extremity: grossly 3+/5   -       Left Upper Extremity: grossly 3+/5    Strength:    -       Right Upper Extremity: 4-/5   -       Left Upper Extremity: 4-/5     AMPAC 6 Click ADL:  AMPAC Total Score: 11    Treatment & Education:  - Pt educated on role of OT, POC, and goals for therapy.    - Patient required constant redirection throughout therapy session   - Pt educated on risk of prolonged stay in the bed, risk of skin breakdown, and generalized weakness/strength.   - Time provided for therapeutic counseling and discussion of health disposition.   - Pt educated on  d/c recs for SNF but pt requesting to return home   - Importance of OOB ax's with staff member assistance and sitting OOB majority of day.   - Pt completed ADLs and functional mobility for treatment session as noted above   - Pt verbalized understanding. Pt expressed no further concerns/questions.  - whiteboard updated   Education:    Patient left HOB elevated with all lines intact, call button in reach and bed alarm on    GOALS:   Multidisciplinary Problems     Occupational Therapy Goals        Problem: Occupational Therapy Goal    Goal Priority Disciplines Outcome Interventions   Occupational Therapy Goal     OT, PT/OT Ongoing, Progressing    Description:  Goals to be met by: 2/26/2020     Patient will increase functional independence with ADLs by performing:    Grooming while EOB with Set-up Assistance.  Toileting from toilet with Moderate Assistance for hygiene and clothing management.   Sitting at edge of bed x10 minutes with Contact Guard Assistance.  Supine to sit with Moderate Assistance.  Stand pivot transfers with Moderate Assistance.  Toilet transfer to bedside commode with Moderate Assistance.                      History:     Past Medical History:   Diagnosis Date    Bipolar 1 disorder     CHF (congestive heart failure)     HLD (hyperlipidemia)     Overactive bladder        Past Surgical History:   Procedure Laterality Date    BACK SURGERY      COLONOSCOPY N/A 1/20/2020    Procedure: COLONOSCOPY;  Surgeon: Micah Patel MD;  Location: [address]);  Service: Endoscopy;  Laterality: N/A;    ESOPHAGOGASTRODUODENOSCOPY N/A 1/20/2020    Procedure: EGD (ESOPHAGOGASTRODUODENOSCOPY);  Surgeon: Micah Patel MD;  Location: [address]);  Service: Endoscopy;  Laterality: N/A;    KNEE SURGERY Bilateral        Time Tracking:     OT Date of Treatment: 02/12/20  OT Start Time: 1424  OT Stop Time: 1439  OT Total Time (min): 15 min    Billable Minutes:Evaluation 15     Annamarie BEAR Soto,  OT  2/12/2020

## 2020-02-12 NOTE — PLAN OF CARE
Problem: Physical Therapy Goal  Goal: Physical Therapy Goal  Description  Goals to be met by: 2020     Patient will increase functional independence with mobility by performin. Supine to sit with Modified [place]  2. Sit to supine with Modified [place]  3. Sit to stand transfer with Contact Guard Assistance  4. Bed to chair transfer with Contact Guard Assistance using Rolling Walker/rollator or LRAD  5. Gait  x 25 feet with Contact Guard Assistance using Rolling Walker/rollator or LRAD.   6. Lower extremity exercise program x15 reps with assistance as needed     Outcome: Ongoing, Progressing   Pt evaluated and appropriate goals established. Sridevi Sullivan, SPT

## 2020-02-12 NOTE — PLAN OF CARE
02/12/20 1516   Post-Acute Status   Post-Acute Authorization Placement     SW met with pt and spouse at bedside to discuss PT rec for SNF. Pt declined SNF placement and stated she is comfortable going home with home health. Pt feels that her son who is a PTA and her  are able to manage all of her needs at home.  is home with pt 24/7. SW discussed case with CM. SW and CM will continue to follow and assist with needs as indicated.    Dominique Ruiz LMSW  [facility]  [id number]

## 2020-02-12 NOTE — ASSESSMENT & PLAN NOTE
· Volume overloaded on exam  · 3+ edema to BLE--improved to 2+ edema  · Rales present  · Given 80mg IV in ED with 500 output  · Ordered 40mg iv bid--continue with iv diuresis of lasix daily  · Elevation of leg  · Daily weight  · Low NA diet with 1500cc fluid restriction  ·  was 589 on prior admission  · Troponin 0.070 will trend, denies CP.  · Not on medical management, was seen in Cardiology clinic on 2/6 and at that time not recommend to start medication and plans for echo in March. Felt that depressed EF and cardiomyopathy was secondary to septic shock, pressors, and bacteremia.   ECHO 1/26/2020:  · Moderate left ventricular enlargement.  · Moderately decreased left ventricular systolic function. The estimated ejection fraction is 30%.  · Grade I (mild) left ventricular diastolic dysfunction consistent with impaired relaxation.  · Normal right ventricular systolic function.  · Moderate left atrial enlargement.  · Mild-to-moderate mitral regurgitation.  · Severe aortic valve stenosis. Aortic valve area is 0.64 cm2; peak velocity is 3.94 m/s; mean gradient is 43 mmHg.  · The estimated PA systolic pressure is 40 mmHg.  · Elevated central venous pressure (15 mmHg).

## 2020-02-12 NOTE — PLAN OF CARE
CM met with patient at the bedside to discuss discharge planning assessment. Pt lives with her  who is able to provide transportation at discharge. Pt is current with [facility]. Pt verified PCP and Pharmacy. CM will continue to follow for discharge needs.        02/12/20 0931   Discharge Assessment   Assessment Type Discharge Planning Assessment   Confirmed/corrected address and phone number on facesheet? Yes   Assessment information obtained from? Patient   Expected Length of Stay (days) 2   Communicated expected length of stay with patient/caregiver yes   Prior to hospitilization cognitive status: Alert/Oriented   Prior to hospitalization functional status: Assistive Equipment   Current cognitive status: Alert/Oriented   Current Functional Status: Assistive Equipment   Able to Return to Prior Arrangements yes   Is patient able to care for self after discharge? Unable to determine at this time (comments)   Who are your caregiver(s) and their phone number(s)? El Ramirez spouse- [phone]   Patient's perception of discharge disposition home health   Readmission Within the Last 30 Days no previous admission in last 30 days   Patient currently being followed by outpatient case management? No   Patient currently receives any other outside agency services? Yes   Name and contact number of agency or person providing outside services [facility]    Is it the patient/care giver preference to resume care with the current outside agency? Yes   Equipment Currently Used at Home walker, rolling;cane, straight   Do you have any problems affording any of your prescribed medications? No   Is the patient taking medications as prescribed? yes   Does the patient have transportation home? Yes   Transportation Anticipated family or friend will provide   Does the patient receive services at the [facility]? No   Discharge Plan A Home Health   Discharge Plan B Skilled Nursing Facility   DME Needed Upon Discharge   none   Patient/Family in Agreement with Plan yes

## 2020-02-12 NOTE — ASSESSMENT & PLAN NOTE
· Hgb stable and at baseline of 8.1--8.6  · Continue to monitor labs  · transfuse if becomes symptomatic or Hgb < 7  · Continue oral supplementation

## 2020-02-12 NOTE — HOSPITAL COURSE
Patient placed in observation for acute on chronic systolic/diastolic heart failure. , troponin 0.07--0.093--0.073. IV diuresis with lasix 2/11-2/13. Transitioned to oral furosemide 40 mg bid on evening of 2/13 with good output (800 ml) and this was continued until transfer to SNF.  Patient also with a fall at home prior to admission. PT/OT consulted. PT recommending SNF. Wound care consulted for multiple skin tears.  2/14 pt participated with therapy and continuing to recommend SNF.  Transfer to [facility] on 2/17/2020

## 2020-02-12 NOTE — PROGRESS NOTES
[facility] Hospital Medicine  Progress Note    Patient Name: Marycruz Ramirez  MRN: [id number]  Patient Class: OP- Observation   Admission Date: 2/11/2020  Length of Stay: 0 days  Attending Physician: Ty Pearson MD  Primary Care Provider: Marcelino Calle MD    [facility] Medicine Team: [facility]OS[facility] MED Y Toshia Winston NP    Subjective:     Principal Problem:Acute on chronic combined systolic and diastolic heart failure    HPI:  Patient is a 75 yo female being placed in observation for acute on chronic heart failure. Patient with past medical hx of new CHF (EF 30), aortic stenosis, HLD, bipolar and overactive bladder. Patient was recently discharged from the hospital last month for pyelonephritis which required CC stay for sepsis (sent with home health) and admission before that was for anemia requiring blood transfusion and GI scope. Patient is present to the ED after reports of her legs becoming weak and falling onto her knees. Reports pain to bilateral knees but has some improvement with pain medication. Patient also endured skin tears to left arm and bilateral legs. Patient also endorses large amount of lower extremity swelling the last 3 days making her legs feel heavy. presenting with weakness and a fall. Reports recently seeing cardiology and plans for echo next month with possible angiogram. Patient works with [employer] every day. She denies fever, chills, abdominal pain, chest pain, lower extremity pain, headache, LOC, dizziness, or confusion.    ED: VVS, WBC 14.89 (baseline), Hgb 8.1 (baseline), , Troponin 0.070, UA neg, BC--pending. XR to bilateral hip and knees: without fractures. CXR: Interval diuresis compared to 01/25/2020.  No new disease identified     Overview/Hospital Course:  Patient placed in observation for acute on chronic systolic/diastolic heart failure. , troponin 0.07--0.093--0.073. IV diuresis with lasix working well. Patient also with a fall at  home prior to admission. PT/OT consulted. PT recommending SNF. Wound care consulted for multiple skin tears.     Interval History: Patient seen at bedside. Lower extremity edema with improvement, reduced iv lasix to daily. Pt recommending SNF awaiting OT recommendations.     Review of Systems   Constitutional: Positive for fatigue. Negative for appetite change, chills and fever.   HENT: Negative for trouble swallowing.    Respiratory: Negative for cough, chest tightness, shortness of breath and wheezing.    Cardiovascular: Positive for leg swelling. Negative for chest pain and palpitations.   Gastrointestinal: Positive for abdominal distention. Negative for abdominal pain, constipation, diarrhea and nausea.   Genitourinary: Negative for difficulty urinating, frequency and urgency.   Musculoskeletal: Negative for arthralgias and myalgias.   Skin: Positive for color change. Negative for rash.   Neurological: Positive for weakness. Negative for dizziness, light-headedness and headaches.   Psychiatric/Behavioral: Negative for sleep disturbance.     Objective:     Vital Signs (Most Recent):  Temp: 97 °F (36.1 °C) (02/12/20 1123)  Pulse: 61 (02/12/20 1127)  Resp: 16 (02/12/20 1123)  BP: (!) 119/59 (02/12/20 1123)  SpO2: 96 % (02/12/20 1123) Vital Signs (24h Range):  Temp:  [97 °F (36.1 °C)-99.1 °F (37.3 °C)] 97 °F (36.1 °C)  Pulse:  [61-88] 61  Resp:  [16-18] 16  SpO2:  [93 %-97 %] 96 %  BP: (105-135)/(56-64) 119/59     Weight: 51.4 kg (113 lb 5.1 oz)  Body mass index is 22.13 kg/m².    Intake/Output Summary (Last 24 hours) at 2/12/2020 1438  Last data filed at 2/12/2020 0600  Gross per 24 hour   Intake 540 ml   Output 900 ml   Net -360 ml      Physical Exam   Constitutional: She is oriented to person, place, and time. She appears well-developed and well-nourished. No distress.   Cardiovascular: Normal rate, regular rhythm and normal heart sounds. Exam reveals no gallop and no friction rub.   No murmur  heard.  Pulmonary/Chest: Effort normal. No respiratory distress. She has no wheezes. She has rales (improving).   Abdominal: Soft. Bowel sounds are normal. She exhibits no distension. There is no tenderness.   Musculoskeletal: Normal range of motion. She exhibits edema (improving 2+ edema to BLE). She exhibits no tenderness.   Neurological: She is alert and oriented to person, place, and time.   Skin: Skin is warm and dry. No rash noted. She is not diaphoretic. No cyanosis. Nails show no clubbing.   Bruising to BUE and BLE. Tears noted to left arm and left leg   Psychiatric: She has a normal mood and affect. Her behavior is normal.       Significant Labs:   A1C:   Recent Labs   Lab 01/27/20  0335 02/12/20  0521   HGBA1C 5.8* 5.6     Bilirubin:   Recent Labs   Lab 01/26/20  0631 01/27/20  0335 01/28/20  0441 01/29/20  0514 02/11/20  0928   BILITOT 0.6 0.6 0.6 0.5 0.4     Blood Culture:   Recent Labs   Lab 02/11/20  0928 02/11/20  0935   LABBLOO No Growth to date  No Growth to date No Growth to date  No Growth to date     CBC:   Recent Labs   Lab 02/11/20 0928 02/12/20  0521   WBC 14.89* 8.66   HGB 8.1* 8.6*   HCT 28.8* 29.2*    253     CMP:   Recent Labs   Lab 02/11/20 0928 02/12/20  0521    138   K 3.6 3.1*    102   CO2 24 26   * 206*   BUN 17 12   CREATININE 0.7 0.7   CALCIUM 9.5 9.2   PROT 6.3  --    ALBUMIN 3.3*  --    BILITOT 0.4  --    ALKPHOS 75  --    AST 20  --    ALT 15  --    ANIONGAP 9 10   EGFRNONAA >60.0 >60.0     Cardiac Markers:   Recent Labs   Lab 02/11/20 0928   *     Coagulation:   Recent Labs   Lab 02/11/20 0928   INR 1.1     Lactic Acid:   Recent Labs   Lab 02/11/20 0928   LACTATE 1.1     Magnesium:   Recent Labs   Lab 02/11/20 0928 02/12/20  0521   MG 1.9 1.6     POCT Glucose:   Recent Labs   Lab 02/11/20  2118   POCTGLUCOSE 212*     Troponin:   Recent Labs   Lab 02/11/20  0928 02/11/20  1452 02/11/20  2105   TROPONINI 0.070* 0.093* 0.073*     Urine  Studies:   Recent Labs   Lab 02/11/20  0945   COLORU Yellow   APPEARANCEUA Clear   PHUR 5.0   SPECGRAV 1.020   PROTEINUA Negative   GLUCUA Negative   KETONESU Negative   BILIRUBINUA Negative   OCCULTUA Negative   NITRITE Negative   LEUKOCYTESUR Negative   RBCUA 0   WBCUA 2   SQUAMEPITHEL 0     All pertinent labs within the past 24 hours have been reviewed.    Significant Imaging: I have reviewed all pertinent imaging results/findings within the past 24 hours.      Assessment/Plan:      * Acute on chronic combined systolic and diastolic heart failure  · Volume overloaded on exam  · 3+ edema to BLE--improved to 2+ edema  · Rales present  · Given 80mg IV in ED with 500 output  · Ordered 40mg iv bid--continue with iv diuresis of lasix daily  · Elevation of leg  · Daily weight  · Low NA diet with 1500cc fluid restriction  ·  was 589 on prior admission  · Troponin 0.070 will trend, denies CP.  · Not on medical management, was seen in Cardiology clinic on 2/6 and at that time not recommend to start medication and plans for echo in March. Felt that depressed EF and cardiomyopathy was secondary to septic shock, pressors, and bacteremia.   ECHO 1/26/2020:  · Moderate left ventricular enlargement.  · Moderately decreased left ventricular systolic function. The estimated ejection fraction is 30%.  · Grade I (mild) left ventricular diastolic dysfunction consistent with impaired relaxation.  · Normal right ventricular systolic function.  · Moderate left atrial enlargement.  · Mild-to-moderate mitral regurgitation.  · Severe aortic valve stenosis. Aortic valve area is 0.64 cm2; peak velocity is 3.94 m/s; mean gradient is 43 mmHg.  · The estimated PA systolic pressure is 40 mmHg.  · Elevated central venous pressure (15 mmHg).    Alteration in skin integrity  · Consulted wound care    Fall  · PT/OT recommends SNF    Microcytic anemia  · Hgb stable and at baseline of 8.1--8.6  · Continue to monitor labs  · transfuse if becomes  symptomatic or Hgb < 7  · Continue oral supplementation    Bipolar disorder  · Mood stable  · Continue oxcarbazepine    OAB (overactive bladder)  · Continue oxybutynin    Chronic radicular lumbar pain  · Continue hydrocodone  · checked  gets new RX monthly    Mixed hyperlipidemia  · Continue fenofibrate  · F/u with PCP upon discharge for ongoing outpatient monitoring       VTE Risk Mitigation (From admission, onward)         Ordered     IP VTE HIGH RISK PATIENT  Once      02/11/20 1142     Place RAMESH hose  Until discontinued      02/11/20 1142     Place sequential compression device  Until discontinued      02/11/20 1142                Toshia Winston NP  Department of Hospital Medicine   [facility]

## 2020-02-12 NOTE — PLAN OF CARE
Problem: Occupational Therapy Goal  Goal: Occupational Therapy Goal  Description  Goals to be met by: 2/26/2020     Patient will increase functional independence with ADLs by performing:    Grooming while EOB with Set-up Assistance.  Toileting from toilet with Moderate Assistance for hygiene and clothing management.   Sitting at edge of bed x10 minutes with Contact Guard Assistance.  Supine to sit with Moderate Assistance.  Stand pivot transfers with Moderate Assistance.  Toilet transfer to bedside commode with Moderate Assistance.     Outcome: Ongoing, Progressing    Annamarie Soto, OTR/L  Pager: [phone]  2/12/2020

## 2020-02-12 NOTE — PT/OT/SLP EVAL
Physical Therapy Evaluation    Patient Name:  Marycruz Ramirez   MRN:  [id number]    Recommendations:     Discharge Recommendations:  nursing facility, skilled   Discharge Equipment Recommendations: bath bench   Barriers to discharge: increased need for skilled assistance at current level of function    Assessment:     Marycruz Ramirez is a 74 y.o. female admitted with a medical diagnosis of Acute on chronic combined systolic and diastolic heart failure.  She presents with the following impairments/functional limitations:  weakness, impaired functional mobilty, gait instability, decreased lower extremity function, decreased ROM, pain, impaired self care skills, impaired balance, decreased safety awareness . Pt was pleasant and agreeable to evaluation. Pt requires redirecting. BLE are very sensitive to touch, which limited assessment during evaluation. Pt was also anxious and had difficulty following commands despite maximum encouragement. Pt was total A for bed mobility and declined participating in transfers or ambulation due to pain. Pt reports she has been walking with her rollator PTA, but deficits in LE ROM and strength do not appear functional, continue to re-assess. Pt would benefit from continuation of therapy services as pain is controlled in order to maximize safety and independence with mobility..    Rehab Prognosis: Fair; patient would benefit from acute skilled PT services to address these deficits and reach maximum level of function.    Recent Surgery: * No surgery found *      Plan:     During this hospitalization, patient to be seen 3 x/week to address the identified rehab impairments via gait training, therapeutic activities, therapeutic exercises, neuromuscular re-education and progress toward the following goals:    · Plan of Care Expires:  03/13/20    Subjective     Chief Complaint: Pain in BLE  Patient/Family Comments/goals: return to PLOF  Pain/Comfort:  Pain Rating 1: 7/10  Location - Side 1:  Bilateral  Location - Orientation 1: lower  Location 1: leg  Pain Addressed 1: Pre-medicate for activity, Distraction, Cessation of Activity, Reposition  Pain Rating Post-Intervention 1: other (see comments)(did not report)    Patients cultural, spiritual, [employer] conflicts given the current situation:      Living Environment:  Pt lives with her  in a H with TH ADALGISA. Pt has a tub/shower combo with grab bars.   Prior to admission, patients level of function was mod I with rollator, according to pt. Pt says she needs some help from her  with getting dressed in the morning.  Equipment used at home: rollator, grab bar, cane, straight.  DME owned (not currently used): none.  Upon discharge, patient will have assistance from  and family that live nearby.    Objective:     Communicated with nurse prior to session.  Patient found HOB elevated with peripheral IV, telemetry, PureWick  upon PT entry to room.    General Precautions: Standard, fall   Orthopedic Precautions:N/A   Braces: N/A     Exams:  · Cognitive Exam:  Patient is oriented to Person, Place and Situation  · Sensation:    · -       Impaired  light/touch BLE extremely sensitive to touch  · RLE ROM: unable to formally assess due to pain; through observation hip flexion limited to 70*, knee 20-50* of flexion, ankle 5-15* of PF  · RLE Strength: unable to formally assess due to pain; through observation hip flexion, knee flexion, and knee extension 1/5, ankle PF/DF 2/5  · LLE ROM: unable to formally assess due to pain; through observation hip flexion limited to 70*, knee 10-70* of flexion, ankle 5-15* of PF  · LLE Strength: unable to formally assess due to pain; grossly 2/5 throughout    Functional Mobility:  · Bed Mobility:     · Scooting toward EOB: maximal assistance  · Supine to Sit: total assistance  · Sit to Supine: total assistance  · Transfers:   Pt declined due to pain in BLE  · Gait: Pt declined due to pain in BLE  · Balance: static  sitting balance: max A with posterior lean, refuses to follow commands for postural correction despite maximum cueing and encouragement      Therapeutic Activities and Exercises:   Pt sat EOB for 7 minutes with max A due to posterior lean. Pt was anxious and had difficulty following commands for postural correction.  Pt educated on PT role/POC, benefits of OOB activity. Pt verbalized understanding.    AM-PAC 6 CLICK MOBILITY  Total Score:12     Patient left HOB elevated with all lines intact, call button in reach, bed alarm on and nurse notified.    GOALS:   Multidisciplinary Problems     Physical Therapy Goals        Problem: Physical Therapy Goal    Goal Priority Disciplines Outcome Goal Variances Interventions   Physical Therapy Goal     PT, PT/OT Ongoing, Progressing     Description:  Goals to be met by: 2020     Patient will increase functional independence with mobility by performin. Supine to sit with Modified [place]  2. Sit to supine with Modified [place]  3. Sit to stand transfer with Contact Guard Assistance  4. Bed to chair transfer with Contact Guard Assistance using Rolling Walker/rollator or LRAD  5. Gait  x 25 feet with Contact Guard Assistance using Rolling Walker/rollator or LRAD.   6. Lower extremity exercise program x15 reps with assistance as needed                       History:     Past Medical History:   Diagnosis Date    Bipolar 1 disorder     CHF (congestive heart failure)     HLD (hyperlipidemia)     Overactive bladder        Past Surgical History:   Procedure Laterality Date    BACK SURGERY      COLONOSCOPY N/A 2020    Procedure: COLONOSCOPY;  Surgeon: Micah Patel MD;  Location: [address];  Service: Endoscopy;  Laterality: N/A;    ESOPHAGOGASTRODUODENOSCOPY N/A 2020    Procedure: EGD (ESOPHAGOGASTRODUODENOSCOPY);  Surgeon: Micah Patel MD;  Location: [address]);  Service: Endoscopy;  Laterality: N/A;    KNEE SURGERY  Bilateral        Time Tracking:     PT Received On: 02/12/20  PT Start Time: 0952     PT Stop Time: 1015  PT Total Time (min): 23 min     Billable Minutes: Evaluation 10 and Therapeutic Activity 13      TERRY Flores  02/12/2020

## 2020-02-12 NOTE — SUBJECTIVE & OBJECTIVE
Interval History: Patient seen at bedside. Lower extremity edema with improvement, reduced iv lasix to daily. Pt recommending SNF awaiting OT recommendations.     Review of Systems   Constitutional: Positive for fatigue. Negative for appetite change, chills and fever.   HENT: Negative for trouble swallowing.    Respiratory: Negative for cough, chest tightness, shortness of breath and wheezing.    Cardiovascular: Positive for leg swelling. Negative for chest pain and palpitations.   Gastrointestinal: Positive for abdominal distention. Negative for abdominal pain, constipation, diarrhea and nausea.   Genitourinary: Negative for difficulty urinating, frequency and urgency.   Musculoskeletal: Negative for arthralgias and myalgias.   Skin: Positive for color change. Negative for rash.   Neurological: Positive for weakness. Negative for dizziness, light-headedness and headaches.   Psychiatric/Behavioral: Negative for sleep disturbance.     Objective:     Vital Signs (Most Recent):  Temp: 97 °F (36.1 °C) (02/12/20 1123)  Pulse: 61 (02/12/20 1127)  Resp: 16 (02/12/20 1123)  BP: (!) 119/59 (02/12/20 1123)  SpO2: 96 % (02/12/20 1123) Vital Signs (24h Range):  Temp:  [97 °F (36.1 °C)-99.1 °F (37.3 °C)] 97 °F (36.1 °C)  Pulse:  [61-88] 61  Resp:  [16-18] 16  SpO2:  [93 %-97 %] 96 %  BP: (105-135)/(56-64) 119/59     Weight: 51.4 kg (113 lb 5.1 oz)  Body mass index is 22.13 kg/m².    Intake/Output Summary (Last 24 hours) at 2/12/2020 1438  Last data filed at 2/12/2020 0600  Gross per 24 hour   Intake 540 ml   Output 900 ml   Net -360 ml      Physical Exam   Constitutional: She is oriented to person, place, and time. She appears well-developed and well-nourished. No distress.   Cardiovascular: Normal rate, regular rhythm and normal heart sounds. Exam reveals no gallop and no friction rub.   No murmur heard.  Pulmonary/Chest: Effort normal. No respiratory distress. She has no wheezes. She has rales (improving).   Abdominal: Soft.  Bowel sounds are normal. She exhibits no distension. There is no tenderness.   Musculoskeletal: Normal range of motion. She exhibits edema (improving 2+ edema to BLE). She exhibits no tenderness.   Neurological: She is alert and oriented to person, place, and time.   Skin: Skin is warm and dry. No rash noted. She is not diaphoretic. No cyanosis. Nails show no clubbing.   Bruising to BUE and BLE. Tears noted to left arm and left leg   Psychiatric: She has a normal mood and affect. Her behavior is normal.       Significant Labs:   A1C:   Recent Labs   Lab 01/27/20  0335 02/12/20  0521   HGBA1C 5.8* 5.6     Bilirubin:   Recent Labs   Lab 01/26/20  0631 01/27/20  0335 01/28/20  0441 01/29/20  0514 02/11/20  0928   BILITOT 0.6 0.6 0.6 0.5 0.4     Blood Culture:   Recent Labs   Lab 02/11/20 0928 02/11/20  0935   LABBLOO No Growth to date  No Growth to date No Growth to date  No Growth to date     CBC:   Recent Labs   Lab 02/11/20 0928 02/12/20  0521   WBC 14.89* 8.66   HGB 8.1* 8.6*   HCT 28.8* 29.2*    253     CMP:   Recent Labs   Lab 02/11/20 0928 02/12/20  0521    138   K 3.6 3.1*    102   CO2 24 26   * 206*   BUN 17 12   CREATININE 0.7 0.7   CALCIUM 9.5 9.2   PROT 6.3  --    ALBUMIN 3.3*  --    BILITOT 0.4  --    ALKPHOS 75  --    AST 20  --    ALT 15  --    ANIONGAP 9 10   EGFRNONAA >60.0 >60.0     Cardiac Markers:   Recent Labs   Lab 02/11/20 0928   *     Coagulation:   Recent Labs   Lab 02/11/20 0928   INR 1.1     Lactic Acid:   Recent Labs   Lab 02/11/20 0928   LACTATE 1.1     Magnesium:   Recent Labs   Lab 02/11/20 0928 02/12/20  0521   MG 1.9 1.6     POCT Glucose:   Recent Labs   Lab 02/11/20 2118   POCTGLUCOSE 212*     Troponin:   Recent Labs   Lab 02/11/20 0928 02/11/20  1452 02/11/20  2105   TROPONINI 0.070* 0.093* 0.073*     Urine Studies:   Recent Labs   Lab 02/11/20  0945   COLORU Yellow   APPEARANCEUA Clear   PHUR 5.0   SPECGRAV 1.020   PROTEINUA Negative    GLUCUA Negative   KETONESU Negative   BILIRUBINUA Negative   OCCULTUA Negative   NITRITE Negative   LEUKOCYTESUR Negative   RBCUA 0   WBCUA 2   SQUAMEPITHEL 0     All pertinent labs within the past 24 hours have been reviewed.    Significant Imaging: I have reviewed all pertinent imaging results/findings within the past 24 hours.

## 2020-02-13 PROBLEM — T14.8XXA MULTIPLE SKIN TEARS: Status: ACTIVE | Noted: 2020-02-13

## 2020-02-13 LAB
ANION GAP SERPL CALC-SCNC: 9 MMOL/L (ref 8–16)
BASOPHILS # BLD AUTO: 0.04 K/UL (ref 0–0.2)
BASOPHILS NFR BLD: 0.4 % (ref 0–1.9)
BUN SERPL-MCNC: 11 MG/DL (ref 8–23)
CALCIUM SERPL-MCNC: 9.4 MG/DL (ref 8.7–10.5)
CHLORIDE SERPL-SCNC: 100 MMOL/L (ref 95–110)
CO2 SERPL-SCNC: 27 MMOL/L (ref 23–29)
CREAT SERPL-MCNC: 0.7 MG/DL (ref 0.5–1.4)
DIFFERENTIAL METHOD: ABNORMAL
EOSINOPHIL # BLD AUTO: 0.2 K/UL (ref 0–0.5)
EOSINOPHIL NFR BLD: 1.9 % (ref 0–8)
ERYTHROCYTE [DISTWIDTH] IN BLOOD BY AUTOMATED COUNT: 29.9 % (ref 11.5–14.5)
EST. GFR  (AFRICAN AMERICAN): >60 ML/MIN/1.73 M^2
EST. GFR  (NON AFRICAN AMERICAN): >60 ML/MIN/1.73 M^2
GLUCOSE SERPL-MCNC: 169 MG/DL (ref 70–110)
HCT VFR BLD AUTO: 31.1 % (ref 37–48.5)
HGB BLD-MCNC: 9.3 G/DL (ref 12–16)
IMM GRANULOCYTES # BLD AUTO: 0.02 K/UL (ref 0–0.04)
IMM GRANULOCYTES NFR BLD AUTO: 0.2 % (ref 0–0.5)
LYMPHOCYTES # BLD AUTO: 1.8 K/UL (ref 1–4.8)
LYMPHOCYTES NFR BLD: 19.2 % (ref 18–48)
MAGNESIUM SERPL-MCNC: 1.8 MG/DL (ref 1.6–2.6)
MCH RBC QN AUTO: 27 PG (ref 27–31)
MCHC RBC AUTO-ENTMCNC: 29.9 G/DL (ref 32–36)
MCV RBC AUTO: 90 FL (ref 82–98)
MONOCYTES # BLD AUTO: 0.8 K/UL (ref 0.3–1)
MONOCYTES NFR BLD: 8.5 % (ref 4–15)
NEUTROPHILS # BLD AUTO: 6.6 K/UL (ref 1.8–7.7)
NEUTROPHILS NFR BLD: 69.8 % (ref 38–73)
NRBC BLD-RTO: 0 /100 WBC
PHOSPHATE SERPL-MCNC: 3.5 MG/DL (ref 2.7–4.5)
PLATELET # BLD AUTO: 246 K/UL (ref 150–350)
PMV BLD AUTO: 10 FL (ref 9.2–12.9)
POTASSIUM SERPL-SCNC: 3.8 MMOL/L (ref 3.5–5.1)
RBC # BLD AUTO: 3.44 M/UL (ref 4–5.4)
SODIUM SERPL-SCNC: 136 MMOL/L (ref 136–145)
WBC # BLD AUTO: 9.39 K/UL (ref 3.9–12.7)

## 2020-02-13 PROCEDURE — 83735 ASSAY OF MAGNESIUM: CPT | Mod: HCNC

## 2020-02-13 PROCEDURE — 80048 BASIC METABOLIC PNL TOTAL CA: CPT | Mod: HCNC

## 2020-02-13 PROCEDURE — G0378 HOSPITAL OBSERVATION PER HR: HCPCS | Mod: HCNC

## 2020-02-13 PROCEDURE — 99226 PR SUBSEQUENT OBSERVATION CARE,LEVEL III: ICD-10-PCS | Mod: HCNC,,, | Performed by: PHYSICIAN ASSISTANT

## 2020-02-13 PROCEDURE — 63600175 PHARM REV CODE 636 W HCPCS: Mod: HCNC | Performed by: NURSE PRACTITIONER

## 2020-02-13 PROCEDURE — 36415 COLL VENOUS BLD VENIPUNCTURE: CPT | Mod: HCNC

## 2020-02-13 PROCEDURE — 25000003 PHARM REV CODE 250: Mod: HCNC | Performed by: PHYSICIAN ASSISTANT

## 2020-02-13 PROCEDURE — 96376 TX/PRO/DX INJ SAME DRUG ADON: CPT

## 2020-02-13 PROCEDURE — 25000003 PHARM REV CODE 250: Mod: HCNC | Performed by: NURSE PRACTITIONER

## 2020-02-13 PROCEDURE — 97530 THERAPEUTIC ACTIVITIES: CPT | Mod: HCNC,CQ

## 2020-02-13 PROCEDURE — 99226 PR SUBSEQUENT OBSERVATION CARE,LEVEL III: CPT | Mod: HCNC,,, | Performed by: PHYSICIAN ASSISTANT

## 2020-02-13 PROCEDURE — 84100 ASSAY OF PHOSPHORUS: CPT | Mod: HCNC

## 2020-02-13 PROCEDURE — 96372 THER/PROPH/DIAG INJ SC/IM: CPT

## 2020-02-13 PROCEDURE — 30200315 PPD INTRADERMAL TEST REV CODE 302: Mod: HCNC | Performed by: INTERNAL MEDICINE

## 2020-02-13 PROCEDURE — 85025 COMPLETE CBC W/AUTO DIFF WBC: CPT | Mod: HCNC

## 2020-02-13 PROCEDURE — 86580 TB INTRADERMAL TEST: CPT | Mod: HCNC | Performed by: INTERNAL MEDICINE

## 2020-02-13 RX ORDER — FUROSEMIDE 40 MG/1
40 TABLET ORAL 2 TIMES DAILY
Status: DISCONTINUED | OUTPATIENT
Start: 2020-02-13 | End: 2020-02-17 | Stop reason: HOSPADM

## 2020-02-13 RX ORDER — OXCARBAZEPINE 150 MG/1
600 TABLET, FILM COATED ORAL NIGHTLY
Status: DISCONTINUED | OUTPATIENT
Start: 2020-02-13 | End: 2020-02-17 | Stop reason: HOSPADM

## 2020-02-13 RX ORDER — DICLOFENAC SODIUM 10 MG/G
2 GEL TOPICAL 3 TIMES DAILY PRN
Status: DISCONTINUED | OUTPATIENT
Start: 2020-02-13 | End: 2020-02-17 | Stop reason: HOSPADM

## 2020-02-13 RX ORDER — CYCLOBENZAPRINE HCL 10 MG
10 TABLET ORAL 3 TIMES DAILY PRN
Status: DISCONTINUED | OUTPATIENT
Start: 2020-02-13 | End: 2020-02-17 | Stop reason: HOSPADM

## 2020-02-13 RX ADMIN — PANTOPRAZOLE SODIUM 40 MG: 40 TABLET, DELAYED RELEASE ORAL at 04:02

## 2020-02-13 RX ADMIN — OXYBUTYNIN CHLORIDE 5 MG: 5 TABLET ORAL at 08:02

## 2020-02-13 RX ADMIN — PANTOPRAZOLE SODIUM 40 MG: 40 TABLET, DELAYED RELEASE ORAL at 05:02

## 2020-02-13 RX ADMIN — HYDROCODONE BITARTRATE AND ACETAMINOPHEN 1 TABLET: 10; 325 TABLET ORAL at 12:02

## 2020-02-13 RX ADMIN — HYDROCODONE BITARTRATE AND ACETAMINOPHEN 1 TABLET: 10; 325 TABLET ORAL at 08:02

## 2020-02-13 RX ADMIN — CETIRIZINE HYDROCHLORIDE 10 MG: 10 TABLET, FILM COATED ORAL at 08:02

## 2020-02-13 RX ADMIN — ENOXAPARIN SODIUM 40 MG: 100 INJECTION SUBCUTANEOUS at 05:02

## 2020-02-13 RX ADMIN — OXCARBAZEPINE 300 MG: 150 TABLET, FILM COATED ORAL at 08:02

## 2020-02-13 RX ADMIN — FUROSEMIDE 40 MG: 10 INJECTION, SOLUTION INTRAMUSCULAR; INTRAVENOUS at 05:02

## 2020-02-13 RX ADMIN — FUROSEMIDE 40 MG: 40 TABLET ORAL at 05:02

## 2020-02-13 RX ADMIN — CYCLOBENZAPRINE HYDROCHLORIDE 10 MG: 10 TABLET, FILM COATED ORAL at 05:02

## 2020-02-13 RX ADMIN — POLYETHYLENE GLYCOL 3350 17 G: 17 POWDER, FOR SOLUTION ORAL at 08:02

## 2020-02-13 RX ADMIN — FERROUS SULFATE TAB EC 325 MG (65 MG FE EQUIVALENT) 325 MG: 325 (65 FE) TABLET DELAYED RESPONSE at 08:02

## 2020-02-13 RX ADMIN — OXCARBAZEPINE 600 MG: 150 TABLET, FILM COATED ORAL at 08:02

## 2020-02-13 RX ADMIN — HYDROCODONE BITARTRATE AND ACETAMINOPHEN 1 TABLET: 10; 325 TABLET ORAL at 05:02

## 2020-02-13 RX ADMIN — HYDROCODONE BITARTRATE AND ACETAMINOPHEN 1 TABLET: 5; 325 TABLET ORAL at 05:02

## 2020-02-13 RX ADMIN — Medication 5 UNITS: at 12:02

## 2020-02-13 RX ADMIN — FENOFIBRATE 145 MG: 145 TABLET, FILM COATED ORAL at 12:02

## 2020-02-13 NOTE — PLAN OF CARE
Problem: Physical Therapy Goal  Goal: Physical Therapy Goal  Description  Goals to be met by: 2020     Patient will increase functional independence with mobility by performin. Supine to sit with Modified [place]  2. Sit to supine with Modified [place]  3. Sit to stand transfer with Contact Guard Assistance  4. Bed to chair transfer with Contact Guard Assistance using Rolling Walker/rollator or LRAD  5. Gait  x 25 feet with Contact Guard Assistance using Rolling Walker/rollator or LRAD.   6. Lower extremity exercise program x15 reps with assistance as needed      Outcome: Ongoing,  continue with PT POC.Goals remain appropriate.  Stalin Yoon PTA

## 2020-02-13 NOTE — PLAN OF CARE
02/13/20 0938   Post-Acute Status   Post-Acute Authorization Placement   Post-Acute Placement Status Referrals Sent     SW was informed on 2/13/20 that pt and family wants to proceed with SNF placement.    SW met with Pt family at bedside. Discussed therapy recs for and provided list. Family to review and give choices asap.    SW and CM will continue to follow with medical team and assist with needs as indicated.    10:35 AM    Referrals sent to four SNF's.    Dominique Ruiz LMSW  [facility]  [id number]

## 2020-02-13 NOTE — PLAN OF CARE
Case Management  spoke to the patient and her  about the patient's observation status and plan to discharge to NH SNF. Case Management  explained that due to the patient's observation status she would incur a higher out of pocket expense than if she was inpatient status. Case Management  explained that the patient's medical needs did not necessitate inpatient status. Case Management  asked Mrs. Ramirez if she had a mental diagnosis or disorder. She stated no. Case Management  stated that in her chart she has bipolar listed in her history. The patient stated that she was not bipolar but that she does have depression. Case Management  explained that depression is a mental disorder/diagnosis. Case Management  explained that when a patient is trying to go to a NH SNF the state has to review all patients to ensure that the hospital is appropriately placing patients into NHs. Mr. Ramirez asked, NH no she is going to a SNF. Case Management  explained that a SNF is a bed type within a NH. Mr. Ramirez asked if the patient had to stay the night or if she could get therapy and go home. Case Management  informed him that what he is describing is outpatient therapy not a NH SNF. Mr. Ramirez stated that he was not aware that the NH SNF would mean that she would stay the night at the facility. Case Management  went on to explain that if the state had to screen the patient there was a chance that due to her history of mental diagnosis/disorder that it could take up to two weeks for the state to clear her to go to a NH SNF. Case Management  informed them that they would be in the hospital the whole time in observation status and the financial burden would be substantial. Mr. Ramirez asked if he could take the patient home while waiting on the state paperwork. Case Management  informed him that the insurance most likely would  not approve NH SNF once the patient was home. Case Management  stated that the hospital could help arrange home health and any equipment needed to help him care appropriately for his wife at home. Mr. Ramirez stated that she cannot even stand up right now. Case Management  asked if they could ask family and/or friends to help them at home until she was able to get stronger with the aid of home health. Mr. Ramirez asked if he has to make a decision right now. Case Management  stated that the hospital is not going to kick him out but he needs to understand that every night she is in the hospital they will be billed for. Mr. Ramirez stated he would need to think about it and inform us of his decision in about 48 hours. Case Management  informed the SILVANA Gomez and HASMUKH TELLEZ of all the above information.    3:30 pm  Mr. Ramirez called Case Management  to ask a few questions. Mr. Ramirez asked why his wife was not an inpatient. Case Management  again explained that she is not sick enough medically to justify being an inpatient. Mr. Ramirez stated that he understood. Mr. Ramirez then asked if his wife could stay in the hospital for the next week. Case Management  again explained that the hospital will not kick him out while we work on placing the patient into a NH SNF but that him and his wife will be financially responsible. Mr. Ramirez then stated that he had to call Case Management  back due to the PA Toshia Winston entering the room.

## 2020-02-13 NOTE — PROGRESS NOTES
[facility] Hospital Medicine  Progress Note    Patient Name: Marycruz Ramirez  MRN: [id number]  Patient Class: OP- Observation   Admission Date: 2/11/2020  Length of Stay: 0 days  Attending Physician: Anika lEliott MD  Primary Care Provider: Marcelino Calle MD    [facility] Medicine Team: [facility] Shena Loera PA-C    Subjective:     Principal Problem:Acute on chronic combined systolic and diastolic heart failure        HPI:  Patient is a 75 yo female being placed in observation for acute on chronic heart failure. Patient with past medical hx of new CHF (EF 30), aortic stenosis, HLD, bipolar and overactive bladder. Patient was recently discharged from the hospital last month for pyelonephritis which required CC stay for sepsis (sent with home health) and admission before that was for anemia requiring blood transfusion and GI scope. Patient is present to the ED after reports of her legs becoming weak and falling onto her knees. Reports pain to bilateral knees but has some improvement with pain medication. Patient also endured skin tears to left arm and bilateral legs. Patient also endorses large amount of lower extremity swelling the last 3 days making her legs feel heavy. presenting with weakness and a fall. Reports recently seeing cardiology and plans for echo next month with possible angiogram. Patient works with home "[employer] every day. She denies fever, chills, abdominal pain, chest pain, lower extremity pain, headache, LOC, dizziness, or confusion.    ED: VVS, WBC 14.89 (baseline), Hgb 8.1 (baseline), , Troponin 0.070, UA neg, BC--pending. XR to bilateral hip and knees: without fractures. CXR: Interval diuresis compared to 01/25/2020.  No new disease identified     Overview/Hospital Course:  Patient placed in observation for acute on chronic systolic/diastolic heart failure. , troponin 0.07--0.093--0.073. IV diuresis with lasix 2/11-2/13. Transitioned to oral  furosemide 40 mg bid on evening of 2/13.  Patient also with a fall at home prior to admission. PT/OT consulted. PT recommending SNF. Wound care consulted for multiple skin tears.     Interval History:  no acute events overnight, no new complaints.  Pt's  at bedside, therapy worked with pt today.  Therapy continuing to recommend SNF and pt now agreeable.    Review of Systems   Constitutional: Positive for fatigue. Negative for appetite change, chills and fever.   HENT: Negative for trouble swallowing.    Respiratory: Negative for cough, chest tightness, shortness of breath and wheezing.    Cardiovascular: Positive for leg swelling. Negative for chest pain and palpitations.   Gastrointestinal: Positive for abdominal distention. Negative for abdominal pain, constipation, diarrhea and nausea.   Genitourinary: Negative for difficulty urinating, frequency and urgency.   Musculoskeletal: Negative for arthralgias and myalgias.   Skin: Positive for color change. Negative for rash.   Neurological: Positive for weakness. Negative for dizziness, light-headedness and headaches.   Psychiatric/Behavioral: Negative for sleep disturbance.     Objective:     Vital Signs (Most Recent):  Temp: 98.3 °F (36.8 °C) (02/13/20 0719)  Pulse: 88 (02/13/20 0719)  Resp: 16 (02/13/20 0719)  BP: 138/62 (02/13/20 0719)  SpO2: (!) 93 % (02/13/20 0719) Vital Signs (24h Range):  Temp:  [96.3 °F (35.7 °C)-98.3 °F (36.8 °C)] 98.3 °F (36.8 °C)  Pulse:  [61-88] 88  Resp:  [16] 16  SpO2:  [91 %-98 %] 93 %  BP: (119-138)/(58-69) 138/62     Weight: 51.4 kg (113 lb 5.1 oz)  Body mass index is 22.13 kg/m².    Intake/Output Summary (Last 24 hours) at 2/13/2020 1051  Last data filed at 2/13/2020 0910  Gross per 24 hour   Intake 700 ml   Output 1150 ml   Net -450 ml      Physical Exam   Constitutional: She is oriented to person, place, and time. She appears well-developed and well-nourished. No distress.   Cardiovascular: Normal rate, regular rhythm and  normal heart sounds. Exam reveals no gallop and no friction rub.   No murmur heard.  Pulmonary/Chest: Effort normal. No respiratory distress. She has no wheezes.   Abdominal: Soft. Bowel sounds are normal. She exhibits no distension. There is no tenderness.   Musculoskeletal: Normal range of motion. She exhibits edema (trace edema to BLE). She exhibits no tenderness.   Neurological: She is alert and oriented to person, place, and time.   Skin: Skin is warm and dry. No rash noted. She is not diaphoretic. No cyanosis. Nails show no clubbing.   Bruising to BUE and BLE. Tears noted to left arm and left leg   Psychiatric: She has a normal mood and affect. Her behavior is normal.       Significant Labs: All pertinent labs within the past 24 hours have been reviewed.    Significant Imaging: I have reviewed all pertinent imaging results/findings within the past 24 hours.      Assessment/Plan:      * Acute on chronic combined systolic and diastolic heart failure  · Volume overloaded on exam  · 3+ edema to BLE--improved to 2+ edema--now trace  · Rales present  · Given 80mg IV in ED with 500 output  · Ordered 40mg iv bid--continue with iv diuresis of lasix daily.  2/13 transition to oral furosemide 40 mg bid.  · Elevation of leg  · Daily weight  · Low NA diet with 1500cc fluid restriction  ·  was 589 on prior admission  · Troponin flat, denies CP.  · Not on medical management, was seen in Cardiology clinic on 2/6 and at that time not recommend to start medication and plans for echo in March. Felt that depressed EF and cardiomyopathy was secondary to septic shock, pressors, and bacteremia.   ECHO 1/26/2020:  · Moderate left ventricular enlargement.  · Moderately decreased left ventricular systolic function. The estimated ejection fraction is 30%.  · Grade I (mild) left ventricular diastolic dysfunction consistent with impaired relaxation.  · Normal right ventricular systolic function.  · Moderate left atrial  enlargement.  · Mild-to-moderate mitral regurgitation.  · Severe aortic valve stenosis. Aortic valve area is 0.64 cm2; peak velocity is 3.94 m/s; mean gradient is 43 mmHg.  · The estimated PA systolic pressure is 40 mmHg.  · Elevated central venous pressure (15 mmHg).    Alteration in skin integrity  · Multiple skin tears.  Consulted wound care.    Fall  · PT/OT recommends SNF.  Pt and  now agreeable.    Microcytic anemia  · Hgb stable and at baseline of 8.1--8.6  · Continue to monitor labs  · transfuse if becomes symptomatic or Hgb < 7  · Continue oral supplementation    Bipolar disorder  · Mood stable  · Continue oxcarbazepine    OAB (overactive bladder)  · Continue oxybutynin    Chronic radicular lumbar pain  · Continue hydrocodone  · checked  gets new RX monthly    Mixed hyperlipidemia  · Continue fenofibrate  · F/u with PCP upon discharge for ongoing outpatient monitoring       VTE Risk Mitigation (From admission, onward)         Ordered     enoxaparin injection 40 mg  Daily      02/12/20 1524     IP VTE HIGH RISK PATIENT  Once      02/11/20 1142     Place RAMESH hose  Until discontinued      02/11/20 1142     Place sequential compression device  Until discontinued      02/11/20 1142                      Shena Loera PA-C  Department of Hospital Medicine   [facility]

## 2020-02-13 NOTE — PROGRESS NOTES
"Wound care consult for multiple skin tears.     PMH: CHF (EF 30), aortic stenosis, HLD, bipolar and overactive bladder    Patient with multiple small skin tears and areas of ecchymosis in various stages of healing.   Pt will only allow assessment of skin tear to the left leg as she said she is "tired and in pain" and "doesn't want to be messed with".    Skin tear to the left lateral leg appears clean. Blood filled blisters to the periwound appear to be deflating. No odor or purulence noted. Area gently cleansed and medihoney applied.   Dressing to the right lateral knee covered with foam, but she will not allow assessment.   BLEs with stasis dermatitis and hemosiderin staining. +1edema noted to the legs at this time. Patient would likely benefit from compression therapy if VIANCA WNL but I do not think she would tolerated at this time.         Mutliple areas of scabs and ecchymosis noted to the HETAL and lower extremities. Would recommend keeping area well moisturized with sween (pink top) moisturizer.    Patient sacral area is pink and blanchable. She is very bony over coccyx with little "padding", EHOB overlay ordered.   Discussed with Shena KNIGHT and recs approved.     Wound care to follow PRN.  Kamala Ferrell RN CN CN   [phone]    "

## 2020-02-13 NOTE — ASSESSMENT & PLAN NOTE
· Volume overloaded on exam  · 3+ edema to BLE--improved to 2+ edema--now trace  · Rales present  · Given 80mg IV in ED with 500 output  · Ordered 40mg iv bid--continue with iv diuresis of lasix daily.  2/13 transition to oral furosemide 40 mg bid.  · Elevation of leg  · Daily weight  · Low NA diet with 1500cc fluid restriction  ·  was 589 on prior admission  · Troponin flat, denies CP.  · Not on medical management, was seen in Cardiology clinic on 2/6 and at that time not recommend to start medication and plans for echo in March. Felt that depressed EF and cardiomyopathy was secondary to septic shock, pressors, and bacteremia.   ECHO 1/26/2020:  · Moderate left ventricular enlargement.  · Moderately decreased left ventricular systolic function. The estimated ejection fraction is 30%.  · Grade I (mild) left ventricular diastolic dysfunction consistent with impaired relaxation.  · Normal right ventricular systolic function.  · Moderate left atrial enlargement.  · Mild-to-moderate mitral regurgitation.  · Severe aortic valve stenosis. Aortic valve area is 0.64 cm2; peak velocity is 3.94 m/s; mean gradient is 43 mmHg.  · The estimated PA systolic pressure is 40 mmHg.  · Elevated central venous pressure (15 mmHg).

## 2020-02-13 NOTE — PLAN OF CARE
POC reviewed with patient/, verbalized an understanding; NADN; VSS; pt rested quietly through the night; no acute event/fall this shift; PRN pain med given X3 with relief; bed alarm on, call bell in reach,  at bedside

## 2020-02-13 NOTE — SUBJECTIVE & OBJECTIVE
Interval History:  no acute events overnight, no new complaints.  Pt's  at bedside, therapy worked with pt today.  Therapy continuing to recommend SNF and pt now agreeable.    Review of Systems   Constitutional: Positive for fatigue. Negative for appetite change, chills and fever.   HENT: Negative for trouble swallowing.    Respiratory: Negative for cough, chest tightness, shortness of breath and wheezing.    Cardiovascular: Positive for leg swelling. Negative for chest pain and palpitations.   Gastrointestinal: Positive for abdominal distention. Negative for abdominal pain, constipation, diarrhea and nausea.   Genitourinary: Negative for difficulty urinating, frequency and urgency.   Musculoskeletal: Negative for arthralgias and myalgias.   Skin: Positive for color change. Negative for rash.   Neurological: Positive for weakness. Negative for dizziness, light-headedness and headaches.   Psychiatric/Behavioral: Negative for sleep disturbance.     Objective:     Vital Signs (Most Recent):  Temp: 98.3 °F (36.8 °C) (02/13/20 0719)  Pulse: 88 (02/13/20 0719)  Resp: 16 (02/13/20 0719)  BP: 138/62 (02/13/20 0719)  SpO2: (!) 93 % (02/13/20 0719) Vital Signs (24h Range):  Temp:  [96.3 °F (35.7 °C)-98.3 °F (36.8 °C)] 98.3 °F (36.8 °C)  Pulse:  [61-88] 88  Resp:  [16] 16  SpO2:  [91 %-98 %] 93 %  BP: (119-138)/(58-69) 138/62     Weight: 51.4 kg (113 lb 5.1 oz)  Body mass index is 22.13 kg/m².    Intake/Output Summary (Last 24 hours) at 2/13/2020 1051  Last data filed at 2/13/2020 0910  Gross per 24 hour   Intake 700 ml   Output 1150 ml   Net -450 ml      Physical Exam   Constitutional: She is oriented to person, place, and time. She appears well-developed and well-nourished. No distress.   Cardiovascular: Normal rate, regular rhythm and normal heart sounds. Exam reveals no gallop and no friction rub.   No murmur heard.  Pulmonary/Chest: Effort normal. No respiratory distress. She has no wheezes.   Abdominal: Soft.  Bowel sounds are normal. She exhibits no distension. There is no tenderness.   Musculoskeletal: Normal range of motion. She exhibits edema (trace edema to BLE). She exhibits no tenderness.   Neurological: She is alert and oriented to person, place, and time.   Skin: Skin is warm and dry. No rash noted. She is not diaphoretic. No cyanosis. Nails show no clubbing.   Bruising to BUE and BLE. Tears noted to left arm and left leg   Psychiatric: She has a normal mood and affect. Her behavior is normal.       Significant Labs: All pertinent labs within the past 24 hours have been reviewed.    Significant Imaging: I have reviewed all pertinent imaging results/findings within the past 24 hours.

## 2020-02-13 NOTE — PT/OT/SLP PROGRESS
Physical Therapy Treatment    Patient Name:  Marycruz Ramirez   MRN:  [id number]    Recommendations:     Discharge Recommendations:  nursing facility, skilled   Discharge Equipment Recommendations: bath bench   Barriers to discharge: increased need for skilled assistance at current level of function    Assessment:     Marycruz Ramirez is a 74 y.o. female admitted with a medical diagnosis of Acute on chronic combined systolic and diastolic heart failure.  She presents with the following impairments/functional limitations:  weakness, impaired endurance, impaired self care skills, gait instability, impaired functional mobilty, impaired balance, decreased ROM, impaired skin, pain, decreased safety awareness, decreased lower extremity function, decreased upper extremity function .Pt tolerated treatment fairly well and is progressing slowly with mobility.pt's  Pt is  anxious and with increased fear of falling with mobility. Pt  had difficulty following commands when becoming anxious and requires maximum encouragement and redirection during treatment session. Pt was total A for bed mobility and declined to attempt transfers or ambulation due to pain. Pt would continue to benefit from skilled PT to address overall functional mobility and goals. Goals remain appropriate       Rehab Prognosis: Fair; patient would benefit from acute skilled PT services to address these deficits and reach maximum level of function.    Recent Surgery: * No surgery found *      Plan:     During this hospitalization, patient to be seen 3 x/week to address the identified rehab impairments via gait training, therapeutic activities, therapeutic exercises, neuromuscular re-education and progress toward the following goals:    · Plan of Care Expires:  03/13/20    Subjective     Chief Complaint: B LE pain  Patient/Family Comments/goals: I can't do this , I just need to rest  Pain/Comfort:  · Pain Rating 1: (did not rate)  · Location - Side 1:  Bilateral  · Location - Orientation 1: lower  · Location 1: leg  · Pain Addressed 1: Pre-medicate for activity, Reposition, Cessation of Activity, Distraction, Nurse notified      Objective:     Communicated with RN prior to session.  Patient found supine with peripheral IV, telemetry, PureWick upon PT entry to room.     General Precautions: Standard, fall   Orthopedic Precautions:N/A   Braces: N/A     Functional Mobility:  · Bed Mobility:     · Scooting toward EOB: maximal assistance  · Supine to Sit: total assistance  · Sit to Supine: total assistance  · Transfers:   Pt declined due to pain in BLE  · Gait: Pt declined due to pain in BLE  · Balance: static sitting balance: mod A with posterior lean, pt refuses to follow commands  and safety. Pt with increased anxiety while seated on EOB  · Attempted scooting to L HOB while seated pt began leaning back and extended BLEs and pt began sliding forwards, requiring totalA to return pt from sit to supine      AM-PAC 6 CLICK MOBILITY  Turning over in bed (including adjusting bedclothes, sheets and blankets)?: 3  Sitting down on and standing up from a chair with arms (e.g., wheelchair, bedside commode, etc.): 2  Moving from lying on back to sitting on the side of the bed?: 2  Moving to and from a bed to a chair (including a wheelchair)?: 2  Need to walk in hospital room?: 1  Climbing 3-5 steps with a railing?: 1  Basic Mobility Total Score: 11       Therapeutic Activities and Exercises:   educated patient on progress, safety,d/c,PT POC, on the effects of prolonged immobility and the importance of performing OOB activity and exercises to promote healing and reduce recovery time   Patient instructed to perform therex  B LE   Updated white board with appropriate PT mobility information for medical team notification  Donned  gripping socks  Bedside table in front of patient and area set up for function, convenience, and safety. RN aware of patient's mobility needs and status.  Questions/concerns addressed within PTA scope of practice; patient with no further questions. Time was provided for active listening, discussion of health disposition, and discussion of safe discharge. Pt?verbalized?agreement       Patient left HOB elevated with all lines intact, call button in reach, nsg notified and spouse present..    GOALS:   Multidisciplinary Problems     Physical Therapy Goals        Problem: Physical Therapy Goal    Goal Priority Disciplines Outcome Goal Variances Interventions   Physical Therapy Goal     PT, PT/OT Ongoing, Progressing     Description:  Goals to be met by: 2020     Patient will increase functional independence with mobility by performin. Supine to sit with Modified Riley  2. Sit to supine with Modified Riley  3. Sit to stand transfer with Contact Guard Assistance  4. Bed to chair transfer with Contact Guard Assistance using Rolling Walker/rollator or LRAD  5. Gait  x 25 feet with Contact Guard Assistance using Rolling Walker/rollator or LRAD.   6. Lower extremity exercise program x15 reps with assistance as needed                       Time Tracking:     PT Received On: 20  PT Start Time: 0933     PT Stop Time: 1012  PT Total Time (min): 39 min     Billable Minutes: Therapeutic Activity 39    Treatment Type: Treatment  PT/PTA: PTA     PTA Visit Number: 1     Stalni Yoon PTA  2020

## 2020-02-14 PROBLEM — R53.81 DEBILITY: Status: ACTIVE | Noted: 2020-02-14

## 2020-02-14 LAB
ANION GAP SERPL CALC-SCNC: 7 MMOL/L (ref 8–16)
BASOPHILS # BLD AUTO: 0.03 K/UL (ref 0–0.2)
BASOPHILS NFR BLD: 0.4 % (ref 0–1.9)
BUN SERPL-MCNC: 12 MG/DL (ref 8–23)
CALCIUM SERPL-MCNC: 9.5 MG/DL (ref 8.7–10.5)
CHLORIDE SERPL-SCNC: 99 MMOL/L (ref 95–110)
CO2 SERPL-SCNC: 30 MMOL/L (ref 23–29)
CREAT SERPL-MCNC: 0.7 MG/DL (ref 0.5–1.4)
DIFFERENTIAL METHOD: ABNORMAL
EOSINOPHIL # BLD AUTO: 0.2 K/UL (ref 0–0.5)
EOSINOPHIL NFR BLD: 2.5 % (ref 0–8)
ERYTHROCYTE [DISTWIDTH] IN BLOOD BY AUTOMATED COUNT: 30 % (ref 11.5–14.5)
EST. GFR  (AFRICAN AMERICAN): >60 ML/MIN/1.73 M^2
EST. GFR  (NON AFRICAN AMERICAN): >60 ML/MIN/1.73 M^2
GLUCOSE SERPL-MCNC: 216 MG/DL (ref 70–110)
HCT VFR BLD AUTO: 30.6 % (ref 37–48.5)
HGB BLD-MCNC: 9 G/DL (ref 12–16)
IMM GRANULOCYTES # BLD AUTO: 0.02 K/UL (ref 0–0.04)
IMM GRANULOCYTES NFR BLD AUTO: 0.2 % (ref 0–0.5)
LYMPHOCYTES # BLD AUTO: 1.7 K/UL (ref 1–4.8)
LYMPHOCYTES NFR BLD: 20.9 % (ref 18–48)
MCH RBC QN AUTO: 27.1 PG (ref 27–31)
MCHC RBC AUTO-ENTMCNC: 29.4 G/DL (ref 32–36)
MCV RBC AUTO: 92 FL (ref 82–98)
MONOCYTES # BLD AUTO: 0.6 K/UL (ref 0.3–1)
MONOCYTES NFR BLD: 7.3 % (ref 4–15)
NEUTROPHILS # BLD AUTO: 5.5 K/UL (ref 1.8–7.7)
NEUTROPHILS NFR BLD: 68.7 % (ref 38–73)
NRBC BLD-RTO: 0 /100 WBC
PLATELET # BLD AUTO: 246 K/UL (ref 150–350)
PMV BLD AUTO: 10.1 FL (ref 9.2–12.9)
POTASSIUM SERPL-SCNC: 3.6 MMOL/L (ref 3.5–5.1)
RBC # BLD AUTO: 3.32 M/UL (ref 4–5.4)
SODIUM SERPL-SCNC: 136 MMOL/L (ref 136–145)
WBC # BLD AUTO: 8.05 K/UL (ref 3.9–12.7)

## 2020-02-14 PROCEDURE — 25000003 PHARM REV CODE 250: Mod: HCNC | Performed by: PHYSICIAN ASSISTANT

## 2020-02-14 PROCEDURE — 36415 COLL VENOUS BLD VENIPUNCTURE: CPT | Mod: HCNC

## 2020-02-14 PROCEDURE — G0378 HOSPITAL OBSERVATION PER HR: HCPCS | Mod: HCNC

## 2020-02-14 PROCEDURE — 99226 PR SUBSEQUENT OBSERVATION CARE,LEVEL III: CPT | Mod: HCNC,,, | Performed by: PHYSICIAN ASSISTANT

## 2020-02-14 PROCEDURE — 80048 BASIC METABOLIC PNL TOTAL CA: CPT | Mod: HCNC

## 2020-02-14 PROCEDURE — 97530 THERAPEUTIC ACTIVITIES: CPT | Mod: HCNC

## 2020-02-14 PROCEDURE — 97535 SELF CARE MNGMENT TRAINING: CPT | Mod: HCNC,59

## 2020-02-14 PROCEDURE — 99226 PR SUBSEQUENT OBSERVATION CARE,LEVEL III: ICD-10-PCS | Mod: HCNC,,, | Performed by: PHYSICIAN ASSISTANT

## 2020-02-14 PROCEDURE — 97116 GAIT TRAINING THERAPY: CPT | Mod: HCNC,59

## 2020-02-14 PROCEDURE — 25000003 PHARM REV CODE 250: Mod: HCNC | Performed by: NURSE PRACTITIONER

## 2020-02-14 PROCEDURE — 96372 THER/PROPH/DIAG INJ SC/IM: CPT

## 2020-02-14 PROCEDURE — 85025 COMPLETE CBC W/AUTO DIFF WBC: CPT | Mod: HCNC

## 2020-02-14 PROCEDURE — 63600175 PHARM REV CODE 636 W HCPCS: Mod: HCNC | Performed by: NURSE PRACTITIONER

## 2020-02-14 PROCEDURE — 97110 THERAPEUTIC EXERCISES: CPT | Mod: HCNC

## 2020-02-14 RX ORDER — DICLOFENAC SODIUM 10 MG/G
2 GEL TOPICAL 3 TIMES DAILY PRN
Qty: 1 TUBE | Refills: 1 | Status: SHIPPED | OUTPATIENT
Start: 2020-02-14 | End: 2020-06-24

## 2020-02-14 RX ORDER — METHOCARBAMOL 500 MG/1
500 TABLET, FILM COATED ORAL 4 TIMES DAILY
Status: DISCONTINUED | OUTPATIENT
Start: 2020-02-14 | End: 2020-02-17 | Stop reason: HOSPADM

## 2020-02-14 RX ORDER — METHOCARBAMOL 500 MG/1
500 TABLET, FILM COATED ORAL 4 TIMES DAILY
Qty: 40 TABLET | Refills: 0 | Status: ON HOLD | OUTPATIENT
Start: 2020-02-14 | End: 2020-03-03 | Stop reason: HOSPADM

## 2020-02-14 RX ORDER — FUROSEMIDE 40 MG/1
40 TABLET ORAL 2 TIMES DAILY
Qty: 60 TABLET | Refills: 1 | Status: SHIPPED | OUTPATIENT
Start: 2020-02-14 | End: 2020-03-10

## 2020-02-14 RX ADMIN — FERROUS SULFATE TAB EC 325 MG (65 MG FE EQUIVALENT) 325 MG: 325 (65 FE) TABLET DELAYED RESPONSE at 09:02

## 2020-02-14 RX ADMIN — OXCARBAZEPINE 300 MG: 150 TABLET, FILM COATED ORAL at 09:02

## 2020-02-14 RX ADMIN — FUROSEMIDE 40 MG: 40 TABLET ORAL at 09:02

## 2020-02-14 RX ADMIN — PANTOPRAZOLE SODIUM 40 MG: 40 TABLET, DELAYED RELEASE ORAL at 04:02

## 2020-02-14 RX ADMIN — POLYETHYLENE GLYCOL 3350 17 G: 17 POWDER, FOR SOLUTION ORAL at 09:02

## 2020-02-14 RX ADMIN — HYDROCODONE BITARTRATE AND ACETAMINOPHEN 1 TABLET: 10; 325 TABLET ORAL at 03:02

## 2020-02-14 RX ADMIN — FENOFIBRATE 145 MG: 145 TABLET, FILM COATED ORAL at 09:02

## 2020-02-14 RX ADMIN — OXYBUTYNIN CHLORIDE 5 MG: 5 TABLET ORAL at 09:02

## 2020-02-14 RX ADMIN — METHOCARBAMOL TABLETS 500 MG: 500 TABLET, COATED ORAL at 08:02

## 2020-02-14 RX ADMIN — OXYBUTYNIN CHLORIDE 5 MG: 5 TABLET ORAL at 08:02

## 2020-02-14 RX ADMIN — PANTOPRAZOLE SODIUM 40 MG: 40 TABLET, DELAYED RELEASE ORAL at 03:02

## 2020-02-14 RX ADMIN — OXCARBAZEPINE 600 MG: 150 TABLET, FILM COATED ORAL at 08:02

## 2020-02-14 RX ADMIN — ENOXAPARIN SODIUM 40 MG: 100 INJECTION SUBCUTANEOUS at 05:02

## 2020-02-14 RX ADMIN — HYDROCODONE BITARTRATE AND ACETAMINOPHEN 1 TABLET: 10; 325 TABLET ORAL at 09:02

## 2020-02-14 RX ADMIN — FUROSEMIDE 40 MG: 40 TABLET ORAL at 05:02

## 2020-02-14 RX ADMIN — METHOCARBAMOL TABLETS 500 MG: 500 TABLET, COATED ORAL at 05:02

## 2020-02-14 RX ADMIN — METHOCARBAMOL TABLETS 500 MG: 500 TABLET, COATED ORAL at 12:02

## 2020-02-14 RX ADMIN — HYDROCODONE BITARTRATE AND ACETAMINOPHEN 1 TABLET: 10; 325 TABLET ORAL at 08:02

## 2020-02-14 RX ADMIN — CETIRIZINE HYDROCHLORIDE 10 MG: 10 TABLET, FILM COATED ORAL at 08:02

## 2020-02-14 NOTE — ASSESSMENT & PLAN NOTE
· Hgb stable and higher than baseline now 9  · Continue to monitor labs  · transfuse if becomes symptomatic or Hgb < 7  · Continue oral supplementation

## 2020-02-14 NOTE — PLAN OF CARE
Problem: Physical Therapy Goal  Goal: Physical Therapy Goal  Description  Goals to be met by: 2020     Patient will increase functional independence with mobility by performin. Supine to sit with Modified Gray  2. Sit to supine with Modified Gray  3. Sit to stand transfer with Contact Guard Assistance  4. Bed to chair transfer with Contact Guard Assistance using Rolling Walker/rollator or LRAD  5. Gait  x 25 feet with Contact Guard Assistance using Rolling Walker/rollator or LRAD.   6. Lower extremity exercise program x15 reps with assistance as needed      Outcome: Ongoing, Progressing       Patient demonstrated Niranjan supine to sit and Gait of 15' +10' w/ RW, CGA. Niranjan sit to stand w/ RW. Patient demonstrates good progress and participation with therapy.     Damari Mckenzie, PT, DPT  2020

## 2020-02-14 NOTE — SUBJECTIVE & OBJECTIVE
Interval History:  no acute events overnight.  Pt transitioned to oral lasix yesterday evening with good output.  Pt refused therapy earlier this morning but participated early afternoon.  Recommendation is SNF.  Discussed with pt's  recommendation remains SNF and [facility] will have a bed available Monday.    Review of Systems   Constitutional: Positive for fatigue. Negative for appetite change, chills and fever.   HENT: Negative for trouble swallowing.    Respiratory: Negative for cough, chest tightness, shortness of breath and wheezing.    Cardiovascular: Positive for leg swelling (improved). Negative for chest pain and palpitations.   Gastrointestinal: Positive for abdominal distention (improved). Negative for abdominal pain, constipation, diarrhea and nausea.   Genitourinary: Negative for difficulty urinating, frequency and urgency.   Musculoskeletal: Negative for arthralgias and myalgias.   Skin: Positive for color change. Negative for rash.   Neurological: Positive for weakness. Negative for dizziness, light-headedness and headaches.   Psychiatric/Behavioral: Negative for sleep disturbance.     Objective:     Vital Signs (Most Recent):  Temp: 99 °F (37.2 °C) (02/14/20 1208)  Pulse: 99 (02/14/20 1208)  Resp: 18 (02/14/20 1208)  BP: 123/68 (02/14/20 1208)  SpO2: 97 % (02/14/20 1208) Vital Signs (24h Range):  Temp:  [97.6 °F (36.4 °C)-99 °F (37.2 °C)] 99 °F (37.2 °C)  Pulse:  [] 99  Resp:  [16-18] 18  SpO2:  [94 %-99 %] 97 %  BP: (106-132)/(54-72) 123/68     Weight: 51.9 kg (114 lb 8.5 oz)  Body mass index is 22.37 kg/m².    Intake/Output Summary (Last 24 hours) at 2/14/2020 1440  Last data filed at 2/14/2020 1300  Gross per 24 hour   Intake 700 ml   Output 650 ml   Net 50 ml      Physical Exam   Constitutional: She is oriented to person, place, and time. She appears well-developed and well-nourished. She has a sickly appearance. No distress.   Cardiovascular: Normal rate, regular rhythm and  normal heart sounds. Exam reveals no gallop and no friction rub.   No murmur heard.  Pulmonary/Chest: Effort normal. No respiratory distress. She has no wheezes.   Abdominal: Soft. Bowel sounds are normal. She exhibits no distension. There is no tenderness.   Musculoskeletal: Normal range of motion. She exhibits edema (trace edema to BLE). She exhibits no tenderness.   Neurological: She is alert and oriented to person, place, and time.   Skin: Skin is warm and dry. No rash noted. She is not diaphoretic. No cyanosis. Nails show no clubbing.   Bruising to BUE and BLE. Tears noted to left arm and left leg   Psychiatric: She has a normal mood and affect. Her behavior is normal.       Significant Labs: All pertinent labs within the past 24 hours have been reviewed.    Significant Imaging: I have reviewed all pertinent imaging results/findings within the past 24 hours.

## 2020-02-14 NOTE — ASSESSMENT & PLAN NOTE
· Volume overloaded on exam  · 3+ edema to BLE--improved to 2+ edema--now trace  · Rales present  · Given 80mg IV in ED with 500 output  · Ordered 40mg iv bid--continue with iv diuresis of lasix daily.  2/13 transition to oral furosemide 40 mg bid.  2/14 continue furosemide 40 mg bid.  · Elevation of leg  · Daily weight  · Low NA diet with 1500cc fluid restriction  ·  was 589 on prior admission  · Troponin flat, denies CP.  · Not on medical management, was seen in Cardiology clinic on 2/6 and at that time not recommend to start medication and plans for echo in March. Felt that depressed EF and cardiomyopathy was secondary to septic shock, pressors, and bacteremia.   ECHO 1/26/2020:  · Moderate left ventricular enlargement.  · Moderately decreased left ventricular systolic function. The estimated ejection fraction is 30%.  · Grade I (mild) left ventricular diastolic dysfunction consistent with impaired relaxation.  · Normal right ventricular systolic function.  · Moderate left atrial enlargement.  · Mild-to-moderate mitral regurgitation.  · Severe aortic valve stenosis. Aortic valve area is 0.64 cm2; peak velocity is 3.94 m/s; mean gradient is 43 mmHg.  · The estimated PA systolic pressure is 40 mmHg.  · Elevated central venous pressure (15 mmHg).    Intake/Output Summary (Last 24 hours) at 2/14/2020 1445  Last data filed at 2/14/2020 1300  Gross per 24 hour   Intake 700 ml   Output 650 ml   Net 50 ml

## 2020-02-14 NOTE — PLAN OF CARE
Goals addressed and unmet.  Cont with POC  Erika Quijano, OT  2/14/2020    Problem: Occupational Therapy Goal  Goal: Occupational Therapy Goal  Description  Goals to be met by: 2/26/2020     Patient will increase functional independence with ADLs by performing:    Grooming while EOB with Set-up Assistance.  Toileting from toilet with Moderate Assistance for hygiene and clothing management.   Sitting at edge of bed x10 minutes with Contact Guard Assistance.  Supine to sit with Moderate Assistance.  Stand pivot transfers with Moderate Assistance.  Toilet transfer to bedside commode with Moderate Assistance.     Outcome: Ongoing, Progressing

## 2020-02-14 NOTE — ASSESSMENT & PLAN NOTE
· Multiple skin tears.  Consulted wound care.  Nursing to apply sween (pink top) moisturizer BID to the arms and legs  - Clear moisture barrier (purple top) to sacral perineal area  Nursing to cleanse skin tears with normal saline and gauze, apply medihoney to open wounds and secure with border foam. Change daily and PRN soiled  EHOB overlay  q2hour turns  Heels offloaded

## 2020-02-14 NOTE — PLAN OF CARE
[facility]    HOME HEALTH ORDERS  FACE TO FACE ENCOUNTER    Patient Name: Marycruz Ramirez  YOB: 1945    PCP: Marcelino Calle MD   PCP Address: [address] [place] [address] [address] / [place] [address]  PCP Phone Number: [phone]  PCP Fax: [phone]    Encounter Date: 02/14/2020    Admit to Home Health    Diagnoses:  Active Hospital Problems    Diagnosis  POA    *Acute on chronic combined systolic and diastolic heart failure [I50.43]  Yes     Priority: 1 - High    Alteration in skin integrity [R23.9]  Yes     Priority: 2     Fall [W19.XXXA]  Yes     Priority: 3     Microcytic anemia [D50.9]  Yes     Priority: 4      Cont iron repeat cbc       Bipolar disorder [F31.9]  Yes     Priority: 5      Psych managing cont current trileptal      OAB (overactive bladder) [N32.81]  Yes     Priority: 6      Cont ditropan working well       Chronic radicular lumbar pain [M54.16, G89.29]  Yes     Priority: 7      Dr allen is managing pain cont current regimen       Mixed hyperlipidemia [E78.2]  Yes     Priority: 8      Is on tricor currently cont current needs cmp and lipid      Multiple skin tears [T14.8XXA]  Yes      Resolved Hospital Problems   No resolved problems to display.       Future Appointments   Date Time Provider Department Center   2/17/2020  1:00 PM MD [facility]   2/20/2020  3:00 PM Leticia Gastelum MD [facility] ID [facility]   3/6/2020  1:00 PM ECHO, [place] [facility]   4/8/2020  1:00 PM Fito Marquez MD [facility] CARDIO [facility]   7/9/2020 11:00 AM Marcelino Calle MD [facility]           I have seen and examined this patient face to face today. My clinical findings that support the need for the home health skilled services and home bound status are the following:  Weakness/numbness causing balance and gait disturbance due to Heart Failure and Weakness/Debility making it taxing to leave  home.    Allergies:Review of patient's allergies indicates:  No Known Allergies    Diet: cardiac diet and fluid restriction: 1500 ml    Activities: activity as tolerated    Nursing:   SN to complete comprehensive assessment including routine vital signs. Instruct on disease process and s/s of complications to report to MD. Review/verify medication list sent home with the patient at time of discharge  and instruct patient/caregiver as needed. Frequency may be adjusted depending on start of care date.    Notify MD if SBP > 160 or < 90; DBP > 90 or < 50; HR > 120 or < 50; Temp > 101; Other:         CONSULTS:    Physical Therapy to evaluate and treat. Evaluate for home safety and equipment needs; Establish/upgrade home exercise program. Perform / instruct on therapeutic exercises, gait training, transfer training, and Range of Motion.  Occupational Therapy to evaluate and treat. Evaluate home environment for safety and equipment needs. Perform/Instruct on transfers, ADL training, ROM, and therapeutic exercises.  Aide to provide assistance with personal care, ADLs, and vital signs.    MISCELLANEOUS CARE:  Heart Failure:      SN to instruct on the following:    Instruct on the definition of CHF.   Instruct on the signs/sympoms of CHF to be reported.   Instruct on and monitor daily weights.   Instruct on factors that cause exacerbation.   Instruct on action, dose, schedule, and side effects of medications.   Instruct on diet as prescribed.   Instruct on activity allowed.   Instruct on life-style modifications for life long management of CHF   SN to assess compliance with daily weights, diet, medications, fluid retention,    safety precautions, activities permitted and life-style modifications.   Additional 1-2 SN visits per week as needed for signs and symptoms     of CHF exacerbation.      For Weight Gain > 2-3 lbs in 1 day or 4-6 lbs over 1 week notify PCP:  Increase furosemide 40 mg bid (oral diuretic) dose to 80 mg bid  for 5 days temporarily  Obtain BMP lab test in 3 days  If weight does not decrease by 5 lbs after 5 days of increased diuretic usage: Notify PCP.    WOUND CARE ORDERS  Nursing to apply sween (pink top) moisturizer BID to the arms and legs  - Clear moisture barrier (purple top) to sacral perineal area  Nursing to cleanse skin tears with normal saline and gauze, apply medihoney to open wounds and secure with border foam. Change daily and PRN soiled  EHOB overlay  q2hour turns  Heels offloaded      Medications: Review discharge medications with patient and family and provide education.      Current Discharge Medication List      START taking these medications    Details   diclofenac sodium (VOLTAREN) 1 % Gel Apply 2 g topically 3 (three) times daily as needed (pain).  Qty: 1 Tube, Refills: 1    Comments: bedside      furosemide (LASIX) 40 MG tablet Take 1 tablet (40 mg total) by mouth 2 (two) times daily.  Qty: 60 tablet, Refills: 1    Comments: bedside      methocarbamol (ROBAXIN) 500 MG Tab Take 1 tablet (500 mg total) by mouth 4 (four) times daily. for 10 days  Qty: 40 tablet, Refills: 0    Comments: bedside         CONTINUE these medications which have NOT CHANGED    Details   b complex vitamins capsule Take 1 capsule by mouth once daily.      calcium/vits D3/C/K2/minerals (BONE ESSENTIALS ORAL) Take by mouth.      cetirizine (ZYRTEC) 10 MG tablet Take by mouth.      fenofibrate (TRICOR) 145 MG tablet       ferrous sulfate (FEOSOL) 325 mg (65 mg iron) Tab tablet Take 1 tablet (325 mg total) by mouth once daily.  Qty: 60 tablet, Refills: 9      fluticasone propionate (FLONASE ALLERGY RELIEF) 50 mcg/actuation nasal spray       HYDROcodone-acetaminophen (NORCO)  mg per tablet Take by mouth.    Comments: Quantity prescribed more than 7 day supply? Press F2 and select one:[id number]        multivitamin with minerals tablet Take by mouth.      mupirocin (BACTROBAN) 2 % ointment by Nasal route.      omega-3 acid ethyl  esters (LOVAZA) 1 gram capsule Take 2 g by mouth 2 (two) times daily.      OXcarbazepine (TRILEPTAL) 300 MG Tab Take 300 mg by mouth once daily. One in the morning, two in the evening      oxybutynin (DITROPAN) 5 MG Tab 5 mg 2 (two) times daily.       pantoprazole (PROTONIX) 40 MG tablet Take 1 tablet (40 mg total) by mouth 2 (two) times daily before meals.  Qty: 60 tablet, Refills: 11         STOP taking these medications       BUMETANIDE ORAL Comments:   Reason for Stopping:         cyclobenzaprine (FLEXERIL) 10 MG tablet Comments:   Reason for Stopping:         tolterodine (DETROL LA) 4 MG 24 hr capsule Comments:   Reason for Stopping:               I certify that this patient is confined to her home and needs intermittent skilled nursing care, physical therapy and occupational therapy.

## 2020-02-14 NOTE — PT/OT/SLP PROGRESS
"Physical Therapy Treatment    Patient Name:  Marycruz Ramirez   MRN:  [id number]    Recommendations:     Discharge Recommendations:  nursing facility, skilled   Discharge Equipment Recommendations: bath bench, walker, rolling(tbd)   Barriers to discharge: Inaccessible home and Decreased caregiver support    Assessment:     Marycruz Ramirez is a 74 y.o. female admitted with a medical diagnosis of Acute on chronic combined systolic and diastolic heart failure.  She presents with the following impairments/functional limitations:  weakness, impaired endurance, impaired self care skills, impaired functional mobilty, gait instability, impaired balance, decreased lower extremity function, decreased upper extremity function, decreased safety awareness, pain. Patient with excellent progress and participation in therapy today. Increased time 2* to pain and active listening to patient's concerns. Patient Niranjan-CGA for bed mobility. Niranjan sit to stand transfer w/ RW x 3 trials. Gait 15' + 15'+ 12' w/ RW, CGA. Patient is not safe to return home when medically ready at current level of mobility and would benefit from skilled nursing facility to improve functional mobility and safety.  Patient shows good progress and participation with therapy in the hospital.    Rehab Prognosis: Fair; patient would benefit from acute skilled PT services to address these deficits and reach maximum level of function.    Recent Surgery: * No surgery found *      Plan:     During this hospitalization, patient to be seen 3 x/week to address the identified rehab impairments via gait training, therapeutic exercises, neuromuscular re-education and progress toward the following goals:    · Plan of Care Expires:  03/13/20    Subjective     Chief Complaint: generalized pain over her whole body  Patient/Family Comments/goals: "You just don't understand I don't know how to describe the pain. It just hurts all over"  Pain/Comfort:  · Pain Rating 1: other (see " comments)(patient did not rate but complained of pain entire session)  · Location - Side 1: Bilateral  · Location - Orientation 1: generalized  · Location 1: other (see comments)(whole body)  · Pain Addressed 1: Pre-medicate for activity, Reposition, Distraction  · Pain Rating Post-Intervention 1: other (see comments)(did not rate but complained less of pain)      Objective:     Communicated with nurse prior to session.  Patient found supine with PureWick upon PT entry to room.     General Precautions: Standard, fall   Orthopedic Precautions:N/A   Braces: N/A     Functional Mobility:  · Bed Mobility:     · Rolling Left:  stand by assistance  · Scooting: stand by assistance  · Supine to Sit: minimum assistance  · Transfers:     · Sit to Stand:  minimum assistance with rolling walker x 3 trials  · Bed to Chair: contact guard assistance with  rolling walker  using  Step Transfer  · Gait: 15'  + 15' + 12' w/ RW, CGA. Patient ambulated to scale for standing weight, back to chair then around EOB. V/T cues to stay within walker. Decreased gait speed, decreased shashank, decreased push off, flexed posture. Patient slightly impulsive with movement      AM-PAC 6 CLICK MOBILITY  Turning over in bed (including adjusting bedclothes, sheets and blankets)?: 4  Sitting down on and standing up from a chair with arms (e.g., wheelchair, bedside commode, etc.): 3  Moving from lying on back to sitting on the side of the bed?: 3  Moving to and from a bed to a chair (including a wheelchair)?: 3  Need to walk in hospital room?: 3  Climbing 3-5 steps with a railing?: 1  Basic Mobility Total Score: 17       Therapeutic Activities and Exercises:   Increased time spend actively listening to patient's concerns, educating patient on importance of mobility, PT plan and goals and encouraging patient. Pt educated on importance of OOB activity to decrease the risks associated with bed rest. Maximal instruction provided for safety and technique for  gait and transfers with RW. Patient sat EOB for ~8 min SBA while preparing for gait and actively listening. Patient stood 1-2 minutes in between 15' x 2 gait trials to get standing weight. Pt educated on plan and goals with physical therapy.       Patient left up in chair with all lines intact, call button in reach, nurse notified and sister present..    GOALS:   Multidisciplinary Problems     Physical Therapy Goals        Problem: Physical Therapy Goal    Goal Priority Disciplines Outcome Goal Variances Interventions   Physical Therapy Goal     PT, PT/OT Ongoing, Progressing     Description:  Goals to be met by: 2020     Patient will increase functional independence with mobility by performin. Supine to sit with Modified [place]  2. Sit to supine with Modified [place]  3. Sit to stand transfer with Contact Guard Assistance  4. Bed to chair transfer with Contact Guard Assistance using Rolling Walker/rollator or LRAD  5. Gait  x 25 feet with Contact Guard Assistance using Rolling Walker/rollator or LRAD.   6. Lower extremity exercise program x15 reps with assistance as needed                       Time Tracking:     PT Received On: 20  PT Start Time: 1016     PT Stop Time: 1056  PT Total Time (min): 40 min     Billable Minutes: Gait Training 13 and Therapeutic Activity 27    Treatment Type: Treatment  PT/PTA: PT     PTA Visit Number: 0     Damari Mckenzie, PT  2020

## 2020-02-14 NOTE — PLAN OF CARE
SILVANA spoke with Osiris at [facility], pt is clinically accepted, no beds available until Monday. CM will continue to follow.

## 2020-02-14 NOTE — ASSESSMENT & PLAN NOTE
Debility  Pt discharged 1/31/2020 with [facility] which was therapy recommendation at that time.  PT/OT has consistently recommended SNF.    Plan to transfer to SNF on 2/17/2020.  I am concerned that if pt were to discharge home they would continue to decline due to debility and be at risk of fall or increased trauma at home without appropriate therapy.

## 2020-02-14 NOTE — PROGRESS NOTES
[facility] Hospital Medicine  Progress Note    Patient Name: Marycruz Ramirez  MRN: [id number]  Patient Class: OP- Observation   Admission Date: 2/11/2020  Length of Stay: 0 days  Attending Physician: Anika Elliott MD  Primary Care Provider: Marcelino Calle MD    [facility] Medicine Team: [facility] Y Shena Loera PA-C    Subjective:     Principal Problem:Acute on chronic combined systolic and diastolic heart failure        HPI:  Patient is a 75 yo female being placed in observation for acute on chronic heart failure. Patient with past medical hx of new CHF (EF 30), aortic stenosis, HLD, bipolar and overactive bladder. Patient was recently discharged from the hospital last month for pyelonephritis which required CC stay for sepsis (sent with home health) and admission before that was for anemia requiring blood transfusion and GI scope. Patient is present to the ED after reports of her legs becoming weak and falling onto her knees. Reports pain to bilateral knees but has some improvement with pain medication. Patient also endured skin tears to left arm and bilateral legs. Patient also endorses large amount of lower extremity swelling the last 3 days making her legs feel heavy. presenting with weakness and a fall. Reports recently seeing cardiology and plans for echo next month with possible angiogram. Patient works with home [employer] every day. She denies fever, chills, abdominal pain, chest pain, lower extremity pain, headache, LOC, dizziness, or confusion.    ED: VVS, WBC 14.89 (baseline), Hgb 8.1 (baseline), , Troponin 0.070, UA neg, BC--pending. XR to bilateral hip and knees: without fractures. CXR: Interval diuresis compared to 01/25/2020.  No new disease identified     Overview/Hospital Course:  Patient placed in observation for acute on chronic systolic/diastolic heart failure. , troponin 0.07--0.093--0.073. IV diuresis with lasix 2/11-2/13. Transitioned to oral  furosemide 40 mg bid on evening of 2/13 with good output (800 ml).  Patient also with a fall at home prior to admission. PT/OT consulted. PT recommending SNF. Wound care consulted for multiple skin tears.  2/14 pt participated with therapy and continuing to recommend SNF.    Anticipate transer to [facility] on 2/17/2020    Interval History:  no acute events overnight.  Pt transitioned to oral lasix yesterday evening with good output.  Pt refused therapy earlier this morning but participated early afternoon.  Recommendation is SNF.  Discussed with pt's  recommendation remains SNF and [facility] will have a bed available Monday.    Review of Systems   Constitutional: Positive for fatigue. Negative for appetite change, chills and fever.   HENT: Negative for trouble swallowing.    Respiratory: Negative for cough, chest tightness, shortness of breath and wheezing.    Cardiovascular: Positive for leg swelling (improved). Negative for chest pain and palpitations.   Gastrointestinal: Positive for abdominal distention (improved). Negative for abdominal pain, constipation, diarrhea and nausea.   Genitourinary: Negative for difficulty urinating, frequency and urgency.   Musculoskeletal: Negative for arthralgias and myalgias.   Skin: Positive for color change. Negative for rash.   Neurological: Positive for weakness. Negative for dizziness, light-headedness and headaches.   Psychiatric/Behavioral: Negative for sleep disturbance.     Objective:     Vital Signs (Most Recent):  Temp: 99 °F (37.2 °C) (02/14/20 1208)  Pulse: 99 (02/14/20 1208)  Resp: 18 (02/14/20 1208)  BP: 123/68 (02/14/20 1208)  SpO2: 97 % (02/14/20 1208) Vital Signs (24h Range):  Temp:  [97.6 °F (36.4 °C)-99 °F (37.2 °C)] 99 °F (37.2 °C)  Pulse:  [] 99  Resp:  [16-18] 18  SpO2:  [94 %-99 %] 97 %  BP: (106-132)/(54-72) 123/68     Weight: 51.9 kg (114 lb 8.5 oz)  Body mass index is 22.37 kg/m².    Intake/Output Summary (Last 24 hours) at 2/14/2020  1440  Last data filed at 2/14/2020 1300  Gross per 24 hour   Intake 700 ml   Output 650 ml   Net 50 ml      Physical Exam   Constitutional: She is oriented to person, place, and time. She appears well-developed and well-nourished. She has a sickly appearance. No distress.   Cardiovascular: Normal rate, regular rhythm and normal heart sounds. Exam reveals no gallop and no friction rub.   No murmur heard.  Pulmonary/Chest: Effort normal. No respiratory distress. She has no wheezes.   Abdominal: Soft. Bowel sounds are normal. She exhibits no distension. There is no tenderness.   Musculoskeletal: Normal range of motion. She exhibits edema (trace edema to BLE). She exhibits no tenderness.   Neurological: She is alert and oriented to person, place, and time.   Skin: Skin is warm and dry. No rash noted. She is not diaphoretic. No cyanosis. Nails show no clubbing.   Bruising to BUE and BLE. Tears noted to left arm and left leg   Psychiatric: She has a normal mood and affect. Her behavior is normal.       Significant Labs: All pertinent labs within the past 24 hours have been reviewed.    Significant Imaging: I have reviewed all pertinent imaging results/findings within the past 24 hours.      Assessment/Plan:      * Acute on chronic combined systolic and diastolic heart failure  · Volume overloaded on exam  · 3+ edema to BLE--improved to 2+ edema--now trace  · Rales present  · Given 80mg IV in ED with 500 output  · Ordered 40mg iv bid--continue with iv diuresis of lasix daily.  2/13 transition to oral furosemide 40 mg bid.  2/14 continue furosemide 40 mg bid.  · Elevation of leg  · Daily weight  · Low NA diet with 1500cc fluid restriction  ·  was 589 on prior admission  · Troponin flat, denies CP.  · Not on medical management, was seen in Cardiology clinic on 2/6 and at that time not recommend to start medication and plans for echo in March. Felt that depressed EF and cardiomyopathy was secondary to septic shock,  pressors, and bacteremia.   ECHO 1/26/2020:  · Moderate left ventricular enlargement.  · Moderately decreased left ventricular systolic function. The estimated ejection fraction is 30%.  · Grade I (mild) left ventricular diastolic dysfunction consistent with impaired relaxation.  · Normal right ventricular systolic function.  · Moderate left atrial enlargement.  · Mild-to-moderate mitral regurgitation.  · Severe aortic valve stenosis. Aortic valve area is 0.64 cm2; peak velocity is 3.94 m/s; mean gradient is 43 mmHg.  · The estimated PA systolic pressure is 40 mmHg.  · Elevated central venous pressure (15 mmHg).    Intake/Output Summary (Last 24 hours) at 2/14/2020 1445  Last data filed at 2/14/2020 1300  Gross per 24 hour   Intake 700 ml   Output 650 ml   Net 50 ml       Alteration in skin integrity  · Multiple skin tears.  Consulted wound care.  Nursing to apply sween (pink top) moisturizer BID to the arms and legs  - Clear moisture barrier (purple top) to sacral perineal area  Nursing to cleanse skin tears with normal saline and gauze, apply medihoney to open wounds and secure with border foam. Change daily and PRN soiled  EHOB overlay  q2hour turns  Heels offloaded          Fall  Debility  Pt discharged 1/31/2020 with [facility] which was therapy recommendation at that time.  PT/OT has consistently recommended SNF.    Plan to transfer to SNF on 2/17/2020.  I am concerned that if pt were to discharge home they would continue to decline due to debility and be at risk of fall or increased trauma at home without appropriate therapy.    Microcytic anemia  · Hgb stable and higher than baseline now 9  · Continue to monitor labs  · transfuse if becomes symptomatic or Hgb < 7  · Continue oral supplementation    Bipolar disorder  · Mood stable  · Continue oxcarbazepine    OAB (overactive bladder)  · Continue oxybutynin    Chronic radicular lumbar pain  · Continue hydrocodone  · checked  gets new RX monthly    Mixed  hyperlipidemia  · Continue fenofibrate  · F/u with PCP upon discharge for ongoing outpatient monitoring       VTE Risk Mitigation (From admission, onward)         Ordered     enoxaparin injection 40 mg  Daily      02/12/20 1524     IP VTE HIGH RISK PATIENT  Once      02/11/20 1142     Place RAMESH hose  Until discontinued      02/11/20 1142     Place sequential compression device  Until discontinued      02/11/20 1142                      Shena Loera PA-C  Department of Hospital Medicine   [facility]

## 2020-02-14 NOTE — PLAN OF CARE
Patient resting in bed in NAD. VSS. Pain only mildly relieved this shift. Dressing remain intact on bilat lower extremities. No safety issues this shift

## 2020-02-14 NOTE — PT/OT/SLP PROGRESS
Occupational Therapy   Treatment    Name: Marycruz Ramirez  MRN: [id number]  Admitting Diagnosis:  Acute on chronic combined systolic and diastolic heart failure       Recommendations:     Discharge Recommendations: nursing facility, skilled  Discharge Equipment Recommendations:  bath bench, walker, rolling  Barriers to discharge:  Decreased caregiver support(imncreased level of assistance required)    Assessment:     Marycruz Ramirez is a 74 y.o. female with a medical diagnosis of Acute on chronic combined systolic and diastolic heart failure.  She presents up in chair after PT session.  Extensive education with pt and sister regarding importance of daily participation, expected gains and required effort for desired gains.  Pt with good potential for improved self care and home performance nad safety and will benefit from skilled OT for max functional gains during acute day. Pt with seated and standing tasks with verbal cues for safety and hand placement.  Pt requires walker for standing balance and safety.  Pt able to complete therex and expresses understanding and renewed motivation for participation and gains Performance deficits affecting function are weakness, impaired endurance, impaired self care skills, impaired functional mobilty, gait instability, impaired balance.     Rehab Prognosis:  Good; patient would benefit from acute skilled OT services to address these deficits and reach maximum level of function.       Plan:     Patient to be seen 3 x/week to address the above listed problems via self-care/home management, therapeutic activities, therapeutic exercises  · Plan of Care Expires: 03/12/20  · Plan of Care Reviewed with: patient, sibling    Subjective     Pain/Comfort:  · Location - Side 1: Right  · Location 1: knee  · Pain Addressed 1: Reposition, Distraction, Pre-medicate for activity    Objective:     Communicated with: RN prior to session.  Patient found up in chair with PureWick, telemetry, peripheral IV  upon OT entry to room.    General Precautions: Standard, fall   Orthopedic Precautions:N/A   Braces: N/A     Occupational Performance:     Functional Mobility/Transfers:  · Patient completed Sit <> Stand Transfer with minimum assistance  with  rolling walker   · Patient completed Bed <> Chair Transfer using Stand Pivot technique with minimum assistance with rolling walker  · Functional Mobility: Pt benefits from verbal cues for positioning and foot placement     Activities of Daily Living:  · Feeding:  stand by assistance    · Grooming: stand by assistance        [facility] 6 Click ADL: 16    Treatment & Education:  Pt educated on safety, role of OT, importance of increased participation in self care for gains , expectations for participation, expectations for gains, POC, energy conservation, caregiver strain. White board updated.   - UE seated and standing therex   - safety training  - extensive education regarding increased participation     Patient left supine with all lines intactEducation:      GOALS:   Multidisciplinary Problems     Occupational Therapy Goals        Problem: Occupational Therapy Goal    Goal Priority Disciplines Outcome Interventions   Occupational Therapy Goal     OT, PT/OT Ongoing, Progressing    Description:  Goals to be met by: 2/26/2020     Patient will increase functional independence with ADLs by performing:    Grooming while EOB with Set-up Assistance.  Toileting from toilet with Moderate Assistance for hygiene and clothing management.   Sitting at edge of bed x10 minutes with Contact Guard Assistance.  Supine to sit with Moderate Assistance.  Stand pivot transfers with Moderate Assistance.  Toilet transfer to bedside commode with Moderate Assistance.                      Time Tracking:     OT Date of Treatment: 02/14/20  OT Start Time: 1105  OT Stop Time: 1130  OT Total Time (min): 25 min    Billable Minutes:Self Care/Home Management 12  Therapeutic Exercise 13    Erika Quijano,  OT  2/14/2020

## 2020-02-15 LAB
ANION GAP SERPL CALC-SCNC: 8 MMOL/L (ref 8–16)
ANISOCYTOSIS BLD QL SMEAR: SLIGHT
BASOPHILS # BLD AUTO: 0.04 K/UL (ref 0–0.2)
BASOPHILS NFR BLD: 0.5 % (ref 0–1.9)
BUN SERPL-MCNC: 13 MG/DL (ref 8–23)
CALCIUM SERPL-MCNC: 9.4 MG/DL (ref 8.7–10.5)
CHLORIDE SERPL-SCNC: 98 MMOL/L (ref 95–110)
CO2 SERPL-SCNC: 32 MMOL/L (ref 23–29)
CREAT SERPL-MCNC: 0.6 MG/DL (ref 0.5–1.4)
DIFFERENTIAL METHOD: ABNORMAL
EOSINOPHIL # BLD AUTO: 0.3 K/UL (ref 0–0.5)
EOSINOPHIL NFR BLD: 3.9 % (ref 0–8)
ERYTHROCYTE [DISTWIDTH] IN BLOOD BY AUTOMATED COUNT: 28.9 % (ref 11.5–14.5)
EST. GFR  (AFRICAN AMERICAN): >60 ML/MIN/1.73 M^2
EST. GFR  (NON AFRICAN AMERICAN): >60 ML/MIN/1.73 M^2
GLUCOSE SERPL-MCNC: 157 MG/DL (ref 70–110)
HCT VFR BLD AUTO: 31.4 % (ref 37–48.5)
HGB BLD-MCNC: 9.5 G/DL (ref 12–16)
HYPOCHROMIA BLD QL SMEAR: ABNORMAL
IMM GRANULOCYTES # BLD AUTO: 0.02 K/UL (ref 0–0.04)
IMM GRANULOCYTES NFR BLD AUTO: 0.3 % (ref 0–0.5)
LYMPHOCYTES # BLD AUTO: 1.6 K/UL (ref 1–4.8)
LYMPHOCYTES NFR BLD: 21.9 % (ref 18–48)
MCH RBC QN AUTO: 27.5 PG (ref 27–31)
MCHC RBC AUTO-ENTMCNC: 30.3 G/DL (ref 32–36)
MCV RBC AUTO: 91 FL (ref 82–98)
MONOCYTES # BLD AUTO: 1 K/UL (ref 0.3–1)
MONOCYTES NFR BLD: 13.9 % (ref 4–15)
NEUTROPHILS # BLD AUTO: 4.5 K/UL (ref 1.8–7.7)
NEUTROPHILS NFR BLD: 59.5 % (ref 38–73)
NRBC BLD-RTO: 0 /100 WBC
OVALOCYTES BLD QL SMEAR: ABNORMAL
PLATELET # BLD AUTO: 235 K/UL (ref 150–350)
PLATELET BLD QL SMEAR: ABNORMAL
PMV BLD AUTO: 9.9 FL (ref 9.2–12.9)
POIKILOCYTOSIS BLD QL SMEAR: SLIGHT
POLYCHROMASIA BLD QL SMEAR: ABNORMAL
POTASSIUM SERPL-SCNC: 3.9 MMOL/L (ref 3.5–5.1)
RBC # BLD AUTO: 3.45 M/UL (ref 4–5.4)
SODIUM SERPL-SCNC: 138 MMOL/L (ref 136–145)
WBC # BLD AUTO: 7.48 K/UL (ref 3.9–12.7)

## 2020-02-15 PROCEDURE — 36415 COLL VENOUS BLD VENIPUNCTURE: CPT | Mod: HCNC

## 2020-02-15 PROCEDURE — 99226 PR SUBSEQUENT OBSERVATION CARE,LEVEL III: ICD-10-PCS | Mod: HCNC,,, | Performed by: PHYSICIAN ASSISTANT

## 2020-02-15 PROCEDURE — 80048 BASIC METABOLIC PNL TOTAL CA: CPT | Mod: HCNC

## 2020-02-15 PROCEDURE — 25000003 PHARM REV CODE 250: Mod: HCNC | Performed by: PHYSICIAN ASSISTANT

## 2020-02-15 PROCEDURE — G0378 HOSPITAL OBSERVATION PER HR: HCPCS | Mod: HCNC

## 2020-02-15 PROCEDURE — 85025 COMPLETE CBC W/AUTO DIFF WBC: CPT | Mod: HCNC

## 2020-02-15 PROCEDURE — 25000003 PHARM REV CODE 250: Mod: HCNC | Performed by: NURSE PRACTITIONER

## 2020-02-15 PROCEDURE — 63600175 PHARM REV CODE 636 W HCPCS: Mod: HCNC | Performed by: NURSE PRACTITIONER

## 2020-02-15 PROCEDURE — 96372 THER/PROPH/DIAG INJ SC/IM: CPT

## 2020-02-15 PROCEDURE — 99226 PR SUBSEQUENT OBSERVATION CARE,LEVEL III: CPT | Mod: HCNC,,, | Performed by: PHYSICIAN ASSISTANT

## 2020-02-15 RX ADMIN — POLYETHYLENE GLYCOL 3350 17 G: 17 POWDER, FOR SOLUTION ORAL at 09:02

## 2020-02-15 RX ADMIN — FENOFIBRATE 145 MG: 145 TABLET, FILM COATED ORAL at 09:02

## 2020-02-15 RX ADMIN — OXCARBAZEPINE 600 MG: 150 TABLET, FILM COATED ORAL at 07:02

## 2020-02-15 RX ADMIN — HYDROCODONE BITARTRATE AND ACETAMINOPHEN 1 TABLET: 10; 325 TABLET ORAL at 07:02

## 2020-02-15 RX ADMIN — METHOCARBAMOL TABLETS 500 MG: 500 TABLET, COATED ORAL at 01:02

## 2020-02-15 RX ADMIN — OXYBUTYNIN CHLORIDE 5 MG: 5 TABLET ORAL at 09:02

## 2020-02-15 RX ADMIN — HYDROCODONE BITARTRATE AND ACETAMINOPHEN 1 TABLET: 10; 325 TABLET ORAL at 04:02

## 2020-02-15 RX ADMIN — FUROSEMIDE 40 MG: 40 TABLET ORAL at 09:02

## 2020-02-15 RX ADMIN — HYDROCODONE BITARTRATE AND ACETAMINOPHEN 1 TABLET: 10; 325 TABLET ORAL at 09:02

## 2020-02-15 RX ADMIN — HYDROCODONE BITARTRATE AND ACETAMINOPHEN 1 TABLET: 10; 325 TABLET ORAL at 12:02

## 2020-02-15 RX ADMIN — ENOXAPARIN SODIUM 40 MG: 100 INJECTION SUBCUTANEOUS at 06:02

## 2020-02-15 RX ADMIN — OXCARBAZEPINE 300 MG: 150 TABLET, FILM COATED ORAL at 01:02

## 2020-02-15 RX ADMIN — FUROSEMIDE 40 MG: 40 TABLET ORAL at 06:02

## 2020-02-15 RX ADMIN — METHOCARBAMOL TABLETS 500 MG: 500 TABLET, COATED ORAL at 06:02

## 2020-02-15 RX ADMIN — PANTOPRAZOLE SODIUM 40 MG: 40 TABLET, DELAYED RELEASE ORAL at 04:02

## 2020-02-15 RX ADMIN — OXYBUTYNIN CHLORIDE 5 MG: 5 TABLET ORAL at 07:02

## 2020-02-15 RX ADMIN — HYDROCODONE BITARTRATE AND ACETAMINOPHEN 1 TABLET: 10; 325 TABLET ORAL at 01:02

## 2020-02-15 RX ADMIN — FERROUS SULFATE TAB EC 325 MG (65 MG FE EQUIVALENT) 325 MG: 325 (65 FE) TABLET DELAYED RESPONSE at 09:02

## 2020-02-15 RX ADMIN — CETIRIZINE HYDROCHLORIDE 10 MG: 10 TABLET, FILM COATED ORAL at 07:02

## 2020-02-15 RX ADMIN — PANTOPRAZOLE SODIUM 40 MG: 40 TABLET, DELAYED RELEASE ORAL at 06:02

## 2020-02-15 RX ADMIN — METHOCARBAMOL TABLETS 500 MG: 500 TABLET, COATED ORAL at 09:02

## 2020-02-15 NOTE — ASSESSMENT & PLAN NOTE
· Volume overloaded on exam  · 3+ edema to BLE--improved to 2+ edema--now trace  · Rales present  · Given 80mg IV in ED with 500 output  · Ordered 40mg iv bid--continue with iv diuresis of lasix daily.  2/13 transition to oral furosemide 40 mg bid.  2/14-2/15 continue furosemide 40 mg bid.  · Elevation of leg  · Daily weight  · Low NA diet with 1500cc fluid restriction  ·  was 589 on prior admission  · Troponin flat, denies CP.  · Not on medical management, was seen in Cardiology clinic on 2/6 and at that time not recommend to start medication and plans for echo in March. Felt that depressed EF and cardiomyopathy was secondary to septic shock, pressors, and bacteremia.   ECHO 1/26/2020:  · Moderate left ventricular enlargement.  · Moderately decreased left ventricular systolic function. The estimated ejection fraction is 30%.  · Grade I (mild) left ventricular diastolic dysfunction consistent with impaired relaxation.  · Normal right ventricular systolic function.  · Moderate left atrial enlargement.  · Mild-to-moderate mitral regurgitation.  · Severe aortic valve stenosis. Aortic valve area is 0.64 cm2; peak velocity is 3.94 m/s; mean gradient is 43 mmHg.  · The estimated PA systolic pressure is 40 mmHg.  · Elevated central venous pressure (15 mmHg).    Intake/Output Summary (Last 24 hours) at 2/15/2020 1129  Last data filed at 2/15/2020 0429  Gross per 24 hour   Intake 690 ml   Output 550 ml   Net 140 ml

## 2020-02-15 NOTE — PROGRESS NOTES
[facility] Hospital Medicine  Progress Note    Patient Name: Marycruz Ramirez  MRN: [id number]  Patient Class: OP- Observation   Admission Date: 2/11/2020  Length of Stay: 0 days  Attending Physician: Anika Elliott MD  Primary Care Provider: Marcelino Calle MD    [facility] Medicine Team: [facility] [place] [facility] HOSP MED Y Shena Loera PA-C    Subjective:     Principal Problem:Acute on chronic combined systolic and diastolic heart failure        HPI:  Patient is a 75 yo female being placed in observation for acute on chronic heart failure. Patient with past medical hx of new CHF (EF 30), aortic stenosis, HLD, bipolar and overactive bladder. Patient was recently discharged from the hospital last month for pyelonephritis which required CC stay for sepsis (sent with home health) and admission before that was for anemia requiring blood transfusion and GI scope. Patient is present to the ED after reports of her legs becoming weak and falling onto her knees. Reports pain to bilateral knees but has some improvement with pain medication. Patient also endured skin tears to left arm and bilateral legs. Patient also endorses large amount of lower extremity swelling the last 3 days making her legs feel heavy. presenting with weakness and a fall. Reports recently seeing cardiology and plans for echo next month with possible angiogram. Patient works with home [employer] every day. She denies fever, chills, abdominal pain, chest pain, lower extremity pain, headache, LOC, dizziness, or confusion.    ED: VVS, WBC 14.89 (baseline), Hgb 8.1 (baseline), , Troponin 0.070, UA neg, BC--pending. XR to bilateral hip and knees: without fractures. CXR: Interval diuresis compared to 01/25/2020.  No new disease identified     Overview/Hospital Course:  Patient placed in observation for acute on chronic systolic/diastolic heart failure. , troponin 0.07--0.093--0.073. IV diuresis with lasix 2/11-2/13. Transitioned to oral  furosemide 40 mg bid on evening of 2/13 with good output (800 ml).  Patient also with a fall at home prior to admission. PT/OT consulted. PT recommending SNF. Wound care consulted for multiple skin tears.  2/14 pt participated with therapy and continuing to recommend SNF.    Anticipate transer to OSNF on 2/17/2020    Interval History:  no acute events overnight, no new complaints.  Pt sitting up in chair, more cheerful today.      Review of Systems   Constitutional: Negative for appetite change, chills, fatigue and fever.   HENT: Negative for trouble swallowing.    Respiratory: Negative for cough, chest tightness, shortness of breath and wheezing.    Cardiovascular: Positive for leg swelling (improved). Negative for chest pain and palpitations.   Gastrointestinal: Positive for abdominal distention (improved). Negative for abdominal pain, constipation, diarrhea and nausea.   Genitourinary: Negative for difficulty urinating, frequency and urgency.   Musculoskeletal: Negative for arthralgias and myalgias.   Skin: Positive for color change. Negative for rash.   Neurological: Positive for weakness. Negative for dizziness, light-headedness and headaches.   Psychiatric/Behavioral: Negative for sleep disturbance.     Objective:     Vital Signs (Most Recent):  Temp: 97.9 °F (36.6 °C) (02/15/20 0756)  Pulse: 106 (02/15/20 1100)  Resp: 18 (02/15/20 0756)  BP: 114/68 (02/15/20 0756)  SpO2: 97 % (02/15/20 0756) Vital Signs (24h Range):  Temp:  [96.7 °F (35.9 °C)-99 °F (37.2 °C)] 97.9 °F (36.6 °C)  Pulse:  [] 106  Resp:  [16-18] 18  SpO2:  [95 %-98 %] 97 %  BP: (111-133)/(58-68) 114/68     Weight: 51.9 kg (114 lb 8.5 oz)  Body mass index is 22.37 kg/m².    Intake/Output Summary (Last 24 hours) at 2/15/2020 1128  Last data filed at 2/15/2020 0429  Gross per 24 hour   Intake 690 ml   Output 550 ml   Net 140 ml      Physical Exam   Constitutional: She is oriented to person, place, and time. She appears well-developed and  well-nourished. She has a sickly appearance. No distress.   Cardiovascular: Normal rate, regular rhythm and normal heart sounds. Exam reveals no gallop and no friction rub.   No murmur heard.  Pulmonary/Chest: Effort normal. No respiratory distress. She has no wheezes.   Abdominal: Soft. Bowel sounds are normal. She exhibits no distension. There is no tenderness.   Musculoskeletal: Normal range of motion. She exhibits edema (trace edema to BLE). She exhibits no tenderness.   Neurological: She is alert and oriented to person, place, and time.   Skin: Skin is warm and dry. No rash noted. She is not diaphoretic. No cyanosis. Nails show no clubbing.   Bruising to BUE and BLE. Tears noted to left arm and left leg   Psychiatric: She has a normal mood and affect. Her behavior is normal.       Significant Labs: All pertinent labs within the past 24 hours have been reviewed.    Significant Imaging: I have reviewed all pertinent imaging results/findings within the past 24 hours.      Assessment/Plan:      * Acute on chronic combined systolic and diastolic heart failure  · Volume overloaded on exam  · 3+ edema to BLE--improved to 2+ edema--now trace  · Rales present  · Given 80mg IV in ED with 500 output  · Ordered 40mg iv bid--continue with iv diuresis of lasix daily.  2/13 transition to oral furosemide 40 mg bid.  2/14-2/15 continue furosemide 40 mg bid.  · Elevation of leg  · Daily weight  · Low NA diet with 1500cc fluid restriction  ·  was 589 on prior admission  · Troponin flat, denies CP.  · Not on medical management, was seen in Cardiology clinic on 2/6 and at that time not recommend to start medication and plans for echo in March. Felt that depressed EF and cardiomyopathy was secondary to septic shock, pressors, and bacteremia.   ECHO 1/26/2020:  · Moderate left ventricular enlargement.  · Moderately decreased left ventricular systolic function. The estimated ejection fraction is 30%.  · Grade I (mild) left  ventricular diastolic dysfunction consistent with impaired relaxation.  · Normal right ventricular systolic function.  · Moderate left atrial enlargement.  · Mild-to-moderate mitral regurgitation.  · Severe aortic valve stenosis. Aortic valve area is 0.64 cm2; peak velocity is 3.94 m/s; mean gradient is 43 mmHg.  · The estimated PA systolic pressure is 40 mmHg.  · Elevated central venous pressure (15 mmHg).    Intake/Output Summary (Last 24 hours) at 2/15/2020 1129  Last data filed at 2/15/2020 0429  Gross per 24 hour   Intake 690 ml   Output 550 ml   Net 140 ml       Alteration in skin integrity  · Multiple skin tears.  Consulted wound care.  Nursing to apply sween (pink top) moisturizer BID to the arms and legs  - Clear moisture barrier (purple top) to sacral perineal area  Nursing to cleanse skin tears with normal saline and gauze, apply medihoney to open wounds and secure with border foam. Change daily and PRN soiled  EHOB overlay  q2hour turns  Heels offloaded          Fall  Debility  Pt discharged 1/31/2020 with [facility] which was therapy recommendation at that time.  PT/OT has consistently recommended SNF.    Plan to transfer to SNF on 2/17/2020.  I am concerned that if pt were to discharge home they would continue to decline due to debility and be at risk of fall or increased trauma at home without appropriate therapy.    Microcytic anemia  · Hgb stable and higher than baseline now 9  · Continue to monitor labs  · transfuse if becomes symptomatic or Hgb < 7  · Continue oral supplementation    Bipolar disorder  · Mood stable  · Continue oxcarbazepine    OAB (overactive bladder)  · Continue oxybutynin    Chronic radicular lumbar pain  · Continue hydrocodone  · checked  gets new RX monthly    Mixed hyperlipidemia  · Continue fenofibrate  · F/u with PCP upon discharge for ongoing outpatient monitoring       VTE Risk Mitigation (From admission, onward)         Ordered     enoxaparin injection 40 mg  Daily       02/12/20 1524     IP VTE HIGH RISK PATIENT  Once      02/11/20 1142     Place RAMESH hose  Until discontinued      02/11/20 1142     Place sequential compression device  Until discontinued      02/11/20 1142                      Shena Loera PA-C  Department of Hospital Medicine   [facility]

## 2020-02-15 NOTE — ASSESSMENT & PLAN NOTE
Debility  Pt discharged 1/31/2020 with [facility] which was therapy recommendation at that time.  PT/OT has consistently recommended SNF.    Plan to transfer to SNF on 2/17/2020.  I am concerned that if pt were to discharge home they would continue to decline due to debility and be at risk of fall or increased trauma at home without appropriate therapy.

## 2020-02-15 NOTE — SUBJECTIVE & OBJECTIVE
Interval History:  no acute events overnight, no new complaints.  Pt sitting up in chair, more cheerful today.      Review of Systems   Constitutional: Negative for appetite change, chills, fatigue and fever.   HENT: Negative for trouble swallowing.    Respiratory: Negative for cough, chest tightness, shortness of breath and wheezing.    Cardiovascular: Positive for leg swelling (improved). Negative for chest pain and palpitations.   Gastrointestinal: Positive for abdominal distention (improved). Negative for abdominal pain, constipation, diarrhea and nausea.   Genitourinary: Negative for difficulty urinating, frequency and urgency.   Musculoskeletal: Negative for arthralgias and myalgias.   Skin: Positive for color change. Negative for rash.   Neurological: Positive for weakness. Negative for dizziness, light-headedness and headaches.   Psychiatric/Behavioral: Negative for sleep disturbance.     Objective:     Vital Signs (Most Recent):  Temp: 97.9 °F (36.6 °C) (02/15/20 0756)  Pulse: 106 (02/15/20 1100)  Resp: 18 (02/15/20 0756)  BP: 114/68 (02/15/20 0756)  SpO2: 97 % (02/15/20 0756) Vital Signs (24h Range):  Temp:  [96.7 °F (35.9 °C)-99 °F (37.2 °C)] 97.9 °F (36.6 °C)  Pulse:  [] 106  Resp:  [16-18] 18  SpO2:  [95 %-98 %] 97 %  BP: (111-133)/(58-68) 114/68     Weight: 51.9 kg (114 lb 8.5 oz)  Body mass index is 22.37 kg/m².    Intake/Output Summary (Last 24 hours) at 2/15/2020 1128  Last data filed at 2/15/2020 0429  Gross per 24 hour   Intake 690 ml   Output 550 ml   Net 140 ml      Physical Exam   Constitutional: She is oriented to person, place, and time. She appears well-developed and well-nourished. She has a sickly appearance. No distress.   Cardiovascular: Normal rate, regular rhythm and normal heart sounds. Exam reveals no gallop and no friction rub.   No murmur heard.  Pulmonary/Chest: Effort normal. No respiratory distress. She has no wheezes.   Abdominal: Soft. Bowel sounds are normal. She  exhibits no distension. There is no tenderness.   Musculoskeletal: Normal range of motion. She exhibits edema (trace edema to BLE). She exhibits no tenderness.   Neurological: She is alert and oriented to person, place, and time.   Skin: Skin is warm and dry. No rash noted. She is not diaphoretic. No cyanosis. Nails show no clubbing.   Bruising to BUE and BLE. Tears noted to left arm and left leg   Psychiatric: She has a normal mood and affect. Her behavior is normal.       Significant Labs: All pertinent labs within the past 24 hours have been reviewed.    Significant Imaging: I have reviewed all pertinent imaging results/findings within the past 24 hours.

## 2020-02-16 LAB
ANION GAP SERPL CALC-SCNC: 9 MMOL/L (ref 8–16)
ANISOCYTOSIS BLD QL SMEAR: ABNORMAL
BACTERIA BLD CULT: NORMAL
BACTERIA BLD CULT: NORMAL
BASOPHILS # BLD AUTO: 0.03 K/UL (ref 0–0.2)
BASOPHILS NFR BLD: 0.4 % (ref 0–1.9)
BUN SERPL-MCNC: 14 MG/DL (ref 8–23)
CALCIUM SERPL-MCNC: 9.5 MG/DL (ref 8.7–10.5)
CHLORIDE SERPL-SCNC: 97 MMOL/L (ref 95–110)
CO2 SERPL-SCNC: 33 MMOL/L (ref 23–29)
CREAT SERPL-MCNC: 0.7 MG/DL (ref 0.5–1.4)
DIFFERENTIAL METHOD: ABNORMAL
EOSINOPHIL # BLD AUTO: 0.3 K/UL (ref 0–0.5)
EOSINOPHIL NFR BLD: 4.2 % (ref 0–8)
ERYTHROCYTE [DISTWIDTH] IN BLOOD BY AUTOMATED COUNT: 28.7 % (ref 11.5–14.5)
EST. GFR  (AFRICAN AMERICAN): >60 ML/MIN/1.73 M^2
EST. GFR  (NON AFRICAN AMERICAN): >60 ML/MIN/1.73 M^2
GLUCOSE SERPL-MCNC: 161 MG/DL (ref 70–110)
HCT VFR BLD AUTO: 30.8 % (ref 37–48.5)
HGB BLD-MCNC: 8.9 G/DL (ref 12–16)
HYPOCHROMIA BLD QL SMEAR: ABNORMAL
IMM GRANULOCYTES # BLD AUTO: 0.01 K/UL (ref 0–0.04)
IMM GRANULOCYTES NFR BLD AUTO: 0.1 % (ref 0–0.5)
LYMPHOCYTES # BLD AUTO: 1.8 K/UL (ref 1–4.8)
LYMPHOCYTES NFR BLD: 27.4 % (ref 18–48)
MCH RBC QN AUTO: 26.8 PG (ref 27–31)
MCHC RBC AUTO-ENTMCNC: 28.9 G/DL (ref 32–36)
MCV RBC AUTO: 93 FL (ref 82–98)
MONOCYTES # BLD AUTO: 0.6 K/UL (ref 0.3–1)
MONOCYTES NFR BLD: 9 % (ref 4–15)
NEUTROPHILS # BLD AUTO: 3.9 K/UL (ref 1.8–7.7)
NEUTROPHILS NFR BLD: 58.9 % (ref 38–73)
NRBC BLD-RTO: 0 /100 WBC
PLATELET # BLD AUTO: 235 K/UL (ref 150–350)
PMV BLD AUTO: 10.1 FL (ref 9.2–12.9)
POLYCHROMASIA BLD QL SMEAR: ABNORMAL
POTASSIUM SERPL-SCNC: 3.7 MMOL/L (ref 3.5–5.1)
RBC # BLD AUTO: 3.32 M/UL (ref 4–5.4)
SODIUM SERPL-SCNC: 139 MMOL/L (ref 136–145)
TB INDURATION 48 - 72 HR READ: 0 MM
WBC # BLD AUTO: 6.67 K/UL (ref 3.9–12.7)

## 2020-02-16 PROCEDURE — 36415 COLL VENOUS BLD VENIPUNCTURE: CPT | Mod: HCNC

## 2020-02-16 PROCEDURE — G0378 HOSPITAL OBSERVATION PER HR: HCPCS | Mod: HCNC

## 2020-02-16 PROCEDURE — 25000003 PHARM REV CODE 250: Mod: HCNC | Performed by: NURSE PRACTITIONER

## 2020-02-16 PROCEDURE — 80048 BASIC METABOLIC PNL TOTAL CA: CPT | Mod: HCNC

## 2020-02-16 PROCEDURE — 99225 PR SUBSEQUENT OBSERVATION CARE,LEVEL II: ICD-10-PCS | Mod: HCNC,,, | Performed by: PHYSICIAN ASSISTANT

## 2020-02-16 PROCEDURE — 63600175 PHARM REV CODE 636 W HCPCS: Mod: HCNC | Performed by: NURSE PRACTITIONER

## 2020-02-16 PROCEDURE — 99225 PR SUBSEQUENT OBSERVATION CARE,LEVEL II: CPT | Mod: HCNC,,, | Performed by: PHYSICIAN ASSISTANT

## 2020-02-16 PROCEDURE — 85025 COMPLETE CBC W/AUTO DIFF WBC: CPT | Mod: HCNC

## 2020-02-16 PROCEDURE — 25000003 PHARM REV CODE 250: Mod: HCNC | Performed by: PHYSICIAN ASSISTANT

## 2020-02-16 PROCEDURE — 96372 THER/PROPH/DIAG INJ SC/IM: CPT

## 2020-02-16 RX ADMIN — ACETAMINOPHEN 650 MG: 325 TABLET ORAL at 06:02

## 2020-02-16 RX ADMIN — OXCARBAZEPINE 600 MG: 150 TABLET, FILM COATED ORAL at 08:02

## 2020-02-16 RX ADMIN — FERROUS SULFATE TAB EC 325 MG (65 MG FE EQUIVALENT) 325 MG: 325 (65 FE) TABLET DELAYED RESPONSE at 09:02

## 2020-02-16 RX ADMIN — OXCARBAZEPINE 300 MG: 150 TABLET, FILM COATED ORAL at 09:02

## 2020-02-16 RX ADMIN — HYDROCODONE BITARTRATE AND ACETAMINOPHEN 1 TABLET: 10; 325 TABLET ORAL at 08:02

## 2020-02-16 RX ADMIN — METHOCARBAMOL TABLETS 500 MG: 500 TABLET, COATED ORAL at 09:02

## 2020-02-16 RX ADMIN — POLYETHYLENE GLYCOL 3350 17 G: 17 POWDER, FOR SOLUTION ORAL at 09:02

## 2020-02-16 RX ADMIN — FENOFIBRATE 145 MG: 145 TABLET, FILM COATED ORAL at 09:02

## 2020-02-16 RX ADMIN — METHOCARBAMOL TABLETS 500 MG: 500 TABLET, COATED ORAL at 01:02

## 2020-02-16 RX ADMIN — PANTOPRAZOLE SODIUM 40 MG: 40 TABLET, DELAYED RELEASE ORAL at 05:02

## 2020-02-16 RX ADMIN — METHOCARBAMOL TABLETS 500 MG: 500 TABLET, COATED ORAL at 05:02

## 2020-02-16 RX ADMIN — HYDROCODONE BITARTRATE AND ACETAMINOPHEN 1 TABLET: 10; 325 TABLET ORAL at 12:02

## 2020-02-16 RX ADMIN — CETIRIZINE HYDROCHLORIDE 10 MG: 10 TABLET, FILM COATED ORAL at 08:02

## 2020-02-16 RX ADMIN — PANTOPRAZOLE SODIUM 40 MG: 40 TABLET, DELAYED RELEASE ORAL at 03:02

## 2020-02-16 RX ADMIN — ENOXAPARIN SODIUM 40 MG: 100 INJECTION SUBCUTANEOUS at 05:02

## 2020-02-16 RX ADMIN — HYDROCODONE BITARTRATE AND ACETAMINOPHEN 1 TABLET: 10; 325 TABLET ORAL at 09:02

## 2020-02-16 RX ADMIN — HYDROCODONE BITARTRATE AND ACETAMINOPHEN 1 TABLET: 10; 325 TABLET ORAL at 05:02

## 2020-02-16 RX ADMIN — OXYBUTYNIN CHLORIDE 5 MG: 5 TABLET ORAL at 09:02

## 2020-02-16 RX ADMIN — OXYBUTYNIN CHLORIDE 5 MG: 5 TABLET ORAL at 08:02

## 2020-02-16 RX ADMIN — FUROSEMIDE 40 MG: 40 TABLET ORAL at 05:02

## 2020-02-16 RX ADMIN — HYDROCODONE BITARTRATE AND ACETAMINOPHEN 1 TABLET: 5; 325 TABLET ORAL at 01:02

## 2020-02-16 RX ADMIN — FUROSEMIDE 40 MG: 40 TABLET ORAL at 09:02

## 2020-02-16 RX ADMIN — METHOCARBAMOL TABLETS 500 MG: 500 TABLET, COATED ORAL at 08:02

## 2020-02-16 NOTE — ASSESSMENT & PLAN NOTE
· Volume overloaded on exam  · 3+ edema to BLE--improved to 2+ edema--now trace  · Rales present  · Given 80mg IV in ED with 500 output  · Ordered 40mg iv bid--continue with iv diuresis of lasix daily.  2/13 transition to oral furosemide 40 mg bid.  2/14 to present continue furosemide 40 mg bid.  · Elevation of leg  · Daily weight  · Low NA diet with 1500cc fluid restriction  ·  was 589 on prior admission  · Troponin flat, denies CP.  · Not on medical management, was seen in Cardiology clinic on 2/6 and at that time not recommend to start medication and plans for echo in March. Felt that depressed EF and cardiomyopathy was secondary to septic shock, pressors, and bacteremia.   ECHO 1/26/2020:  · Moderate left ventricular enlargement.  · Moderately decreased left ventricular systolic function. The estimated ejection fraction is 30%.  · Grade I (mild) left ventricular diastolic dysfunction consistent with impaired relaxation.  · Normal right ventricular systolic function.  · Moderate left atrial enlargement.  · Mild-to-moderate mitral regurgitation.  · Severe aortic valve stenosis. Aortic valve area is 0.64 cm2; peak velocity is 3.94 m/s; mean gradient is 43 mmHg.  · The estimated PA systolic pressure is 40 mmHg.  · Elevated central venous pressure (15 mmHg).    Intake/Output Summary (Last 24 hours) at 2/16/2020 1406  Last data filed at 2/16/2020 0509  Gross per 24 hour   Intake 180 ml   Output 400 ml   Net -220 ml

## 2020-02-16 NOTE — PROGRESS NOTES
[facility] Hospital Medicine  Progress Note    Patient Name: Marycruz Ramirez  MRN: [id number]  Patient Class: OP- Observation   Admission Date: 2/11/2020  Length of Stay: 0 days  Attending Physician: Anika Elliott MD  Primary Care Provider: Marcelino Calle MD    [facility] Medicine Team: [facility]OSP MED Y Shena Loera PA-C    Subjective:     Principal Problem:Acute on chronic combined systolic and diastolic heart failure        HPI:  Patient is a 75 yo female being placed in observation for acute on chronic heart failure. Patient with past medical hx of new CHF (EF 30), aortic stenosis, HLD, bipolar and overactive bladder. Patient was recently discharged from the hospital last month for pyelonephritis which required CC stay for sepsis (sent with home health) and admission before that was for anemia requiring blood transfusion and GI scope. Patient is present to the ED after reports of her legs becoming weak and falling onto her knees. Reports pain to bilateral knees but has some improvement with pain medication. Patient also endured skin tears to left arm and bilateral legs. Patient also endorses large amount of lower extremity swelling the last 3 days making her legs feel heavy. presenting with weakness and a fall. Reports recently seeing cardiology and plans for echo next month with possible angiogram. Patient works with home [employer] every day. She denies fever, chills, abdominal pain, chest pain, lower extremity pain, headache, LOC, dizziness, or confusion.    ED: VVS, WBC 14.89 (baseline), Hgb 8.1 (baseline), , Troponin 0.070, UA neg, BC--pending. XR to bilateral hip and knees: without fractures. CXR: Interval diuresis compared to 01/25/2020.  No new disease identified     Overview/Hospital Course:  Patient placed in observation for acute on chronic systolic/diastolic heart failure. , troponin 0.07--0.093--0.073. IV diuresis with lasix 2/11-2/13. Transitioned to oral  furosemide 40 mg bid on evening of 2/13 with good output (800 ml).  Patient also with a fall at home prior to admission. PT/OT consulted. PT recommending SNF. Wound care consulted for multiple skin tears.  2/14 pt participated with therapy and continuing to recommend SNF.    Anticipate transer to OSNF on 2/17/2020    Interval History:  no acute events overnight, no new complaints.  Pt taking much more active approach in her care.  Bringing walker to bedside has lifted spirits, pt ambulating around the unit with walker.    Review of Systems   Constitutional: Negative for appetite change, chills, fatigue and fever.   HENT: Negative for trouble swallowing.    Respiratory: Negative for cough, chest tightness, shortness of breath and wheezing.    Cardiovascular: Positive for leg swelling (improved). Negative for chest pain and palpitations.   Gastrointestinal: Negative for abdominal distention, abdominal pain, constipation, diarrhea and nausea.   Genitourinary: Negative for difficulty urinating, frequency and urgency.   Musculoskeletal: Negative for arthralgias and myalgias.   Skin: Positive for color change. Negative for rash.   Neurological: Positive for weakness. Negative for dizziness, light-headedness and headaches.   Psychiatric/Behavioral: Negative for sleep disturbance.     Objective:     Vital Signs (Most Recent):  Temp: 98.2 °F (36.8 °C) (02/16/20 1150)  Pulse: 81 (02/16/20 1150)  Resp: 18 (02/16/20 1150)  BP: (!) 122/55 (02/16/20 1150)  SpO2: 99 % (02/16/20 1150) Vital Signs (24h Range):  Temp:  [96.6 °F (35.9 °C)-98.2 °F (36.8 °C)] 98.2 °F (36.8 °C)  Pulse:  [69-98] 81  Resp:  [16-18] 18  SpO2:  [96 %-100 %] 99 %  BP: (117-129)/(55-68) 122/55     Weight: 51.9 kg (114 lb 8.5 oz)  Body mass index is 22.37 kg/m².    Intake/Output Summary (Last 24 hours) at 2/16/2020 1404  Last data filed at 2/16/2020 0509  Gross per 24 hour   Intake 180 ml   Output 400 ml   Net -220 ml      Physical Exam   Constitutional: She is  oriented to person, place, and time. She appears well-developed and well-nourished. No distress.   Cardiovascular: Normal rate, regular rhythm and normal heart sounds. Exam reveals no gallop and no friction rub.   No murmur heard.  Pulmonary/Chest: Effort normal. No respiratory distress. She has no wheezes.   Abdominal: Soft. Bowel sounds are normal. She exhibits no distension. There is no tenderness.   Musculoskeletal: Normal range of motion. She exhibits edema (trace edema to BLE). She exhibits no tenderness.   Neurological: She is alert and oriented to person, place, and time.   Skin: Skin is warm and dry. No rash noted. She is not diaphoretic. No cyanosis. Nails show no clubbing.   Bruising to BUE and BLE. Tears noted to left arm and left leg   Psychiatric: She has a normal mood and affect. Her behavior is normal.       Significant Labs: All pertinent labs within the past 24 hours have been reviewed.    Significant Imaging: I have reviewed all pertinent imaging results/findings within the past 24 hours.      Assessment/Plan:      * Acute on chronic combined systolic and diastolic heart failure  · Volume overloaded on exam  · 3+ edema to BLE--improved to 2+ edema--now trace  · Rales present  · Given 80mg IV in ED with 500 output  · Ordered 40mg iv bid--continue with iv diuresis of lasix daily.  2/13 transition to oral furosemide 40 mg bid.  2/14 to present continue furosemide 40 mg bid.  · Elevation of leg  · Daily weight  · Low NA diet with 1500cc fluid restriction  ·  was 589 on prior admission  · Troponin flat, denies CP.  · Not on medical management, was seen in Cardiology clinic on 2/6 and at that time not recommend to start medication and plans for echo in March. Felt that depressed EF and cardiomyopathy was secondary to septic shock, pressors, and bacteremia.   ECHO 1/26/2020:  · Moderate left ventricular enlargement.  · Moderately decreased left ventricular systolic function. The estimated ejection  fraction is 30%.  · Grade I (mild) left ventricular diastolic dysfunction consistent with impaired relaxation.  · Normal right ventricular systolic function.  · Moderate left atrial enlargement.  · Mild-to-moderate mitral regurgitation.  · Severe aortic valve stenosis. Aortic valve area is 0.64 cm2; peak velocity is 3.94 m/s; mean gradient is 43 mmHg.  · The estimated PA systolic pressure is 40 mmHg.  · Elevated central venous pressure (15 mmHg).    Intake/Output Summary (Last 24 hours) at 2/16/2020 1406  Last data filed at 2/16/2020 0509  Gross per 24 hour   Intake 180 ml   Output 400 ml   Net -220 ml       Alteration in skin integrity  · Multiple skin tears.  Consulted wound care.  Nursing to apply sween (pink top) moisturizer BID to the arms and legs  - Clear moisture barrier (purple top) to sacral perineal area  Nursing to cleanse skin tears with normal saline and gauze, apply medihoney to open wounds and secure with border foam. Change daily and PRN soiled  EHOB overlay  q2hour turns  Heels offloaded          Fall  Debility  Pt discharged 1/31/2020 with [facility] which was therapy recommendation at that time.  PT/OT has consistently recommended SNF.    Plan to transfer to SNF on 2/17/2020.  I am concerned that if pt were to discharge home they would continue to decline due to debility and be at risk of fall or increased trauma at home without appropriate therapy.    Microcytic anemia  · Hgb stable and higher than baseline now 9  · Continue to monitor labs  · transfuse if becomes symptomatic or Hgb < 7  · Continue oral supplementation    Bipolar disorder  · Mood stable  · Continue oxcarbazepine    OAB (overactive bladder)  · Continue oxybutynin    Chronic radicular lumbar pain  · Continue hydrocodone  · checked  gets new RX monthly    Mixed hyperlipidemia  · Continue fenofibrate  · F/u with PCP upon discharge for ongoing outpatient monitoring       VTE Risk Mitigation (From admission, onward)         Ordered      enoxaparin injection 40 mg  Daily      02/12/20 1524     IP VTE HIGH RISK PATIENT  Once      02/11/20 1142     Place RAMESH hose  Until discontinued      02/11/20 1142     Place sequential compression device  Until discontinued      02/11/20 1142                      Shena Loera PA-C  Department of Hospital Medicine   [facility]

## 2020-02-16 NOTE — ASSESSMENT & PLAN NOTE
Debility  Pt discharged 1/31/2020 with [facility] which was therapy recommendation at that time.  PT/OT has consistently recommended SNF.    Plan to transfer to SNF on 2/17/2020.  I am concerned that if pt were to discharge home they would continue to decline due to debility and be at risk of fall or increased trauma at home without appropriate therapy.

## 2020-02-16 NOTE — SUBJECTIVE & OBJECTIVE
Interval History:  no acute events overnight, no new complaints.  Pt taking much more active approach in her care.  Bringing walker to bedside has lifted spirits, pt ambulating around the unit with walker.    Review of Systems   Constitutional: Negative for appetite change, chills, fatigue and fever.   HENT: Negative for trouble swallowing.    Respiratory: Negative for cough, chest tightness, shortness of breath and wheezing.    Cardiovascular: Positive for leg swelling (improved). Negative for chest pain and palpitations.   Gastrointestinal: Negative for abdominal distention, abdominal pain, constipation, diarrhea and nausea.   Genitourinary: Negative for difficulty urinating, frequency and urgency.   Musculoskeletal: Negative for arthralgias and myalgias.   Skin: Positive for color change. Negative for rash.   Neurological: Positive for weakness. Negative for dizziness, light-headedness and headaches.   Psychiatric/Behavioral: Negative for sleep disturbance.     Objective:     Vital Signs (Most Recent):  Temp: 98.2 °F (36.8 °C) (02/16/20 1150)  Pulse: 81 (02/16/20 1150)  Resp: 18 (02/16/20 1150)  BP: (!) 122/55 (02/16/20 1150)  SpO2: 99 % (02/16/20 1150) Vital Signs (24h Range):  Temp:  [96.6 °F (35.9 °C)-98.2 °F (36.8 °C)] 98.2 °F (36.8 °C)  Pulse:  [69-98] 81  Resp:  [16-18] 18  SpO2:  [96 %-100 %] 99 %  BP: (117-129)/(55-68) 122/55     Weight: 51.9 kg (114 lb 8.5 oz)  Body mass index is 22.37 kg/m².    Intake/Output Summary (Last 24 hours) at 2/16/2020 1404  Last data filed at 2/16/2020 0509  Gross per 24 hour   Intake 180 ml   Output 400 ml   Net -220 ml      Physical Exam   Constitutional: She is oriented to person, place, and time. She appears well-developed and well-nourished. No distress.   Cardiovascular: Normal rate, regular rhythm and normal heart sounds. Exam reveals no gallop and no friction rub.   No murmur heard.  Pulmonary/Chest: Effort normal. No respiratory distress. She has no wheezes.    Abdominal: Soft. Bowel sounds are normal. She exhibits no distension. There is no tenderness.   Musculoskeletal: Normal range of motion. She exhibits edema (trace edema to BLE). She exhibits no tenderness.   Neurological: She is alert and oriented to person, place, and time.   Skin: Skin is warm and dry. No rash noted. She is not diaphoretic. No cyanosis. Nails show no clubbing.   Bruising to BUE and BLE. Tears noted to left arm and left leg   Psychiatric: She has a normal mood and affect. Her behavior is normal.       Significant Labs: All pertinent labs within the past 24 hours have been reviewed.    Significant Imaging: I have reviewed all pertinent imaging results/findings within the past 24 hours.

## 2020-02-17 ENCOUNTER — HOSPITAL ENCOUNTER (INPATIENT)
Facility: HOSPITAL | Age: 75
LOS: 16 days | Discharge: HOME HEALTH | DRG: 292 | End: 2020-03-04
Attending: INTERNAL MEDICINE | Admitting: EMERGENCY MEDICINE
Payer: MEDICARE

## 2020-02-17 ENCOUNTER — TELEPHONE (OUTPATIENT)
Dept: GASTROENTEROLOGY | Facility: CLINIC | Age: 75
End: 2020-02-17

## 2020-02-17 ENCOUNTER — EXTERNAL HOME HEALTH (OUTPATIENT)
Dept: HOME HEALTH SERVICES | Facility: HOSPITAL | Age: 75
End: 2020-02-17
Payer: MEDICARE

## 2020-02-17 VITALS
BODY MASS INDEX: 22.51 KG/M2 | SYSTOLIC BLOOD PRESSURE: 126 MMHG | TEMPERATURE: 98 F | DIASTOLIC BLOOD PRESSURE: 60 MMHG | OXYGEN SATURATION: 96 % | RESPIRATION RATE: 17 BRPM | WEIGHT: 114.63 LBS | HEIGHT: 60 IN | HEART RATE: 87 BPM

## 2020-02-17 DIAGNOSIS — R23.9 ALTERATION IN SKIN INTEGRITY: Primary | ICD-10-CM

## 2020-02-17 DIAGNOSIS — I50.43 ACUTE ON CHRONIC COMBINED SYSTOLIC AND DIASTOLIC HEART FAILURE: ICD-10-CM

## 2020-02-17 DIAGNOSIS — S81.819A SKIN TEAR OF LOWER LEG WITHOUT COMPLICATION, UNSPECIFIED LATERALITY, INITIAL ENCOUNTER: ICD-10-CM

## 2020-02-17 DIAGNOSIS — S81.819D SKIN TEAR OF LOWER LEG WITHOUT COMPLICATION, UNSPECIFIED LATERALITY, SUBSEQUENT ENCOUNTER: ICD-10-CM

## 2020-02-17 DIAGNOSIS — T14.8XXA MULTIPLE SKIN TEARS: ICD-10-CM

## 2020-02-17 LAB
ANION GAP SERPL CALC-SCNC: 8 MMOL/L (ref 8–16)
ANISOCYTOSIS BLD QL SMEAR: ABNORMAL
BASOPHILS # BLD AUTO: 0.02 K/UL (ref 0–0.2)
BASOPHILS NFR BLD: 0.3 % (ref 0–1.9)
BUN SERPL-MCNC: 11 MG/DL (ref 8–23)
CALCIUM SERPL-MCNC: 9.4 MG/DL (ref 8.7–10.5)
CHLORIDE SERPL-SCNC: 98 MMOL/L (ref 95–110)
CO2 SERPL-SCNC: 31 MMOL/L (ref 23–29)
CREAT SERPL-MCNC: 0.6 MG/DL (ref 0.5–1.4)
DIFFERENTIAL METHOD: ABNORMAL
EOSINOPHIL # BLD AUTO: 0.2 K/UL (ref 0–0.5)
EOSINOPHIL NFR BLD: 3.4 % (ref 0–8)
ERYTHROCYTE [DISTWIDTH] IN BLOOD BY AUTOMATED COUNT: 27.3 % (ref 11.5–14.5)
EST. GFR  (AFRICAN AMERICAN): >60 ML/MIN/1.73 M^2
EST. GFR  (NON AFRICAN AMERICAN): >60 ML/MIN/1.73 M^2
GLUCOSE SERPL-MCNC: 159 MG/DL (ref 70–110)
HCT VFR BLD AUTO: 30.3 % (ref 37–48.5)
HGB BLD-MCNC: 9 G/DL (ref 12–16)
HYPOCHROMIA BLD QL SMEAR: ABNORMAL
IMM GRANULOCYTES # BLD AUTO: 0.02 K/UL (ref 0–0.04)
IMM GRANULOCYTES NFR BLD AUTO: 0.3 % (ref 0–0.5)
LYMPHOCYTES # BLD AUTO: 1.7 K/UL (ref 1–4.8)
LYMPHOCYTES NFR BLD: 27.5 % (ref 18–48)
MCH RBC QN AUTO: 27.4 PG (ref 27–31)
MCHC RBC AUTO-ENTMCNC: 29.7 G/DL (ref 32–36)
MCV RBC AUTO: 92 FL (ref 82–98)
MONOCYTES # BLD AUTO: 0.6 K/UL (ref 0.3–1)
MONOCYTES NFR BLD: 9.3 % (ref 4–15)
NEUTROPHILS # BLD AUTO: 3.6 K/UL (ref 1.8–7.7)
NEUTROPHILS NFR BLD: 59.2 % (ref 38–73)
NRBC BLD-RTO: 0 /100 WBC
OVALOCYTES BLD QL SMEAR: ABNORMAL
PLATELET # BLD AUTO: 234 K/UL (ref 150–350)
PLATELET BLD QL SMEAR: ABNORMAL
PMV BLD AUTO: 10.2 FL (ref 9.2–12.9)
POIKILOCYTOSIS BLD QL SMEAR: SLIGHT
POLYCHROMASIA BLD QL SMEAR: ABNORMAL
POTASSIUM SERPL-SCNC: 4 MMOL/L (ref 3.5–5.1)
RBC # BLD AUTO: 3.29 M/UL (ref 4–5.4)
SODIUM SERPL-SCNC: 137 MMOL/L (ref 136–145)
WBC # BLD AUTO: 6.12 K/UL (ref 3.9–12.7)

## 2020-02-17 PROCEDURE — 25000003 PHARM REV CODE 250: Mod: HCNC | Performed by: PHYSICIAN ASSISTANT

## 2020-02-17 PROCEDURE — 63600175 PHARM REV CODE 636 W HCPCS: Mod: HCNC | Performed by: NURSE PRACTITIONER

## 2020-02-17 PROCEDURE — 85025 COMPLETE CBC W/AUTO DIFF WBC: CPT | Mod: HCNC

## 2020-02-17 PROCEDURE — 36415 COLL VENOUS BLD VENIPUNCTURE: CPT | Mod: HCNC

## 2020-02-17 PROCEDURE — 11000004 HC SNF PRIVATE: Mod: HCNC

## 2020-02-17 PROCEDURE — 80048 BASIC METABOLIC PNL TOTAL CA: CPT | Mod: HCNC

## 2020-02-17 PROCEDURE — G0378 HOSPITAL OBSERVATION PER HR: HCPCS | Mod: HCNC

## 2020-02-17 PROCEDURE — 25000003 PHARM REV CODE 250: Mod: HCNC | Performed by: NURSE PRACTITIONER

## 2020-02-17 PROCEDURE — 97530 THERAPEUTIC ACTIVITIES: CPT | Mod: HCNC

## 2020-02-17 RX ORDER — OXCARBAZEPINE 300 MG/1
300 TABLET, FILM COATED ORAL DAILY
Status: DISCONTINUED | OUTPATIENT
Start: 2020-02-18 | End: 2020-03-04 | Stop reason: HOSPADM

## 2020-02-17 RX ORDER — AMOXICILLIN 250 MG
1 CAPSULE ORAL 2 TIMES DAILY
Status: DISCONTINUED | OUTPATIENT
Start: 2020-02-17 | End: 2020-03-04 | Stop reason: HOSPADM

## 2020-02-17 RX ORDER — PANTOPRAZOLE SODIUM 40 MG/1
40 TABLET, DELAYED RELEASE ORAL
Status: CANCELLED | OUTPATIENT
Start: 2020-02-17

## 2020-02-17 RX ORDER — CALCIUM CARBONATE 200(500)MG
500 TABLET,CHEWABLE ORAL 2 TIMES DAILY PRN
Status: DISCONTINUED | OUTPATIENT
Start: 2020-02-17 | End: 2020-03-04 | Stop reason: HOSPADM

## 2020-02-17 RX ORDER — AMOXICILLIN 250 MG
1 CAPSULE ORAL 2 TIMES DAILY
Status: CANCELLED | OUTPATIENT
Start: 2020-02-17

## 2020-02-17 RX ORDER — OXCARBAZEPINE 150 MG/1
300 TABLET, FILM COATED ORAL DAILY
Status: CANCELLED | OUTPATIENT
Start: 2020-02-18

## 2020-02-17 RX ORDER — CETIRIZINE HYDROCHLORIDE 10 MG/1
10 TABLET ORAL NIGHTLY
Status: CANCELLED | OUTPATIENT
Start: 2020-02-17

## 2020-02-17 RX ORDER — TALC
6 POWDER (GRAM) TOPICAL NIGHTLY PRN
Status: DISCONTINUED | OUTPATIENT
Start: 2020-02-17 | End: 2020-03-04 | Stop reason: HOSPADM

## 2020-02-17 RX ORDER — IBUPROFEN 200 MG
16 TABLET ORAL
Status: CANCELLED | OUTPATIENT
Start: 2020-02-17

## 2020-02-17 RX ORDER — FERROUS SULFATE 325(65) MG
325 TABLET, DELAYED RELEASE (ENTERIC COATED) ORAL DAILY
Status: DISCONTINUED | OUTPATIENT
Start: 2020-02-18 | End: 2020-02-19

## 2020-02-17 RX ORDER — BISACODYL 10 MG
10 SUPPOSITORY, RECTAL RECTAL DAILY PRN
Status: CANCELLED | OUTPATIENT
Start: 2020-02-17

## 2020-02-17 RX ORDER — ENOXAPARIN SODIUM 100 MG/ML
40 INJECTION SUBCUTANEOUS EVERY 24 HOURS
Status: CANCELLED | OUTPATIENT
Start: 2020-02-17

## 2020-02-17 RX ORDER — FUROSEMIDE 40 MG/1
40 TABLET ORAL 2 TIMES DAILY
Status: DISCONTINUED | OUTPATIENT
Start: 2020-02-18 | End: 2020-02-20

## 2020-02-17 RX ORDER — GLUCAGON 1 MG
1 KIT INJECTION
Status: CANCELLED | OUTPATIENT
Start: 2020-02-17

## 2020-02-17 RX ORDER — METHOCARBAMOL 500 MG/1
500 TABLET, FILM COATED ORAL 4 TIMES DAILY
Status: CANCELLED | OUTPATIENT
Start: 2020-02-17

## 2020-02-17 RX ORDER — SODIUM CHLORIDE 0.9 % (FLUSH) 0.9 %
5 SYRINGE (ML) INJECTION
Status: DISCONTINUED | OUTPATIENT
Start: 2020-02-17 | End: 2020-02-20

## 2020-02-17 RX ORDER — CETIRIZINE HYDROCHLORIDE 5 MG/1
10 TABLET ORAL NIGHTLY
Status: DISCONTINUED | OUTPATIENT
Start: 2020-02-18 | End: 2020-03-04 | Stop reason: HOSPADM

## 2020-02-17 RX ORDER — ACETAMINOPHEN 325 MG/1
650 TABLET ORAL EVERY 6 HOURS PRN
Status: CANCELLED | OUTPATIENT
Start: 2020-02-17

## 2020-02-17 RX ORDER — ACETAMINOPHEN 325 MG/1
650 TABLET ORAL EVERY 6 HOURS PRN
Status: DISCONTINUED | OUTPATIENT
Start: 2020-02-17 | End: 2020-03-04 | Stop reason: HOSPADM

## 2020-02-17 RX ORDER — ONDANSETRON 8 MG/1
8 TABLET, ORALLY DISINTEGRATING ORAL EVERY 8 HOURS PRN
Status: DISCONTINUED | OUTPATIENT
Start: 2020-02-17 | End: 2020-03-04 | Stop reason: HOSPADM

## 2020-02-17 RX ORDER — IBUPROFEN 200 MG
16 TABLET ORAL
Status: DISCONTINUED | OUTPATIENT
Start: 2020-02-17 | End: 2020-02-19

## 2020-02-17 RX ORDER — ONDANSETRON 8 MG/1
8 TABLET, ORALLY DISINTEGRATING ORAL EVERY 8 HOURS PRN
Status: CANCELLED | OUTPATIENT
Start: 2020-02-17

## 2020-02-17 RX ORDER — OXCARBAZEPINE 600 MG/1
600 TABLET, FILM COATED ORAL NIGHTLY
Status: DISCONTINUED | OUTPATIENT
Start: 2020-02-18 | End: 2020-03-04 | Stop reason: HOSPADM

## 2020-02-17 RX ORDER — BISACODYL 10 MG
10 SUPPOSITORY, RECTAL RECTAL DAILY PRN
Status: DISCONTINUED | OUTPATIENT
Start: 2020-02-17 | End: 2020-03-04 | Stop reason: HOSPADM

## 2020-02-17 RX ORDER — METHOCARBAMOL 500 MG/1
500 TABLET, FILM COATED ORAL 4 TIMES DAILY
Status: DISCONTINUED | OUTPATIENT
Start: 2020-02-18 | End: 2020-02-19

## 2020-02-17 RX ORDER — TALC
9 POWDER (GRAM) TOPICAL NIGHTLY PRN
Status: CANCELLED | OUTPATIENT
Start: 2020-02-17

## 2020-02-17 RX ORDER — IPRATROPIUM BROMIDE AND ALBUTEROL SULFATE 2.5; .5 MG/3ML; MG/3ML
3 SOLUTION RESPIRATORY (INHALATION) EVERY 4 HOURS PRN
Status: DISCONTINUED | OUTPATIENT
Start: 2020-02-17 | End: 2020-03-04 | Stop reason: HOSPADM

## 2020-02-17 RX ORDER — CYCLOBENZAPRINE HCL 10 MG
10 TABLET ORAL 3 TIMES DAILY PRN
Status: CANCELLED | OUTPATIENT
Start: 2020-02-17

## 2020-02-17 RX ORDER — CALCIUM CARBONATE 200(500)MG
500 TABLET,CHEWABLE ORAL 2 TIMES DAILY PRN
Status: CANCELLED | OUTPATIENT
Start: 2020-02-17

## 2020-02-17 RX ORDER — CYCLOBENZAPRINE HCL 5 MG
10 TABLET ORAL 3 TIMES DAILY PRN
Status: DISCONTINUED | OUTPATIENT
Start: 2020-02-17 | End: 2020-02-19

## 2020-02-17 RX ORDER — TALC
9 POWDER (GRAM) TOPICAL NIGHTLY PRN
Status: DISCONTINUED | OUTPATIENT
Start: 2020-02-17 | End: 2020-02-18

## 2020-02-17 RX ORDER — OXCARBAZEPINE 150 MG/1
600 TABLET, FILM COATED ORAL NIGHTLY
Status: CANCELLED | OUTPATIENT
Start: 2020-02-17

## 2020-02-17 RX ORDER — HYDROCODONE BITARTRATE AND ACETAMINOPHEN 10; 325 MG/1; MG/1
1 TABLET ORAL EVERY 4 HOURS PRN
Status: CANCELLED | OUTPATIENT
Start: 2020-02-17

## 2020-02-17 RX ORDER — SODIUM CHLORIDE 0.9 % (FLUSH) 0.9 %
5 SYRINGE (ML) INJECTION
Status: CANCELLED | OUTPATIENT
Start: 2020-02-17

## 2020-02-17 RX ORDER — GLUCAGON 1 MG
1 KIT INJECTION
Status: DISCONTINUED | OUTPATIENT
Start: 2020-02-17 | End: 2020-02-19

## 2020-02-17 RX ORDER — HYDROCODONE BITARTRATE AND ACETAMINOPHEN 5; 325 MG/1; MG/1
1 TABLET ORAL EVERY 4 HOURS PRN
Status: DISCONTINUED | OUTPATIENT
Start: 2020-02-17 | End: 2020-03-04 | Stop reason: HOSPADM

## 2020-02-17 RX ORDER — ENOXAPARIN SODIUM 100 MG/ML
40 INJECTION SUBCUTANEOUS EVERY 24 HOURS
Status: DISCONTINUED | OUTPATIENT
Start: 2020-02-18 | End: 2020-03-04 | Stop reason: HOSPADM

## 2020-02-17 RX ORDER — OXYBUTYNIN CHLORIDE 5 MG/1
5 TABLET ORAL 2 TIMES DAILY
Status: CANCELLED | OUTPATIENT
Start: 2020-02-17

## 2020-02-17 RX ORDER — HYDROCODONE BITARTRATE AND ACETAMINOPHEN 5; 325 MG/1; MG/1
1 TABLET ORAL EVERY 4 HOURS PRN
Status: CANCELLED | OUTPATIENT
Start: 2020-02-17

## 2020-02-17 RX ORDER — POLYETHYLENE GLYCOL 3350 17 G/17G
17 POWDER, FOR SOLUTION ORAL DAILY
Status: DISCONTINUED | OUTPATIENT
Start: 2020-02-18 | End: 2020-03-04 | Stop reason: HOSPADM

## 2020-02-17 RX ORDER — ACETAMINOPHEN 325 MG/1
650 TABLET ORAL EVERY 4 HOURS PRN
Status: DISCONTINUED | OUTPATIENT
Start: 2020-02-17 | End: 2020-03-04 | Stop reason: HOSPADM

## 2020-02-17 RX ORDER — HYDROCODONE BITARTRATE AND ACETAMINOPHEN 10; 325 MG/1; MG/1
1 TABLET ORAL EVERY 4 HOURS PRN
Status: DISCONTINUED | OUTPATIENT
Start: 2020-02-17 | End: 2020-03-04 | Stop reason: HOSPADM

## 2020-02-17 RX ORDER — IBUPROFEN 200 MG
24 TABLET ORAL
Status: CANCELLED | OUTPATIENT
Start: 2020-02-17

## 2020-02-17 RX ORDER — DICLOFENAC SODIUM 10 MG/G
2 GEL TOPICAL 3 TIMES DAILY PRN
Status: CANCELLED | OUTPATIENT
Start: 2020-02-17

## 2020-02-17 RX ORDER — PANTOPRAZOLE SODIUM 40 MG/1
40 TABLET, DELAYED RELEASE ORAL
Status: DISCONTINUED | OUTPATIENT
Start: 2020-02-18 | End: 2020-02-19

## 2020-02-17 RX ORDER — IBUPROFEN 200 MG
24 TABLET ORAL
Status: DISCONTINUED | OUTPATIENT
Start: 2020-02-17 | End: 2020-02-19

## 2020-02-17 RX ORDER — FERROUS SULFATE 325(65) MG
325 TABLET, DELAYED RELEASE (ENTERIC COATED) ORAL DAILY
Status: CANCELLED | OUTPATIENT
Start: 2020-02-18

## 2020-02-17 RX ORDER — DICLOFENAC SODIUM 10 MG/G
2 GEL TOPICAL 3 TIMES DAILY PRN
Status: DISCONTINUED | OUTPATIENT
Start: 2020-02-17 | End: 2020-03-04 | Stop reason: HOSPADM

## 2020-02-17 RX ORDER — POLYETHYLENE GLYCOL 3350 17 G/17G
17 POWDER, FOR SOLUTION ORAL DAILY
Status: CANCELLED | OUTPATIENT
Start: 2020-02-18

## 2020-02-17 RX ORDER — ACETAMINOPHEN 325 MG/1
650 TABLET ORAL EVERY 4 HOURS PRN
Status: CANCELLED | OUTPATIENT
Start: 2020-02-17

## 2020-02-17 RX ORDER — FENOFIBRATE 145 MG/1
145 TABLET, FILM COATED ORAL DAILY
Status: DISCONTINUED | OUTPATIENT
Start: 2020-02-18 | End: 2020-03-04 | Stop reason: HOSPADM

## 2020-02-17 RX ORDER — FUROSEMIDE 40 MG/1
40 TABLET ORAL 2 TIMES DAILY
Status: CANCELLED | OUTPATIENT
Start: 2020-02-17

## 2020-02-17 RX ORDER — TALC
6 POWDER (GRAM) TOPICAL NIGHTLY PRN
Status: CANCELLED | OUTPATIENT
Start: 2020-02-17

## 2020-02-17 RX ORDER — FENOFIBRATE 145 MG/1
145 TABLET, FILM COATED ORAL DAILY
Status: CANCELLED | OUTPATIENT
Start: 2020-02-18

## 2020-02-17 RX ORDER — OXYBUTYNIN CHLORIDE 5 MG/1
5 TABLET ORAL 2 TIMES DAILY
Status: DISCONTINUED | OUTPATIENT
Start: 2020-02-18 | End: 2020-03-04 | Stop reason: HOSPADM

## 2020-02-17 RX ORDER — IPRATROPIUM BROMIDE AND ALBUTEROL SULFATE 2.5; .5 MG/3ML; MG/3ML
3 SOLUTION RESPIRATORY (INHALATION) EVERY 4 HOURS PRN
Status: CANCELLED | OUTPATIENT
Start: 2020-02-17

## 2020-02-17 RX ADMIN — ENOXAPARIN SODIUM 40 MG: 100 INJECTION SUBCUTANEOUS at 07:02

## 2020-02-17 RX ADMIN — HYDROCODONE BITARTRATE AND ACETAMINOPHEN 1 TABLET: 10; 325 TABLET ORAL at 03:02

## 2020-02-17 RX ADMIN — HYDROCODONE BITARTRATE AND ACETAMINOPHEN 1 TABLET: 10; 325 TABLET ORAL at 10:02

## 2020-02-17 RX ADMIN — PANTOPRAZOLE SODIUM 40 MG: 40 TABLET, DELAYED RELEASE ORAL at 03:02

## 2020-02-17 RX ADMIN — OXYBUTYNIN CHLORIDE 5 MG: 5 TABLET ORAL at 08:02

## 2020-02-17 RX ADMIN — METHOCARBAMOL TABLETS 500 MG: 500 TABLET, COATED ORAL at 08:02

## 2020-02-17 RX ADMIN — FUROSEMIDE 40 MG: 40 TABLET ORAL at 07:02

## 2020-02-17 RX ADMIN — METHOCARBAMOL TABLETS 500 MG: 500 TABLET, COATED ORAL at 07:02

## 2020-02-17 RX ADMIN — FENOFIBRATE 145 MG: 145 TABLET, FILM COATED ORAL at 08:02

## 2020-02-17 RX ADMIN — HYDROCODONE BITARTRATE AND ACETAMINOPHEN 1 TABLET: 10; 325 TABLET ORAL at 01:02

## 2020-02-17 RX ADMIN — CETIRIZINE HYDROCHLORIDE 10 MG: 10 TABLET, FILM COATED ORAL at 08:02

## 2020-02-17 RX ADMIN — OXCARBAZEPINE 300 MG: 150 TABLET, FILM COATED ORAL at 08:02

## 2020-02-17 RX ADMIN — HYDROCODONE BITARTRATE AND ACETAMINOPHEN 1 TABLET: 10; 325 TABLET ORAL at 11:02

## 2020-02-17 RX ADMIN — HYDROCODONE BITARTRATE AND ACETAMINOPHEN 1 TABLET: 10; 325 TABLET ORAL at 06:02

## 2020-02-17 RX ADMIN — HYDROCODONE BITARTRATE AND ACETAMINOPHEN 1 TABLET: 5; 325 TABLET ORAL at 08:02

## 2020-02-17 RX ADMIN — POLYETHYLENE GLYCOL 3350 17 G: 17 POWDER, FOR SOLUTION ORAL at 08:02

## 2020-02-17 RX ADMIN — FERROUS SULFATE TAB EC 325 MG (65 MG FE EQUIVALENT) 325 MG: 325 (65 FE) TABLET DELAYED RESPONSE at 08:02

## 2020-02-17 RX ADMIN — PANTOPRAZOLE SODIUM 40 MG: 40 TABLET, DELAYED RELEASE ORAL at 06:02

## 2020-02-17 RX ADMIN — FUROSEMIDE 40 MG: 40 TABLET ORAL at 08:02

## 2020-02-17 RX ADMIN — OXCARBAZEPINE 600 MG: 150 TABLET, FILM COATED ORAL at 08:02

## 2020-02-17 NOTE — ASSESSMENT & PLAN NOTE
Debility  Pt discharged 1/31/2020 with [facility] which was therapy recommendation at that time.  PT/OT has consistently recommended SNF.    Plan to transfer to SNF on 2/17/2020.  I am concerned that if pt were to discharge home they would continue to decline due to debility and be at risk of fall or increased trauma at home without appropriate therapy.

## 2020-02-17 NOTE — PLAN OF CARE
02/17/20 1531   Final Note   Assessment Type Final Discharge Note   Anticipated Discharge Disposition SNF     Pt is being discharged to [facility].    Please have pt ready for 7:30 transport .     SW arranged wheelchair transport via [facility]. Requested  time is 7:30 PM.  Requested  time does not guarantee arrival time.      Nurse can call report to [phone].    Nurse has been informed of above.     Dominique Ruiz LMSW  [facility]  [phone]

## 2020-02-17 NOTE — PLAN OF CARE
Goals addressed and unmet.  Cont with POC  Erika Quijano, OT  2/17/2020    Problem: Occupational Therapy Goal  Goal: Occupational Therapy Goal  Description  Goals to be met by: 2/26/2020     Patient will increase functional independence with ADLs by performing:    Grooming while EOB with Set-up Assistance.  Toileting from toilet with Moderate Assistance for hygiene and clothing management.   Sitting at edge of bed x10 minutes with Contact Guard Assistance.  Supine to sit with Moderate Assistance.  Stand pivot transfers with Moderate Assistance.  Toilet transfer to bedside commode with Moderate Assistance.     Outcome: Ongoing, Progressing

## 2020-02-17 NOTE — PT/OT/SLP PROGRESS
Occupational Therapy   Treatment    Name: Marycruz Ramirez  MRN: [id number]  Admitting Diagnosis:  Acute on chronic combined systolic and diastolic heart failure       Recommendations:     Discharge Recommendations: nursing facility, skilled  Discharge Equipment Recommendations:  bath bench, walker, rolling  Barriers to discharge:  Decreased caregiver support    Assessment:     Marycruz Ramirez is a 74 y.o. female with a medical diagnosis of Acute on chronic combined systolic and diastolic heart failure.  She presents supine with complaints of pain.  Pt preparing for DC to SNF and with potential for gains with participation.  Pt has shown improved self care prettification and activity tolerance . Performance deficits affecting function are weakness, impaired endurance, impaired self care skills, gait instability, impaired functional mobilty, impaired balance.     Rehab Prognosis:  Good; patient would benefit from acute skilled OT services to address these deficits and reach maximum level of function.       Plan:     Patient to be seen 3 x/week to address the above listed problems via self-care/home management, therapeutic activities, therapeutic exercises  · Plan of Care Expires: 02/20/20  · Plan of Care Reviewed with: patient, spouse    Subjective     Pain/Comfort:  · Location - Side 1: Right  · Location 1: knee    Objective:     Communicated with: RN prior to session.  Patient found supine with PureWick, telemetry, peripheral IV upon OT entry to room.    General Precautions: Standard, fall   Orthopedic Precautions:N/A   Braces: N/A     Occupational Performance:     Bed Mobility:    · Patient completed Rolling/Turning to Left with  minimum assistance  · Patient completed Rolling/Turning to Right with minimum assistance  · Patient completed Scooting/Bridging with minimum assistance  · Patient completed Supine to Sit with minimum assistance  · Patient completed Sit to Supine with minimum assistance     Functional  Mobility/Transfers:  · Patient completed Sit <> Stand Transfer with minimum assistance  with  no assistive device   · Patient completed Bed <> Chair Transfer using Stand Pivot technique with minimum assistance with hand-held assist    Activities of Daily Living:  · Grooming: contact guard assistance    · Bathing: moderate assistance    · Upper Body Dressing: minimum assistance    · Lower Body Dressing: moderate assistance        [facility] 6 Click ADL: 17    Treatment & Education:  Pt educated on safety, role of OT, importance of increased participation in self care for gains , expectations for participation, expectations for gains, POC, energy conservation, caregiver strain. White board updated.   - bed mobility training     Patient left supine with all lines intactEducation:      GOALS:   Multidisciplinary Problems     Occupational Therapy Goals        Problem: Occupational Therapy Goal    Goal Priority Disciplines Outcome Interventions   Occupational Therapy Goal     OT, PT/OT Ongoing, Progressing    Description:  Goals to be met by: 2/26/2020     Patient will increase functional independence with ADLs by performing:    Grooming while EOB with Set-up Assistance.  Toileting from toilet with Moderate Assistance for hygiene and clothing management.   Sitting at edge of bed x10 minutes with Contact Guard Assistance.  Supine to sit with Moderate Assistance.  Stand pivot transfers with Moderate Assistance.  Toilet transfer to bedside commode with Moderate Assistance.                      Time Tracking:     OT Date of Treatment: 02/17/20  OT Start Time: 0845  OT Stop Time: 0910  OT Total Time (min): 25 min    Billable Minutes:Therapeutic Activity 25    Erika Quijano, OT  2/17/2020

## 2020-02-17 NOTE — ASSESSMENT & PLAN NOTE
· Volume overloaded on exam  · 3+ edema to BLE--improved to 2+ edema--now trace  · Rales present  · Given 80mg IV in ED with 500 output  · Ordered 40mg iv bid--continue with iv diuresis of lasix daily.  2/13 transition to oral furosemide 40 mg bid.  2/14 to present continue furosemide 40 mg bid.  · Elevation of leg  · Daily weight  · Low NA diet with 1500cc fluid restriction  ·  was 589 on prior admission  · Troponin flat, denies CP.  · Not on medical management, was seen in Cardiology clinic on 2/6 and at that time not recommend to start medication and plans for echo in March. Felt that depressed EF and cardiomyopathy was secondary to septic shock, pressors, and bacteremia.   ECHO 1/26/2020:  · Moderate left ventricular enlargement.  · Moderately decreased left ventricular systolic function. The estimated ejection fraction is 30%.  · Grade I (mild) left ventricular diastolic dysfunction consistent with impaired relaxation.  · Normal right ventricular systolic function.  · Moderate left atrial enlargement.  · Mild-to-moderate mitral regurgitation.  · Severe aortic valve stenosis. Aortic valve area is 0.64 cm2; peak velocity is 3.94 m/s; mean gradient is 43 mmHg.  · The estimated PA systolic pressure is 40 mmHg.  · Elevated central venous pressure (15 mmHg).  No intake or output data in the 24 hours ending 02/17/20 3816

## 2020-02-17 NOTE — TELEPHONE ENCOUNTER
----- Message from Micah Patel MD sent at 2/11/2020  5:43 PM CST -----  The polyp removed from the colon was completely benign.  Biopsies from the stomach and duodenum were also unremarkable.    MA to call patient with results.

## 2020-02-17 NOTE — PLAN OF CARE
SILVANA spoke with Osiris at [facility], pt is medically stable, due to staffing issues they are not able to take her until 7pm shift change. CM notified the patient and family. SW will schedule late transport ride.

## 2020-02-17 NOTE — DISCHARGE SUMMARY
[facility] Hospital Medicine  Discharge Summary      Patient Name: Marycruz Ramirez  MRN: [id number]  Admission Date: 2/11/2020  Hospital Length of Stay: 0 days  Discharge Date and Time:  02/17/2020 10:50 AM  Attending Physician: Anika Elliott MD   Discharging Provider: Shena Loera PA-C  Primary Care Provider: Marcelino Calle MD  [facility] Medicine Team: [facility] HOSP MED Y Shena Loera PA-C    HPI:   Patient is a 75 yo female being placed in observation for acute on chronic heart failure. Patient with past medical hx of new CHF (EF 30), aortic stenosis, HLD, bipolar and overactive bladder. Patient was recently discharged from the hospital last month for pyelonephritis which required [facility] stay for sepsis (sent with home health) and admission before that was for anemia requiring blood transfusion and GI scope. Patient is present to the ED after reports of her legs becoming weak and falling onto her knees. Reports pain to bilateral knees but has some improvement with pain medication. Patient also endured skin tears to left arm and bilateral legs. Patient also endorses large amount of lower extremity swelling the last 3 days making her legs feel heavy. presenting with weakness and a fall. Reports recently seeing cardiology and plans for echo next month with possible angiogram. Patient works with home [employer] every day. She denies fever, chills, abdominal pain, chest pain, lower extremity pain, headache, LOC, dizziness, or confusion.    ED: VVS, WBC 14.89 (baseline), Hgb 8.1 (baseline), , Troponin 0.070, UA neg, BC--pending. XR to bilateral hip and knees: without fractures. CXR: Interval diuresis compared to 01/25/2020.  No new disease identified     * No surgery found *      Hospital Course:   Patient placed in observation for acute on chronic systolic/diastolic heart failure. , troponin 0.07--0.093--0.073. IV diuresis with lasix 2/11-2/13. Transitioned to oral furosemide 40 mg bid  on evening of 2/13 with good output (800 ml) and this was continued until transfer to SNF.  Patient also with a fall at home prior to admission. PT/OT consulted. PT recommending SNF. Wound care consulted for multiple skin tears.  2/14 pt participated with therapy and continuing to recommend SNF.  Transfer to [facility] on 2/17/2020     Consults:   Consults (From admission, onward)        Status Ordering Provider     Inpatient consult to [facility]  Once     Provider:  (Not yet assigned)    Acknowledged CHELLY CABRERA          * Acute on chronic combined systolic and diastolic heart failure  · Volume overloaded on exam  · 3+ edema to BLE--improved to 2+ edema--now trace  · Rales present  · Given 80mg IV in ED with 500 output  · Ordered 40mg iv bid--continue with iv diuresis of lasix daily.  2/13 transition to oral furosemide 40 mg bid.  2/14 to present continue furosemide 40 mg bid.  · Elevation of leg  · Daily weight  · Low NA diet with 1500cc fluid restriction  ·  was 589 on prior admission  · Troponin flat, denies CP.  · Not on medical management, was seen in Cardiology clinic on 2/6 and at that time not recommend to start medication and plans for echo in March. Felt that depressed EF and cardiomyopathy was secondary to septic shock, pressors, and bacteremia.   ECHO 1/26/2020:  · Moderate left ventricular enlargement.  · Moderately decreased left ventricular systolic function. The estimated ejection fraction is 30%.  · Grade I (mild) left ventricular diastolic dysfunction consistent with impaired relaxation.  · Normal right ventricular systolic function.  · Moderate left atrial enlargement.  · Mild-to-moderate mitral regurgitation.  · Severe aortic valve stenosis. Aortic valve area is 0.64 cm2; peak velocity is 3.94 m/s; mean gradient is 43 mmHg.  · The estimated PA systolic pressure is 40 mmHg.  · Elevated central venous pressure (15 mmHg).  No intake or output data in the 24 hours ending 02/17/20  1049    Alteration in skin integrity  · Multiple skin tears.  Consulted wound care.  Nursing to apply sween (pink top) moisturizer BID to the arms and legs  - Clear moisture barrier (purple top) to sacral perineal area  Nursing to cleanse skin tears with normal saline and gauze, apply medihoney to open wounds and secure with border foam. Change daily and PRN soiled  EHOB overlay  q2hour turns  Heels offloaded          Fall  Debility  Pt discharged 1/31/2020 with [facility] which was therapy recommendation at that time.  PT/OT has consistently recommended SNF.    Plan to transfer to SNF on 2/17/2020.  I am concerned that if pt were to discharge home they would continue to decline due to debility and be at risk of fall or increased trauma at home without appropriate therapy.    Microcytic anemia  · Hgb stable and higher than baseline now 9  · Continue to monitor labs  · transfuse if becomes symptomatic or Hgb < 7  · Continue oral supplementation    Bipolar disorder  · Mood stable  · Continue oxcarbazepine    OAB (overactive bladder)  · Continue oxybutynin    Chronic radicular lumbar pain  · Continue hydrocodone  · checked  gets new RX monthly    Mixed hyperlipidemia  · Continue fenofibrate  · F/u with PCP upon discharge for ongoing outpatient monitoring       Final Active Diagnoses:    Diagnosis Date Noted POA    PRINCIPAL PROBLEM:  Acute on chronic combined systolic and diastolic heart failure [I50.43] 02/11/2020 Yes    Alteration in skin integrity [R23.9] 02/12/2020 Yes    Fall [W19.XXXA] 02/11/2020 Yes    Microcytic anemia [D50.9]  Yes    Bipolar disorder [F31.9] 01/16/2020 Yes    OAB (overactive bladder) [N32.81] 01/16/2020 Yes    Chronic radicular lumbar pain [M54.16, G89.29] 01/16/2020 Yes    Mixed hyperlipidemia [E78.2] 01/16/2020 Yes    Debility [R53.81] 02/14/2020 Unknown    Multiple skin tears [T14.8XXA] 02/13/2020 Yes      Problems Resolved During this Admission:       Discharged Condition:  good    Disposition: Skilled Nursing Facility    Follow Up:    Patient Instructions:   No discharge procedures on file.    Significant Diagnostic Studies: Labs: All labs within the past 24 hours have been reviewed    Pending Diagnostic Studies:     None         Medications:  Reconciled Home Medications:      Medication List      START taking these medications    diclofenac sodium 1 % Gel  Commonly known as:  VOLTAREN  Apply 2 g topically 3 (three) times daily as needed (pain).     furosemide 40 MG tablet  Commonly known as:  LASIX  Take 1 tablet (40 mg total) by mouth 2 (two) times daily.     methocarbamol 500 MG Tab  Commonly known as:  ROBAXIN  Take 1 tablet (500 mg total) by mouth 4 (four) times daily. for 10 days        CONTINUE taking these medications    b complex vitamins capsule  Take 1 capsule by mouth once daily.     BONE ESSENTIALS ORAL  Take by mouth.     cetirizine 10 MG tablet  Commonly known as:  ZYRTEC  Take by mouth.     fenofibrate 145 MG tablet  Commonly known as:  TRICOR     ferrous sulfate 325 mg (65 mg iron) Tab tablet  Commonly known as:  FEOSOL  Take 1 tablet (325 mg total) by mouth once daily.     Flonase Allergy Relief 50 mcg/actuation nasal spray  Generic drug:  fluticasone propionate     multivitamin with minerals tablet  Take by mouth.     mupirocin 2 % ointment  Commonly known as:  BACTROBAN  by Nasal route.     Norco  mg per tablet  Generic drug:  HYDROcodone-acetaminophen  Take by mouth.     omega-3 acid ethyl esters 1 gram capsule  Commonly known as:  LOVAZA  Take 2 g by mouth 2 (two) times daily.     OXcarbazepine 300 MG Tab  Commonly known as:  TRILEPTAL  Take 300 mg by mouth once daily. One in the morning, two in the evening     oxybutynin 5 MG Tab  Commonly known as:  DITROPAN  5 mg 2 (two) times daily.     pantoprazole 40 MG tablet  Commonly known as:  PROTONIX  Take 1 tablet (40 mg total) by mouth 2 (two) times daily before meals.        STOP taking these medications     BUMETANIDE ORAL     cyclobenzaprine 10 MG tablet  Commonly known as:  FLEXERIL     tolterodine 4 MG 24 hr capsule  Commonly known as:  DETROL LA            Indwelling Lines/Drains at time of discharge:   Lines/Drains/Airways     None                 Time spent on the discharge of patient: >30 minutes  Patient was seen and examined on the date of discharge and determined to be suitable for discharge.         Shena Loera PA-C  Department of Hospital Medicine  [facility]

## 2020-02-17 NOTE — PLAN OF CARE
Pt informed of plan of care. Pt refused some parts of physical assessment. Pt admits being transferred to [facility] on tomorrow and will work with treatment plan discussed with Medical team. Pt tolerated well. Pt expressed verbally with family at the bedside for compliance with treatment plan. Will continue to monitor. Pt will utilize the call light for all needs.

## 2020-02-18 ENCOUNTER — TELEPHONE (OUTPATIENT)
Dept: FAMILY MEDICINE | Facility: CLINIC | Age: 75
End: 2020-02-18

## 2020-02-18 LAB
ANION GAP SERPL CALC-SCNC: 10 MMOL/L (ref 8–16)
ANISOCYTOSIS BLD QL SMEAR: SLIGHT
BASO STIPL BLD QL SMEAR: ABNORMAL
BASOPHILS # BLD AUTO: 0.05 K/UL (ref 0–0.2)
BASOPHILS NFR BLD: 0.8 % (ref 0–1.9)
BUN SERPL-MCNC: 14 MG/DL (ref 8–23)
CALCIUM SERPL-MCNC: 9.6 MG/DL (ref 8.7–10.5)
CHLORIDE SERPL-SCNC: 98 MMOL/L (ref 95–110)
CO2 SERPL-SCNC: 30 MMOL/L (ref 23–29)
CREAT SERPL-MCNC: 0.7 MG/DL (ref 0.5–1.4)
DIFFERENTIAL METHOD: ABNORMAL
EOSINOPHIL # BLD AUTO: 0.3 K/UL (ref 0–0.5)
EOSINOPHIL NFR BLD: 3.9 % (ref 0–8)
ERYTHROCYTE [DISTWIDTH] IN BLOOD BY AUTOMATED COUNT: 27.1 % (ref 11.5–14.5)
EST. GFR  (AFRICAN AMERICAN): >60 ML/MIN/1.73 M^2
EST. GFR  (NON AFRICAN AMERICAN): >60 ML/MIN/1.73 M^2
GIANT PLATELETS BLD QL SMEAR: PRESENT
GLUCOSE SERPL-MCNC: 145 MG/DL (ref 70–110)
HCT VFR BLD AUTO: 32 % (ref 37–48.5)
HGB BLD-MCNC: 9.1 G/DL (ref 12–16)
IMM GRANULOCYTES # BLD AUTO: 0.01 K/UL (ref 0–0.04)
IMM GRANULOCYTES NFR BLD AUTO: 0.2 % (ref 0–0.5)
LYMPHOCYTES # BLD AUTO: 2.2 K/UL (ref 1–4.8)
LYMPHOCYTES NFR BLD: 34.2 % (ref 18–48)
MCH RBC QN AUTO: 26.8 PG (ref 27–31)
MCHC RBC AUTO-ENTMCNC: 28.4 G/DL (ref 32–36)
MCV RBC AUTO: 94 FL (ref 82–98)
MONOCYTES # BLD AUTO: 0.7 K/UL (ref 0.3–1)
MONOCYTES NFR BLD: 11.1 % (ref 4–15)
NEUTROPHILS # BLD AUTO: 3.2 K/UL (ref 1.8–7.7)
NEUTROPHILS NFR BLD: 49.8 % (ref 38–73)
NRBC BLD-RTO: 0 /100 WBC
OVALOCYTES BLD QL SMEAR: ABNORMAL
PLATELET # BLD AUTO: 231 K/UL (ref 150–350)
PLATELET BLD QL SMEAR: ABNORMAL
PMV BLD AUTO: 10.1 FL (ref 9.2–12.9)
POIKILOCYTOSIS BLD QL SMEAR: SLIGHT
POLYCHROMASIA BLD QL SMEAR: ABNORMAL
POTASSIUM SERPL-SCNC: 3.3 MMOL/L (ref 3.5–5.1)
RBC # BLD AUTO: 3.4 M/UL (ref 4–5.4)
SODIUM SERPL-SCNC: 138 MMOL/L (ref 136–145)
WBC # BLD AUTO: 6.4 K/UL (ref 3.9–12.7)

## 2020-02-18 PROCEDURE — 97535 SELF CARE MNGMENT TRAINING: CPT | Mod: HCNC

## 2020-02-18 PROCEDURE — 99306 1ST NF CARE HIGH MDM 50: CPT | Mod: HCNC,,, | Performed by: INTERNAL MEDICINE

## 2020-02-18 PROCEDURE — 97116 GAIT TRAINING THERAPY: CPT | Mod: HCNC

## 2020-02-18 PROCEDURE — 80048 BASIC METABOLIC PNL TOTAL CA: CPT | Mod: HCNC

## 2020-02-18 PROCEDURE — 25000003 PHARM REV CODE 250: Mod: HCNC | Performed by: INTERNAL MEDICINE

## 2020-02-18 PROCEDURE — 85025 COMPLETE CBC W/AUTO DIFF WBC: CPT | Mod: HCNC

## 2020-02-18 PROCEDURE — 99306 PR NURSING FACILITY CARE, INIT, HIGH SEVERITY: ICD-10-PCS | Mod: HCNC,,, | Performed by: INTERNAL MEDICINE

## 2020-02-18 PROCEDURE — 97165 OT EVAL LOW COMPLEX 30 MIN: CPT | Mod: HCNC

## 2020-02-18 PROCEDURE — 97530 THERAPEUTIC ACTIVITIES: CPT | Mod: HCNC

## 2020-02-18 PROCEDURE — 97162 PT EVAL MOD COMPLEX 30 MIN: CPT | Mod: HCNC

## 2020-02-18 PROCEDURE — 11000004 HC SNF PRIVATE: Mod: HCNC

## 2020-02-18 PROCEDURE — 63600175 PHARM REV CODE 636 W HCPCS: Mod: HCNC | Performed by: PHYSICIAN ASSISTANT

## 2020-02-18 PROCEDURE — 25000003 PHARM REV CODE 250: Mod: HCNC | Performed by: PHYSICIAN ASSISTANT

## 2020-02-18 PROCEDURE — 97802 MEDICAL NUTRITION INDIV IN: CPT | Mod: HCNC

## 2020-02-18 PROCEDURE — 36415 COLL VENOUS BLD VENIPUNCTURE: CPT | Mod: HCNC

## 2020-02-18 RX ORDER — POTASSIUM CHLORIDE 750 MG/1
30 CAPSULE, EXTENDED RELEASE ORAL ONCE
Status: COMPLETED | OUTPATIENT
Start: 2020-02-18 | End: 2020-02-18

## 2020-02-18 RX ORDER — POTASSIUM CHLORIDE 750 MG/1
10 CAPSULE, EXTENDED RELEASE ORAL DAILY
Status: DISCONTINUED | OUTPATIENT
Start: 2020-02-19 | End: 2020-02-20

## 2020-02-18 RX ADMIN — PANTOPRAZOLE SODIUM 40 MG: 40 TABLET, DELAYED RELEASE ORAL at 03:02

## 2020-02-18 RX ADMIN — HYDROCODONE BITARTRATE AND ACETAMINOPHEN 1 TABLET: 10; 325 TABLET ORAL at 01:02

## 2020-02-18 RX ADMIN — CETIRIZINE HYDROCHLORIDE 10 MG: 5 TABLET ORAL at 09:02

## 2020-02-18 RX ADMIN — PANTOPRAZOLE SODIUM 40 MG: 40 TABLET, DELAYED RELEASE ORAL at 06:02

## 2020-02-18 RX ADMIN — METHOCARBAMOL TABLETS 500 MG: 500 TABLET, COATED ORAL at 09:02

## 2020-02-18 RX ADMIN — OXYBUTYNIN CHLORIDE 5 MG: 5 TABLET ORAL at 09:02

## 2020-02-18 RX ADMIN — FUROSEMIDE 40 MG: 40 TABLET ORAL at 05:02

## 2020-02-18 RX ADMIN — METHOCARBAMOL TABLETS 500 MG: 500 TABLET, COATED ORAL at 05:02

## 2020-02-18 RX ADMIN — FUROSEMIDE 40 MG: 40 TABLET ORAL at 10:02

## 2020-02-18 RX ADMIN — FENOFIBRATE 145 MG: 145 TABLET, FILM COATED ORAL at 09:02

## 2020-02-18 RX ADMIN — HYDROCODONE BITARTRATE AND ACETAMINOPHEN 1 TABLET: 10; 325 TABLET ORAL at 06:02

## 2020-02-18 RX ADMIN — METHOCARBAMOL TABLETS 500 MG: 500 TABLET, COATED ORAL at 01:02

## 2020-02-18 RX ADMIN — OXCARBAZEPINE 300 MG: 300 TABLET, FILM COATED ORAL at 09:02

## 2020-02-18 RX ADMIN — OXCARBAZEPINE 600 MG: 600 TABLET, FILM COATED ORAL at 09:02

## 2020-02-18 RX ADMIN — POTASSIUM CHLORIDE 30 MEQ: 750 CAPSULE, EXTENDED RELEASE ORAL at 11:02

## 2020-02-18 RX ADMIN — HYDROCODONE BITARTRATE AND ACETAMINOPHEN 1 TABLET: 10; 325 TABLET ORAL at 03:02

## 2020-02-18 RX ADMIN — ENOXAPARIN SODIUM 40 MG: 100 INJECTION SUBCUTANEOUS at 05:02

## 2020-02-18 RX ADMIN — HYDROCODONE BITARTRATE AND ACETAMINOPHEN 1 TABLET: 10; 325 TABLET ORAL at 09:02

## 2020-02-18 RX ADMIN — FERROUS SULFATE TAB EC 325 MG (65 MG FE EQUIVALENT) 325 MG: 325 (65 FE) TABLET DELAYED RESPONSE at 09:02

## 2020-02-18 NOTE — PT/OT/SLP EVAL
Occupational Therapy  Evaluation / Treatment    Marycruz Ramirez   MRN: [id number]   Admitting Diagnosis:  Acute on chronic combined systolic and diastolic heart failure       OT Date of Treatment: 02/18/20   OT Start Time: 0730  OT Stop Time: 0828  OT Total Time (min): 58 min    Billable Minutes:  Evaluation 20  Self Care/Home Management 38    Diagnosis:  Acute on chronic combined systolic and diastolic heart failure       Past Medical History:   Diagnosis Date    Bipolar 1 disorder     CHF (congestive heart failure)     HLD (hyperlipidemia)     Overactive bladder       Past Surgical History:   Procedure Laterality Date    BACK SURGERY      COLONOSCOPY N/A 1/20/2020    Procedure: COLONOSCOPY;  Surgeon: Micah Patel MD;  [facility]  Service: Endoscopy;  Laterality: N/A;    ESOPHAGOGASTRODUODENOSCOPY N/A 1/20/2020    Procedure: EGD (ESOPHAGOGASTRODUODENOSCOPY);  Surgeon: Micah Patel MD;  [facility]  Service: Endoscopy;  Laterality: N/A;    KNEE SURGERY Bilateral          General Precautions: Standard, fall  Orthopedic Precautions: N/A  Braces: N/A    Spiritual, Cultural Beliefs, Roman Catholic Practices, Values that Affect Care: no     Patient History:  Lives With: spouse  Living Arrangements: house  Home Accessibility: stairs to enter home(Threshold to enter)  Home Layout: Able to live on 1st floor  Stair Railings at Home: none  Transportation Anticipated: car, drives self, family or friend will provide  Living Environment Comment: Pt lives in single story home with threshold to enter and was ambulating in home setting using a rollator. Pt has standard toilet and tub with grab bars but no seat. She was (I) with ADLs and functional transfers PTA.  is able to asist post D/C  Equipment Currently Used at Home: cane, straight, rollator, grab bar    Prior level of function:    Bed Mobility/Transfers: needs device(Rollator)  Grooming: independent  Bathing: needs  device(Grab bars)  Upper Body Dressing: independent  Lower Body Dressing: independent  Toileting: independent  Driving License: Yes  Mode of Transportation: Family, Car  Occupation: Other (Comment)  Type of Occupation: Homemaker  Leisure and Hobbies: Pt enjoys [employer] and goes to participate once a week at Karaoke bar.     Dominant hand: right    Subjective:  Communicated with nurse prior to session.    Chief Complaint: Pt indicating that she hurts all over and that she lives with pain.  Patient/Family stated goals: Pt wants to return to [facility].    Pain/Comfort  Pain Rating 1: 8/10  Location - Side 1: Bilateral  Location - Orientation 1: generalized  Location 1: back(ankles, neck and hands)  Pain Addressed 1: Reposition, Distraction, Pre-medicate for activity  Pain Rating Post-Intervention 1: 8/10    Objective:   Patient found with: (no lines and seated in bedside chair.)    Cognitive Exam:  Oriented to: Person, Place, Time and Situation  Follows Commands/attention: Follows two-step commands  Communication: clear/fluent  Memory:  No Deficits noted  Safety awareness/insight to disability: intact  Coping skills/emotional control: Appropriate to situation    Visual/perceptual:  Intact    Physical Exam:  Postural examination/scapula alignment:    -       Rounded shoulders  Skin integrity: Bruising of (B) LEs  Edema: None noted (B) UEs    Sensation:      -       Intact (B) UEs    Upper Extremity Range of Motion:  Right Upper Extremity: WFL  Left Upper Extremity: WFL    Upper Extremity Strength:  Right Upper Extremity: WFL  Left Upper Extremity: WFL   Strength: WFLs    Fine motor coordination:      -     Mildly diminished with difficulty noted when attempting to open containers and manipulate small fasteners.    Gross motor coordination: WFL    Occupational Performance:    Bed Mobility:    · Patient completed Rolling/Turning to Left with  stand by assistance  · Patient completed Rolling/Turning to Right with stand by  assistance  · Patient completed Scooting/Bridging with contact guard assistance  · Patient completed Supine to Sit with contact guard assistance     Functional Mobility/Transfers:  · Patient completed Sit <> Stand Transfer with contact guard assistance  with  rolling walker   · Patient completed Bed <> Chair Transfer using Step Transfer technique with contact guard assistance with rolling walker  · Patient completed Toilet Transfer Step Transfer technique with contact guard assistance with  rolling walker  · Functional Mobility: Pt ambulating from EOB to toilet in restroom with RW and CGA a distance of 14 ft and then back to bedside chair with RW and CGA 12 ft.    Activities of Daily Living:  · Feeding:  set up assist only. .  · Grooming: set up with Pt grooming seated sinkside. .  · Bathing: minimum assistance with sponge bathing  · Upper Body Dressing: stand by assistance with Pt donning button down shirt.  · Lower Body Dressing: maximal assistance with (A) to don socks, shoes and to thread LEs into pant legs. CGA when standing with RW.  · Toileting: CGA when standing to manage clothing and RW to steady. .    Additional Treatment:  Pt edu on Plan of care,  safety when performing functional transfers, self care tasks and functional standing activities.  - White board updated  - Self care tasks completed-- as noted above     Patient left up in chair with call button in reach and nurse notified    Assessment:  Marycruz Ramirez is a 74 y.o. female with a medical diagnosis of  Acute on chronic combined systolic and diastolic heart failure.  Pt presents with performance deficits of Physical skills including impaired functional mobility, strength, functional endurance, fine motor coordination, functional standing balance, and  functional use of (B ) UEs . Pt also demonstrating decreased safety when performing Functional Activities, self care activities, as well as functional mobility. Pt is motivated and would benefit from  OT intervention to further her functional (I)ce and safety.      Rehab identified problem list/impairments: weakness, impaired endurance, impaired self care skills, impaired functional mobilty, gait instability, impaired balance, decreased lower extremity function, decreased safety awareness, pain, decreased ROM, impaired cardiopulmonary response to activity    Rehab potential is good    Activity tolerance: Good    Discharge recommendations: home health OT     Barriers to discharge: None     Equipment recommendations: (TBD)     GOALS:   Multidisciplinary Problems     Occupational Therapy Goals        Problem: Occupational Therapy Goal    Goal Priority Disciplines Outcome Interventions   Occupational Therapy Goal     OT, PT/OT Ongoing, Progressing    Description:  Goals to be met by: 2/28/20     Patient will increase functional independence with ADLs by performing:    Feeding with Modified [place].  UE Dressing with Modified [place].  LE Dressing with Supervision.  Grooming while standing with Supervision with RW to steady.  Toileting from bedside commode with Supervision for hygiene and clothing management and A/D to stand.   Bathing from  sitting at sink with Supervision with DME for safety.  Supine to sit with Modified [place].  Step transfer with Modified [place] with A/D for safety.  Upper extremity exercise program x10 reps per handout, with assistance as needed.  Pt's caregiver will be competent when performing self care tasks and functional transfers.                      PLAN: Patient to be seen 5 x/week, 6 x/week to address the above listed problems via self-care/home management, therapeutic activities, therapeutic exercises  Plan of Care expires: 03/18/20  Plan of Care reviewed with: patient    Reggie Jean, RITIKAR/L  02/18/2020

## 2020-02-18 NOTE — CONSULTS
"  [facility] - Skilled Nursing Care  Adult Nutrition  Consult Note    SUMMARY     Recommendations    Recommendation: Continue regular diet as tolerated.   Goals: PO intake > 75% EEN, EPN daily   Nutrition Goal Status: new  Communication of RITA Recs: other (comment)(POC)    Reason for Assessment    Reason For Assessment: consult  Diagnosis: other (see comments)(skin tear of lower leg w/o complication)  Relevant Medical History: HLD, bipolar, CHF, microcyctic anemia, aortic stenosis  Interdisciplinary Rounds: did not attend  General Information Comments: Pt reports having a good appetite; PO intake 100% of meals. Denies wt changes; #. Denies N/V/D/C. Pt does not like boost and is satisfied with meals. NFPE completed , moderate muscle depletion in the temples noted but pt is otherwise well-nourished.   Nutrition Discharge Planning: d/c on regular diet     Nutrition Risk Screen    Nutrition Risk Screen: no indicators present    Nutrition/Diet History    Patient Reported Diet/Restrictions/Preferences: general  Spiritual, Cultural Beliefs, Cheondoism Practices, Values that Affect Care: no  Food Allergies: NKFA  Factors Affecting Nutritional Intake: None identified at this time    Anthropometrics    Temp: 97.8 °F (36.6 °C)  Height Method: Stated  Height: 5' 1" (154.9 cm)  Height (inches): 61 in  Weight Method: Standard Scale  Weight: 51.6 kg (113 lb 12.1 oz)  Weight (lb): 113.76 lb  Ideal Body Weight (IBW), Female: 105 lb  % Ideal Body Weight, Female (lb): 108.34 %  BMI (Calculated): 21.5  BMI Grade: 18.5-24.9 - normal  Usual Body Weight (UBW), k.6 kg(20)  % Usual Body Weight: 100.21  % Weight Change From Usual Weight: 0 %     Lab/Procedures/Meds    Pertinent Labs Reviewed: reviewed  Pertinent Labs Comments: K 3.3, glu 145  Pertinent Medications Reviewed: reviewed  Pertinent Medications Comments: fenofibrate, ferrous sulfate, furosemide, methocarbamol, pantoprazole, polyethylene glycol, KCl, senna-docusate "     Physical Findings/Assessment    Obed Score 19   Multiple skin tears     Estimated/Assessed Needs    Weight Used For Calorie Calculations: 51.6 kg (113 lb 12.1 oz)  Energy Calorie Requirements (kcal): 1240 kcal/day   Energy Need Method: [place]-St Jeor(PAL 1.3)  Protein Requirements: 62-72 g/day(1.2-1.4 g/kg)  Weight Used For Protein Calculations: 51.6 kg (113 lb 12.1 oz)  Fluid Requirements (mL): 1 mL/kcal or per MD   Estimated Fluid Requirement Method: RDA Method  RDA Method (mL): 1240     Nutrition Prescription Ordered    Current Diet Order: regular     Evaluation of Received Nutrient/Fluid Intake    I/O: 540/0  Energy Calories Required: meeting needs  Protein Required: meeting needs  Fluid Required: meeting needs  Comments: LBM 2/16  Tolerance: tolerating  % Intake of Estimated Energy Needs: 75 - 100 %  % Meal Intake: 75 - 100 %    Nutrition Risk    Level of Risk/Frequency of Follow-up: low(1x/week)     Assessment and Plan    Nutrition Problem  Increased nutrient needs - protein    Related to (etiology):   Wound healing    Signs and Symptoms (as evidenced by):   Multiple skin tears      Interventions:  Collaboration with other providers     Nutrition Diagnosis Status:   New    Monitor and Evaluation    Food and Nutrient Intake: energy intake, food and beverage intake  Food and Nutrient Adminstration: diet order  Anthropometric Measurements: weight, weight change, body mass index  Biochemical Data, Medical Tests and Procedures: electrolyte and renal panel, inflammatory profile, lipid profile, glucose/endocrine profile  Nutrition-Focused Physical Findings: overall appearance     Malnutrition Assessment    Mandaeism Region (Muscle Loss): moderate depletion  Clavicle Bone Region (Muscle Loss): well nourished  Dorsal Hand (Muscle Loss): well nourished  Patellar Region (Muscle Loss): well nourished  Anterior Thigh Region (Muscle Loss): well nourished  Posterior Calf Region (Muscle Loss): well nourished(loose skin,  discolored)   Edema (Fluid Accumulation): 0-->no edema present   Subcutaneous Fat Loss (Final Summary): well nourished  Muscle Loss Evaluation (Final Summary): well nourished       Nutrition Follow-Up    RD Follow-up?: Yes

## 2020-02-18 NOTE — PLAN OF CARE
Problem: Adult Inpatient Plan of Care  Goal: Plan of Care Review  Outcome: Ongoing, Progressing     Recommendation: Continue regular diet as tolerated.   Goals: PO intake > 75% EEN, EPN daily   Nutrition Goal Status: new  Communication of RD Recs: other (comment)(POC)

## 2020-02-18 NOTE — PLAN OF CARE
Plan of care reviewed with patient and family.  Patient verbalized understanding and had no further questions.  Patient worked with therapy today, wound care performed, and pain controlled.  Patient now resting comfortably in bed locked in lowest position, side rails up x3, and call bell in reach.

## 2020-02-18 NOTE — PT/OT/SLP EVAL
"PhysicalTherapy   Evaluation    Marycruz Ramirez   MRN: [id number]     PT Received On: 02/18/20  PT Start Time: 1055     PT Stop Time: 1200    PT Total Time (min): 65 min       Billable Minutes:  Evaluation 7, Gait Training 15, Therapeutic Activity 38 and Therapeutic Exercise 0    Diagnosis: Acute on chronic combined systolic and diastolic  Past Medical History:   Diagnosis Date    Bipolar 1 disorder     CHF (congestive heart failure)     HLD (hyperlipidemia)     Overactive bladder       Past Surgical History:   Procedure Laterality Date    BACK SURGERY      COLONOSCOPY N/A 1/20/2020    Procedure: COLONOSCOPY;  Surgeon: Micah Patel MD;  Location: [facility] ([address] [place] [address]);  Service: Endoscopy;  Laterality: N/A;    ESOPHAGOGASTRODUODENOSCOPY N/A 1/20/2020    Procedure: EGD (ESOPHAGOGASTRODUODENOSCOPY);  Surgeon: Micah Patel MD;  Location: [facility] ([address] [place] [address]);  Service: Endoscopy;  Laterality: N/A;    KNEE SURGERY Bilateral          General Precautions: Standard, fall  Orthopedic Precautions: N/A   Braces: N/A    Spiritual, Cultural Beliefs, Mu-ism Practices, Values that Affect Care: no    Patient History:  Lives With: spouse  Living Arrangements: house  Home Accessibility: stairs to enter home(2'' step)  Home Layout: Bathroom on 2nd floor  Transportation Anticipated: car, drives self  Living Environment Comment: Pt lives in 1 SH, 2 inch step to enter, with  (who can assist). Pt was Mod I with rollator, owns SPC. Likes [employer]. Has grab bars in restroom. sometimes needs help getting into tub.(sleeps in recliner)  Equipment Currently Used at Home: cane, straight, rollator  DME owned (not currently used): single point cane    Previous Level of Function:  Ambulation Skills: independent  Transfer Skills: independent  ADL Skills: independent  Work/Leisure Activity: independent    Subjective:  Communicated with pt prior to session.  "Why are you doing this to me?" pt said jokingly about doing different " things in therapy. PT explained the purpose of each activity and the purpose of PT. Pt understood.  Chief Complaint: Pain in low back. Cannot lie on back flat. Sleeps in a recliner.  Patient goals: To return to doing karaoke.    Pain/Comfort  Pain Rating 1: 8/10  Location - Side 1: Bilateral  Location - Orientation 1: generalized  Location 1: back  Pain Addressed 1: Reposition  Pain Rating Post-Intervention 1: 8/10    Objective:  Patient found seated in bedside chair.     Cognitive Exam:  Oriented to: Person, Place and Time;  Unaware of situation.  Follows Commands/attention: Follows multistep  commands  Communication: clear/fluent  Safety awareness/insight to disability: intact    Physical Exam:  Postural examination/scapula alignment: -       Rounded shoulders  -       Forward head    Skin integrity: Visible skin intact  Edema: None noted     Sensation:   -       Intact    Upper Extremity Range of Motion:  Right Upper Extremity: WNL  Left Upper Extremity: WNL    Upper Extremity Strength:  Right Upper Extremity: WNL  Left Upper Extremity: WNL    Lower Extremity Range of Motion:  Right Lower Extremity: WFL except decreased knee flexion (<90 degrees)  Left Lower Extremity: WFL except decreased knee flexion (<90 degrees)    Lower Extremity Strength:  Right Lower Extremity: WFL except iliopsoas and quads 3+/5  Left Lower Extremity: WFL except iliopsoas and quads 3+/5     Fine motor coordination:  -       Intact    Gross motor coordination: WFL      AM-PAC 6 CLICK MOBILITY  Total Score:18    Bed Mobility:  Did not perform due to hx of pain. Sleeps in recliner.    Transfers:  Sit<>Stand: Brayden from toilet (low-rise) and WC.  Stand Pivot Transfer: Brayden with UE support/grab bar/RW.    Gait:  Amb 85 ft with use of RW, CGA and leading with abdomen, short, shuffling steps.       Advanced Gait:  Curb Step: 2 inch curb step with Brayden and RW.    Balance:  Pt required assistance with donning her pants while standing, despite  unilateral UE support during lower body dressing (mod A needed).  Assisted to/from toilet. Able to toilet herself. Niranjan to transfer to/from toilet.    Additional Treatment:  White board updated with (A) level.  Educated on frequency of therapies, safety of mobility while on the unit; educated about family training as well upon DC.      Patient left up in chair with call button in reach.    Assessment:  Marycruz Ramirez is a 74 y.o. female with a medical diagnosis of as above. Pt presents with impaired overall functional mobility, requiring assistance for the following tasks: sit<>stand transfers, toilet transfer/stand pivot transfers, lower body dressing, and ambulation. She will benefit from continued PT services and will need light assistance from her  upon returning home.     Rehab identified problem list/impairments: weakness, impaired endurance, impaired self care skills, gait instability, impaired functional mobilty, impaired balance, decreased lower extremity function, pain    Rehab potential is fair.    Activity tolerance: Fair    Discharge recommendations: home health PT(with light assistance)     Barriers to discharge: None    Equipment recommendations: bedside commode, bath bench, walker, rolling(recommended;  owns rollator and SPC)     GOALS:   Multidisciplinary Problems     Physical Therapy Goals        Problem: Physical Therapy Goal    Goal Priority Disciplines Outcome Goal Variances Interventions   Physical Therapy Goal     PT, PT/OT      Description:  Goals to be met by: 20    Patient will increase functional independence with mobility by performin. Sit to stand transfer with Supervision  2. Bed to chair transfer with Supervision using Rolling Walker/rollator.  3. Gait  x 150 feet with Supervision using Rolling Walker/rollator.  4. Ascend/Descend 2 inch curb step with Supervision using Rolling Walker/rollator.  5. Stand for 3 minutes with Supervision using Rolling Walker/rollator  while performing a dynamic task.  6. Lower extremity exercise program x 20 reps per handout, with independence                      PLAN:    Patient to be seen (5-7x/wk)  to address the above listed problems via gait training, therapeutic activities, therapeutic exercises, neuromuscular re-education, wheelchair management/training  Plan of Care Expires: 03/19/20    Ifeoma English, PT 2/18/2020

## 2020-02-18 NOTE — PROGRESS NOTES
Pt srrived to floor via wheelchair accompanied by transporter. Pt's  arrived shortly after/ pt VSS. Pt aao  X4. Pt has noted skin tears to BLE with foam dressings in place. Pt oriented to room, call bell and fall precautions. Bed alarm set on bed. Will continue to monitor pt.

## 2020-02-18 NOTE — TELEPHONE ENCOUNTER
----- Message from Dwight Dickens sent at 2/18/2020  2:01 PM CST -----  Contact: El [phone]  Pt  is requesting a call back    Pt states that the pt was in the hospital yesterday and could not make the appt    Please advise pt at [phone]

## 2020-02-18 NOTE — NURSING
Pt transporter to SNF via  van accompanied by  and transporter.aaox4.resp even and nonlabored.no distress noted.vss.sl and telemetry dced prior to transfer.

## 2020-02-18 NOTE — TELEPHONE ENCOUNTER
Patient  wanted to call and let Dr. Calle know that she was still in the hospital yesterday and that is why she did not make her appointment. The patient was transferred to Skilled nursing for therapy. The  will call the office when she is discharged for a follow up appointment.

## 2020-02-19 ENCOUNTER — PATIENT OUTREACH (OUTPATIENT)
Dept: ADMINISTRATIVE | Facility: OTHER | Age: 75
End: 2020-02-19

## 2020-02-19 PROCEDURE — 25000003 PHARM REV CODE 250: Mod: HCNC | Performed by: INTERNAL MEDICINE

## 2020-02-19 PROCEDURE — 97535 SELF CARE MNGMENT TRAINING: CPT | Mod: HCNC

## 2020-02-19 PROCEDURE — 97530 THERAPEUTIC ACTIVITIES: CPT | Mod: HCNC

## 2020-02-19 PROCEDURE — 63600175 PHARM REV CODE 636 W HCPCS: Mod: HCNC | Performed by: PHYSICIAN ASSISTANT

## 2020-02-19 PROCEDURE — 97110 THERAPEUTIC EXERCISES: CPT | Mod: HCNC

## 2020-02-19 PROCEDURE — 97116 GAIT TRAINING THERAPY: CPT | Mod: HCNC,CQ

## 2020-02-19 PROCEDURE — 25000003 PHARM REV CODE 250: Mod: HCNC | Performed by: PHYSICIAN ASSISTANT

## 2020-02-19 PROCEDURE — 11000004 HC SNF PRIVATE: Mod: HCNC

## 2020-02-19 PROCEDURE — 25000003 PHARM REV CODE 250: Mod: HCNC | Performed by: NURSE PRACTITIONER

## 2020-02-19 PROCEDURE — 97110 THERAPEUTIC EXERCISES: CPT | Mod: HCNC,CQ

## 2020-02-19 PROCEDURE — 97530 THERAPEUTIC ACTIVITIES: CPT | Mod: HCNC,CQ

## 2020-02-19 RX ORDER — ASCORBIC ACID 250 MG
250 TABLET ORAL
Status: DISCONTINUED | OUTPATIENT
Start: 2020-02-19 | End: 2020-03-04 | Stop reason: HOSPADM

## 2020-02-19 RX ORDER — INSULIN ASPART 100 [IU]/ML
0-5 INJECTION, SOLUTION INTRAVENOUS; SUBCUTANEOUS
Status: DISCONTINUED | OUTPATIENT
Start: 2020-02-19 | End: 2020-02-19

## 2020-02-19 RX ORDER — FUROSEMIDE 40 MG/1
40 TABLET ORAL ONCE
Status: COMPLETED | OUTPATIENT
Start: 2020-02-19 | End: 2020-02-19

## 2020-02-19 RX ORDER — IBUPROFEN 200 MG
16 TABLET ORAL
Status: DISCONTINUED | OUTPATIENT
Start: 2020-02-19 | End: 2020-02-19

## 2020-02-19 RX ORDER — IBUPROFEN 200 MG
24 TABLET ORAL
Status: DISCONTINUED | OUTPATIENT
Start: 2020-02-19 | End: 2020-02-19

## 2020-02-19 RX ORDER — METHOCARBAMOL 500 MG/1
500 TABLET, FILM COATED ORAL 4 TIMES DAILY PRN
Status: DISCONTINUED | OUTPATIENT
Start: 2020-02-19 | End: 2020-03-04 | Stop reason: HOSPADM

## 2020-02-19 RX ORDER — PANTOPRAZOLE SODIUM 40 MG/1
40 TABLET, DELAYED RELEASE ORAL DAILY
Status: DISCONTINUED | OUTPATIENT
Start: 2020-02-20 | End: 2020-03-04 | Stop reason: HOSPADM

## 2020-02-19 RX ORDER — FERROUS SULFATE 325(65) MG
325 TABLET, DELAYED RELEASE (ENTERIC COATED) ORAL
Status: DISCONTINUED | OUTPATIENT
Start: 2020-02-19 | End: 2020-03-04 | Stop reason: HOSPADM

## 2020-02-19 RX ADMIN — OXYBUTYNIN CHLORIDE 5 MG: 5 TABLET ORAL at 08:02

## 2020-02-19 RX ADMIN — METHOCARBAMOL TABLETS 500 MG: 500 TABLET, COATED ORAL at 12:02

## 2020-02-19 RX ADMIN — OXCARBAZEPINE 300 MG: 300 TABLET, FILM COATED ORAL at 09:02

## 2020-02-19 RX ADMIN — FENOFIBRATE 145 MG: 145 TABLET, FILM COATED ORAL at 09:02

## 2020-02-19 RX ADMIN — POTASSIUM CHLORIDE 10 MEQ: 750 CAPSULE, EXTENDED RELEASE ORAL at 09:02

## 2020-02-19 RX ADMIN — FUROSEMIDE 40 MG: 40 TABLET ORAL at 09:02

## 2020-02-19 RX ADMIN — CETIRIZINE HYDROCHLORIDE 10 MG: 5 TABLET ORAL at 08:02

## 2020-02-19 RX ADMIN — HYDROCODONE BITARTRATE AND ACETAMINOPHEN 1 TABLET: 10; 325 TABLET ORAL at 09:02

## 2020-02-19 RX ADMIN — Medication 6 MG: at 08:02

## 2020-02-19 RX ADMIN — OXYBUTYNIN CHLORIDE 5 MG: 5 TABLET ORAL at 09:02

## 2020-02-19 RX ADMIN — FERROUS SULFATE TAB EC 325 MG (65 MG FE EQUIVALENT) 325 MG: 325 (65 FE) TABLET DELAYED RESPONSE at 08:02

## 2020-02-19 RX ADMIN — METHOCARBAMOL TABLETS 500 MG: 500 TABLET, COATED ORAL at 08:02

## 2020-02-19 RX ADMIN — Medication 250 MG: at 08:02

## 2020-02-19 RX ADMIN — FUROSEMIDE 40 MG: 40 TABLET ORAL at 05:02

## 2020-02-19 RX ADMIN — HYDROCODONE BITARTRATE AND ACETAMINOPHEN 1 TABLET: 10; 325 TABLET ORAL at 03:02

## 2020-02-19 RX ADMIN — METHOCARBAMOL TABLETS 500 MG: 500 TABLET, COATED ORAL at 09:02

## 2020-02-19 RX ADMIN — OXCARBAZEPINE 600 MG: 600 TABLET, FILM COATED ORAL at 08:02

## 2020-02-19 RX ADMIN — ENOXAPARIN SODIUM 40 MG: 100 INJECTION SUBCUTANEOUS at 05:02

## 2020-02-19 RX ADMIN — PANTOPRAZOLE SODIUM 40 MG: 40 TABLET, DELAYED RELEASE ORAL at 06:02

## 2020-02-19 RX ADMIN — FUROSEMIDE 40 MG: 40 TABLET ORAL at 03:02

## 2020-02-19 RX ADMIN — FERROUS SULFATE TAB EC 325 MG (65 MG FE EQUIVALENT) 325 MG: 325 (65 FE) TABLET DELAYED RESPONSE at 09:02

## 2020-02-19 RX ADMIN — HYDROCODONE BITARTRATE AND ACETAMINOPHEN 1 TABLET: 10; 325 TABLET ORAL at 06:02

## 2020-02-19 RX ADMIN — HYDROCODONE BITARTRATE AND ACETAMINOPHEN 1 TABLET: 5; 325 TABLET ORAL at 02:02

## 2020-02-19 NOTE — PT/OT/SLP PROGRESS
Physical Therapy  Treatment    Marycruz Ramirez   MRN: [id number]   Admitting Diagnosis: Acute on chronic combined systolic and diastolic heart failure    PT Received On: 02/19/20  Total Time (min): (--)       Billable Minutes:  Gait Training 10, Therapeutic Activity 20 and Therapeutic Exercise 15    Treatment Type: Treatment  PT/PTA: PTA     PTA Visit Number: 1       General Precautions: Standard, fall  Orthopedic Precautions: N/A   Braces: N/A    Spiritual, Cultural Beliefs, Sabianist Practices, Values that Affect Care: no    Subjective:  Communicated with OT prior to session.  Pt agreed to work with therapy.     Pain/Comfort  Pain Rating 1: (Pt did not rate. )  Location - Side 1: Bilateral  Location - Orientation 1: generalized  Location 1: back  Pain Addressed 1: Nurse notified, Distraction, Reposition  Pain Rating Post-Intervention 1: (Pt did not rate. )    Objective:  Patient found seated w/c with  OT.     AM-PAC 6 CLICK MOBILITY  Total Score:18    Bed Mobility:  Sit>Supine: Pt declined 2* to pain.   Supine>Sit: Pt declined 2* to pain.     Transfers:  Sit<>Stand: to/from w/c x5 trials w/ RW and Min A     Gait:  Amb x2 trials of ~100ft each trial w/ RW and CGA. Pt w/ short,shuffling steps.      Advanced Gait:  Curb Step: Pt declined 2* to pain on this date.     Therex:  BLE therex 2x10 reps w/ rest breaks b/w sets:    -AP   -LAQ   -Hip flexion   Cueing for proper technique and for pt to stay on task.     Balance:  Pt stood CGA w/ RW for unilateral support for ~4 min while placing/removing rings from ring tree.     Additional Treatment:  -Attempted mini-elliptical on this date, but pt was unable to perform 2* to pain.     Patient left up in chair with call button in reach, nursing notified and family friend present.    Assessment:  Marycruz Ramirez is a 74 y.o. female with a medical diagnosis of Acute on chronic combined systolic and diastolic heart failure.  Pt limited w/ activity on this date 2* to pain. Pt requires  cueing for safety and to stay on task. Pt will continue to benefit from PT services at this time. Continue with PT POC as indicated.    Rehab identified problem list/impairments: weakness, impaired endurance, impaired self care skills, gait instability, impaired functional mobilty, impaired balance, decreased lower extremity function, pain    Rehab potential is fair.    Activity tolerance: Fair    Discharge recommendations: home health PT(with light assistance)     Barriers to discharge: None    Equipment recommendations: bedside commode, bath bench, walker, rolling(recommended;  owns rollator and SPC)     GOALS:   Multidisciplinary Problems     Physical Therapy Goals        Problem: Physical Therapy Goal    Goal Priority Disciplines Outcome Goal Variances Interventions   Physical Therapy Goal     PT, PT/OT      Description:  Goals to be met by: 20    Patient will increase functional independence with mobility by performin. Sit to stand transfer with Supervision  2. Bed to chair transfer with Supervision using Rolling Walker/rollator.  3. Gait  x 150 feet with Supervision using Rolling Walker/rollator.  4. Ascend/Descend 2 inch curb step with Supervision using Rolling Walker/rollator.  5. Stand for 3 minutes with Supervision using Rolling Walker/rollator while performing a dynamic task.  6. Lower extremity exercise program x 20 reps per handout, with independence                      PLAN:    Patient to be seen (5-7x/wk)  to address the above listed problems via gait training, therapeutic activities, therapeutic exercises, neuromuscular re-education, wheelchair management/training  Plan of Care expires: 20  Plan of Care reviewed with: patient    Dominique Ramírez, PTA  2020

## 2020-02-19 NOTE — PLAN OF CARE
Problem: Occupational Therapy Goal  Goal: Occupational Therapy Goal  Description  Goals to be met by: 2/28/20     Patient will increase functional independence with ADLs by performing:    Feeding with Modified [place].  UE Dressing with Modified [place].  LE Dressing with Supervision.  Grooming while standing with Supervision with RW to steady.  Toileting from bedside commode with Supervision for hygiene and clothing management and A/D to stand.   Bathing from  sitting at sink with Supervision with DME for safety.  Supine to sit with Modified [place].  Step transfer with Modified [place] with A/D for safety.  Upper extremity exercise program x10 reps per handout, with assistance as needed.  Pt's caregiver will be competent when performing self care tasks and functional transfers.     Outcome: Ongoing, Progressing

## 2020-02-19 NOTE — PT/OT/SLP PROGRESS
Occupational Therapy  Treatment    Marycruz Ramirez   MRN: [id number]   Admitting Diagnosis: Acute on chronic combined systolic and diastolic heart failure    OT Date of Treatment: 02/19/20       Billable Minutes:  Self Care/Home Management 17, Therapeutic Activity 14 and Therapeutic Exercise 15    General Precautions: Standard, fall  Orthopedic Precautions: N/A  Braces: N/A    Spiritual, Cultural Beliefs, Voodoo Practices, Values that Affect Care: no    Subjective:  Communicated with nurse prior to session.      Pain/Comfort  Pain Rating 1: 6/10  Location - Side 1: Bilateral  Location - Orientation 1: generalized  Location 1: back  Pain Addressed 1: Reposition, Distraction, Nurse notified  Pain Rating Post-Intervention 1: 6/10    Objective:  Patient found with: (no lines and seated in bedside chair.)    Occupational Performance:    Bed Mobility:    · Pt seated in W/C at onset of therapy session.    Functional Mobility/Transfers:  · Patient completed Sit <> Stand Transfer with stand by assistance  with  rolling walker   · Patient completed Bed <> Chair Transfer using Step Transfer technique with stand by assistance with rolling walker  · Functional Mobility: Pt ambulated from bedside chair to toilet in restroom 10ft and then from toilet to sink to perform grooming 8 ft and then from sink toward therapy gym a distance of 44 ft.    Activities of Daily Living:  · Grooming: stand by assistance standing sinkside with RW to perform grooming.  · Toileting: supervision with Pt toileting on raised toilet with RW to steady when standing.    [facility] 6 Click:  AMPA Total Score: 19    OT Exercises: UE Ergometer performed 10 minutes on UBE with Min resistance.  UE exercises performed to increase functional endurance and strength in order increase independence when performing self care tasks, functional ambulation, W/C propulsion , functional standing activities as well as when performing functional tasks.      Additional  Treatment:  Pt edu on Plan of care,  safety when performing functional transfers, self care tasks and functional standing activities.  - White board updated  - Self care tasks completed-- as noted above       Patient left up in chair with PTA present and initiating Tx session.    ASSESSMENT:  Marycruz Ramirez is a 74 y.o. female with a medical diagnosis of Acute on chronic combined systolic and diastolic heart failure .  Pt tolerated Tx without incident making progress with self care tasks and functional transfers and would continue to benefit from OT intervention to further her functional (I)ce and safety.    Rehab identified problem list/impairments: weakness, impaired endurance, impaired self care skills, impaired functional mobilty, gait instability, impaired balance, decreased lower extremity function, decreased safety awareness, pain, decreased ROM, impaired cardiopulmonary response to activity    Rehab potential is good    Activity tolerance: Good    Discharge recommendations: home health OT     Barriers to discharge: None     Equipment recommendations: (TBD)     GOALS:   Multidisciplinary Problems     Occupational Therapy Goals        Problem: Occupational Therapy Goal    Goal Priority Disciplines Outcome Interventions   Occupational Therapy Goal     OT, PT/OT Ongoing, Progressing    Description:  Goals to be met by: 2/28/20     Patient will increase functional independence with ADLs by performing:    Feeding with Modified [place].  UE Dressing with Modified [place].  LE Dressing with Supervision.  Grooming while standing with Supervision with RW to steady.  Toileting from bedside commode with Supervision for hygiene and clothing management and A/D to stand.   Bathing from  sitting at sink with Supervision with DME for safety.  Supine to sit with Modified [place].  Step transfer with Modified [place] with A/D for safety.  Upper extremity exercise program x10 reps per handout, with assistance  as needed.  Pt's caregiver will be competent when performing self care tasks and functional transfers.                      Plan:  Patient to be seen 5 x/week, 6 x/week to address the above listed problems via self-care/home management, therapeutic activities, therapeutic exercises  Plan of Care expires: 03/18/20  Plan of Care reviewed with: patient    Reggie Jean OTR/LILA  02/19/2020

## 2020-02-19 NOTE — PROGRESS NOTES
[facility]                                  Skilled Nursing [facility]                   Progress Note   DOS 2/19/2020  Admit Date: 2/17/2020  EVONNE   Principal Problem:  Acute on chronic combined systolic and diastolic heart failure   HPI obtained from patient interview and chart review     Chief Complaint: Establish Care/ Initial Visit     HPI: Ms Ramirez is a 73 yo female with sig pmhx of CHF(EF 30), Aortic stenosis, HLD, Bipolar and overactive bladder who presents to SNF s/p hospitalization for Acute on chronic combined systolic and diastolic heart failure. Patient initially presented with LE edema, weakness and a fall. Patient with recent hospitalizations/treatment for pyelonephritis with sepsis and Anemia with transfusion. This hospitalization patient diuresed well with lasix and was deemed medically stable and transferred to [facility] to participate in acute inpatient therapy. Admission to SNF for secondary weakness and debility.     Patient found to have 2-3 + edema to bilateral LE. All labs reviewed from 2/18. No leukocytosis. Hemoglobin stable at 9.1. Platelets stable at 231. Sodium at 138. Potasium 3.3, treated by MD. Creatinine stable at 0.7. 24 hr blood glucose 145. Afebrile. Patient's current blood pressure is at 121/83. Patient denies trouble sleeping at night, abdominal discomfort, nausea, or vomiting. Patient reports an adequate appetite. Patient denies dysuria. Patient reports having regular bowel movements. Patient will be treated at [facility] with PT and OT to improve functional status and ability to perform ADLs.     ECHO 1/26/2020:  Moderate left ventricular enlargement.  Moderately decreased left ventricular systolic function. The estimated ejection fraction is 30%.  Grade I (mild) left ventricular diastolic dysfunction consistent with impaired relaxation.  Normal right ventricular systolic function.  Moderate left atrial  enlargement.  Mild-to-moderate mitral regurgitation.  Severe aortic valve stenosis. Aortic valve area is 0.64 cm2; peak velocity is 3.94 m/s; mean gradient is 43 mmHg.  The estimated PA systolic pressure is 40 mmHg.  Elevated central venous pressure (15 mmHg).    PEx  Constitutional: Patient appears well-developed and in no distress   HENT:   Head: Normocephalic and atraumatic.   Eyes: Pupils are equal, round, and reactive to light.   Neck: Normal range of motion. Neck supple.   Cardiovascular: Normal rate, regular rhythm and normal heart sounds.    Pulmonary/Chest: Effort normal and breath sounds are clear  Abdominal: Soft. Bowel sounds are normal.   Musculoskeletal: Normal range of motion. + generalized weakness.  Neurological: Alert and oriented to person, place, and time.   Psychiatric: Normal mood and affect. Behavior is normal.   Wounds/Skin: Skin is warm and dry.     Wound location:   Multiple skin tears on LE unable to visualize due to dressing CDI without redness or drainage around dressing.  Following: This NP weekly- last observed on 2/19/2020 and wound care team Prn.   Full body skin assessed with no other skin breakdown or wounds noted+++      Temp:  [97.6 °F (36.4 °C)-98 °F (36.7 °C)]   Pulse:  [88-96]   Resp:  [18]   BP: (117-121)/(56-83)   SpO2:  [97 %-98 %]   Body mass index is 21.49 kg/m².     ROS  Constitutional: Negative for fever and malaise/fatigue.   Eyes: Negative for blurred vision, double vision and discharge.   Respiratory: Negative for cough, shortness of breath and wheezing.    Cardiovascular: Negative for chest pain, palpitations, claudication, and leg swelling.   Gastrointestinal: Negative for abdominal pain, constipation, diarrhea, nausea and vomiting.   Genitourinary: Negative for dysuria, frequency and urgency.   Musculoskeletal:  + generalized weakness. Negative for back pain and myalgias.   Skin: Negative for itching and rash.  Neurological: Negative for dizziness, speech change,  seizures, and headaches.   Psychiatric/Behavioral: Negative for depression. The patient is not nervous/anxious.      Past Medical History: Patient has a past medical history of Bipolar 1 disorder, CHF (congestive heart failure), HLD (hyperlipidemia), and Overactive bladder.    Past Surgical History: Patient has a past surgical history that includes Knee surgery (Bilateral); Back surgery; Esophagogastroduodenoscopy (N/A, 1/20/2020); and Colonoscopy (N/A, 1/20/2020).    Social History: Patient reports that she has been smoking cigarettes. She has a 75.00 pack-year smoking history. She has never used smokeless tobacco. She reports that she drinks alcohol. She reports that she does not use drugs.    Family History: No significant family hx reported.     Allergies: Patient has No Known Allergies.      Assessment and Plan:    Acute on chronic combined systolic and diastolic heart failure  -seen by cards on 2/6 and has TTE schedule march 6  -2-3+ edema to BLE  -continue lasix 40 mg bid   -2/19 initiated additional dose of lasix 40 mg po x 1, initiated cardiac diet and discontinued regular diet, initiated BNP lab draw for the am, and reordered daily weights and adrienne hose     Elevated glucose  -2/19 initiated sliding scale insulin, Accu-checks AC/HS, and Hypoglycemia protocol with IV dextrose and glucose tabs prn     Alteration in skin integrity  -Multiple skin tears  -2/19 initiated consult to wound care.     Debility  -Continue with PT/OT for gait training and strengthening and restoration of ADL's   -Encourage mobility, OOB in chair, and early ambulation as appropriate  -Fall precautions   -Monitor for bowel and bladder dysfunction  -Monitor for and prevent skin breakdown and pressure ulcers  -Continue DVT prophylaxis with Lovenox    Microcytic anemia  -transfuse if Hgb < 7  -stable  -continue ferrous sulfate 325 mg daily     Bipolar disorder  -Mood stable  -Continue oxcarbazepine     OAB (overactive bladder)  -Continue  oxybutynin     Chronic radicular lumbar pain  -Continue hydrocodone, methocarbamol, and tylenol     Mixed hyperlipidemia  -Continue fenofibrate  -F/u with PCP as outpatient       Future Appointments   Date Time Provider Department [place]   2/20/2020  3:00 PM Leticia Gastelum MD [facility] ID [facility]   3/6/2020  1:00 PM ECHO, [facility] ECHOL[facility]   4/8/2020  1:00 PM Fito Marquez MD [facility] CARDIO [facility]   7/9/2020 11:00 AM Marcelino Calle MD [facility]CT[facility] SALES certify that SNF services are required to be given on an inpatient basis because Marycruz Ramirez needs for skilled nursing care and/or skilled rehabilitation are required on a daily basis and such services can only practically be provided in a skilled nursing facility setting and are for an ongoing condition for which she received inpatient care in the hospital.     Total time of the visit 66 min 1203PM- 109PM   Non physical exam/ non charting time: 50 minutes   Description of non physical exam/non charting time: counseling patient on clinical conditions and therapies provided regarding acute on chronic systolic and diastolic heart failure requiring diuresis with Lasix, elevated blood glucose requiring sliding scale insulin and hypoglycemic protocol, multiple skin tears requiring wound care consult, anemia, bipolar disorder, overactive bladder, chronic lumbar fall, hyperlipidemia, pain regimen, postop care with follow-up, and the beginning of discharge planning. Extensive chart review completed including all consultation notes.  All pertinent laboratory and radiographical images reviewed.    Patient note was created using MModal Dictation.  Any errors in syntax or even information may not have been identified and edited on initial review prior to signing this note.     Saul Valdez NP  DOS 2/19/2020

## 2020-02-19 NOTE — CLINICAL REVIEW
Clinical Pharmacy Chart Review Note      Admit Date: 2/17/2020   LOS: 2 days       Marycruz Ramirez is a 74 y.o. female admitted to SNF for PT/OT after hospitalization for acute on chronic systolic and diastolic heart failure.    Active Hospital Problems    Diagnosis  POA    *Acute on chronic combined systolic and diastolic heart failure [I50.43]  Yes    Skin tear of lower leg without complication [S81.819A]  Yes    Multiple skin tears [T14.8XXA]  Yes    Alteration in skin integrity [R23.9]  Yes      Resolved Hospital Problems   No resolved problems to display.     Review of patient's allergies indicates:  No Known Allergies  Patient Active Problem List    Diagnosis Date Noted    Skin tear of lower leg without complication 02/17/2020    Debility 02/14/2020    Multiple skin tears 02/13/2020    Alteration in skin integrity 02/12/2020    Acute on chronic combined systolic and diastolic heart failure 02/11/2020    Fall 02/11/2020    Nephrolithiasis 01/28/2020    Tobacco abuse 01/27/2020    E coli bacteremia 01/27/2020    Chronic combined systolic and diastolic heart failure 01/27/2020    Aortic stenosis, severe 01/27/2020    Pyelonephritis 01/26/2020    Symptomatic anemia     Microcytic anemia     Abdominal distension 01/18/2020    GI bleeding 01/17/2020    Mixed hyperlipidemia 01/16/2020    Chronic radicular lumbar pain 01/16/2020    OAB (overactive bladder) 01/16/2020    Bipolar disorder 01/16/2020       Scheduled Meds:    cetirizine  10 mg Oral QHS    enoxaparin  40 mg Subcutaneous Daily    fenofibrate  145 mg Oral Daily    ferrous sulfate  325 mg Oral Daily    furosemide  40 mg Oral BID    furosemide  40 mg Oral Once    methocarbamol  500 mg Oral QID    OXcarbazepine  300 mg Oral Daily    OXcarbazepine  600 mg Oral QHS    oxybutynin  5 mg Oral BID    pantoprazole  40 mg Oral BID AC    polyethylene glycol  17 g Oral Daily    potassium chloride  10 mEq Oral Daily    senna-docusate  8.6-50 mg  1 tablet Oral BID     Continuous Infusions:   PRN Meds: acetaminophen, acetaminophen, albuterol-ipratropium, bisacodyL, calcium carbonate, cyclobenzaprine, diclofenac sodium, glucagon (human recombinant), glucose, glucose, HYDROcodone-acetaminophen, HYDROcodone-acetaminophen, melatonin, ondansetron, sodium chloride 0.9%    OBJECTIVE:     Vital Signs (Last 24H)  Temp:  [97.6 °F (36.4 °C)-98 °F (36.7 °C)]   Pulse:  [88-96]   Resp:  [18]   BP: (117-121)/(56-83)   SpO2:  [97 %-98 %]     Laboratory:  CBC:   Recent Labs   Lab 02/16/20  0612 02/17/20 0413 02/18/20 0415   WBC 6.67 6.12 6.40   RBC 3.32* 3.29* 3.40*   HGB 8.9* 9.0* 9.1*   HCT 30.8* 30.3* 32.0*    234 231   MCV 93 92 94   MCH 26.8* 27.4 26.8*   MCHC 28.9* 29.7* 28.4*     BMP:   Recent Labs   Lab 02/13/20  0406  02/16/20  0612 02/17/20  0413 02/18/20 0415   *   < > 161* 159* 145*      < > 139 137 138   K 3.8   < > 3.7 4.0 3.3*      < > 97 98 98   CO2 27   < > 33* 31* 30*   BUN 11   < > 14 11 14   CREATININE 0.7   < > 0.7 0.6 0.7   CALCIUM 9.4   < > 9.5 9.4 9.6   MG 1.8  --   --   --   --     < > = values in this interval not displayed.     CMP:   Recent Labs   Lab 02/16/20  0612 02/17/20  0413 02/18/20  0415   * 159* 145*   CALCIUM 9.5 9.4 9.6    137 138   K 3.7 4.0 3.3*   CO2 33* 31* 30*   CL 97 98 98   BUN 14 11 14   CREATININE 0.7 0.6 0.7     Lab Results   Component Value Date    ALT 15 02/11/2020    AST 20 02/11/2020    ALKPHOS 75 02/11/2020    BILITOT 0.4 02/11/2020     Lab Results   Component Value Date    HGBA1C 5.6 02/12/2020         ASSESSMENT/PLAN:     I have reviewed the medications in compliance with CMS Regulation F756 of the [place]. Based on information gathered, the following items may need to be addressed:    **According to H&P patient taking bumetanide and tolterodine at home. These medications are not currently ordered at SNF. Patient is taking furosemide and oxybutynin at SNF.    Medications  reviewed by PharmD, please re-consult if needed.      Dominique Javier, Pharm. D.  Clinical Pharmacist  [facility]

## 2020-02-19 NOTE — H&P
Hospital Medicine  History and Physical Exam    Admit Date: 2/17/2020  EVONNE TBD  Principal Problem:  Acute on chronic combined systolic and diastolic heart failure   Primary care Physician: Marcelino Calle MD  Code status: Full Code    HPI:   Patient is a 75 yo female being placed in observation for acute on chronic heart failure. Patient with past medical hx of new CHF (EF 30), aortic stenosis, HLD, bipolar and overactive bladder. Patient was recently discharged from the hospital last month for pyelonephritis which required CC stay for sepsis (sent with home health) and admission before that was for anemia requiring blood transfusion and GI scope. Patient is present to the ED after reports of her legs becoming weak and falling onto her knees on 2/11. Patient also endured skin tears to left arm and bilateral legs. Patient also endorses large amount of lower extremity swelling the last 3 days making her legs feel heavy. Patient was admitted for acute on chronic systolic/diastolic heart failure. . Treated with IV diuresis with lasix 2/11-2/13. Transitioned to oral furosemide 40 mg bid on evening of 2/13 with good output (800 ml). Patient denies any complaints including SOB, palpitations, and chest pain. She states her LE swelling is at baseline.    Patient transferred to [facility] with PT and OT to improve functional status and ability to perform ADLs.     Past Medical History: Patient has a past medical history of Bipolar 1 disorder, CHF (congestive heart failure), HLD (hyperlipidemia), and Overactive bladder.    Past Surgical History: Patient has a past surgical history that includes Knee surgery (Bilateral); Back surgery; Esophagogastroduodenoscopy (N/A, 1/20/2020); and Colonoscopy (N/A, 1/20/2020).    Social History: Patient reports that she has been smoking cigarettes. She has a 75.00 pack-year smoking history. She has never used smokeless tobacco. She reports that she drinks alcohol. She reports  that she does not use drugs.    Family History: family history is not on file.    Medications: reviewed     Allergies: Patient has No Known Allergies.    ROS  Constitutional: no fever or chills  Respiratory: no cough or shortness of breath  Cardiovascular: no chest pain or palpitations  Gastrointestinal: no nausea or vomiting, no abdominal pain or change in bowel habits  Genitourinary: no hematuria or dysuria  Integument/Breast: no rash or pruritis  Hematologic/Lymphatic: no easy bruising or lymphadenopathy  Musculoskeletal: no arthralgias or myalgias  Neurological: no seizures or tremors  Behavioral/Psych: no depression or anxiety    PEx  Temp:  [97.6 °F (36.4 °C)-98 °F (36.7 °C)]   Pulse:  [88-96]   Resp:  [18]   BP: (117-121)/(56-83)   SpO2:  [97 %-98 %]   Body mass index is 21.49 kg/m².     General appearance: no distress  Mental status: Alert and oriented x 3  HEENT:  conjunctivae/corneas clear, PERRL  Neck: supple, thyroid not enlarged  Pulm:   normal respiratory effort, crackles bases, no c/w/r  Card: RRR, no murmur  Abd: soft, NT, ND, BS present; no masses, no organomegaly  Ext: 2+ pitting edema up to mid-part of lower leg  Pulses: 2+, symmetric  Skin: color, texture, turgor normal. No rashes or lesions  Neuro: CN II-XII grossly intact, no focal numbness or weakness, normal strength and tone     Recent Labs   Lab 02/13/20  0406  02/16/20  0612 02/17/20 0413 02/18/20 0415      < > 139 137 138   K 3.8   < > 3.7 4.0 3.3*      < > 97 98 98   CO2 27   < > 33* 31* 30*   BUN 11   < > 14 11 14   CREATININE 0.7   < > 0.7 0.6 0.7   *   < > 161* 159* 145*   CALCIUM 9.4   < > 9.5 9.4 9.6   MG 1.8  --   --   --   --    PHOS 3.5  --   --   --   --     < > = values in this interval not displayed.     Recent Labs   Lab 02/16/20  0612 02/17/20 0413 02/18/20  0415   WBC 6.67 6.12 6.40   HGB 8.9* 9.0* 9.1*   HCT 30.8* 30.3* 32.0*    234 231   GRAN 58.9  3.9 59.2  3.6 49.8  3.2   LYMPH 27.4  1.8  27.5  1.7 34.2  2.2   MONO 9.0  0.6 9.3  0.6 11.1  0.7     Assessment and Plan:    Acute on chronic combined systolic and diastolic heart failure  Pulmonary HTN -  estimated PA systolic pressure is 40 mmHg  Severe aortic stenosis  Daily weights  Furosemide 40 mg BID    Alteration in skin integrity  Multiple skin tears.  Consulted wound care.  Nursing to apply sween (pink top) moisturizer BID to the arms and legs  - Clear moisture barrier (purple top) to sacral perineal area  Nursing to cleanse skin tears with normal saline and gauze, apply medihoney to open wounds and secure with border foam. Change daily and PRN soiled  EHOB overlay  q2hour turns  Heels offloaded      Fall  Debility  PT/OT     Iron deficiency anemia  Chronic blood loss anemia  Erosive gastropathy  Colonic angioectasia  Ferrous sulfate 325 mg + ascorbic acid 250 mg every other day   Resume protonix at 40 mg daily     Bipolar disorder  Mood stable  Continue oxcarbazepine 300 mg daily and 600 mg qhs     OAB (overactive bladder)  Continue oxybutynin 5 mg BID     Chronic radicular lumbar pain  DJD of lumbar spine  Continue hydrocodone  Change methocarbamol to prn  voltaren gel prn    Mixed hyperlipidemia  Continue fenofibrate 145 mg daily  F/u with PCP upon discharge for ongoing outpatient monitoring      Hypokalemia -  KCl at 10 mEq once daily    Moderate protein calore malnutrition  Body mass index is 21.49 kg/m².  High protein high calorie diet  Boost plus TID  Dietary consult    Continue the following medications for treatment of the indicated conditions:  ·  Indication Medication Dose Route  Frequency       allergies cetirizine  10 mg Oral QHS    DVT prophylaxis enoxaparin  40 mg Subcutaneous Daily    hyperlipidemia fenofibrate  145 mg Oral Daily    Iron deficiency anemia ferrous sulfate  325 mg Oral Daily    Acute on chronic systolic and  diastolic heart failure furosemide  40 mg Oral BID    Bipolar disease OXcarbazepine  300 mg Oral  Daily    Bipolar disease OXcarbazepine  600 mg Oral QHS    Overactive bladder oxybutynin  5 mg Oral BID    Gastritis pantoprazole  40 mg Oral daily    BM regimen polyethylene glycol  17 g Oral Daily    hypokalemia potassium chloride  10 mEq Oral Daily    BM regimen senna-docusate 8.6-50 mg  1 tablet Oral BID       Future Appointments   Date Time Provider Department Center   3/6/2020  1:00 PM ECHO, [facility] ECHOLAB [facility]   4/8/2020  1:00 PM Fito Marquez MD [facility] CARDIO [facility]   7/9/2020 11:00 AM Marcelino Calle MD [facility]     I certify that SNF services are required to be given on an inpatient basis because Marycruz Ramirez needs for skilled nursing care and/or skilled rehabilitation are required on a daily basis and such services can only practically be provided in a skilled nursing facility setting and are for an ongoing condition for which she received inpatient care in the hospital.     Time (minutes) spent in care of the patient including face-to-face contact and coordination of care while on unit: 75    Purnima Carney MD

## 2020-02-20 LAB
ANION GAP SERPL CALC-SCNC: 12 MMOL/L (ref 8–16)
ANISOCYTOSIS BLD QL SMEAR: SLIGHT
BASOPHILS # BLD AUTO: 0.04 K/UL (ref 0–0.2)
BASOPHILS NFR BLD: 0.6 % (ref 0–1.9)
BNP SERPL-MCNC: 210 PG/ML (ref 0–99)
BUN SERPL-MCNC: 16 MG/DL (ref 8–23)
CALCIUM SERPL-MCNC: 10.2 MG/DL (ref 8.7–10.5)
CHLORIDE SERPL-SCNC: 95 MMOL/L (ref 95–110)
CO2 SERPL-SCNC: 29 MMOL/L (ref 23–29)
CREAT SERPL-MCNC: 0.7 MG/DL (ref 0.5–1.4)
DIFFERENTIAL METHOD: ABNORMAL
EOSINOPHIL # BLD AUTO: 0.3 K/UL (ref 0–0.5)
EOSINOPHIL NFR BLD: 3.9 % (ref 0–8)
ERYTHROCYTE [DISTWIDTH] IN BLOOD BY AUTOMATED COUNT: 25.4 % (ref 11.5–14.5)
EST. GFR  (AFRICAN AMERICAN): >60 ML/MIN/1.73 M^2
EST. GFR  (NON AFRICAN AMERICAN): >60 ML/MIN/1.73 M^2
GLUCOSE SERPL-MCNC: 172 MG/DL (ref 70–110)
HCT VFR BLD AUTO: 34.5 % (ref 37–48.5)
HGB BLD-MCNC: 10 G/DL (ref 12–16)
HYPOCHROMIA BLD QL SMEAR: ABNORMAL
IMM GRANULOCYTES # BLD AUTO: 0.03 K/UL (ref 0–0.04)
IMM GRANULOCYTES NFR BLD AUTO: 0.5 % (ref 0–0.5)
LYMPHOCYTES # BLD AUTO: 1.7 K/UL (ref 1–4.8)
LYMPHOCYTES NFR BLD: 26.6 % (ref 18–48)
MAGNESIUM SERPL-MCNC: 1.8 MG/DL (ref 1.6–2.6)
MCH RBC QN AUTO: 26.8 PG (ref 27–31)
MCHC RBC AUTO-ENTMCNC: 29 G/DL (ref 32–36)
MCV RBC AUTO: 93 FL (ref 82–98)
MONOCYTES # BLD AUTO: 1 K/UL (ref 0.3–1)
MONOCYTES NFR BLD: 15 % (ref 4–15)
NEUTROPHILS # BLD AUTO: 3.5 K/UL (ref 1.8–7.7)
NEUTROPHILS NFR BLD: 53.4 % (ref 38–73)
NRBC BLD-RTO: 0 /100 WBC
OVALOCYTES BLD QL SMEAR: ABNORMAL
PHOSPHATE SERPL-MCNC: 3.9 MG/DL (ref 2.7–4.5)
PLATELET # BLD AUTO: 291 K/UL (ref 150–350)
PLATELET BLD QL SMEAR: ABNORMAL
PMV BLD AUTO: 10.7 FL (ref 9.2–12.9)
POCT GLUCOSE: 164 MG/DL (ref 70–110)
POCT GLUCOSE: 165 MG/DL (ref 70–110)
POCT GLUCOSE: 203 MG/DL (ref 70–110)
POIKILOCYTOSIS BLD QL SMEAR: SLIGHT
POLYCHROMASIA BLD QL SMEAR: ABNORMAL
POTASSIUM SERPL-SCNC: 3.3 MMOL/L (ref 3.5–5.1)
RBC # BLD AUTO: 3.73 M/UL (ref 4–5.4)
SODIUM SERPL-SCNC: 136 MMOL/L (ref 136–145)
WBC # BLD AUTO: 6.47 K/UL (ref 3.9–12.7)

## 2020-02-20 PROCEDURE — 11000004 HC SNF PRIVATE: Mod: HCNC

## 2020-02-20 PROCEDURE — 97110 THERAPEUTIC EXERCISES: CPT | Mod: HCNC

## 2020-02-20 PROCEDURE — 83735 ASSAY OF MAGNESIUM: CPT | Mod: HCNC

## 2020-02-20 PROCEDURE — 84100 ASSAY OF PHOSPHORUS: CPT | Mod: HCNC

## 2020-02-20 PROCEDURE — 25000003 PHARM REV CODE 250: Mod: HCNC | Performed by: INTERNAL MEDICINE

## 2020-02-20 PROCEDURE — 97116 GAIT TRAINING THERAPY: CPT | Mod: HCNC

## 2020-02-20 PROCEDURE — 36415 COLL VENOUS BLD VENIPUNCTURE: CPT | Mod: HCNC

## 2020-02-20 PROCEDURE — 63600175 PHARM REV CODE 636 W HCPCS: Mod: HCNC | Performed by: PHYSICIAN ASSISTANT

## 2020-02-20 PROCEDURE — 97535 SELF CARE MNGMENT TRAINING: CPT | Mod: HCNC

## 2020-02-20 PROCEDURE — 97530 THERAPEUTIC ACTIVITIES: CPT | Mod: HCNC

## 2020-02-20 PROCEDURE — 25000003 PHARM REV CODE 250: Mod: HCNC | Performed by: NURSE PRACTITIONER

## 2020-02-20 PROCEDURE — 80048 BASIC METABOLIC PNL TOTAL CA: CPT | Mod: HCNC

## 2020-02-20 PROCEDURE — 83880 ASSAY OF NATRIURETIC PEPTIDE: CPT | Mod: HCNC

## 2020-02-20 PROCEDURE — 85025 COMPLETE CBC W/AUTO DIFF WBC: CPT | Mod: HCNC

## 2020-02-20 PROCEDURE — 25000003 PHARM REV CODE 250: Mod: HCNC | Performed by: PHYSICIAN ASSISTANT

## 2020-02-20 RX ORDER — POTASSIUM CHLORIDE 20 MEQ/1
40 TABLET, EXTENDED RELEASE ORAL EVERY 4 HOURS
Status: COMPLETED | OUTPATIENT
Start: 2020-02-20 | End: 2020-02-20

## 2020-02-20 RX ORDER — POTASSIUM CHLORIDE 20 MEQ/1
20 TABLET, EXTENDED RELEASE ORAL DAILY
Status: DISCONTINUED | OUTPATIENT
Start: 2020-02-21 | End: 2020-02-28

## 2020-02-20 RX ADMIN — FENOFIBRATE 145 MG: 145 TABLET, FILM COATED ORAL at 09:02

## 2020-02-20 RX ADMIN — OXYBUTYNIN CHLORIDE 5 MG: 5 TABLET ORAL at 09:02

## 2020-02-20 RX ADMIN — HYDROCODONE BITARTRATE AND ACETAMINOPHEN 1 TABLET: 10; 325 TABLET ORAL at 12:02

## 2020-02-20 RX ADMIN — POTASSIUM CHLORIDE 40 MEQ: 1500 TABLET, EXTENDED RELEASE ORAL at 12:02

## 2020-02-20 RX ADMIN — CETIRIZINE HYDROCHLORIDE 10 MG: 5 TABLET ORAL at 08:02

## 2020-02-20 RX ADMIN — FUROSEMIDE 60 MG: 40 TABLET ORAL at 04:02

## 2020-02-20 RX ADMIN — ENOXAPARIN SODIUM 40 MG: 100 INJECTION SUBCUTANEOUS at 04:02

## 2020-02-20 RX ADMIN — PANTOPRAZOLE SODIUM 40 MG: 40 TABLET, DELAYED RELEASE ORAL at 09:02

## 2020-02-20 RX ADMIN — OXCARBAZEPINE 300 MG: 300 TABLET, FILM COATED ORAL at 09:02

## 2020-02-20 RX ADMIN — HYDROCODONE BITARTRATE AND ACETAMINOPHEN 1 TABLET: 10; 325 TABLET ORAL at 09:02

## 2020-02-20 RX ADMIN — OXCARBAZEPINE 600 MG: 600 TABLET, FILM COATED ORAL at 09:02

## 2020-02-20 RX ADMIN — POTASSIUM CHLORIDE 40 MEQ: 1500 TABLET, EXTENDED RELEASE ORAL at 05:02

## 2020-02-20 RX ADMIN — Medication 6 MG: at 08:02

## 2020-02-20 RX ADMIN — HYDROCODONE BITARTRATE AND ACETAMINOPHEN 1 TABLET: 5; 325 TABLET ORAL at 04:02

## 2020-02-20 RX ADMIN — POTASSIUM CHLORIDE 10 MEQ: 750 CAPSULE, EXTENDED RELEASE ORAL at 09:02

## 2020-02-20 RX ADMIN — FUROSEMIDE 40 MG: 40 TABLET ORAL at 09:02

## 2020-02-20 RX ADMIN — HYDROCODONE BITARTRATE AND ACETAMINOPHEN 1 TABLET: 10; 325 TABLET ORAL at 06:02

## 2020-02-20 RX ADMIN — HYDROCODONE BITARTRATE AND ACETAMINOPHEN 1 TABLET: 5; 325 TABLET ORAL at 11:02

## 2020-02-20 RX ADMIN — DOCUSATE SODIUM - SENNOSIDES 1 TABLET: 50; 8.6 TABLET, FILM COATED ORAL at 09:02

## 2020-02-20 NOTE — PLAN OF CARE
Problem: Occupational Therapy Goal  Goal: Occupational Therapy Goal  Description  Goals to be met by: 2/28/20     Patient will increase functional independence with ADLs by performing:    Feeding with Modified [place].- Met  UE Dressing with Modified [place].   -Met  LE Dressing with Supervision.  Grooming while standing with Supervision with RW to steady.  Toileting from bedside commode with Supervision for hygiene and clothing management and A/D to stand.   Bathing from sitting at sink with Supervision with DME for safety.  Supine to sit with Modified [place]. - Met  Step transfer with Modified [place] with A/D for safety.  Upper extremity exercise program x10 reps per handout, with assistance as needed.  Pt's caregiver will be competent when performing self care tasks and functional transfers.      Outcome: Ongoing, Progressing

## 2020-02-20 NOTE — PT/OT/SLP PROGRESS
Occupational Therapy  Treatment    Marycruz Ramirez   MRN: [id number]   Admitting Diagnosis: Acute on chronic combined systolic and diastolic heart failure    OT Date of Treatment: 02/20/20       Billable Minutes:  Therapeutic Activity 29 and Therapeutic Exercise 15    General Precautions: Standard, fall  Orthopedic Precautions: N/A  Braces: N/A    Spiritual, Cultural Beliefs, Hoahaoism Practices, Values that Affect Care: no    Subjective:  Communicated with nurse prior to session.      Pain/Comfort  Pain Rating 1: 8/10  Location - Side 1: Bilateral  Location - Orientation 1: generalized  Location 1: back  Pain Addressed 1: Reposition, Distraction, Nurse notified  Pain Rating Post-Intervention 1: 8/10    Objective:  Patient found with: (no lines and seated in bedside chair.)    Occupational Performance:    Bed Mobility:    · Patient completed Rolling/Turning to Right with modified independence  · Patient completed Scooting/Bridging with modified independence  · Patient completed Supine to Sit with modified independence     Functional Mobility/Transfers:  · Patient completed Sit <> Stand Transfer with stand by assistance  with  rolling walker   · Patient completed Bed <> Chair Transfer using Step Transfer technique with stand by assistance with rolling walker  · Functional Mobility: Pt ambulated from bedside chair to therapy gym with RW and SBA a distance of 147 ft with slow shashank noted but no loss of balance.    Activities of Daily Living:  · Pt performed all self care tasks prior to OT session.    OT Exercises: UE Ergometer performed 15 minutes on UBE with Min resistance. UE exercises performed to increase functional endurance and strength in order increase independence when performing self care tasks, functional ambulation, W/C propulsion , functional standing activities as well as when performing functional tasks.    Additional Treatment:  Pt worked on functional standing activity consisting of standing with RW while  reaching in all planes , crossing of midline and reaching to varying heights to facilitate (B) wt shifting and stability in standing .  Pt tolerated up to 11 Min.  10 sec. And 6 min 23 sec in standing withSBA and RW to steady.    Patient left up in chair with call button in reach    [facility] 6 Click:  [facility] Total Score: 19    ASSESSMENT:  Marycruz Ramirez is a 74 y.o. female with a medical diagnosis of Acute on chronic combined systolic and diastolic heart failure .  Pt tolerated Tx without incident making progress with self care tasks and functional transfers and would continue to benefit from OT intervention to further her functional (I)ce and safety.    Rehab identified problem list/impairments: weakness, impaired endurance, impaired self care skills, impaired functional mobilty, gait instability, impaired balance, decreased lower extremity function, decreased safety awareness, pain, decreased ROM, impaired cardiopulmonary response to activity    Rehab potential is good    Activity tolerance: Good    Discharge recommendations: home health OT     Barriers to discharge: None     Equipment recommendations: (TBD)     GOALS:   Multidisciplinary Problems     Occupational Therapy Goals        Problem: Occupational Therapy Goal    Goal Priority Disciplines Outcome Interventions   Occupational Therapy Goal     OT, PT/OT Ongoing, Progressing    Description:  Goals to be met by: 2/28/20     Patient will increase functional independence with ADLs by performing:    Feeding with Modified [place].- Met  UE Dressing with Modified [place].   -Met  LE Dressing with Supervision.  Grooming while standing with Supervision with RW to steady.  Toileting from bedside commode with Supervision for hygiene and clothing management and A/D to stand.   Bathing from sitting at sink with Supervision with DME for safety.  Supine to sit with Modified [place]. - Met  Step transfer with Modified [place] with A/D for safety.  Upper  extremity exercise program x10 reps per handout, with assistance as needed.  Pt's caregiver will be competent when performing self care tasks and functional transfers.                       Plan:  Patient to be seen 5 x/week, 6 x/week to address the above listed problems via self-care/home management, therapeutic activities, therapeutic exercises  Plan of Care expires: 03/18/20  Plan of Care reviewed with: patient    Reggie Jean, RITIKAR/LILA  02/20/2020

## 2020-02-20 NOTE — PT/OT/SLP PROGRESS
"Physical Therapy  Treatment    Marycruz Ramirez   MRN: [id number]   Admitting Diagnosis: Acute on chronic combined systolic and diastolic heart failure    PT Received On: 02/20/20          Billable Minutes:  Gait Training 15 and Therapeutic Activity 15    Treatment Type: Treatment  PT/PTA: PT     PTA Visit Number: 1       General Precautions: Standard, fall  Orthopedic Precautions: N/A   Braces: N/A    Spiritual, Cultural Beliefs, Zoroastrian Practices, Values that Affect Care: no    Subjective:  Communicated with pt prior to session.  Agreeable to PT services. "I'm just hurting so bad. Vinay worked me out earlier today!"    Pain/Comfort  Pain Rating 1: 9/10  Location - Side 1: Bilateral  Location - Orientation 1: generalized  Location 1: knee  Pain Addressed 1: Cessation of Activity  Pain Rating Post-Intervention 1: 9/10    Objective:  Patient found seated in bedside chair.    AM-PAC 6 CLICK MOBILITY  Total Score:18      Transfers:  Sit<>Stand: supervision  Stand Pivot Transfer: supervision    Gait:  Amb 120 ft with swing-through gait pattern, decreased foot clearance bilaterally, SBA.   Amb to WC from bedside chair with SBA, RW     Therex:  Refused standing exercises and seated exercises.    Balance:  Needs CGA and RW    Pt's legs did appear swollen. Educated pt to elevate her legs above her heart when able.       Patient left up in chair with call button in reach.    Assessment:  Marycruz Ramirez is a 74 y.o. female with a medical diagnosis of Acute on chronic combined systolic and diastolic heart failure.  Ms. Ramirez was limited today by pain in her legs. Was able to increase her giat distance but will benefit from continued PT services to ensure a safe return home.    Rehab identified problem list/impairments: weakness, impaired endurance, impaired self care skills, gait instability, impaired functional mobilty, impaired balance, decreased lower extremity function, pain    Rehab potential is fair.    Activity tolerance: " Fair    Discharge recommendations: home health PT(with light assistance)     Barriers to discharge: None    Equipment recommendations: bedside commode, bath bench, walker, rolling(recommended;  owns rollator and SPC)     GOALS:   Multidisciplinary Problems     Physical Therapy Goals        Problem: Physical Therapy Goal    Goal Priority Disciplines Outcome Goal Variances Interventions   Physical Therapy Goal     PT, PT/OT      Description:  Goals to be met by: 20    Patient will increase functional independence with mobility by performin. Sit to stand transfer with Supervision. Met(2020)  2. Bed to chair transfer with Supervision using Rolling Walker/rollator. Me t(2020)  3. Gait  x 150 feet with Supervision using Rolling Walker/rollator.  4. Ascend/Descend 2 inch curb step with Supervision using Rolling Walker/rollator.  5. Stand for 3 minutes with Supervision using Rolling Walker/rollator while performing a dynamic task.  6. Lower extremity exercise program x 20 reps per handout, with independence.                       PLAN:    Patient to be seen (5-7x/wk)  to address the above listed problems via gait training, therapeutic activities, therapeutic exercises, neuromuscular re-education, wheelchair management/training  Plan of Care expires: 20  Plan of Care reviewed with: patient    Ifeoma TAM English, PT  2020

## 2020-02-20 NOTE — PROGRESS NOTES
[facility]                                  Skilled Nursing Facility                   Progress Note   DOS 2/20/2020  Admit Date: 2/17/2020  EVONNE   Principal Problem:  Acute on chronic combined systolic and diastolic heart failure   HPI obtained from patient interview and chart review     Chief Complaint: Follow up LE edema and Revaluation of medical treatment and therapy status: Lab review     HPI: Ms Ramirez is a 75 yo female with sig pmhx of CHF(EF 30), Aortic stenosis, HLD, Bipolar and overactive bladder who presents to SNF s/p hospitalization for Acute on chronic combined systolic and diastolic heart failure. Patient initially presented with LE edema, weakness and a fall. Patient with recent hospitalizations/treatment for pyelonephritis with sepsis and Anemia with transfusion. This hospitalization patient diuresed well with lasix and was deemed medically stable and transferred to [facility] to participate in acute inpatient therapy. Admission to SNF for secondary weakness and debility.     Interval Hx: Patient found to have 2 + edema to bilateral LE which is stable, continue to trend. All of today's labs reviewed. BNP at 210. No leukocytosis. Hemoglobin stable at 10. Platelets stable at 291. Sodium at 136. Potasium 3.3. Creatinine stable at 0.7. 24 hr blood glucose at 172 on serum, will monitor poct glucose bid considering patient hyperglycemia on previous checks. Afebrile. Patient's current blood pressure is at 110/56. Patient progessing well with PT/OT. Continuing to follow and treat all acute and chronic conditions.     ECHO 1/26/2020:  Moderate left ventricular enlargement.  Moderately decreased left ventricular systolic function. The estimated ejection fraction is 30%.  Grade I (mild) left ventricular diastolic dysfunction consistent with impaired relaxation.  Normal right ventricular systolic function.  Moderate left atrial enlargement.  Mild-to-moderate  mitral regurgitation.  Severe aortic valve stenosis. Aortic valve area is 0.64 cm2; peak velocity is 3.94 m/s; mean gradient is 43 mmHg.  The estimated PA systolic pressure is 40 mmHg.  Elevated central venous pressure (15 mmHg).    PEx  Constitutional: Patient appears well-developed and in no distress   HENT:   Head: Normocephalic and atraumatic.   Eyes: Pupils are equal, round, and reactive to light.   Neck: Normal range of motion. Neck supple.   Cardiovascular: Normal rate, regular rhythm and normal heart sounds. + lower extremity edema  Pulmonary/Chest: Effort normal and breath sounds are clear  Abdominal: Soft. Bowel sounds are normal.   Musculoskeletal: Normal range of motion. + generalized weakness.  Neurological: Alert and oriented to person, place, and time.   Psychiatric: Normal mood and affect. Behavior is normal.   Wounds/Skin: Skin is warm and dry.     Wound location:   Multiple skin tears on LE unable to visualize due to dressing CDI without redness or drainage around dressing.  Following: This NP weekly- last observed on 2/20/2020 and wound care team Prn.       Temp:  [97.5 °F (36.4 °C)-98.1 °F (36.7 °C)]   Pulse:  [86-98]   Resp:  [18]   BP: (110-125)/(56-75)   SpO2:  [97 %-98 %]   Body mass index is 21.66 kg/m².     ROS  Constitutional: Negative for fever and malaise/fatigue.   Eyes: Negative for blurred vision, double vision and discharge.   Respiratory: Negative for cough, shortness of breath and wheezing.    Cardiovascular: Negative for chest pain, palpitations, claudication, and leg swelling.   Gastrointestinal: Negative for abdominal pain, constipation, diarrhea, nausea and vomiting.   Genitourinary: Negative for dysuria, frequency and urgency.   Musculoskeletal:  + generalized weakness. Negative for back pain and myalgias.   Skin: Negative for itching and rash.  Neurological: Negative for dizziness, speech change, seizures, and headaches.   Psychiatric/Behavioral: Negative for depression. The  patient is not nervous/anxious.      Past Medical History: Patient has a past medical history of Bipolar 1 disorder, CHF (congestive heart failure), HLD (hyperlipidemia), and Overactive bladder.    Past Surgical History: Patient has a past surgical history that includes Knee surgery (Bilateral); Back surgery; Esophagogastroduodenoscopy (N/A, 1/20/2020); and Colonoscopy (N/A, 1/20/2020).    Social History: Patient reports that she has been smoking cigarettes. She has a 75.00 pack-year smoking history. She has never used smokeless tobacco. She reports that she drinks alcohol. She reports that she does not use drugs.    Family History: No significant family hx reported.     Allergies: Patient has No Known Allergies.      Assessment and Plan:    Hypokalemia  -2/20 initiated potassium 40 every 4 hr x2 doses and increased to potassium 20 mEq daily    Acute on chronic combined systolic and diastolic heart failure  -seen by cards on 2/6 and has TTE schedule march 6  -2-3+ edema to BLE  -continue lasix 40 mg bid   -2/19 initiated additional dose of lasix 40 mg po x 1, initiated cardiac diet and discontinued regular diet, initiated BNP lab draw for the am, and reordered daily weights and adrienne hose  -2/20 increase lasix to 60 mg bid wm      Bilateral lower extremity edema  -2/20 increase lasix to 60 mg bid wm      Elevated glucose  -2/20 initiated poct glucose checks bid before meals    Alteration in skin integrity  -Multiple skin tears  -2/19 initiated consult to wound care  -2/20 stable pending wound care eval and continue dressing changes as ordered     Microcytic anemia  -transfuse if Hgb < 7  -stable  -continue ferrous sulfate 325 mg daily  -2/20 stable      Ongoing, Stable Problems    Debility  -Continue with PT/OT for gait training and strengthening and restoration of ADL's   -Encourage mobility, OOB in chair, and early ambulation as appropriate  -Fall precautions   -Monitor for bowel and bladder dysfunction  -Monitor  for and prevent skin breakdown and pressure ulcers  -Continue DVT prophylaxis with Lovenox     Bipolar disorder  -Mood stable  -Continue oxcarbazepine     OAB (overactive bladder)  -Continue oxybutynin     Chronic radicular lumbar pain  -Continue hydrocodone, methocarbamol, and tylenol     Mixed hyperlipidemia  -Continue fenofibrate  -F/u with PCP as outpatient       Future Appointments   Date Time Provider Department Center   3/6/2020  1:00 PM ECHO, [facility] ECHOLAB [facility]   4/8/2020  1:00 PM Fito Marquez MD [facility] CARDIO [facility]   7/9/2020 11:00 AM Marcelino Calle [facility]       Patient note was created using MModal Dictation.  Any errors in syntax or even information may not have been identified and edited on initial review prior to signing this note.     Saul Valdez NP  DOS 2/20/2020

## 2020-02-21 LAB
POCT GLUCOSE: 152 MG/DL (ref 70–110)
POCT GLUCOSE: 169 MG/DL (ref 70–110)
POCT GLUCOSE: 172 MG/DL (ref 70–110)

## 2020-02-21 PROCEDURE — 11000004 HC SNF PRIVATE: Mod: HCNC

## 2020-02-21 PROCEDURE — 25000003 PHARM REV CODE 250: Mod: HCNC | Performed by: INTERNAL MEDICINE

## 2020-02-21 PROCEDURE — 25000003 PHARM REV CODE 250: Mod: HCNC | Performed by: PHYSICIAN ASSISTANT

## 2020-02-21 PROCEDURE — 25000003 PHARM REV CODE 250: Mod: HCNC | Performed by: NURSE PRACTITIONER

## 2020-02-21 PROCEDURE — 97535 SELF CARE MNGMENT TRAINING: CPT | Mod: HCNC

## 2020-02-21 PROCEDURE — 63600175 PHARM REV CODE 636 W HCPCS: Mod: HCNC | Performed by: PHYSICIAN ASSISTANT

## 2020-02-21 PROCEDURE — 97530 THERAPEUTIC ACTIVITIES: CPT | Mod: HCNC

## 2020-02-21 RX ORDER — GLUCAGON 1 MG
1 KIT INJECTION
Status: DISCONTINUED | OUTPATIENT
Start: 2020-02-21 | End: 2020-03-04 | Stop reason: HOSPADM

## 2020-02-21 RX ORDER — IBUPROFEN 200 MG
24 TABLET ORAL
Status: DISCONTINUED | OUTPATIENT
Start: 2020-02-21 | End: 2020-03-04 | Stop reason: HOSPADM

## 2020-02-21 RX ORDER — IBUPROFEN 200 MG
16 TABLET ORAL
Status: DISCONTINUED | OUTPATIENT
Start: 2020-02-21 | End: 2020-03-04 | Stop reason: HOSPADM

## 2020-02-21 RX ORDER — INSULIN ASPART 100 [IU]/ML
0-5 INJECTION, SOLUTION INTRAVENOUS; SUBCUTANEOUS
Status: DISCONTINUED | OUTPATIENT
Start: 2020-02-21 | End: 2020-03-04 | Stop reason: HOSPADM

## 2020-02-21 RX ADMIN — FUROSEMIDE 60 MG: 40 TABLET ORAL at 05:02

## 2020-02-21 RX ADMIN — PANTOPRAZOLE SODIUM 40 MG: 40 TABLET, DELAYED RELEASE ORAL at 08:02

## 2020-02-21 RX ADMIN — HYDROCODONE BITARTRATE AND ACETAMINOPHEN 1 TABLET: 5; 325 TABLET ORAL at 01:02

## 2020-02-21 RX ADMIN — CETIRIZINE HYDROCHLORIDE 10 MG: 5 TABLET ORAL at 08:02

## 2020-02-21 RX ADMIN — OXCARBAZEPINE 300 MG: 300 TABLET, FILM COATED ORAL at 08:02

## 2020-02-21 RX ADMIN — ENOXAPARIN SODIUM 40 MG: 100 INJECTION SUBCUTANEOUS at 05:02

## 2020-02-21 RX ADMIN — POTASSIUM CHLORIDE 20 MEQ: 1500 TABLET, EXTENDED RELEASE ORAL at 08:02

## 2020-02-21 RX ADMIN — OXCARBAZEPINE 600 MG: 600 TABLET, FILM COATED ORAL at 08:02

## 2020-02-21 RX ADMIN — DOCUSATE SODIUM - SENNOSIDES 1 TABLET: 50; 8.6 TABLET, FILM COATED ORAL at 08:02

## 2020-02-21 RX ADMIN — OXYBUTYNIN CHLORIDE 5 MG: 5 TABLET ORAL at 08:02

## 2020-02-21 RX ADMIN — HYDROCODONE BITARTRATE AND ACETAMINOPHEN 1 TABLET: 10; 325 TABLET ORAL at 06:02

## 2020-02-21 RX ADMIN — Medication 250 MG: at 08:02

## 2020-02-21 RX ADMIN — HYDROCODONE BITARTRATE AND ACETAMINOPHEN 1 TABLET: 10; 325 TABLET ORAL at 02:02

## 2020-02-21 RX ADMIN — Medication 6 MG: at 08:02

## 2020-02-21 RX ADMIN — OXYBUTYNIN CHLORIDE 5 MG: 5 TABLET ORAL at 09:02

## 2020-02-21 RX ADMIN — FENOFIBRATE 145 MG: 145 TABLET, FILM COATED ORAL at 08:02

## 2020-02-21 RX ADMIN — HYDROCODONE BITARTRATE AND ACETAMINOPHEN 1 TABLET: 10; 325 TABLET ORAL at 08:02

## 2020-02-21 RX ADMIN — FERROUS SULFATE TAB EC 325 MG (65 MG FE EQUIVALENT) 325 MG: 325 (65 FE) TABLET DELAYED RESPONSE at 08:02

## 2020-02-21 RX ADMIN — FUROSEMIDE 60 MG: 40 TABLET ORAL at 08:02

## 2020-02-21 NOTE — PT/OT/SLP PROGRESS
Physical Therapy      Patient Name:  Marycruz Ramirez   MRN:  [id number]    Patient not seen today secondary to pt refusal x3 attempts due to pain. Pt did receive pain medication prior to AM attempt, and PM attempt. Will follow-up at next scheduled visit per PT POC.    Dominique aRmírez, HAILEY

## 2020-02-21 NOTE — CONSULTS
Wound care consult received from MD team for skin tears to BLE.  Pt known to wound care team from admission to hospital and treatment for skin breakdown to BLE and was last assessed on 2/13.  Upon assessment today, pt L leg skin breakdown appears worse.  Hematoma to lateral leg opening with full thickness tissue and moderate amount of serosanguinous drainage.  R leg with full thickness wound of opened hematoma. Recommend to change treatment plan to d/c Medihoney and begin Aquacel AG dressing to reduce bioburden and autolytically debride slough. Will need to monitor L leg hematoma for further breakdown.  Recommend pt to keep legs elevated and limit time that legs are in dependent position. Wound care team to follow prn [id number]      L lateral leg- hematoma breakdown, full thickness [id number]    R leg- full thickness wound 3x3x0.1cm

## 2020-02-21 NOTE — PROGRESS NOTES
[facility]                                  Skilled Nursing Facility                   Progress Note   DOS 2/21/2020  Admit Date: 2/17/2020  EVONNE   Principal Problem:  Acute on chronic combined systolic and diastolic heart failure   HPI obtained from patient interview and chart review     Chief Complaint: Follow up wounds and follow up hyperglycemia and Revaluation of medical treatment and therapy status: Lab review     HPI: Ms Ramirez is a 73 yo female with sig pmhx of CHF(EF 30), Aortic stenosis, HLD, Bipolar and overactive bladder who presents to SNF s/p hospitalization for Acute on chronic combined systolic and diastolic heart failure. Patient initially presented with LE edema, weakness and a fall. Patient with recent hospitalizations/treatment for pyelonephritis with sepsis and Anemia with transfusion. This hospitalization patient diuresed well with lasix and was deemed medically stable and transferred to [facility] to participate in acute inpatient therapy. Admission to SNF for secondary weakness and debility.     Interval Hx: Wounds reevaluated requiring change in treatment plan. 24 hr blood glucose at 164-203. Afebrile. Patient's current blood pressure is at 120/84. Patient progessing well with PT/OT. Continuing to follow and treat all acute and chronic conditions.     ECHO 1/26/2020:  Moderate left ventricular enlargement.  Moderately decreased left ventricular systolic function. The estimated ejection fraction is 30%.  Grade I (mild) left ventricular diastolic dysfunction consistent with impaired relaxation.  Normal right ventricular systolic function.  Moderate left atrial enlargement.  Mild-to-moderate mitral regurgitation.  Severe aortic valve stenosis. Aortic valve area is 0.64 cm2; peak velocity is 3.94 m/s; mean gradient is 43 mmHg.  The estimated PA systolic pressure is 40 mmHg.  Elevated central venous pressure (15 mmHg).    PEx  Constitutional:  Patient appears well-developed and in no distress   HENT:   Head: Normocephalic and atraumatic.   Eyes: Pupils are equal, round, and reactive to light.   Neck: Normal range of motion. Neck supple.   Cardiovascular: Normal rate, regular rhythm and normal heart sounds. + lower extremity edema  Pulmonary/Chest: Effort normal and breath sounds are clear  Abdominal: Soft. Bowel sounds are normal.   Musculoskeletal: Normal range of motion. + generalized weakness.  Neurological: Alert and oriented to person, place, and time.   Psychiatric: Normal mood and affect. Behavior is normal.   Wounds/Skin: Skin is warm and dry.     Wound location:   Multiple skin tears FABI with eschar, without redness or drainage around.  Following: This NP weekly- last observed on 2/21/2020 and wound care team Prn.     Wound location:   Left LE hematoma with 90 % purplish-red full thickness tissue and 10 % tan with small amount of serosang drainage, pink edges  Wound length: 10 cm   Wound width: 10 cm  Wound depth: 0.1 cm   Following: Ortho NP- weekly. This NP weekly- last observed on 2/21/2020 and wound care team Prn.     Wound location:   Right LE hematoma full thickness wound with 90 % tan/white and 10 % dark brown eschar with pink edges  Wound length: 3 cm   Wound width: 3 cm  Wound depth: 0.1 cm   Following: Ortho NP- weekly. This NP weekly- last observed on 2/21/2020 and wound care team Prn.     Temp:  [98 °F (36.7 °C)]   Pulse:  [91]   Resp:  [20]   BP: (120)/(84)   SpO2:  [98 %]   Body mass index is 21.37 kg/m².     ROS  Constitutional: Negative for fever and malaise/fatigue.   Eyes: Negative for blurred vision, double vision and discharge.   Respiratory: Negative for cough, shortness of breath and wheezing.    Cardiovascular: Negative for chest pain, palpitations, claudication, and leg swelling.   Gastrointestinal: Negative for abdominal pain, constipation, diarrhea, nausea and vomiting.   Genitourinary: Negative for dysuria, frequency  and urgency.   Musculoskeletal:  + generalized weakness. Negative for back pain and myalgias.   Skin: Negative for itching and rash.  Neurological: Negative for dizziness, speech change, seizures, and headaches.   Psychiatric/Behavioral: Negative for depression. The patient is not nervous/anxious.      Past Medical History: Patient has a past medical history of Bipolar 1 disorder, CHF (congestive heart failure), HLD (hyperlipidemia), and Overactive bladder.    Past Surgical History: Patient has a past surgical history that includes Knee surgery (Bilateral); Back surgery; Esophagogastroduodenoscopy (N/A, 1/20/2020); and Colonoscopy (N/A, 1/20/2020).    Social History: Patient reports that she has been smoking cigarettes. She has a 75.00 pack-year smoking history. She has never used smokeless tobacco. She reports that she drinks alcohol. She reports that she does not use drugs.    Family History: No significant family hx reported.     Allergies: Patient has No Known Allergies.      Assessment and Plan:    Alteration in skin integrity  -Multiple skin tears to bilateral lower extremities   -2/19 initiated consult to wound care  -2/20 stable pending wound care eval and continue dressing changes as ordered  -2/21 discontinued Medihoney and initiated Aquacel AG dressing to be changed every M,W,F    Elevated glucose  -2/20 initiated poct glucose checks bid before meals  -2/21 initiated sliding scale insulin and Hypoglycemia protocol with IV dextrose and glucose tabs prn      Ongoing, Stable Problems     Hypokalemia  -2/20 initiated potassium 40 every 4 hr x2 doses and increased to potassium 20 mEq daily    Acute on chronic combined systolic and diastolic heart failure  -seen by cards on 2/6 and has TTE schedule march 6  -2-3+ edema to BLE  -continue lasix 40 mg bid   -2/19 initiated additional dose of lasix 40 mg po x 1, initiated cardiac diet and discontinued regular diet, initiated BNP lab draw for the am, and reordered  daily weights and adrienne hose  -2/20 increase lasix to 60 mg bid wm      Bilateral lower extremity edema  -2/20 increase lasix to 60 mg bid wm      Microcytic anemia  -transfuse if Hgb < 7  -stable  -continue ferrous sulfate 325 mg daily  -2/20 stable      Ongoing, Stable Problems    Debility  -Continue with PT/OT for gait training and strengthening and restoration of ADL's   -Encourage mobility, OOB in chair, and early ambulation as appropriate  -Fall precautions   -Monitor for bowel and bladder dysfunction  -Monitor for and prevent skin breakdown and pressure ulcers  -Continue DVT prophylaxis with Lovenox     Bipolar disorder  -Mood stable  -Continue oxcarbazepine     OAB (overactive bladder)  -Continue oxybutynin     Chronic radicular lumbar pain  -Continue hydrocodone, methocarbamol, and tylenol     Mixed hyperlipidemia  -Continue fenofibrate  -F/u with PCP as outpatient       Future Appointments   Date Time Provider Department Center   3/6/2020  1:00 PM [facility] ECHOLAB [facility]   4/8/2020  1:00 PM Fito Marquez MD [facility] CARDIO [facility]   7/9/2020 11:00 AM Marcelino Calle MD [facility]       Patient note was created using MModal Dictation.  Any errors in syntax or even information may not have been identified and edited on initial review prior to signing this note.     Saul Valdez NP  DOS 2/21/2020

## 2020-02-21 NOTE — PLAN OF CARE
Problem: Occupational Therapy Goal  Goal: Occupational Therapy Goal  Description  Goals to be met by: 2/28/20     Patient will increase functional independence with ADLs by performing:    Feeding with Modified [place].- Met  UE Dressing with Modified [place].   -Met  LE Dressing with Supervision.  Grooming while standing with Supervision with RW to steady.  Toileting from bedside commode with Supervision for hygiene and clothing management and A/D to stand.   Bathing from sitting at sink with Supervision with DME for safety.  Supine to sit with Modified [place]. - Met  Step transfer with Modified [place] with A/D for safety.  Upper extremity exercise program x10 reps per handout, with assistance as needed.  Pt's caregiver will be competent when performing self care tasks and functional transfers.      Outcome: Ongoing, Progressing

## 2020-02-21 NOTE — PT/OT/SLP PROGRESS
Occupational Therapy  Treatment    Marycruz Ramirez   MRN: [id number]   Admitting Diagnosis: Acute on chronic combined systolic and diastolic heart failure    OT Date of Treatment: 02/21/20       Billable Minutes:  Self Care/Home Management 20 and Therapeutic Activity 5    General Precautions: Standard, fall  Orthopedic Precautions: N/A  Braces: N/A    Spiritual, Cultural Beliefs, Yarsani Practices, Values that Affect Care: no    Subjective:  Communicated with nurse prior to session.    Pain/Comfort  Pain Rating 1: 7/10  Location - Side 1: Bilateral  Location - Orientation 1: generalized  Location 1: knee  Pain Addressed 1: Reposition, Distraction, Nurse notified  Pain Rating Post-Intervention 1: 7/10    Objective:  Patient found with: (no lines and seated in bedside chair)    Occupational Performance:    Bed Mobility:    · Pt seated in bedside chair at onset of therapy session.     Functional Mobility/Transfers:  · Patient completed Sit <> Stand Transfer with stand by assistance  with  rolling walker   · Patient completed Bed <> Chair Transfer using Step Transfer technique with stand by assistance with rolling walker  · Patient completed Toilet Transfer Step Transfer technique with stand by assistance with  rolling walker  · Functional Mobility: Pt ambulated from bedside chair to restroom with RW 12 ft with SBA and then to sink 4ft and lastly to bedside chair 8 ft all with SBA and RW .    Activities of Daily Living:  · Grooming: supervision standing sinkside and performing grooming task.  · Toileting: supervision with Pt using BSC positioned over toilet with RW to stand.    [facility] 6 Click:  [facility] Total Score: 19      Patient left up in chair with call button in reach and nurse notified    ASSESSMENT:  Marycruz Ramirez is a 74 y.o. female with a medical diagnosis of Acute on chronic combined systolic and diastolic heart failure . Pt tolerated Tx without incident but reported significant pain in her knees and declined going  to gym this morning. OT continues to be recommended to further her functional (I)ce.    Rehab identified problem list/impairments: weakness, impaired endurance, impaired self care skills, impaired functional mobilty, gait instability, impaired balance, decreased lower extremity function, decreased safety awareness, pain, decreased ROM, impaired cardiopulmonary response to activity    Rehab potential is good    Activity tolerance: Good    Discharge recommendations: home health OT     Barriers to discharge: None     Equipment recommendations: (TBD)     GOALS:   Multidisciplinary Problems     Occupational Therapy Goals        Problem: Occupational Therapy Goal    Goal Priority Disciplines Outcome Interventions   Occupational Therapy Goal     OT, PT/OT Ongoing, Progressing    Description:  Goals to be met by: 2/28/20     Patient will increase functional independence with ADLs by performing:    Feeding with Modified [place].- Met  UE Dressing with Modified [place].   -Met  LE Dressing with Supervision.  Grooming while standing with Supervision with RW to steady.  Toileting from bedside commode with Supervision for hygiene and clothing management and A/D to stand.   Bathing from sitting at sink with Supervision with DME for safety.  Supine to sit with Modified [place]. - Met  Step transfer with Modified [place] with A/D for safety.  Upper extremity exercise program x10 reps per handout, with assistance as needed.  Pt's caregiver will be competent when performing self care tasks and functional transfers.                       Plan:  Patient to be seen 5 x/week, 6 x/week to address the above listed problems via self-care/home management, therapeutic activities, therapeutic exercises  Plan of Care expires: 03/18/20  Plan of Care reviewed with: patient    ELLIOT Zuniga/LILA  02/21/2020

## 2020-02-21 NOTE — PLAN OF CARE
Problem: Adult Inpatient Plan of Care  Goal: Plan of Care Review  2/20/2020 1824 by Sanjuana Mcneill LPN  Outcome: Ongoing, Progressing  2/20/2020 1824 by Sanjuana Mcneill LPN  Outcome: Ongoing, Progressing  Goal: Patient-Specific Goal (Individualization)  2/20/2020 1824 by Sanjuana Mcneill LPN  Outcome: Ongoing, Progressing  2/20/2020 1824 by Sanjuana Mcneill LPN  Outcome: Ongoing, Progressing  Goal: Absence of Hospital-Acquired Illness or Injury  2/20/2020 1824 by Sanjuana Mcneill LPN  Outcome: Ongoing, Progressing  2/20/2020 1824 by Sanjuana Mcneill LPN  Outcome: Ongoing, Progressing  Goal: Optimal Comfort and Wellbeing  2/20/2020 1824 by Sanjuana Mcneill LPN  Outcome: Ongoing, Progressing  2/20/2020 1824 by Sanjuana Mcneill LPN  Outcome: Ongoing, Progressing  Goal: Readiness for Transition of Care  2/20/2020 1824 by Sanjuana Mcneill LPN  Outcome: Ongoing, Progressing  2/20/2020 1824 by Sanjuana Mcneill LPN  Outcome: Ongoing, Progressing  Goal: Rounds/Family Conference  2/20/2020 1824 by Sanjuana Mcneill LPN  Outcome: Ongoing, Progressing  2/20/2020 1824 by Sanjuana Mcneill LPN  Outcome: Ongoing, Progressing     Problem: Infection  Goal: Infection Symptom Resolution  2/20/2020 1824 by Sanjuana Mcneill LPN  Outcome: Ongoing, Progressing  2/20/2020 1824 by Sanjuana Mcneill LPN  Outcome: Ongoing, Progressing     Problem: Fall Injury Risk  Goal: Absence of Fall and Fall-Related Injury  2/20/2020 1824 by Sanjuana Mcneill LPN  Outcome: Ongoing, Progressing  2/20/2020 1824 by Sanjuana Mcneill LPN  Outcome: Ongoing, Progressing

## 2020-02-22 LAB
POCT GLUCOSE: 131 MG/DL (ref 70–110)
POCT GLUCOSE: 208 MG/DL (ref 70–110)

## 2020-02-22 PROCEDURE — 63600175 PHARM REV CODE 636 W HCPCS: Mod: HCNC | Performed by: PHYSICIAN ASSISTANT

## 2020-02-22 PROCEDURE — 25000003 PHARM REV CODE 250: Mod: HCNC | Performed by: INTERNAL MEDICINE

## 2020-02-22 PROCEDURE — 25000003 PHARM REV CODE 250: Mod: HCNC | Performed by: PHYSICIAN ASSISTANT

## 2020-02-22 PROCEDURE — 11000004 HC SNF PRIVATE: Mod: HCNC

## 2020-02-22 PROCEDURE — 25000003 PHARM REV CODE 250: Mod: HCNC | Performed by: NURSE PRACTITIONER

## 2020-02-22 RX ADMIN — HYDROCODONE BITARTRATE AND ACETAMINOPHEN 1 TABLET: 5; 325 TABLET ORAL at 06:02

## 2020-02-22 RX ADMIN — FUROSEMIDE 60 MG: 40 TABLET ORAL at 09:02

## 2020-02-22 RX ADMIN — HYDROCODONE BITARTRATE AND ACETAMINOPHEN 1 TABLET: 10; 325 TABLET ORAL at 12:02

## 2020-02-22 RX ADMIN — OXYBUTYNIN CHLORIDE 5 MG: 5 TABLET ORAL at 08:02

## 2020-02-22 RX ADMIN — POTASSIUM CHLORIDE 20 MEQ: 1500 TABLET, EXTENDED RELEASE ORAL at 09:02

## 2020-02-22 RX ADMIN — ENOXAPARIN SODIUM 40 MG: 100 INJECTION SUBCUTANEOUS at 05:02

## 2020-02-22 RX ADMIN — DOCUSATE SODIUM - SENNOSIDES 1 TABLET: 50; 8.6 TABLET, FILM COATED ORAL at 08:02

## 2020-02-22 RX ADMIN — OXYBUTYNIN CHLORIDE 5 MG: 5 TABLET ORAL at 09:02

## 2020-02-22 RX ADMIN — CETIRIZINE HYDROCHLORIDE 10 MG: 5 TABLET ORAL at 08:02

## 2020-02-22 RX ADMIN — Medication 6 MG: at 08:02

## 2020-02-22 RX ADMIN — PANTOPRAZOLE SODIUM 40 MG: 40 TABLET, DELAYED RELEASE ORAL at 09:02

## 2020-02-22 RX ADMIN — DOCUSATE SODIUM - SENNOSIDES 1 TABLET: 50; 8.6 TABLET, FILM COATED ORAL at 09:02

## 2020-02-22 RX ADMIN — OXCARBAZEPINE 300 MG: 300 TABLET, FILM COATED ORAL at 09:02

## 2020-02-22 RX ADMIN — FENOFIBRATE 145 MG: 145 TABLET, FILM COATED ORAL at 09:02

## 2020-02-22 RX ADMIN — OXCARBAZEPINE 600 MG: 600 TABLET, FILM COATED ORAL at 08:02

## 2020-02-22 RX ADMIN — FUROSEMIDE 60 MG: 40 TABLET ORAL at 05:02

## 2020-02-23 LAB
POCT GLUCOSE: 136 MG/DL (ref 70–110)
POCT GLUCOSE: 196 MG/DL (ref 70–110)

## 2020-02-23 PROCEDURE — 25000003 PHARM REV CODE 250: Mod: HCNC | Performed by: NURSE PRACTITIONER

## 2020-02-23 PROCEDURE — 63600175 PHARM REV CODE 636 W HCPCS: Mod: HCNC | Performed by: PHYSICIAN ASSISTANT

## 2020-02-23 PROCEDURE — 25000003 PHARM REV CODE 250: Mod: HCNC | Performed by: INTERNAL MEDICINE

## 2020-02-23 PROCEDURE — 11000004 HC SNF PRIVATE: Mod: HCNC

## 2020-02-23 PROCEDURE — 25000003 PHARM REV CODE 250: Mod: HCNC | Performed by: PHYSICIAN ASSISTANT

## 2020-02-23 RX ADMIN — OXCARBAZEPINE 600 MG: 600 TABLET, FILM COATED ORAL at 08:02

## 2020-02-23 RX ADMIN — FERROUS SULFATE TAB EC 325 MG (65 MG FE EQUIVALENT) 325 MG: 325 (65 FE) TABLET DELAYED RESPONSE at 08:02

## 2020-02-23 RX ADMIN — CETIRIZINE HYDROCHLORIDE 10 MG: 5 TABLET ORAL at 08:02

## 2020-02-23 RX ADMIN — HYDROCODONE BITARTRATE AND ACETAMINOPHEN 1 TABLET: 10; 325 TABLET ORAL at 10:02

## 2020-02-23 RX ADMIN — FUROSEMIDE 60 MG: 40 TABLET ORAL at 10:02

## 2020-02-23 RX ADMIN — PANTOPRAZOLE SODIUM 40 MG: 40 TABLET, DELAYED RELEASE ORAL at 10:02

## 2020-02-23 RX ADMIN — FUROSEMIDE 60 MG: 40 TABLET ORAL at 06:02

## 2020-02-23 RX ADMIN — OXCARBAZEPINE 300 MG: 300 TABLET, FILM COATED ORAL at 10:02

## 2020-02-23 RX ADMIN — OXYBUTYNIN CHLORIDE 5 MG: 5 TABLET ORAL at 08:02

## 2020-02-23 RX ADMIN — DOCUSATE SODIUM - SENNOSIDES 1 TABLET: 50; 8.6 TABLET, FILM COATED ORAL at 08:02

## 2020-02-23 RX ADMIN — ENOXAPARIN SODIUM 40 MG: 100 INJECTION SUBCUTANEOUS at 06:02

## 2020-02-23 RX ADMIN — DOCUSATE SODIUM - SENNOSIDES 1 TABLET: 50; 8.6 TABLET, FILM COATED ORAL at 10:02

## 2020-02-23 RX ADMIN — Medication 6 MG: at 08:02

## 2020-02-23 RX ADMIN — POTASSIUM CHLORIDE 20 MEQ: 1500 TABLET, EXTENDED RELEASE ORAL at 10:02

## 2020-02-23 RX ADMIN — HYDROCODONE BITARTRATE AND ACETAMINOPHEN 1 TABLET: 10; 325 TABLET ORAL at 03:02

## 2020-02-23 RX ADMIN — HYDROCODONE BITARTRATE AND ACETAMINOPHEN 1 TABLET: 10; 325 TABLET ORAL at 08:02

## 2020-02-23 RX ADMIN — OXYBUTYNIN CHLORIDE 5 MG: 5 TABLET ORAL at 10:02

## 2020-02-23 RX ADMIN — HYDROCODONE BITARTRATE AND ACETAMINOPHEN 1 TABLET: 10; 325 TABLET ORAL at 01:02

## 2020-02-23 RX ADMIN — Medication 250 MG: at 08:02

## 2020-02-23 NOTE — PT/OT/SLP PROGRESS
"Occupational Therapy      Patient Name:  Marycruz Ramirez   MRN:  [id number]    Patient not seen today secondary to  declining stating, "I just took a pain pill I'm not doing anything right now, I'm in 9/10 pain. Will follow-up 2/24/20.    GASTON Alejandro  2/23/2020  "

## 2020-02-24 LAB
POCT GLUCOSE: 194 MG/DL (ref 70–110)
POCT GLUCOSE: 224 MG/DL (ref 70–110)
POCT GLUCOSE: 272 MG/DL (ref 70–110)

## 2020-02-24 PROCEDURE — 97116 GAIT TRAINING THERAPY: CPT | Mod: HCNC,CQ

## 2020-02-24 PROCEDURE — 25000003 PHARM REV CODE 250: Mod: HCNC | Performed by: NURSE PRACTITIONER

## 2020-02-24 PROCEDURE — 25000003 PHARM REV CODE 250: Mod: HCNC | Performed by: INTERNAL MEDICINE

## 2020-02-24 PROCEDURE — 99900058 HC 022 PAID UNDER SNF PPS

## 2020-02-24 PROCEDURE — 25000003 PHARM REV CODE 250: Mod: HCNC | Performed by: PHYSICIAN ASSISTANT

## 2020-02-24 PROCEDURE — 63600175 PHARM REV CODE 636 W HCPCS: Mod: HCNC | Performed by: PHYSICIAN ASSISTANT

## 2020-02-24 PROCEDURE — 97530 THERAPEUTIC ACTIVITIES: CPT | Mod: HCNC,CQ

## 2020-02-24 PROCEDURE — 11000004 HC SNF PRIVATE: Mod: HCNC

## 2020-02-24 PROCEDURE — 97110 THERAPEUTIC EXERCISES: CPT | Mod: HCNC,CQ

## 2020-02-24 RX ADMIN — DOCUSATE SODIUM - SENNOSIDES 1 TABLET: 50; 8.6 TABLET, FILM COATED ORAL at 09:02

## 2020-02-24 RX ADMIN — POTASSIUM CHLORIDE 20 MEQ: 1500 TABLET, EXTENDED RELEASE ORAL at 08:02

## 2020-02-24 RX ADMIN — FENOFIBRATE 145 MG: 145 TABLET, FILM COATED ORAL at 08:02

## 2020-02-24 RX ADMIN — HYDROCODONE BITARTRATE AND ACETAMINOPHEN 1 TABLET: 5; 325 TABLET ORAL at 12:02

## 2020-02-24 RX ADMIN — HYDROCODONE BITARTRATE AND ACETAMINOPHEN 1 TABLET: 10; 325 TABLET ORAL at 08:02

## 2020-02-24 RX ADMIN — HYDROCODONE BITARTRATE AND ACETAMINOPHEN 1 TABLET: 5; 325 TABLET ORAL at 02:02

## 2020-02-24 RX ADMIN — OXCARBAZEPINE 300 MG: 300 TABLET, FILM COATED ORAL at 08:02

## 2020-02-24 RX ADMIN — Medication 6 MG: at 09:02

## 2020-02-24 RX ADMIN — PANTOPRAZOLE SODIUM 40 MG: 40 TABLET, DELAYED RELEASE ORAL at 08:02

## 2020-02-24 RX ADMIN — OXYBUTYNIN CHLORIDE 5 MG: 5 TABLET ORAL at 09:02

## 2020-02-24 RX ADMIN — OXYBUTYNIN CHLORIDE 5 MG: 5 TABLET ORAL at 08:02

## 2020-02-24 RX ADMIN — HYDROCODONE BITARTRATE AND ACETAMINOPHEN 1 TABLET: 5; 325 TABLET ORAL at 06:02

## 2020-02-24 RX ADMIN — CETIRIZINE HYDROCHLORIDE 10 MG: 5 TABLET ORAL at 09:02

## 2020-02-24 RX ADMIN — ENOXAPARIN SODIUM 40 MG: 100 INJECTION SUBCUTANEOUS at 05:02

## 2020-02-24 RX ADMIN — OXCARBAZEPINE 600 MG: 600 TABLET, FILM COATED ORAL at 09:02

## 2020-02-24 RX ADMIN — FUROSEMIDE 60 MG: 40 TABLET ORAL at 08:02

## 2020-02-24 RX ADMIN — FUROSEMIDE 60 MG: 40 TABLET ORAL at 05:02

## 2020-02-24 NOTE — PT/OT/SLP PROGRESS
Physical Therapy  Treatment    Marycruz Ramirez   MRN: [id number]   Admitting Diagnosis: Acute on chronic combined systolic and diastolic heart failure    PT Received On: 02/24/20  Total Time (min): (--)       Billable Minutes:  Gait Training 10, Therapeutic Activity 10 and Therapeutic Exercise 18    Treatment Type: Treatment  PT/PTA: PTA     PTA Visit Number: 2       General Precautions: Standard, fall  Orthopedic Precautions: N/A   Braces: N/A    Spiritual, Cultural Beliefs, Voodoo Practices, Values that Affect Care: no    Subjective:  Communicated with nursing prior to session.  Pt agreed to work with therapy.     Pain/Comfort  Pain Rating 1: 0/10  Pain Rating Post-Intervention 1: 0/10    Objective:  Patient found seated w/c.       AM-PAC 6 CLICK MOBILITY  Total Score:18    Bed Mobility:  Sit>Supine: not performed  Supine>Sit: not performed    Transfers:  Sit<>Stand: to/from w/c x2 trials w/ RW and CGA    Gait:  Amb 132ft w/ RW and CG for safety. No LOB noted.      Therex:  x10 reps with assistance as needed:   -AP   -LAQ    Additional Treatment:  -UBE x12 min with cueing for pt to stay on task.     Patient left up in chair with call button in reach and nursing notified.    Assessment:  Marycruz Ramirez is a 74 y.o. female with a medical diagnosis of Acute on chronic combined systolic and diastolic heart failure.  Pt tolerated increased gait distance well, and will continue to benefit from PT services at this time. Continue with PT POC as indicated.    Rehab identified problem list/impairments: weakness, impaired endurance, impaired self care skills, gait instability, impaired functional mobilty, impaired balance, decreased lower extremity function, pain    Rehab potential is fair.    Activity tolerance: Fair    Discharge recommendations: home health PT(with light assistance)     Barriers to discharge: None    Equipment recommendations: bedside commode, bath bench, walker, rolling(recommended;  owns rollator and SPC)      GOALS:   Multidisciplinary Problems     Physical Therapy Goals        Problem: Physical Therapy Goal    Goal Priority Disciplines Outcome Goal Variances Interventions   Physical Therapy Goal     PT, PT/OT      Description:  Goals to be met by: 20    Patient will increase functional independence with mobility by performin. Sit to stand transfer with Supervision. Met(2020)  2. Bed to chair transfer with Supervision using Rolling Walker/rollator. Me t(2020)  3. Gait  x 150 feet with Supervision using Rolling Walker/rollator.  4. Ascend/Descend 2 inch curb step with Supervision using Rolling Walker/rollator.  5. Stand for 3 minutes with Supervision using Rolling Walker/rollator while performing a dynamic task.  6. Lower extremity exercise program x 20 reps per handout, with independence.                       PLAN:    Patient to be seen (5-7x/wk)  to address the above listed problems via gait training, therapeutic activities, therapeutic exercises, neuromuscular re-education, wheelchair management/training  Plan of Care expires: 20  Plan of Care reviewed with: patient    Dominique Desiree, PTA  2020

## 2020-02-24 NOTE — PROGRESS NOTES
Wound care consult for skin tears.     Patient had been seen at [facility]. She is now at [facility]/    Patient with skin tear to the right lateral calf. Wound appears to be improving. Wound cleansed and medihoney applied. Wound approx 4 x 2 x 0.1 cm. Wound full thickness with ecchymosis to the surrounding area.       3 wounds noted to the left shin/lateral leg. Entire area approx 6.5 x 7 x 0.2 cm. Wound full thickness with no granular tissue yet noted. 2 anterior wounds appear stable. Wound to the lateral leg feels slightly fluctuant and very tender with likely hematoma under the skin. Area cleansed and Medihoney applied.             Dr Carney notified of fluctuant area, recommend follow up with wound clinic.   Medihoney daily to the wounds.     Wound care to follow PRN.   Kamala Ferrell RN [facility]   [phone]

## 2020-02-24 NOTE — PROGRESS NOTES
Occupational Therapy      Marycruz Ramirez  MRN: [id number]  Room/Bed: [place] [address]/[place] [address] A    Patient not seen today secondary to pt refusal due to pain. Pt received prior to OT attempt in PM. Will follow-up at next scheduled visit per OT POC.    Reggie Jean, OTR/L  2/24/2020

## 2020-02-25 LAB
POCT GLUCOSE: 155 MG/DL (ref 70–110)
POCT GLUCOSE: 230 MG/DL (ref 70–110)

## 2020-02-25 PROCEDURE — 63600175 PHARM REV CODE 636 W HCPCS: Mod: HCNC | Performed by: PHYSICIAN ASSISTANT

## 2020-02-25 PROCEDURE — 25000003 PHARM REV CODE 250: Mod: HCNC | Performed by: INTERNAL MEDICINE

## 2020-02-25 PROCEDURE — 25000003 PHARM REV CODE 250: Mod: HCNC | Performed by: NURSE PRACTITIONER

## 2020-02-25 PROCEDURE — 25000003 PHARM REV CODE 250: Mod: HCNC | Performed by: PHYSICIAN ASSISTANT

## 2020-02-25 PROCEDURE — 99900035 HC TECH TIME PER 15 MIN (STAT): Mod: HCNC

## 2020-02-25 PROCEDURE — 63600175 PHARM REV CODE 636 W HCPCS: Mod: HCNC | Performed by: NURSE PRACTITIONER

## 2020-02-25 PROCEDURE — 11000004 HC SNF PRIVATE: Mod: HCNC

## 2020-02-25 RX ADMIN — ENOXAPARIN SODIUM 40 MG: 100 INJECTION SUBCUTANEOUS at 05:02

## 2020-02-25 RX ADMIN — METHOCARBAMOL TABLETS 500 MG: 500 TABLET, COATED ORAL at 03:02

## 2020-02-25 RX ADMIN — FUROSEMIDE 60 MG: 40 TABLET ORAL at 07:02

## 2020-02-25 RX ADMIN — FERROUS SULFATE TAB EC 325 MG (65 MG FE EQUIVALENT) 325 MG: 325 (65 FE) TABLET DELAYED RESPONSE at 09:02

## 2020-02-25 RX ADMIN — Medication 6 MG: at 09:02

## 2020-02-25 RX ADMIN — DOCUSATE SODIUM - SENNOSIDES 1 TABLET: 50; 8.6 TABLET, FILM COATED ORAL at 09:02

## 2020-02-25 RX ADMIN — Medication 250 MG: at 09:02

## 2020-02-25 RX ADMIN — HYDROCODONE BITARTRATE AND ACETAMINOPHEN 1 TABLET: 5; 325 TABLET ORAL at 01:02

## 2020-02-25 RX ADMIN — HYDROCODONE BITARTRATE AND ACETAMINOPHEN 1 TABLET: 5; 325 TABLET ORAL at 10:02

## 2020-02-25 RX ADMIN — PANTOPRAZOLE SODIUM 40 MG: 40 TABLET, DELAYED RELEASE ORAL at 08:02

## 2020-02-25 RX ADMIN — OXCARBAZEPINE 300 MG: 300 TABLET, FILM COATED ORAL at 12:02

## 2020-02-25 RX ADMIN — OXYBUTYNIN CHLORIDE 5 MG: 5 TABLET ORAL at 08:02

## 2020-02-25 RX ADMIN — HYDROCODONE BITARTRATE AND ACETAMINOPHEN 1 TABLET: 10; 325 TABLET ORAL at 06:02

## 2020-02-25 RX ADMIN — CETIRIZINE HYDROCHLORIDE 10 MG: 5 TABLET ORAL at 09:02

## 2020-02-25 RX ADMIN — FENOFIBRATE 145 MG: 145 TABLET, FILM COATED ORAL at 08:02

## 2020-02-25 RX ADMIN — OXCARBAZEPINE 600 MG: 600 TABLET, FILM COATED ORAL at 09:02

## 2020-02-25 RX ADMIN — OXYBUTYNIN CHLORIDE 5 MG: 5 TABLET ORAL at 09:02

## 2020-02-25 RX ADMIN — HYDROCODONE BITARTRATE AND ACETAMINOPHEN 1 TABLET: 5; 325 TABLET ORAL at 03:02

## 2020-02-25 RX ADMIN — POTASSIUM CHLORIDE 20 MEQ: 1500 TABLET, EXTENDED RELEASE ORAL at 08:02

## 2020-02-25 RX ADMIN — FUROSEMIDE 60 MG: 40 TABLET ORAL at 05:02

## 2020-02-25 NOTE — NURSING
Pt refused insulin, Pt stated, Oh no baby, I don't take insulin. You don't remember from days ago/ No. No. I don't take insulin. Staff will continue to monitor for changes related to hyperglycemic/hypoglycemic, Report accordingly.

## 2020-02-26 ENCOUNTER — TELEPHONE (OUTPATIENT)
Dept: WOUND CARE | Facility: CLINIC | Age: 75
End: 2020-02-26

## 2020-02-26 LAB
ANION GAP SERPL CALC-SCNC: 9 MMOL/L (ref 8–16)
BASOPHILS # BLD AUTO: 0.06 K/UL (ref 0–0.2)
BASOPHILS NFR BLD: 0.8 % (ref 0–1.9)
BUN SERPL-MCNC: 13 MG/DL (ref 8–23)
CALCIUM SERPL-MCNC: 10.2 MG/DL (ref 8.7–10.5)
CHLORIDE SERPL-SCNC: 99 MMOL/L (ref 95–110)
CO2 SERPL-SCNC: 31 MMOL/L (ref 23–29)
CREAT SERPL-MCNC: 0.6 MG/DL (ref 0.5–1.4)
DIFFERENTIAL METHOD: ABNORMAL
EOSINOPHIL # BLD AUTO: 0.1 K/UL (ref 0–0.5)
EOSINOPHIL NFR BLD: 2 % (ref 0–8)
ERYTHROCYTE [DISTWIDTH] IN BLOOD BY AUTOMATED COUNT: 23.2 % (ref 11.5–14.5)
EST. GFR  (AFRICAN AMERICAN): >60 ML/MIN/1.73 M^2
EST. GFR  (NON AFRICAN AMERICAN): >60 ML/MIN/1.73 M^2
GLUCOSE SERPL-MCNC: 132 MG/DL (ref 70–110)
HCT VFR BLD AUTO: 34.7 % (ref 37–48.5)
HGB BLD-MCNC: 10.1 G/DL (ref 12–16)
IMM GRANULOCYTES # BLD AUTO: 0.02 K/UL (ref 0–0.04)
IMM GRANULOCYTES NFR BLD AUTO: 0.3 % (ref 0–0.5)
LYMPHOCYTES # BLD AUTO: 2 K/UL (ref 1–4.8)
LYMPHOCYTES NFR BLD: 27.9 % (ref 18–48)
MAGNESIUM SERPL-MCNC: 1.9 MG/DL (ref 1.6–2.6)
MCH RBC QN AUTO: 27.2 PG (ref 27–31)
MCHC RBC AUTO-ENTMCNC: 29.1 G/DL (ref 32–36)
MCV RBC AUTO: 93 FL (ref 82–98)
MONOCYTES # BLD AUTO: 0.8 K/UL (ref 0.3–1)
MONOCYTES NFR BLD: 10.6 % (ref 4–15)
NEUTROPHILS # BLD AUTO: 4.2 K/UL (ref 1.8–7.7)
NEUTROPHILS NFR BLD: 58.4 % (ref 38–73)
NRBC BLD-RTO: 0 /100 WBC
PHOSPHATE SERPL-MCNC: 3.6 MG/DL (ref 2.7–4.5)
PLATELET # BLD AUTO: 318 K/UL (ref 150–350)
PMV BLD AUTO: 10.4 FL (ref 9.2–12.9)
POCT GLUCOSE: 116 MG/DL (ref 70–110)
POCT GLUCOSE: 172 MG/DL (ref 70–110)
POCT GLUCOSE: 195 MG/DL (ref 70–110)
POCT GLUCOSE: 230 MG/DL (ref 70–110)
POTASSIUM SERPL-SCNC: 3.5 MMOL/L (ref 3.5–5.1)
RBC # BLD AUTO: 3.72 M/UL (ref 4–5.4)
SODIUM SERPL-SCNC: 139 MMOL/L (ref 136–145)
WBC # BLD AUTO: 7.17 K/UL (ref 3.9–12.7)

## 2020-02-26 PROCEDURE — 25000003 PHARM REV CODE 250: Mod: HCNC | Performed by: INTERNAL MEDICINE

## 2020-02-26 PROCEDURE — 97116 GAIT TRAINING THERAPY: CPT | Mod: HCNC,CQ

## 2020-02-26 PROCEDURE — 97110 THERAPEUTIC EXERCISES: CPT | Mod: HCNC,CQ

## 2020-02-26 PROCEDURE — 63600175 PHARM REV CODE 636 W HCPCS: Mod: HCNC | Performed by: PHYSICIAN ASSISTANT

## 2020-02-26 PROCEDURE — 97530 THERAPEUTIC ACTIVITIES: CPT | Mod: HCNC,CQ

## 2020-02-26 PROCEDURE — 25000003 PHARM REV CODE 250: Mod: HCNC | Performed by: PHYSICIAN ASSISTANT

## 2020-02-26 PROCEDURE — 80048 BASIC METABOLIC PNL TOTAL CA: CPT | Mod: HCNC

## 2020-02-26 PROCEDURE — 25000003 PHARM REV CODE 250: Mod: HCNC | Performed by: NURSE PRACTITIONER

## 2020-02-26 PROCEDURE — 84100 ASSAY OF PHOSPHORUS: CPT | Mod: HCNC

## 2020-02-26 PROCEDURE — 36415 COLL VENOUS BLD VENIPUNCTURE: CPT | Mod: HCNC

## 2020-02-26 PROCEDURE — 11000004 HC SNF PRIVATE: Mod: HCNC

## 2020-02-26 PROCEDURE — 83735 ASSAY OF MAGNESIUM: CPT | Mod: HCNC

## 2020-02-26 PROCEDURE — 97803 MED NUTRITION INDIV SUBSEQ: CPT | Mod: HCNC

## 2020-02-26 PROCEDURE — 85025 COMPLETE CBC W/AUTO DIFF WBC: CPT | Mod: HCNC

## 2020-02-26 RX ORDER — POTASSIUM CHLORIDE 750 MG/1
30 CAPSULE, EXTENDED RELEASE ORAL ONCE
Status: COMPLETED | OUTPATIENT
Start: 2020-02-26 | End: 2020-02-26

## 2020-02-26 RX ADMIN — OXYBUTYNIN CHLORIDE 5 MG: 5 TABLET ORAL at 08:02

## 2020-02-26 RX ADMIN — POTASSIUM CHLORIDE 20 MEQ: 1500 TABLET, EXTENDED RELEASE ORAL at 08:02

## 2020-02-26 RX ADMIN — HYDROCODONE BITARTRATE AND ACETAMINOPHEN 1 TABLET: 5; 325 TABLET ORAL at 09:02

## 2020-02-26 RX ADMIN — POTASSIUM CHLORIDE 30 MEQ: 750 CAPSULE, EXTENDED RELEASE ORAL at 11:02

## 2020-02-26 RX ADMIN — ENOXAPARIN SODIUM 40 MG: 100 INJECTION SUBCUTANEOUS at 06:02

## 2020-02-26 RX ADMIN — FUROSEMIDE 60 MG: 40 TABLET ORAL at 08:02

## 2020-02-26 RX ADMIN — DOCUSATE SODIUM - SENNOSIDES 1 TABLET: 50; 8.6 TABLET, FILM COATED ORAL at 09:02

## 2020-02-26 RX ADMIN — FENOFIBRATE 145 MG: 145 TABLET, FILM COATED ORAL at 08:02

## 2020-02-26 RX ADMIN — PANTOPRAZOLE SODIUM 40 MG: 40 TABLET, DELAYED RELEASE ORAL at 08:02

## 2020-02-26 RX ADMIN — FUROSEMIDE 60 MG: 40 TABLET ORAL at 06:02

## 2020-02-26 RX ADMIN — CETIRIZINE HYDROCHLORIDE 10 MG: 5 TABLET ORAL at 09:02

## 2020-02-26 RX ADMIN — OXCARBAZEPINE 600 MG: 600 TABLET, FILM COATED ORAL at 09:02

## 2020-02-26 RX ADMIN — HYDROCODONE BITARTRATE AND ACETAMINOPHEN 1 TABLET: 5; 325 TABLET ORAL at 03:02

## 2020-02-26 RX ADMIN — OXCARBAZEPINE 300 MG: 300 TABLET, FILM COATED ORAL at 08:02

## 2020-02-26 RX ADMIN — OXYBUTYNIN CHLORIDE 5 MG: 5 TABLET ORAL at 09:02

## 2020-02-26 NOTE — TELEPHONE ENCOUNTER
----- Message from Maria Guadalupe Shaver NP sent at 2/26/2020  1:43 PM CST -----  Contact: Dian   I will forward this to my nurse to schedule.    Thanks!    ----- Message -----  From: Anuj Bernabe  Sent: 2/26/2020   1:21 PM CST  To: [facility] Wound Clinical Support    Dian called in about wanting to get appt. Pt is in the skilled nurse unit now. Sera would like the office to give her a call back        Dian can be reached at [phone]        TY

## 2020-02-26 NOTE — PROGRESS NOTES
"[facility] - Skilled Nursing Care  Adult Nutrition  Progress Note    SUMMARY       Recommendations    Recommendation:     1. Continue regular diet as tolerated.   Goals: PO intake > 75% EEN, EPN daily   Nutrition Goal Status: goal met  Communication of RD Recs: other (comment)(POC)    Reason for Assessment    Reason For Assessment: RD follow-up  Diagnosis: other (see comments)(skin tear of lower leg w/o complication)  Relevant Medical History: HLD, bipolar, CHF, microcyctic anemia, aortic stenosis  Interdisciplinary Rounds: did not attend  General Information Comments:  20: Pt reports having a good appetite; PO intake 100-75% of meals. Denies N/V/D/C. Pt continues to be satisfied with meals; RD offered double protein portions but pt refused at this time.   20: Pt reports having a good appetite; PO intake 100% of meals. Denies wt changes; #. Denies N/V/D/C. Pt does not like boost and is satisfied with meals. NFPE completed , moderate muscle depletion in the temples noted but pt is otherwise well-nourished.   Nutrition Discharge Planning: d/c on regular diet     Nutrition Risk Screen    Nutrition Risk Screen: no indicators present    Nutrition/Diet History    Patient Reported Diet/Restrictions/Preferences: general  Spiritual, Cultural Beliefs, Protestant Practices, Values that Affect Care: no  Food Allergies: NKFA  Factors Affecting Nutritional Intake: None identified at this time    Anthropometrics    Temp: 98.6 °F (37 °C)  Height Method: Stated  Height: 5' 1" (154.9 cm)  Height (inches): 61 in  Weight Method: Standard Scale  Weight: 51.3 kg (113 lb 1.5 oz)  Weight (lb): 113.1 lb  Ideal Body Weight (IBW), Female: 105 lb  % Ideal Body Weight, Female (lb): 108.34 %  BMI (Calculated): 21.4  BMI Grade: 18.5-24.9 - normal  Usual Body Weight (UBW), k.6 kg(20)  % Usual Body Weight: 100.21  % Weight Change From Usual Weight: 0 %     Lab/Procedures/Meds    Pertinent Labs Reviewed: " reviewed  Pertinent Labs Comments: Glu 132   Pertinent Medications Reviewed: reviewed  Pertinent Medications Comments: fenofibrate, ferrous sulfate, furosemide, pantoprazole, polyethylene glycol, KCl, senna-docusate, ascorbic acid, cetirizine, enoxaparin    Physical Findings/Assessment    Obed Score 19   Multiple skin tears     Estimated/Assessed Needs    Weight Used For Calorie Calculations: 51.6 kg (113 lb 12.1 oz)  Energy Calorie Requirements (kcal): 1240 kcal/day   Energy Need Method: [place]-St Jeor(PAL 1.3)  Protein Requirements: 62-72 g/day(1.2-1.4 g/kg)  Weight Used For Protein Calculations: 51.6 kg (113 lb 12.1 oz)  Fluid Requirements (mL): 1 mL/kcal or per MD   Estimated Fluid Requirement Method: RDA Method  RDA Method (mL): 1240     Nutrition Prescription Ordered    Current Diet Order: high protein, high calorie diet     Evaluation of Received Nutrient/Fluid Intake    I/O: 540/0  Energy Calories Required: meeting needs  Protein Required: meeting needs  Fluid Required: meeting needs  Comments: LBM 2/25   Tolerance: tolerating  % Intake of Estimated Energy Needs: 75 - 100 %  % Meal Intake: 75 - 100 %    Nutrition Risk    Level of Risk/Frequency of Follow-up: low(1x/week)     Assessment and Plan    Nutrition Problem  Increased nutrient needs - protein     Related to (etiology):   Wound healing     Signs and Symptoms (as evidenced by):   Multiple skin tears       Interventions:  Collaboration with other providers      Nutrition Diagnosis Status:   Continue     Monitor and Evaluation    Food and Nutrient Intake: energy intake, food and beverage intake  Food and Nutrient Adminstration: diet order  Anthropometric Measurements: weight, weight change, body mass index  Biochemical Data, Medical Tests and Procedures: electrolyte and renal panel, inflammatory profile, lipid profile, glucose/endocrine profile  Nutrition-Focused Physical Findings: overall appearance     Malnutrition Assessment    [facility]  (Muscle Loss): moderate depletion  Clavicle Bone Region (Muscle Loss): well nourished  Dorsal Hand (Muscle Loss): well nourished  Patellar Region (Muscle Loss): well nourished  Anterior Thigh Region (Muscle Loss): well nourished  Posterior Calf Region (Muscle Loss): well nourished(loose skin, discolored)   Edema (Fluid Accumulation): 0-->no edema present   Subcutaneous Fat Loss (Final Summary): well nourished  Muscle Loss Evaluation (Final Summary): well nourished      Nutrition Follow-Up    RD Follow-up?: Yes

## 2020-02-26 NOTE — TELEPHONE ENCOUNTER
Returned call and spoke with Dian, offered 8am appointment Saturday, 2/29/2020 - accepted appointment date/time and instructed to report to the 5th floor [facility] location

## 2020-02-26 NOTE — PLAN OF CARE
Problem: Adult Inpatient Plan of Care  Goal: Plan of Care Review  Outcome: Ongoing, Progressing     Recommendation:     1. Continue regular diet as tolerated.   Goals: PO intake > 75% EEN, EPN daily   Nutrition Goal Status: goal met  Communication of RD Recs: other (comment)(POC)

## 2020-02-26 NOTE — PT/OT/SLP PROGRESS
Physical Therapy  Treatment    Marycruz Ramirez   MRN: [id number]   Admitting Diagnosis: Acute on chronic combined systolic and diastolic heart failure    PT Received On: 02/26/20  Total Time (min): (--)       Billable Minutes:  Gait Training 10, Therapeutic Activity 10 and Therapeutic Exercise 18    Treatment Type: Treatment  PT/PTA: PTA     PTA Visit Number: 3       General Precautions: Standard, fall  Orthopedic Precautions: N/A   Braces: N/A    Spiritual, Cultural Beliefs, Hinduism Practices, Values that Affect Care: no    Subjective:  Communicated with nursing prior to session.  Pt agree to work with therapy.     Pain/Comfort  Pain Rating 1: 0/10  Pain Rating Post-Intervention 1: 0/10    Objective:  Patient found seated bedside chair w/ spouse in room.        AM-PAC 6 CLICK MOBILITY  Total Score:18    Bed Mobility:  Sit>Supine:not performed   Supine>Sit: not performed    Transfers:  Sit<>Stand: to/from bedside chair, to/from w/c w/ RW and SBA  Stand Pivot Transfer: bedside chair to w/c w/ RW and SBA    Gait:  Amb 130ft w/ RW and CG-SBA. No LOB noted.     Therex:  -BLE therex 2x10 reps:    -AP   -LAQ   -Hip flexion    -GS       Additional Treatment:  -UBE x18 min     Patient left up in chair with call button in reach and nursing notified.    Assessment:  Marycruz Ramirez is a 74 y.o. female with a medical diagnosis of Acute on chronic combined systolic and diastolic heart failure.  Pt tolerated treatment well, and will continue to benefit from PT services at this time. Continue with PT POC as inidcated.    Rehab identified problem list/impairments: weakness, impaired endurance, impaired self care skills, gait instability, impaired functional mobilty, impaired balance, decreased lower extremity function, pain    Rehab potential is fair.    Activity tolerance: Fair    Discharge recommendations: home health PT(with light assistance)     Barriers to discharge: None    Equipment recommendations: bedside commode, bath bench,  walker, rolling(recommended;  owns rollator and SPC)     GOALS:   Multidisciplinary Problems     Physical Therapy Goals        Problem: Physical Therapy Goal    Goal Priority Disciplines Outcome Goal Variances Interventions   Physical Therapy Goal     PT, PT/OT      Description:  Goals to be met by: 20    Patient will increase functional independence with mobility by performin. Sit to stand transfer with Supervision. Met(2020)  2. Bed to chair transfer with Supervision using Rolling Walker/rollator. Me t(2020)  3. Gait  x 150 feet with Supervision using Rolling Walker/rollator.  4. Ascend/Descend 2 inch curb step with Supervision using Rolling Walker/rollator.  5. Stand for 3 minutes with Supervision using Rolling Walker/rollator while performing a dynamic task.  6. Lower extremity exercise program x 20 reps per handout, with independence.                       PLAN:    Patient to be seen (5-7x/wk)  to address the above listed problems via gait training, therapeutic activities, therapeutic exercises, neuromuscular re-education, wheelchair management/training  Plan of Care expires: 20  Plan of Care reviewed with: patient    Dominique Ramírez, PTA  2020

## 2020-02-26 NOTE — PROGRESS NOTES
[facility]                                  Skilled Nursing Facility                   Progress Note   DOS 2/26/2020  Admit Date: 2/17/2020  EVONNE   Principal Problem:  Acute on chronic combined systolic and diastolic heart failure   HPI obtained from patient interview and chart review     Chief Complaint:Revaluation of medical treatment and therapy status: Lab review, hyperkalemia      HPI: Ms Ramirez is a 75 yo female with sig pmhx of CHF(EF 30), Aortic stenosis, HLD, Bipolar and overactive bladder who presents to SNF s/p hospitalization for Acute on chronic combined systolic and diastolic heart failure. Patient initially presented with LE edema, weakness and a fall. Patient with recent hospitalizations/treatment for pyelonephritis with sepsis and Anemia with transfusion. This hospitalization patient diuresed well with lasix and was deemed medically stable and transferred to [facility] to participate in acute inpatient therapy. Admission to SNF for secondary weakness and debility.     Interval Hx: All of today's labs reviewed and are listed below.  24 hr vital sign ranges listed below.  K 3.5.  Reports much improvement to her lower extremity edema. Patient denies trouble sleeping at night, shortness of breath or cough, abdominal discomfort, nausea, or vomiting.  Patient reports an adequate appetite.  Patient denies dysuria.  Patient reports having regular bowel movements.  Patient progessing with PT/OT. Continuing to follow and treat all acute and chronic conditions.    Past Medical History: Patient has a past medical history of Bipolar 1 disorder, CHF (congestive heart failure), HLD (hyperlipidemia), and Overactive bladder.    Past Surgical History: Patient has a past surgical history that includes Knee surgery (Bilateral); Back surgery; Esophagogastroduodenoscopy (N/A, 1/20/2020); and Colonoscopy (N/A, 1/20/2020).    Social History: Patient reports that she has  been smoking cigarettes. She has a 75.00 pack-year smoking history. She has never used smokeless tobacco. She reports that she drinks alcohol. She reports that she does not use drugs.    Family History: No significant family hx reported.     Allergies: Patient has No Known Allergies.    ROS  Constitutional: Negative for fever and fatigue.   Eyes: Negative for blurred vision, double vision    Respiratory: Negative for cough, shortness of breath and wheezing.    Cardiovascular: Negative for chest pain, palpitations, and leg swelling.   Gastrointestinal: Negative for abdominal pain, constipation, diarrhea, nausea and vomiting.   Genitourinary: Negative for dysuria, frequency and urgency.   Musculoskeletal:  + generalized weakness. Negative for back pain and myalgias.   Skin: Negative for itching and rash.  Neurological: Negative for dizziness, speech change, and headaches.   Psychiatric/Behavioral: Negative for depression. The patient is not nervous/anxious.      PEx    Temp:  [98.3 °F (36.8 °C)-98.6 °F (37 °C)] 98.6 °F (37 °C)  Pulse:  [75-85] 85  Resp:  [18-19] 18  SpO2:  [96 %-99 %] 96 %  BP: (115-120)/(60-81) 115/60    Constitutional: Patient appears frail, elderly. no distress   HENT:   Head: Normocephalic and atraumatic.   Eyes: Pupils are equal, round, and reactive to light.   Neck: Normal range of motion. Neck supple.   Cardiovascular: Normal rate, regular rhythm and normal heart sounds. + lower extremity edema, improved  Pulmonary/Chest: Effort normal and breath sounds are clear  Abdominal: Soft. Bowel sounds are normal.   Musculoskeletal: Normal range of motion. + generalized weakness.  Neurological: Alert and oriented to person, place, and time.   Psychiatric: Normal mood and affect. Behavior is normal.   Wounds/Skin: Skin is warm and dry.     Wound location:   Multiple skin tears FABI with eschar, without redness or drainage around.  Following: This NP weekly- last observed on 2/26 and wound care team Prn.      Wound location:   Left LE hematoma with 90 % purplish-red full thickness tissue and 10 % tan with small amount of serosang drainage, pink edges; lateral feels slightly fluctuant and very tender.  Wound length: 6.5 cm   Wound width: 7 cm  Wound depth: 0.2 cm   Following: Ortho NP- weekly. This NP weekly- last observed on 2/26 and wound care team Prn.     Wound location:   Right LE hematoma full thickness wound with 90 % tan/white and 10 % dark brown eschar with pink edges  Wound length: 4 cm   Wound width: 2 cm  Wound depth: 0.1 cm   Following: Ortho NP- weekly. This NP weekly- last observed on  2/26 and wound care team Prn.     Recent Labs   Lab 02/26/20  0505      K 3.5   CL 99   CO2 31*   BUN 13   CREATININE 0.6   MG 1.9     Recent Labs   Lab 02/26/20  0505   WBC 7.17   RBC 3.72*   HGB 10.1*   HCT 34.7*      MCV 93   MCH 27.2   MCHC 29.1*         Assessment and Plan:         Problems addressed today    Hypokalemia  -2/20 initiated potassium 40 every 4 hr x2 doses and increased to potassium 20 mEq daily  - -26 initiated potassium 30 mEq x1 dose in addition to daily replacement for K of 3.5.    Alteration in skin integrity  -Multiple skin tears  -2/19 initiated consult to wound care  -2/20 stable pending wound care eval and continue dressing changes as ordered  -2/21 discontinued Medihoney and initiated Aquacel AG dressing to be changed every M,W,F  - 2/26 contacted by wound care RN who is recommending patient be seen in wound Care Clinic.  Calling now to see if we can get an appointment    Elevated glucose  - checking Accu-Cheks, 24 hr range is 194-272, instructed patient and her  to follow up with PCP regarding possible diabetes    Acute on chronic combined systolic and diastolic heart failure  -seen by cards on 2/6 and has TTE schedule march 6  -2/19 initiated additional dose of lasix 40 mg po x 1, initiated cardiac diet and discontinued regular diet, initiated BNP lab draw for the am, and  reordered daily weights and adrienne hose  -2/20 increase lasix to 60 mg bid wm   - 2/26 weight stable     Bilateral lower extremity edema  - improving, continue lasix to 60 mg bid wm      Microcytic anemia  -transfuse if Hgb < 7  -stable, continue ferrous sulfate 325 mg daily       Ongoing but Stable Problems    Debility  -Continue with PT/OT for gait training and strengthening and restoration of ADL's   -Encourage mobility, OOB in chair, and early ambulation as appropriate  -Fall precautions   -Monitor for bowel and bladder dysfunction  -Monitor for and prevent skin breakdown and pressure ulcers  -Continue DVT prophylaxis with Lovenox     Bipolar disorder  -Mood stable  -Continue oxcarbazepine     OAB (overactive bladder)  -Continue oxybutynin     Chronic radicular lumbar pain  -Continue hydrocodone, methocarbamol, and tylenol     Mixed hyperlipidemia  -Continue fenofibrate  -F/u with PCP as outpatient       Future Appointments   Date Time Provider Department Center   [facility]   4/8/2020  1:00 PM Fito Marquez MD [facility] CARDIO [facility]   7/9/2020 11:00 AM Marcelino Calle MD [facility] VIKKI Carrizales NP     DOS 2/26/2020    Patient note was created using MModal Dictation.  Any errors in syntax or even information may not have been identified and edited on initial review prior to signing this note.

## 2020-02-26 NOTE — NURSING
"Pt refused insulin.  Pt stated,  "I not going to remind you again baba, I don't take insulin." Charge nurse and NP notified.  "

## 2020-02-27 ENCOUNTER — PATIENT OUTREACH (OUTPATIENT)
Dept: ADMINISTRATIVE | Facility: OTHER | Age: 75
End: 2020-02-27

## 2020-02-27 ENCOUNTER — TELEPHONE (OUTPATIENT)
Dept: WOUND CARE | Facility: CLINIC | Age: 75
End: 2020-02-27

## 2020-02-27 LAB
POCT GLUCOSE: 137 MG/DL (ref 70–110)
POCT GLUCOSE: 185 MG/DL (ref 70–110)
POCT GLUCOSE: 202 MG/DL (ref 70–110)
POCT GLUCOSE: 297 MG/DL (ref 70–110)

## 2020-02-27 PROCEDURE — 97530 THERAPEUTIC ACTIVITIES: CPT | Mod: HCNC,CQ

## 2020-02-27 PROCEDURE — 97116 GAIT TRAINING THERAPY: CPT | Mod: HCNC,CQ

## 2020-02-27 PROCEDURE — 25000003 PHARM REV CODE 250: Mod: HCNC | Performed by: PHYSICIAN ASSISTANT

## 2020-02-27 PROCEDURE — 25000003 PHARM REV CODE 250: Mod: HCNC | Performed by: INTERNAL MEDICINE

## 2020-02-27 PROCEDURE — 63600175 PHARM REV CODE 636 W HCPCS: Mod: HCNC | Performed by: NURSE PRACTITIONER

## 2020-02-27 PROCEDURE — 97530 THERAPEUTIC ACTIVITIES: CPT | Mod: HCNC

## 2020-02-27 PROCEDURE — 97535 SELF CARE MNGMENT TRAINING: CPT | Mod: HCNC

## 2020-02-27 PROCEDURE — 97110 THERAPEUTIC EXERCISES: CPT | Mod: HCNC

## 2020-02-27 PROCEDURE — 25000003 PHARM REV CODE 250: Mod: HCNC | Performed by: NURSE PRACTITIONER

## 2020-02-27 PROCEDURE — 63600175 PHARM REV CODE 636 W HCPCS: Mod: HCNC | Performed by: PHYSICIAN ASSISTANT

## 2020-02-27 PROCEDURE — 97110 THERAPEUTIC EXERCISES: CPT | Mod: HCNC,CQ

## 2020-02-27 PROCEDURE — 11000004 HC SNF PRIVATE: Mod: HCNC

## 2020-02-27 RX ADMIN — FERROUS SULFATE TAB EC 325 MG (65 MG FE EQUIVALENT) 325 MG: 325 (65 FE) TABLET DELAYED RESPONSE at 08:02

## 2020-02-27 RX ADMIN — ENOXAPARIN SODIUM 40 MG: 100 INJECTION SUBCUTANEOUS at 04:02

## 2020-02-27 RX ADMIN — PANTOPRAZOLE SODIUM 40 MG: 40 TABLET, DELAYED RELEASE ORAL at 09:02

## 2020-02-27 RX ADMIN — FUROSEMIDE 60 MG: 40 TABLET ORAL at 04:02

## 2020-02-27 RX ADMIN — OXCARBAZEPINE 300 MG: 300 TABLET, FILM COATED ORAL at 09:02

## 2020-02-27 RX ADMIN — FUROSEMIDE 60 MG: 40 TABLET ORAL at 09:02

## 2020-02-27 RX ADMIN — OXYBUTYNIN CHLORIDE 5 MG: 5 TABLET ORAL at 09:02

## 2020-02-27 RX ADMIN — OXCARBAZEPINE 600 MG: 600 TABLET, FILM COATED ORAL at 08:02

## 2020-02-27 RX ADMIN — CETIRIZINE HYDROCHLORIDE 10 MG: 5 TABLET ORAL at 08:02

## 2020-02-27 RX ADMIN — Medication 250 MG: at 08:02

## 2020-02-27 RX ADMIN — HYDROCODONE BITARTRATE AND ACETAMINOPHEN 1 TABLET: 10; 325 TABLET ORAL at 08:02

## 2020-02-27 RX ADMIN — OXYBUTYNIN CHLORIDE 5 MG: 5 TABLET ORAL at 08:02

## 2020-02-27 RX ADMIN — POTASSIUM CHLORIDE 20 MEQ: 1500 TABLET, EXTENDED RELEASE ORAL at 09:02

## 2020-02-27 RX ADMIN — INSULIN ASPART 3 UNITS: 100 INJECTION, SOLUTION INTRAVENOUS; SUBCUTANEOUS at 12:02

## 2020-02-27 RX ADMIN — HYDROCODONE BITARTRATE AND ACETAMINOPHEN 1 TABLET: 10; 325 TABLET ORAL at 09:02

## 2020-02-27 RX ADMIN — FENOFIBRATE 145 MG: 145 TABLET, FILM COATED ORAL at 09:02

## 2020-02-27 RX ADMIN — HYDROCODONE BITARTRATE AND ACETAMINOPHEN 1 TABLET: 10; 325 TABLET ORAL at 02:02

## 2020-02-27 NOTE — PT/OT/SLP PROGRESS
Occupational Therapy  Treatment    Marycruz Ramirez   MRN: [id number]   Admitting Diagnosis: Acute on chronic combined systolic and diastolic heart failure    OT Date of Treatment: 02/27/20       Billable Minutes:  Self Care/Home Management 14, Therapeutic Activity 15 and Therapeutic Exercise 15    General Precautions: Standard, fall  Orthopedic Precautions: N/A  Braces: N/A    Spiritual, Cultural Beliefs, Zoroastrianism Practices, Values that Affect Care: no    Subjective:  Communicated with nurse prior to session.      Pain/Comfort  Pain Rating 1: 0/10  Pain Rating Post-Intervention 1: 0/10    Objective:  Patient found with: (no lines and seated in W/C)    Occupational Performance:    Bed Mobility:    · Pt seated in W/C at onset of therapy session.    Functional Mobility/Transfers:  · Patient completed Sit <> Stand Transfer with supervision  with  rolling walker   · Patient completed Wheelchair <> bedside chair with Step Transfer technique with supervision with rolling walker  · Patient completed Toilet Transfer Step Transfer technique with supervision with  rolling walker  · Functional Mobility: Pt ambulated from W/C t toilet with RW and (S) a distance of 10ft with no loss of balance    Activities of Daily Living:  · Grooming: modified independence seated sinkside to perform groomiong task.  · Toileting: supervision with Pt performing all aspects of toileting with (S) from raised toilet with RW to steady when standing.    OT Exercises: UE Ergometer performed 15 minutes on UBE with Min resistance. UE exercises performed to increase functional endurance and strength in order increase independence when performing self care tasks, functional ambulation, W/C propulsion , functional standing activities as well as when performing functional tasks.    Additional Treatment:  Pt worked on functional standing activity consisting of standing with RW  while reaching in all planes , crossing of midline and reaching to varying heights to  facilitate (B) wt shifting and stability in standing .  Pt tolerated up to 4 Min. 32 sec in standing with SBA and RW to steady.    Patient left up in chair with call button in reach    [facility] 6 Click:  [facility] Total Score: 20    ASSESSMENT:  Marycruz Ramirez is a 74 y.o. female with a medical diagnosis of Acute on chronic combined systolic and diastolic heart failure .  Pt tolerated Tx without incident making progress with self care tasks and functional transfers and would continue to benefit from OT intervention to further her functional (I)ce and safety.    Rehab identified problem list/impairments: weakness, impaired endurance, impaired self care skills, impaired functional mobilty, gait instability, impaired balance, decreased lower extremity function, decreased safety awareness, pain, decreased ROM, impaired cardiopulmonary response to activity    Rehab potential is good    Activity tolerance: Good    Discharge recommendations: home health OT     Barriers to discharge: None     Equipment recommendations: (TBD)     GOALS:   Multidisciplinary Problems     Occupational Therapy Goals        Problem: Occupational Therapy Goal    Goal Priority Disciplines Outcome Interventions   Occupational Therapy Goal     OT, PT/OT Ongoing, Progressing    Description:  Goals to be met by: 2/28/20     Patient will increase functional independence with ADLs by performing:    Feeding with Modified [place].- Met  UE Dressing with Modified [place].   -Met  LE Dressing with Supervision.  Grooming while standing with Supervision with RW to steady.  Toileting from bedside commode with Supervision for hygiene and clothing management and A/D to stand.   Bathing from sitting at sink with Supervision with DME for safety.  Supine to sit with Modified [place]. - Met  Step transfer with Modified [place] with A/D for safety.  Upper extremity exercise program x10 reps per handout, with assistance as needed.  Pt's caregiver will be  competent when performing self care tasks and functional transfers.                       Plan:  Patient to be seen 5 x/week, 6 x/week to address the above listed problems via self-care/home management, therapeutic activities, therapeutic exercises  Plan of Care expires: 03/18/20  Plan of Care reviewed with: patient    Reggie Jean, OTR/LILA  02/27/2020

## 2020-02-27 NOTE — PLAN OF CARE
Problem: Occupational Therapy Goal  Goal: Occupational Therapy Goal  Description  Goals to be met by: 2/28/20     Patient will increase functional independence with ADLs by performing:    Feeding with Modified [place].- Met  UE Dressing with Modified [place].   -Met  LE Dressing with Supervision.  Grooming while standing with Supervision with RW to steady.  Toileting from bedside commode with Supervision for hygiene and clothing management and A/D to stand.   Bathing from sitting at sink with Supervision with DME for safety.  Supine to sit with Modified [place]. - Met  Step transfer with Modified [place] with A/D for safety.  Upper extremity exercise program x10 reps per handout, with assistance as needed.  Pt's caregiver will be competent when performing self care tasks and functional transfers.      Outcome: Ongoing, Progressing

## 2020-02-27 NOTE — PROGRESS NOTES
Chart reviewed.   Requested updates from Care Everywhere.  Immunizations reconciled.    updated.  Mammogram previously ordered

## 2020-02-27 NOTE — PLAN OF CARE
Problem: Adult Inpatient Plan of Care  Goal: Plan of Care Review  Outcome: Ongoing, Progressing  Plan of Care Reviewed With: patient     Problem: Infection  Goal: Infection Symptom Resolution  Outcome: Ongoing, Progressing     Problem: Fall Injury Risk  Goal: Absence of Fall and Fall-Related Injury  Outcome: Ongoing, Progressing     Problem: Wound  Goal: Optimal Wound Healing  Outcome: Ongoing, Progressing     Afebrile. Pain medication effective. No new skin wounds. Repositions with assist. Safety maintained, no falls. Medication regimen reviewed with patient, patient verbalized understanding.  Will continue to monitor for pain and safety.

## 2020-02-27 NOTE — PLAN OF CARE
Problem: Adult Inpatient Plan of Care  Goal: Plan of Care Review  Outcome: Ongoing, Progressing  Goal: Patient-Specific Goal (Individualization)  Outcome: Ongoing, Progressing  Goal: Absence of Hospital-Acquired Illness or Injury  Outcome: Ongoing, Progressing  Goal: Optimal Comfort and Wellbeing  Outcome: Ongoing, Progressing  Goal: Readiness for Transition of Care  Outcome: Ongoing, Progressing  Goal: Rounds/Family Conference  Outcome: Ongoing, Progressing     Problem: Infection  Goal: Infection Symptom Resolution  Outcome: Ongoing, Progressing     Problem: Fall Injury Risk  Goal: Absence of Fall and Fall-Related Injury  Outcome: Ongoing, Progressing     Problem: Wound  Goal: Optimal Wound Healing  Outcome: Ongoing, Progressing

## 2020-02-27 NOTE — HOSPITAL COURSE
Patient progressed well with PT and OT. Patient had no significant events during their stay at SNF.  Patient went to ID follow-up while admitted SNF- she is clear discontinue antibiotics.  Patient also sent to Wound Care Clinic for lower extremity ulcers with fluctuation- wound care orders placed, patient will follow up in wound Care Clinic.  Home health was set up. DME was ordered if needed. Follow up appointment to be made by patient within one week. All prescriptions and discharge instructions were ordered to be given to the patient prior to discharge.     PEx  Constitutional: Patient appears frail, elderly. no distress   HENT:   Head: Normocephalic and atraumatic.   Eyes: Pupils are equal, round, and reactive to light.   Neck: Normal range of motion. Neck supple.   Cardiovascular: Normal rate, regular rhythm and normal heart sounds. + lower extremity edema, improved  Pulmonary/Chest: Effort normal and breath sounds are clear  Abdominal: Soft. Bowel sounds are normal.   Musculoskeletal: Normal range of motion. + generalized weakness.  Neurological: Alert and oriented to person, place, and time.   Psychiatric: Normal mood and affect. Behavior is normal.   Wounds/Skin: Skin is warm and dry.   - Multiple skin tears FABI with eschar, without redness or drainage around.   -Left LE hematoma with 90 % purplish-red full thickness tissue and 10 % tan with small amount of serosang drainage, pink edges; lateral feels slightly fluctuant and very tender.  Wound length: 6.5 cm   Wound width: 7 cm  Wound depth: 0.2 cm    - Right LE hematoma full thickness wound with 90 % tan/white and 10 % dark brown eschar with pink edges  Wound length: 4 cm   Wound width: 2 cm  Wound depth: 0.1 cm

## 2020-02-27 NOTE — TELEPHONE ENCOUNTER
Returned call and spoke with patient's  - Mr. Ramirez stated he was not aware of the appointment for wound care and asked what it was for.  Informed Mr. Ramirez that I received call from Dian at the facility who stated the patient has a skin tear on her leg and needs to be seen by wound care.  I advised Mr. Ramirez to speak with Dian and he stated she is not in at this time.  I explained that he needs to speak with Dian or someone at the facility to answer the questions he has regarding why he was not notified of the injury.  Mr. Ramirez also wanted me to address some issue regarding the records for infectious disease.  Again, I strongly advised Mr. Ramirez to speak with Dian or someone at the facility to address his concerns.  I told Mr. Ramirez that Dian stated their facility will make arrangements for transportation to the appointment this Saturday, 2/29/2020 for 8am at [facility] 5th floor clinic with Sherri Shaver - wound care provider.

## 2020-02-27 NOTE — TELEPHONE ENCOUNTER
----- Message from Angelina Jimenez sent at 2/27/2020  8:35 AM CST -----  Contact: pt's  Pt's  called    Please call  [phone]    Please call about appt Saturday     Therapy unit has not told him about this appt  Thanks

## 2020-02-27 NOTE — PROGRESS NOTES
Wound care follow up.    Patient states the wound to the left lateral leg is feeling better.   Area appears slightly less fluctuant.     Aquacel ag has been ordered and nursing to change dressing today.     Kamala Ferrell RN CN CN   [phone]

## 2020-02-27 NOTE — PT/OT/SLP PROGRESS
"Physical Therapy  Treatment    Marycruz Ramirez   MRN: [id number]   Admitting Diagnosis: Acute on chronic combined systolic and diastolic heart failure    PT Received On: 02/27/20          Billable Minutes:  Gait Training 15, Therapeutic Activity 15 and Therapeutic Exercise 8    Treatment Type: Treatment  PT/PTA: PTA     PTA Visit Number: 4       General Precautions: Standard, fall  Orthopedic Precautions: N/A   Braces: N/A    Spiritual, Cultural Beliefs, Moravian Practices, Values that Affect Care: no    Subjective:  "I'm good" Pt agreebale to therapy    Pain/Comfort  Pain Rating 1: 0/10  Pain Rating Post-Intervention 1: 0/10    Objective:  Patient found in bedside chair with       AM-PAC 6 CLICK MOBILITY  Total Score:18    Transfers:  Sit<>Stand: t/f wc S RW    Gait:  Amb 300' RW S slow shashank with decreased step length and width, pt required seated rest break after trial, additional ambulation with balance activity     Therex:  1 x 4 LAQ with 10s holds    Balance:  Dynamic standing with shopping cart unilateral UE while picking objects off shelf 3 minutes and an addition 95' ambulating with shopping cart S    Additional Treatment:  Patient educated on importance of increased time out of bed and OJ throughout the day    Patient left up in chair with OT.    Assessment:  Marycruz Ramirez is a 74 y.o. female with a medical diagnosis of Acute on chronic combined systolic and diastolic heart failure.  Patient tolerated treatment well focusing on transfers, gait, therex and standing balance/tolerance. Patient will continue to improve with skilled physical therapy services in order to return to functional baseline.    Rehab identified problem list/impairments: weakness, impaired endurance, impaired self care skills, gait instability, impaired functional mobilty, impaired balance, decreased lower extremity function, pain    Rehab potential is good.    Activity tolerance: Good    Discharge recommendations: home health PT(with " light assistance)     Barriers to discharge: None    Equipment recommendations: bedside commode, bath bench, walker, rolling(recommended;  owns rollator and SPC)     GOALS:   Multidisciplinary Problems     Physical Therapy Goals        Problem: Physical Therapy Goal    Goal Priority Disciplines Outcome Goal Variances Interventions   Physical Therapy Goal     PT, PT/OT Ongoing, Progressing     Description:  Goals to be met by: 20    Patient will increase functional independence with mobility by performin. Sit to stand transfer with Supervision. Met(2020)  2. Bed to chair transfer with Supervision using Rolling Walker/rollator. Me t(2020)  3. Gait  x 150 feet with Supervision using Rolling Walker/rollator.met 2020  4. Ascend/Descend 2 inch curb step with Supervision using Rolling Walker/rollator.  5. Stand for 3 minutes with Supervision using Rolling Walker/rollator while performing a dynamic task. Met 2020  6. Lower extremity exercise program x 20 reps per handout, with independence.                        PLAN:    Patient to be seen (5-7x/wk)  to address the above listed problems via gait training, therapeutic activities, therapeutic exercises, neuromuscular re-education, wheelchair management/training  Plan of Care expires: 20  Plan of Care reviewed with: patient    Sarah Mendiola, PTA  2020

## 2020-02-27 NOTE — PLAN OF CARE
Problem: Physical Therapy Goal  Goal: Physical Therapy Goal  Description  Goals to be met by: 20    Patient will increase functional independence with mobility by performin. Sit to stand transfer with Supervision. Met(2020)  2. Bed to chair transfer with Supervision using Rolling Walker/rollator. Me t(2020)  3. Gait  x 150 feet with Supervision using Rolling Walker/rollator.met 2020  4. Ascend/Descend 2 inch curb step with Supervision using Rolling Walker/rollator.  5. Stand for 3 minutes with Supervision using Rolling Walker/rollator while performing a dynamic task. Met 2020  6. Lower extremity exercise program x 20 reps per handout, with independence.       Outcome: Ongoing, Progressing

## 2020-02-27 NOTE — HPI
Ms Ramirez is a 75 yo female with sig pmhx of CHF(EF 30), Aortic stenosis, HLD, Bipolar and overactive bladder who presents to SNF s/p hospitalization for Acute on chronic combined systolic and diastolic heart failure. Patient initially presented with LE edema, weakness and a fall. Patient with recent hospitalizations/treatment for pyelonephritis with sepsis and Anemia with transfusion. This hospitalization patient diuresed well with lasix and was deemed medically stable and transferred to [facility] to participate in acute inpatient therapy. Admission to SNF for secondary weakness and debility.      Patient found to have 2-3 + edema to bilateral LE. All labs reviewed from 2/18. No leukocytosis. Hemoglobin stable at 9.1. Platelets stable at 231. Sodium at 138. Potasium 3.3, treated by MD. Creatinine stable at 0.7. 24 hr blood glucose 145. Afebrile. Patient's current blood pressure is at 121/83. Patient denies trouble sleeping at night, abdominal discomfort, nausea, or vomiting. Patient reports an adequate appetite. Patient denies dysuria. Patient reports having regular bowel movements. Patient will be treated at [facility] with PT and OT to improve functional status and ability to perform ADLs.

## 2020-02-28 LAB
ANION GAP SERPL CALC-SCNC: 11 MMOL/L (ref 8–16)
BASOPHILS # BLD AUTO: 0.06 K/UL (ref 0–0.2)
BASOPHILS NFR BLD: 0.7 % (ref 0–1.9)
BUN SERPL-MCNC: 16 MG/DL (ref 8–23)
CALCIUM SERPL-MCNC: 10.1 MG/DL (ref 8.7–10.5)
CHLORIDE SERPL-SCNC: 96 MMOL/L (ref 95–110)
CO2 SERPL-SCNC: 30 MMOL/L (ref 23–29)
CREAT SERPL-MCNC: 0.8 MG/DL (ref 0.5–1.4)
DIFFERENTIAL METHOD: ABNORMAL
EOSINOPHIL # BLD AUTO: 0.2 K/UL (ref 0–0.5)
EOSINOPHIL NFR BLD: 2.6 % (ref 0–8)
ERYTHROCYTE [DISTWIDTH] IN BLOOD BY AUTOMATED COUNT: 22.5 % (ref 11.5–14.5)
EST. GFR  (AFRICAN AMERICAN): >60 ML/MIN/1.73 M^2
EST. GFR  (NON AFRICAN AMERICAN): >60 ML/MIN/1.73 M^2
GLUCOSE SERPL-MCNC: 190 MG/DL (ref 70–110)
HCT VFR BLD AUTO: 35 % (ref 37–48.5)
HGB BLD-MCNC: 10.6 G/DL (ref 12–16)
IMM GRANULOCYTES # BLD AUTO: 0.02 K/UL (ref 0–0.04)
IMM GRANULOCYTES NFR BLD AUTO: 0.2 % (ref 0–0.5)
LYMPHOCYTES # BLD AUTO: 2.4 K/UL (ref 1–4.8)
LYMPHOCYTES NFR BLD: 28.9 % (ref 18–48)
MAGNESIUM SERPL-MCNC: 1.9 MG/DL (ref 1.6–2.6)
MCH RBC QN AUTO: 27.7 PG (ref 27–31)
MCHC RBC AUTO-ENTMCNC: 30.3 G/DL (ref 32–36)
MCV RBC AUTO: 92 FL (ref 82–98)
MONOCYTES # BLD AUTO: 0.7 K/UL (ref 0.3–1)
MONOCYTES NFR BLD: 8.6 % (ref 4–15)
NEUTROPHILS # BLD AUTO: 4.8 K/UL (ref 1.8–7.7)
NEUTROPHILS NFR BLD: 59 % (ref 38–73)
NRBC BLD-RTO: 0 /100 WBC
PHOSPHATE SERPL-MCNC: 3.5 MG/DL (ref 2.7–4.5)
PLATELET # BLD AUTO: 334 K/UL (ref 150–350)
PMV BLD AUTO: 10.1 FL (ref 9.2–12.9)
POCT GLUCOSE: 163 MG/DL (ref 70–110)
POCT GLUCOSE: 185 MG/DL (ref 70–110)
POCT GLUCOSE: 186 MG/DL (ref 70–110)
POTASSIUM SERPL-SCNC: 3.3 MMOL/L (ref 3.5–5.1)
RBC # BLD AUTO: 3.82 M/UL (ref 4–5.4)
SODIUM SERPL-SCNC: 137 MMOL/L (ref 136–145)
WBC # BLD AUTO: 8.18 K/UL (ref 3.9–12.7)

## 2020-02-28 PROCEDURE — 11000004 HC SNF PRIVATE: Mod: HCNC

## 2020-02-28 PROCEDURE — 63600175 PHARM REV CODE 636 W HCPCS: Mod: HCNC | Performed by: NURSE PRACTITIONER

## 2020-02-28 PROCEDURE — 97530 THERAPEUTIC ACTIVITIES: CPT | Mod: HCNC

## 2020-02-28 PROCEDURE — 97116 GAIT TRAINING THERAPY: CPT | Mod: HCNC

## 2020-02-28 PROCEDURE — 63600175 PHARM REV CODE 636 W HCPCS: Mod: HCNC | Performed by: PHYSICIAN ASSISTANT

## 2020-02-28 PROCEDURE — 84100 ASSAY OF PHOSPHORUS: CPT | Mod: HCNC

## 2020-02-28 PROCEDURE — 85025 COMPLETE CBC W/AUTO DIFF WBC: CPT | Mod: HCNC

## 2020-02-28 PROCEDURE — 36415 COLL VENOUS BLD VENIPUNCTURE: CPT | Mod: HCNC

## 2020-02-28 PROCEDURE — 25000003 PHARM REV CODE 250: Mod: HCNC | Performed by: NURSE PRACTITIONER

## 2020-02-28 PROCEDURE — 97110 THERAPEUTIC EXERCISES: CPT | Mod: HCNC

## 2020-02-28 PROCEDURE — 25000003 PHARM REV CODE 250: Mod: HCNC | Performed by: INTERNAL MEDICINE

## 2020-02-28 PROCEDURE — 80048 BASIC METABOLIC PNL TOTAL CA: CPT | Mod: HCNC

## 2020-02-28 PROCEDURE — 83735 ASSAY OF MAGNESIUM: CPT | Mod: HCNC

## 2020-02-28 PROCEDURE — 25000003 PHARM REV CODE 250: Mod: HCNC | Performed by: PHYSICIAN ASSISTANT

## 2020-02-28 PROCEDURE — 97535 SELF CARE MNGMENT TRAINING: CPT | Mod: HCNC

## 2020-02-28 RX ORDER — POTASSIUM CHLORIDE 20 MEQ/1
40 TABLET, EXTENDED RELEASE ORAL DAILY
Status: DISCONTINUED | OUTPATIENT
Start: 2020-02-29 | End: 2020-03-03

## 2020-02-28 RX ORDER — POTASSIUM CHLORIDE 750 MG/1
30 CAPSULE, EXTENDED RELEASE ORAL EVERY 4 HOURS
Status: COMPLETED | OUTPATIENT
Start: 2020-02-28 | End: 2020-02-28

## 2020-02-28 RX ADMIN — POTASSIUM CHLORIDE 30 MEQ: 750 CAPSULE, EXTENDED RELEASE ORAL at 05:02

## 2020-02-28 RX ADMIN — FENOFIBRATE 145 MG: 145 TABLET, FILM COATED ORAL at 09:02

## 2020-02-28 RX ADMIN — ENOXAPARIN SODIUM 40 MG: 100 INJECTION SUBCUTANEOUS at 05:02

## 2020-02-28 RX ADMIN — FUROSEMIDE 60 MG: 40 TABLET ORAL at 09:02

## 2020-02-28 RX ADMIN — POTASSIUM CHLORIDE 20 MEQ: 1500 TABLET, EXTENDED RELEASE ORAL at 09:02

## 2020-02-28 RX ADMIN — INSULIN ASPART 4 UNITS: 100 INJECTION, SOLUTION INTRAVENOUS; SUBCUTANEOUS at 05:02

## 2020-02-28 RX ADMIN — HYDROCODONE BITARTRATE AND ACETAMINOPHEN 1 TABLET: 10; 325 TABLET ORAL at 01:02

## 2020-02-28 RX ADMIN — OXCARBAZEPINE 600 MG: 600 TABLET, FILM COATED ORAL at 08:02

## 2020-02-28 RX ADMIN — CETIRIZINE HYDROCHLORIDE 10 MG: 5 TABLET ORAL at 08:02

## 2020-02-28 RX ADMIN — OXYBUTYNIN CHLORIDE 5 MG: 5 TABLET ORAL at 08:02

## 2020-02-28 RX ADMIN — PANTOPRAZOLE SODIUM 40 MG: 40 TABLET, DELAYED RELEASE ORAL at 09:02

## 2020-02-28 RX ADMIN — HYDROCODONE BITARTRATE AND ACETAMINOPHEN 1 TABLET: 10; 325 TABLET ORAL at 09:02

## 2020-02-28 RX ADMIN — POTASSIUM CHLORIDE 30 MEQ: 750 CAPSULE, EXTENDED RELEASE ORAL at 01:02

## 2020-02-28 RX ADMIN — HYDROCODONE BITARTRATE AND ACETAMINOPHEN 1 TABLET: 10; 325 TABLET ORAL at 05:02

## 2020-02-28 RX ADMIN — OXYBUTYNIN CHLORIDE 5 MG: 5 TABLET ORAL at 09:02

## 2020-02-28 RX ADMIN — OXCARBAZEPINE 300 MG: 300 TABLET, FILM COATED ORAL at 09:02

## 2020-02-28 RX ADMIN — FUROSEMIDE 60 MG: 40 TABLET ORAL at 05:02

## 2020-02-28 NOTE — PROGRESS NOTES
[facility]                                  Skilled Nursing Facility                   Progress Note   DOS 2/28/2020  Admit Date: 2/17/2020  EVONNE   Principal Problem:  Acute on chronic combined systolic and diastolic heart failure   HPI obtained from patient interview and chart review     Chief Complaint:Revaluation of medical treatment and therapy status: Lab review, hyperkalemia      HPI: Ms Ramirez is a 73 yo female with sig pmhx of CHF(EF 30), Aortic stenosis, HLD, Bipolar and overactive bladder who presents to SNF s/p hospitalization for Acute on chronic combined systolic and diastolic heart failure. Patient initially presented with LE edema, weakness and a fall. Patient with recent hospitalizations/treatment for pyelonephritis with sepsis and Anemia with transfusion. This hospitalization patient diuresed well with lasix and was deemed medically stable and transferred to [facility] to participate in acute inpatient therapy. Admission to SNF for secondary weakness and debility.     Interval Hx: All of today's labs reviewed and are listed below.  24 hr vital sign ranges listed below.  K 3.3.  Reports much improvement to her lower extremity edema. Patient denies trouble sleeping at night, shortness of breath or cough, abdominal discomfort, nausea, or vomiting.  Patient reports an adequate appetite.  Patient denies dysuria.  Patient reports having regular bowel movements.  Patient progessing with PT/OT. Continuing to follow and treat all acute and chronic conditions.    Past Medical History: Patient has a past medical history of Bipolar 1 disorder, CHF (congestive heart failure), HLD (hyperlipidemia), and Overactive bladder.    Past Surgical History: Patient has a past surgical history that includes Knee surgery (Bilateral); Back surgery; Esophagogastroduodenoscopy (N/A, 1/20/2020); and Colonoscopy (N/A, 1/20/2020).    Social History: Patient reports that she has  been smoking cigarettes. She has a 75.00 pack-year smoking history. She has never used smokeless tobacco. She reports that she drinks alcohol. She reports that she does not use drugs.    Family History: No significant family hx reported.     Allergies: Patient has No Known Allergies.    ROS  Constitutional: Negative for fever and fatigue.   Eyes: Negative for blurred vision, double vision    Respiratory: Negative for cough, shortness of breath and wheezing.    Cardiovascular: Negative for chest pain, palpitations, and leg swelling.   Gastrointestinal: Negative for abdominal pain, constipation, diarrhea, nausea and vomiting.   Genitourinary: Negative for dysuria, frequency and urgency.   Musculoskeletal:  + generalized weakness. Negative for back pain and myalgias.   Skin: Negative for itching and rash.  Neurological: Negative for dizziness, speech change, and headaches.   Psychiatric/Behavioral: Negative for depression. The patient is not nervous/anxious.      PEx    Temp:  [97.9 °F (36.6 °C)-98.2 °F (36.8 °C)] 97.9 °F (36.6 °C)  Pulse:  [] 101  Resp:  [18-20] 18  SpO2:  [98 %-100 %] 100 %  BP: (127-131)/(57-60) 127/57    Constitutional: Patient appears frail, elderly. no distress   HENT:   Head: Normocephalic and atraumatic.   Eyes: Pupils are equal, round, and reactive to light.   Neck: Normal range of motion. Neck supple.   Cardiovascular: Normal rate, regular rhythm and normal heart sounds. + lower extremity edema, improved  Pulmonary/Chest: Effort normal and breath sounds are clear  Abdominal: Soft. Bowel sounds are normal.   Musculoskeletal: Normal range of motion. + generalized weakness.  Neurological: Alert and oriented to person, place, and time.   Psychiatric: Normal mood and affect. Behavior is normal.   Wounds/Skin: Skin is warm and dry.     Wound location:   Multiple skin tears FABI with eschar, without redness or drainage around.  Following: This NP weekly- last observed on 2/26 and wound care  team Prn.     Wound location:   Left LE hematoma with 90 % purplish-red full thickness tissue and 10 % tan with small amount of serosang drainage, pink edges; lateral feels slightly fluctuant and very tender.  Wound length: 6.5 cm   Wound width: 7 cm  Wound depth: 0.2 cm   Following: Ortho NP- weekly. This NP weekly- last observed on 2/26 and wound care team Prn.     Wound location:   Right LE hematoma full thickness wound with 90 % tan/white and 10 % dark brown eschar with pink edges  Wound length: 4 cm   Wound width: 2 cm  Wound depth: 0.1 cm   Following: Ortho NP- weekly. This NP weekly- last observed on  2/26 and wound care team Prn.     Recent Labs   Lab 02/28/20  0551      K 3.3*   CL 96   CO2 30*   BUN 16   CREATININE 0.8   MG 1.9     Recent Labs   Lab 02/28/20  0551   WBC 8.18   RBC 3.82*   HGB 10.6*   HCT 35.0*      MCV 92   MCH 27.7   MCHC 30.3*         Assessment and Plan:         Problems addressed today    Hypokalemia  -2/20 initiated potassium 40 every 4 hr x2 doses and increased to potassium 20 mEq daily  - 2/28  initiated potassium 30 mEq x2 doses; increased daily potassium replacement to 40 mg daily    Alteration in skin integrity  -Multiple skin tears  -2/19 initiated consult to wound care  -2/20 stable pending wound care eval and continue dressing changes as ordered  -2/21 discontinued Medihoney and initiated Aquacel AG dressing to be changed every M,W,F  - 2/28 patient will follow up in wound Care Clinic on Saturday 2/29    Elevated glucose  - checking Accu-Cheks, 24 hr range is 194-272, instructed patient and her  to follow up with PCP regarding possible diabetes    Acute on chronic combined systolic and diastolic heart failure  -seen by cards on 2/6 and has TTE schedule march 6  -2/19 initiated additional dose of lasix 40 mg po x 1, initiated cardiac diet and discontinued regular diet, initiated BNP lab draw for the am, and reordered daily weights and adrienne hose  -2/20  increase lasix to 60 mg bid wm   - 2/28 weight stable     Bilateral lower extremity edema  - improving, continue lasix to 60 mg bid wm      Microcytic anemia  -transfuse if Hgb < 7  -stable, continue ferrous sulfate 325 mg daily       Ongoing but Stable Problems    Debility  -Continue with PT/OT for gait training and strengthening and restoration of ADL's   -Encourage mobility, OOB in chair, and early ambulation as appropriate  -Fall precautions   -Monitor for bowel and bladder dysfunction  -Monitor for and prevent skin breakdown and pressure ulcers  -Continue DVT prophylaxis with Lovenox     Bipolar disorder  -Mood stable  -Continue oxcarbazepine     OAB (overactive bladder)  -Continue oxybutynin     Chronic radicular lumbar pain  -Continue hydrocodone, methocarbamol, and tylenol     Mixed hyperlipidemia  -Continue fenofibrate  -F/u with PCP as outpatient       Future Appointments   Date Time Provider Department Center   2/29/2020  8:00 AM Maria Guadalupe Shaver NP [facility] WOUND [facility]   3/4/2020  8:30 AM Zuhair Jacob MD [facility] ID [facility]   3/6/2020  1:00 PM ECHO[facility] ECHOLAB [facility]   4/8/2020  1:00 PM Fito Marquez MD [facility] CARDIO [facility]   7/9/2020 11:00 AM [facility]     DOS 2/28/2020    Patient note was created using MModal Dictation.  Any errors in syntax or even information may not have been identified and edited on initial review prior to signing this note.

## 2020-02-28 NOTE — PT/OT/SLP PROGRESS
Physical Therapy  Treatment    Marycruz Ramirez   MRN: [id number]   Admitting Diagnosis: Acute on chronic combined systolic and diastolic heart failure    PT Received On: 02/28/20          Billable Minutes:  Gait Training 15, Therapeutic Activity 15 and Therapeutic Exercise 15    Treatment Type: Treatment  PT/PTA: PT     PTA Visit Number: 0       General Precautions: Standard, fall  Orthopedic Precautions: N/A   Braces: N/A    Spiritual, Cultural Beliefs, Baptism Practices, Values that Affect Care: no    Subjective:  Communicated with pt prior to session.  Agreeable to PT.    Pain/Comfort  Pain Rating 1: 0/10    Objective:  Patient found seated in bedside chair.   AM-PAC 6 CLICK MOBILITY  Total Score:18      Transfers:  Sit<>Stand: supervision  Stand Pivot Transfer: supervision    Gait:  Amb supervision x 85 ft (twice) with rollator, small step length.  Amb with one UE support on // bars (RUE), 6 ft x 2 trials with SBA  Amb with SPC with SBA 80 ft with no loss of balance, small step length.   Recommend pt use shoes while ambulating due to fallen arches.*    Wheelchair Mobility:  Patient propels w/c 150 ft with BUE Niranjan for turning     Additional Treatment:  Completed 10 minutes on recumbent stepper at level 2 to improve U/LE strength and endurance.       Patient left up in chair with call button in reach.    Assessment:  Marycruz Ramirez is a 74 y.o. female with a medical diagnosis of Acute on chronic combined systolic and diastolic heart failure.  Pt progressed to use of a single point cane and will benefit from continued practice, using shoes while ambulating to avoid pain or damage to her feet (due to fallen arches).    Rehab identified problem list/impairments: weakness, impaired endurance, impaired self care skills, gait instability, impaired functional mobilty, impaired balance, decreased lower extremity function, pain    Rehab potential is good.    Activity tolerance: Good    Discharge recommendations: home health  PT(with light assistance)     Barriers to discharge: None    Equipment recommendations: bedside commode, bath bench, walker, rolling(recommended;  owns rollator and SPC)     GOALS:   Multidisciplinary Problems     Physical Therapy Goals        Problem: Physical Therapy Goal    Goal Priority Disciplines Outcome Goal Variances Interventions   Physical Therapy Goal     PT, PT/OT Ongoing, Progressing     Description:  Goals to be met by: 20    Patient will increase functional independence with mobility by performin. Sit to stand transfer with Supervision. Met(2020)  2. Bed to chair transfer with Supervision using Rolling Walker/rollator. Me t(2020)  3. Gait  x 150 feet with Supervision using Rolling Walker/rollator.met 2020  4. Ascend/Descend 2 inch curb step with Supervision using Rolling Walker/rollator.  5. Stand for 3 minutes with Supervision using Rolling Walker/rollator while performing a dynamic task. Met 2020  6. Lower extremity exercise program x 20 reps per handout, with independence.                        PLAN:    Patient to be seen (5-7x/wk)  to address the above listed problems via gait training, therapeutic activities, therapeutic exercises, neuromuscular re-education, wheelchair management/training  Plan of Care expires: 20  Plan of Care reviewed with: patient    Ifeoma TAM English, PT  2020

## 2020-02-29 ENCOUNTER — OFFICE VISIT (OUTPATIENT)
Dept: WOUND CARE | Facility: CLINIC | Age: 75
End: 2020-02-29
Payer: MEDICARE

## 2020-02-29 VITALS
BODY MASS INDEX: 20.39 KG/M2 | DIASTOLIC BLOOD PRESSURE: 74 MMHG | TEMPERATURE: 98 F | SYSTOLIC BLOOD PRESSURE: 136 MMHG | WEIGHT: 108 LBS | HEIGHT: 61 IN | HEART RATE: 78 BPM

## 2020-02-29 DIAGNOSIS — R23.9 ALTERATION IN SKIN INTEGRITY: ICD-10-CM

## 2020-02-29 DIAGNOSIS — T14.8XXA MULTIPLE SKIN TEARS: Primary | ICD-10-CM

## 2020-02-29 DIAGNOSIS — S81.819A SKIN TEAR OF LOWER LEG WITHOUT COMPLICATION, UNSPECIFIED LATERALITY, INITIAL ENCOUNTER: ICD-10-CM

## 2020-02-29 LAB
POCT GLUCOSE: 127 MG/DL (ref 70–110)
POCT GLUCOSE: 158 MG/DL (ref 70–110)
POCT GLUCOSE: 166 MG/DL (ref 70–110)
POCT GLUCOSE: 198 MG/DL (ref 70–110)
POCT GLUCOSE: 308 MG/DL (ref 70–110)

## 2020-02-29 PROCEDURE — 1101F PR PT FALLS ASSESS DOC 0-1 FALLS W/OUT INJ PAST YR: ICD-10-PCS | Mod: HCNC,CPTII,S$GLB, | Performed by: NURSE PRACTITIONER

## 2020-02-29 PROCEDURE — 97530 THERAPEUTIC ACTIVITIES: CPT | Mod: HCNC,CQ

## 2020-02-29 PROCEDURE — 25000003 PHARM REV CODE 250: Mod: HCNC | Performed by: INTERNAL MEDICINE

## 2020-02-29 PROCEDURE — 11000004 HC SNF PRIVATE: Mod: HCNC

## 2020-02-29 PROCEDURE — 25000003 PHARM REV CODE 250: Mod: HCNC | Performed by: NURSE PRACTITIONER

## 2020-02-29 PROCEDURE — 25000003 PHARM REV CODE 250: Mod: HCNC | Performed by: PHYSICIAN ASSISTANT

## 2020-02-29 PROCEDURE — 97110 THERAPEUTIC EXERCISES: CPT | Mod: HCNC,CQ

## 2020-02-29 PROCEDURE — 63600175 PHARM REV CODE 636 W HCPCS: Mod: HCNC | Performed by: PHYSICIAN ASSISTANT

## 2020-02-29 PROCEDURE — 1125F AMNT PAIN NOTED PAIN PRSNT: CPT | Mod: HCNC,S$GLB,, | Performed by: NURSE PRACTITIONER

## 2020-02-29 PROCEDURE — 1159F PR MEDICATION LIST DOCUMENTED IN MEDICAL RECORD: ICD-10-PCS | Mod: HCNC,S$GLB,, | Performed by: NURSE PRACTITIONER

## 2020-02-29 PROCEDURE — 99204 OFFICE O/P NEW MOD 45 MIN: CPT | Mod: HCNC,S$GLB,, | Performed by: NURSE PRACTITIONER

## 2020-02-29 PROCEDURE — 99999 PR PBB SHADOW E&M-EST. PATIENT-LVL V: CPT | Mod: PBBFAC,HCNC,, | Performed by: NURSE PRACTITIONER

## 2020-02-29 PROCEDURE — 97116 GAIT TRAINING THERAPY: CPT | Mod: HCNC,CQ

## 2020-02-29 PROCEDURE — 99999 PR PBB SHADOW E&M-EST. PATIENT-LVL V: ICD-10-PCS | Mod: PBBFAC,HCNC,, | Performed by: NURSE PRACTITIONER

## 2020-02-29 PROCEDURE — 1125F PR PAIN SEVERITY QUANTIFIED, PAIN PRESENT: ICD-10-PCS | Mod: HCNC,S$GLB,, | Performed by: NURSE PRACTITIONER

## 2020-02-29 PROCEDURE — 99204 PR OFFICE/OUTPT VISIT, NEW, LEVL IV, 45-59 MIN: ICD-10-PCS | Mod: HCNC,S$GLB,, | Performed by: NURSE PRACTITIONER

## 2020-02-29 PROCEDURE — 1159F MED LIST DOCD IN RCRD: CPT | Mod: HCNC,S$GLB,, | Performed by: NURSE PRACTITIONER

## 2020-02-29 PROCEDURE — 1101F PT FALLS ASSESS-DOCD LE1/YR: CPT | Mod: HCNC,CPTII,S$GLB, | Performed by: NURSE PRACTITIONER

## 2020-02-29 RX ADMIN — FUROSEMIDE 60 MG: 40 TABLET ORAL at 10:02

## 2020-02-29 RX ADMIN — OXYBUTYNIN CHLORIDE 5 MG: 5 TABLET ORAL at 08:02

## 2020-02-29 RX ADMIN — PANTOPRAZOLE SODIUM 40 MG: 40 TABLET, DELAYED RELEASE ORAL at 10:02

## 2020-02-29 RX ADMIN — DOCUSATE SODIUM - SENNOSIDES 1 TABLET: 50; 8.6 TABLET, FILM COATED ORAL at 08:02

## 2020-02-29 RX ADMIN — OXCARBAZEPINE 600 MG: 600 TABLET, FILM COATED ORAL at 08:02

## 2020-02-29 RX ADMIN — HYDROCODONE BITARTRATE AND ACETAMINOPHEN 1 TABLET: 10; 325 TABLET ORAL at 12:02

## 2020-02-29 RX ADMIN — OXYBUTYNIN CHLORIDE 5 MG: 5 TABLET ORAL at 10:02

## 2020-02-29 RX ADMIN — POTASSIUM CHLORIDE 40 MEQ: 1500 TABLET, EXTENDED RELEASE ORAL at 10:02

## 2020-02-29 RX ADMIN — FUROSEMIDE 60 MG: 40 TABLET ORAL at 05:02

## 2020-02-29 RX ADMIN — CETIRIZINE HYDROCHLORIDE 10 MG: 5 TABLET ORAL at 08:02

## 2020-02-29 RX ADMIN — FERROUS SULFATE TAB EC 325 MG (65 MG FE EQUIVALENT) 325 MG: 325 (65 FE) TABLET DELAYED RESPONSE at 08:02

## 2020-02-29 RX ADMIN — HYDROCODONE BITARTRATE AND ACETAMINOPHEN 1 TABLET: 10; 325 TABLET ORAL at 06:02

## 2020-02-29 RX ADMIN — ENOXAPARIN SODIUM 40 MG: 100 INJECTION SUBCUTANEOUS at 05:02

## 2020-02-29 RX ADMIN — FENOFIBRATE 145 MG: 145 TABLET, FILM COATED ORAL at 10:02

## 2020-02-29 RX ADMIN — Medication 250 MG: at 08:02

## 2020-02-29 RX ADMIN — OXCARBAZEPINE 300 MG: 300 TABLET, FILM COATED ORAL at 10:02

## 2020-02-29 RX ADMIN — HYDROCODONE BITARTRATE AND ACETAMINOPHEN 1 TABLET: 10; 325 TABLET ORAL at 11:02

## 2020-02-29 NOTE — PROGRESS NOTES
Subjective:       Patient ID: Marycruz Ramirez is a 74 y.o. female.    Chief Complaint: Wound Check    HPI   Ms Ramirez is a 75 yo female with sig pmhx of CHF(EF 30), Aortic stenosis, HLD, Bipolar and overactive bladder who was admitted to SNF s/p hospitalization for Acute on chronic combined systolic and diastolic heart failure.  She is seen today for evaluation and management of multiple skin tears to the lower legs.  She does not know how long the wounds have been there or how they originated. Foam border dressing are in place to the wounds.  She is afebrile. She denies increased redness, swelling or purulent drainage.  Her pain level is 9/10.  Review of Systems   Constitutional: Negative for chills, diaphoresis and fever.   HENT: Positive for tinnitus. Negative for hearing loss, postnasal drip, rhinorrhea, sinus pressure, sneezing, sore throat and trouble swallowing.    Eyes: Negative for visual disturbance.   Respiratory: Negative for apnea, cough, shortness of breath and wheezing.    Cardiovascular: Positive for leg swelling. Negative for chest pain and palpitations.   Gastrointestinal: Negative for constipation, diarrhea, nausea and vomiting.   Genitourinary: Negative for difficulty urinating, dysuria, frequency and hematuria.   Musculoskeletal: Negative for arthralgias, back pain and joint swelling.   Skin: Positive for wound.   Neurological: Negative for dizziness, weakness, light-headedness and headaches.   Hematological: Bruises/bleeds easily.   Psychiatric/Behavioral: Negative for confusion, decreased concentration, dysphoric mood and sleep disturbance. The patient is not nervous/anxious.        Objective:      Physical Exam   Constitutional: She is oriented to person, place, and time. She appears well-developed and well-nourished. No distress.   HENT:   Head: Normocephalic and atraumatic.   Mouth/Throat: Oropharynx is clear and moist.   Eyes: Pupils are equal, round, and reactive to light. Conjunctivae and  EOM are normal. Right eye exhibits no discharge. Left eye exhibits no discharge. No scleral icterus.   Neck: Neck supple. No JVD present. No thyromegaly present.   Cardiovascular: Normal rate and regular rhythm. Exam reveals no gallop and no friction rub.   Murmur heard.  Pulmonary/Chest: Effort normal and breath sounds normal. No respiratory distress. She has no wheezes. She has no rales.   Abdominal: Soft. Bowel sounds are normal. She exhibits no distension. There is no tenderness.   Musculoskeletal: She exhibits edema (1-2+ lower leg). She exhibits no tenderness.        Legs:  Neurological: She is alert and oriented to person, place, and time.   Skin: Skin is warm and dry. Rash (dermatitis right lower leg) noted. She is not diaphoretic. No erythema.   Psychiatric: She has a normal mood and affect. Her behavior is normal. Judgment and thought content normal.   Nursing note and vitals reviewed.        Left leg    Right lateral knee        Assessment:       1. Multiple skin tears    2. Skin tear of lower leg without complication, unspecified laterality, initial encounter    3. Alteration in skin integrity               Plan:            Cleanse wounds with wound cleanser.  Apply triamcinolone cream twice daily to right lower leg.  Apply skin prep to periwound area.  Foam border dressings to skin tears bilateral lower legs.  Change dressings three times weekly.  Return to clinic in one month or prn of healed.

## 2020-02-29 NOTE — PT/OT/SLP PROGRESS
"Physical Therapy  Treatment    Marycruz Ramirez   MRN: [id number]   Admitting Diagnosis: Acute on chronic combined systolic and diastolic heart failure    PT Received On: 02/29/20          Billable Minutes:  Gait Training 15, Therapeutic Activity 15 and Therapeutic Exercise 15    Treatment Type: Treatment  PT/PTA: PTA     PTA Visit Number: 1       General Precautions: Standard, fall  Orthopedic Precautions: N/A   Braces: N/A    Spiritual, Cultural Beliefs, Latter day Practices, Values that Affect Care: no    Subjective:  "I'm good" Pt agreebale to therapy    Pain/Comfort  Pain Rating 1: 0/10  Pain Rating Post-Intervention 1: 0/10    Objective:  Patient found in wc in gym with        AM-PAC 6 CLICK MOBILITY  Total Score:18    Transfers:  Sit<>Stand: t/f  wc S rollator, SBA SPC, t/f rollator SBA vcs for tech  Stand Pivot Transfer: wc>chair no AD arm rest for support SBA    Gait:  Amb 3 trials 95' each RW, rollator and SPC, RW and rollator S, SPC SBA     Advaced Gait:  Curb Step: 2" rollator Min A for rollator mgmt vcs for tech 2 trials     Therex:  UBE 10 minutes    Balance:  No LOB noted throughout session    Additional Treatment:  Patient educated on importance of increased time out of bed and OJ throughout the day    Patient left up in chair with call button in reach and  present.    Assessment:  Marycruz Ramirez is a 74 y.o. female with a medical diagnosis of Acute on chronic combined systolic and diastolic heart failure.  Patient tolerated treatment well focusing on transfers, gait and therex. Patient will continue to improve with skilled physical therapy services in order to return to functional baseline.    Rehab identified problem list/impairments: weakness, impaired endurance, impaired self care skills, gait instability, impaired functional mobilty, impaired balance, decreased lower extremity function, pain    Rehab potential is good.    Activity tolerance: Good    Discharge recommendations: home health " PT(with light assistance)     Barriers to discharge: None    Equipment recommendations: bedside commode, bath bench, walker, rolling(recommended;  owns rollator and SPC)     GOALS:   Multidisciplinary Problems     Physical Therapy Goals        Problem: Physical Therapy Goal    Goal Priority Disciplines Outcome Goal Variances Interventions   Physical Therapy Goal     PT, PT/OT Ongoing, Progressing     Description:  Goals to be met by: 20    Patient will increase functional independence with mobility by performin. Sit to stand transfer with Supervision. Met(2020)  2. Bed to chair transfer with Supervision using Rolling Walker/rollator. Me t(2020)  3. Gait  x 150 feet with Supervision using Rolling Walker/rollator.met 2020  3a. Gait x 150 ft with SBA and SPC. Not met  4. Ascend/Descend 2 inch curb step with Supervision using Rolling Walker/rollator.  5. Stand for 3 minutes with Supervision using Rolling Walker/rollator while performing a dynamic task. Met 2020  6. Lower extremity exercise program x 20 reps per handout, with independence.                         PLAN:    Patient to be seen (5-7x/wk)  to address the above listed problems via gait training, therapeutic activities, therapeutic exercises, neuromuscular re-education, wheelchair management/training  Plan of Care expires: 20  Plan of Care reviewed with: patient    Sarah Vargheseond, PTA  2020

## 2020-02-29 NOTE — PLAN OF CARE
Problem: Occupational Therapy Goal  Goal: Occupational Therapy Goal  Description  Goals to be met by: 2/28/20     Patient will increase functional independence with ADLs by performing:    Feeding with Modified [place].- Met  UE Dressing with Modified [place].   -Met  LE Dressing with Supervision.  Grooming while standing with Supervision with RW to steady.  Toileting from bedside commode with Supervision for hygiene and clothing management and A/D to stand.   Bathing from sitting at sink with Supervision with DME for safety.  Supine to sit with Modified [place]. - Met  Step transfer with Modified [place] with A/D for safety.  Upper extremity exercise program x10 reps per handout, with assistance as needed.  Pt's caregiver will be competent when performing self care tasks and functional transfers.      Outcome: Ongoing, Progressing

## 2020-02-29 NOTE — PT/OT/SLP PROGRESS
Occupational Therapy  Treatment    Marycruz Ramirez   MRN: [id number]   Admitting Diagnosis: Acute on chronic combined systolic and diastolic heart failure    Billable Minutes:  Self Care/Home Management 15 and Therapeutic Exercise 30    General Precautions: Standard, fall  Orthopedic Precautions: N/A  Braces: N/A    Spiritual, Cultural Beliefs, Jainism Practices, Values that Affect Care: no    Subjective:  Communicated with nurse and pts  prior to session.  Pts  exhibited frustration with pts initial attempt to refuse therapy. OT was easily able to re-direct pt to importance of focus on ADL's and goals as a path to her d/c. Pts  stated frustration that if pt does not progress with self care the burden of care would be on him when she transitions to home. He reports his attempts to encourage have been ongoing.     Objective:     Functional Mobility/Transfers:  · Patient completed Sit <> Stand Transfer with stand by assistance  with  rolling walker   · Patient completed Bed <> Chair Transfer using Step Transfer technique with stand by assistance with rolling walker  · Patient completed Toilet Transfer Step Transfer technique with stand by assistance with  rolling walker, bedside commode and grab bars  · Patient completed  Shower Transfer Step Transfer technique with contact guard assistance with rolling walker, grab bars and shower chair  · Functional Mobility: Pt seen for standing balance/ tolerance, functional reach tasks and sitting balance training with goal of improving her ability to safely perform all ADL's from standing level for d/c.     Activities of Daily Living:  · Upper Body Dressing: contact guard assistance and set up  · Lower Body Dressing: contact guard assistance    · Toileting: supervision on raised commode    OT Exercises: UE Ergometer x 8 minutes    Additional Treatment:  Pt able to identify her goals of increased independence in self care skills in preparation to d/c to her  home.     Patient left up in chair with call button in reach    ASSESSMENT:  Marycruz Ramirez is a 74 y.o. female with a medical diagnosis of Acute on chronic combined systolic and diastolic heart failure who is slowly and steadily improving in her functional mobility, ADL's, standing balance and tolerance. She is highly motivated to transition home at her highest level of independence yet continues to require encouragement to perform at her highest functional level during OT intervention.    Rehab identified problem list/impairments: weakness, impaired endurance, impaired self care skills, impaired functional mobilty, gait instability, impaired balance, decreased lower extremity function, decreased safety awareness, pain, decreased ROM, impaired cardiopulmonary response to activity    Rehab potential is good    Activity tolerance: Good    Discharge recommendations: home health OT     Barriers to discharge: None     Equipment recommendations: (TBD)     GOALS:   Multidisciplinary Problems     Occupational Therapy Goals        Problem: Occupational Therapy Goal    Goal Priority Disciplines Outcome Interventions   Occupational Therapy Goal     OT, PT/OT Ongoing, Progressing    Description:  Goals to be met by: 2/28/20     Patient will increase functional independence with ADLs by performing:    Feeding with Modified [place].- Met  UE Dressing with Modified [place].   -Met  LE Dressing with Supervision.  Grooming while standing with Supervision with RW to steady.  Toileting from bedside commode with Supervision for hygiene and clothing management and A/D to stand.   Bathing from sitting at sink with Supervision with DME for safety.  Supine to sit with Modified [place]. - Met  Step transfer with Modified [place] with A/D for safety.  Upper extremity exercise program x10 reps per handout, with assistance as needed.  Pt's caregiver will be competent when performing self care tasks and functional  transfers.                       Plan:  Patient to be seen 5 x/week, 6 x/week to address the above listed problems via self-care/home management, therapeutic activities, therapeutic exercises  Plan of Care expires: 03/18/20  Plan of Care reviewed with: patient    Janette Dumont, OT  02/28/2020

## 2020-02-29 NOTE — LETTER
February 29, 2020      Dian Carrizales NP  [address]  [facility] [address]           [facility]  [address]  [facility] [address]  Phone: [phone]          Patient: Marycruz Ramirez   MR Number: [id number]   YOB: 1945   Date of Visit: 2/29/2020       Dear Dian Carrizales:    Thank you for referring Marycruz Ramirez to me for evaluation. Attached you will find relevant portions of my assessment and plan of care.    If you have questions, please do not hesitate to call me. I look forward to following Marycruz Ramirez along with you.    Sincerely,    Maria Guadalupe Shaver NP    Enclosure  CC:  No Recipients    If you would like to receive this communication electronically, please contact [email or web address] or [phone] to request more information on [employer] Link access.    For providers and/or their staff who would like to refer a patient to [facility], please contact us through our one-stop-shop provider referral line, Violette Edwards, at [phone].    If you feel you have received this communication in error or would no longer like to receive these types of communications, please e-mail [email or web address]

## 2020-02-29 NOTE — PATIENT INSTRUCTIONS
Cleanse wounds with wound cleanser.  Apply triamcinolone cream twice daily to right lower leg.  Apply skin prep to periwound area.  Foam border dressings to skin tears bilateral lower legs.  Change dressings three times weekly.

## 2020-03-01 LAB
POCT GLUCOSE: 153 MG/DL (ref 70–110)
POCT GLUCOSE: 179 MG/DL (ref 70–110)
POCT GLUCOSE: 185 MG/DL (ref 70–110)
POCT GLUCOSE: 234 MG/DL (ref 70–110)

## 2020-03-01 PROCEDURE — 25000003 PHARM REV CODE 250: Mod: HCNC | Performed by: INTERNAL MEDICINE

## 2020-03-01 PROCEDURE — 25000003 PHARM REV CODE 250: Mod: HCNC | Performed by: NURSE PRACTITIONER

## 2020-03-01 PROCEDURE — 63600175 PHARM REV CODE 636 W HCPCS: Mod: HCNC | Performed by: PHYSICIAN ASSISTANT

## 2020-03-01 PROCEDURE — 11000004 HC SNF PRIVATE: Mod: HCNC

## 2020-03-01 PROCEDURE — 25000003 PHARM REV CODE 250: Mod: HCNC | Performed by: PHYSICIAN ASSISTANT

## 2020-03-01 RX ADMIN — FUROSEMIDE 60 MG: 40 TABLET ORAL at 06:03

## 2020-03-01 RX ADMIN — HYDROCODONE BITARTRATE AND ACETAMINOPHEN 1 TABLET: 10; 325 TABLET ORAL at 10:03

## 2020-03-01 RX ADMIN — FUROSEMIDE 60 MG: 40 TABLET ORAL at 09:03

## 2020-03-01 RX ADMIN — ENOXAPARIN SODIUM 40 MG: 100 INJECTION SUBCUTANEOUS at 06:03

## 2020-03-01 RX ADMIN — OXYBUTYNIN CHLORIDE 5 MG: 5 TABLET ORAL at 09:03

## 2020-03-01 RX ADMIN — INSULIN ASPART 2 UNITS: 100 INJECTION, SOLUTION INTRAVENOUS; SUBCUTANEOUS at 09:03

## 2020-03-01 RX ADMIN — HYDROCODONE BITARTRATE AND ACETAMINOPHEN 1 TABLET: 10; 325 TABLET ORAL at 03:03

## 2020-03-01 RX ADMIN — OXCARBAZEPINE 300 MG: 300 TABLET, FILM COATED ORAL at 09:03

## 2020-03-01 RX ADMIN — OXYBUTYNIN CHLORIDE 5 MG: 5 TABLET ORAL at 08:03

## 2020-03-01 RX ADMIN — POTASSIUM CHLORIDE 40 MEQ: 1500 TABLET, EXTENDED RELEASE ORAL at 09:03

## 2020-03-01 RX ADMIN — PANTOPRAZOLE SODIUM 40 MG: 40 TABLET, DELAYED RELEASE ORAL at 09:03

## 2020-03-01 RX ADMIN — CETIRIZINE HYDROCHLORIDE 10 MG: 5 TABLET ORAL at 08:03

## 2020-03-01 RX ADMIN — OXCARBAZEPINE 600 MG: 600 TABLET, FILM COATED ORAL at 08:03

## 2020-03-01 RX ADMIN — FENOFIBRATE 145 MG: 145 TABLET, FILM COATED ORAL at 09:03

## 2020-03-01 RX ADMIN — HYDROCODONE BITARTRATE AND ACETAMINOPHEN 1 TABLET: 10; 325 TABLET ORAL at 08:03

## 2020-03-01 RX ADMIN — HYDROCODONE BITARTRATE AND ACETAMINOPHEN 1 TABLET: 10; 325 TABLET ORAL at 06:03

## 2020-03-02 LAB
POCT GLUCOSE: 106 MG/DL (ref 70–110)
POCT GLUCOSE: 152 MG/DL (ref 70–110)
POCT GLUCOSE: 204 MG/DL (ref 70–110)

## 2020-03-02 PROCEDURE — 97116 GAIT TRAINING THERAPY: CPT | Mod: HCNC

## 2020-03-02 PROCEDURE — 11000004 HC SNF PRIVATE: Mod: HCNC

## 2020-03-02 PROCEDURE — 25000003 PHARM REV CODE 250: Mod: HCNC | Performed by: INTERNAL MEDICINE

## 2020-03-02 PROCEDURE — 97530 THERAPEUTIC ACTIVITIES: CPT | Mod: HCNC

## 2020-03-02 PROCEDURE — 25000003 PHARM REV CODE 250: Mod: HCNC | Performed by: PHYSICIAN ASSISTANT

## 2020-03-02 PROCEDURE — 63600175 PHARM REV CODE 636 W HCPCS: Mod: HCNC | Performed by: PHYSICIAN ASSISTANT

## 2020-03-02 PROCEDURE — 25000003 PHARM REV CODE 250: Mod: HCNC | Performed by: NURSE PRACTITIONER

## 2020-03-02 PROCEDURE — 97110 THERAPEUTIC EXERCISES: CPT | Mod: HCNC

## 2020-03-02 PROCEDURE — 97535 SELF CARE MNGMENT TRAINING: CPT | Mod: HCNC

## 2020-03-02 RX ORDER — TRIAMCINOLONE ACETONIDE 1 MG/G
CREAM TOPICAL 2 TIMES DAILY
Status: DISCONTINUED | OUTPATIENT
Start: 2020-03-02 | End: 2020-03-04 | Stop reason: HOSPADM

## 2020-03-02 RX ADMIN — HYDROCODONE BITARTRATE AND ACETAMINOPHEN 1 TABLET: 10; 325 TABLET ORAL at 06:03

## 2020-03-02 RX ADMIN — FUROSEMIDE 60 MG: 40 TABLET ORAL at 06:03

## 2020-03-02 RX ADMIN — ENOXAPARIN SODIUM 40 MG: 100 INJECTION SUBCUTANEOUS at 06:03

## 2020-03-02 RX ADMIN — HYDROCODONE BITARTRATE AND ACETAMINOPHEN 1 TABLET: 10; 325 TABLET ORAL at 02:03

## 2020-03-02 RX ADMIN — OXYBUTYNIN CHLORIDE 5 MG: 5 TABLET ORAL at 09:03

## 2020-03-02 RX ADMIN — Medication 250 MG: at 08:03

## 2020-03-02 RX ADMIN — TRIAMCINOLONE ACETONIDE: 1 CREAM TOPICAL at 08:03

## 2020-03-02 RX ADMIN — CETIRIZINE HYDROCHLORIDE 10 MG: 5 TABLET ORAL at 08:03

## 2020-03-02 RX ADMIN — OXCARBAZEPINE 300 MG: 300 TABLET, FILM COATED ORAL at 09:03

## 2020-03-02 RX ADMIN — OXCARBAZEPINE 600 MG: 600 TABLET, FILM COATED ORAL at 08:03

## 2020-03-02 RX ADMIN — FERROUS SULFATE TAB EC 325 MG (65 MG FE EQUIVALENT) 325 MG: 325 (65 FE) TABLET DELAYED RESPONSE at 08:03

## 2020-03-02 RX ADMIN — DOCUSATE SODIUM - SENNOSIDES 1 TABLET: 50; 8.6 TABLET, FILM COATED ORAL at 08:03

## 2020-03-02 RX ADMIN — HYDROCODONE BITARTRATE AND ACETAMINOPHEN 1 TABLET: 10; 325 TABLET ORAL at 05:03

## 2020-03-02 RX ADMIN — FENOFIBRATE 145 MG: 145 TABLET, FILM COATED ORAL at 09:03

## 2020-03-02 RX ADMIN — POTASSIUM CHLORIDE 40 MEQ: 1500 TABLET, EXTENDED RELEASE ORAL at 09:03

## 2020-03-02 RX ADMIN — HYDROCODONE BITARTRATE AND ACETAMINOPHEN 1 TABLET: 10; 325 TABLET ORAL at 10:03

## 2020-03-02 RX ADMIN — HYDROCODONE BITARTRATE AND ACETAMINOPHEN 1 TABLET: 10; 325 TABLET ORAL at 09:03

## 2020-03-02 RX ADMIN — FUROSEMIDE 60 MG: 40 TABLET ORAL at 09:03

## 2020-03-02 RX ADMIN — OXYBUTYNIN CHLORIDE 5 MG: 5 TABLET ORAL at 08:03

## 2020-03-02 RX ADMIN — PANTOPRAZOLE SODIUM 40 MG: 40 TABLET, DELAYED RELEASE ORAL at 09:03

## 2020-03-02 RX ADMIN — TRIAMCINOLONE ACETONIDE: 1 CREAM TOPICAL at 02:03

## 2020-03-02 NOTE — PLAN OF CARE
Problem: Occupational Therapy Goal  Goal: Occupational Therapy Goal  Description  Goals to be met by: 2/28/20     Patient will increase functional independence with ADLs by performing:    Feeding with Modified [place].- Met  UE Dressing with Modified [place].   -Met  LE Dressing with Supervision.  Grooming while standing with Supervision with RW to steady.  Toileting from bedside commode with Supervision for hygiene and clothing management and A/D to stand.   Bathing from sitting at sink with Supervision with DME for safety.  Supine to sit with Modified [place]. - Met  Step transfer with Modified [place] with A/D for safety.  Upper extremity exercise program x10 reps per handout, with assistance as needed.  Pt's caregiver will be competent when performing self care tasks and functional transfers.      Outcome: Ongoing, Progressing

## 2020-03-02 NOTE — PT/OT/SLP PROGRESS
Physical Therapy  Treatment    Marycruz Ramirez   MRN: [id number]   Admitting Diagnosis: Acute on chronic combined systolic and diastolic heart failure    PT Received On: 03/02/20          Billable Minutes:  Gait Training 15, Therapeutic Activity 23    Treatment Type: Treatment  PT/PTA: PT     PTA Visit Number: 0       General Precautions: Standard, fall  Orthopedic Precautions: N/A   Braces: N/A    Spiritual, Cultural Beliefs, Latter day Practices, Values that Affect Care: no    Subjective:  Communicated with pt prior to session.       Pain/Comfort  Pain Rating 1: 0/10    Objective:  Patient found seated in WC.       AM-PAC 6 CLICK MOBILITY  Total Score:18    Transfers:  Sit<>Stand: SBA-CGA  Stand Pivot Transfer: SBA-CGA (stepper <> WC), demonstrated trunk extension. Educated to perform trunk flexion to avoid a fall.    Gait:  Amb 158 ft with SPC, SBA-CGA with short, shuffling steps bilaterally. No loss of balance.     Additional Treatment:  Completed 15 minutes on recumbent stepper at level 3 to improve U/LE strength and endurance.       Patient left up in chair with call button in reach.    Assessment:  Marycruz Ramirez is a 74 y.o. female with a medical diagnosis of Acute on chronic combined systolic and diastolic heart failure.  Ms. Ramirez met one goal listed below (ambulation with SPC) and will benefit from continued PT services. Need to address last two goals below.    Rehab identified problem list/impairments: weakness, impaired endurance, impaired self care skills, gait instability, impaired functional mobilty, impaired balance, decreased lower extremity function, pain    Rehab potential is fair.    Activity tolerance: Fair    Discharge recommendations: home health PT(with light assistance)     Barriers to discharge: None    Equipment recommendations: bedside commode, bath bench, walker, rolling(recommended;  owns rollator and SPC)     GOALS:   Multidisciplinary Problems     Physical Therapy Goals        Problem:  Physical Therapy Goal    Goal Priority Disciplines Outcome Goal Variances Interventions   Physical Therapy Goal     PT, PT/OT Ongoing, Progressing     Description:  Goals to be met by: 20    Patient will increase functional independence with mobility by performin. Sit to stand transfer with Supervision. Met(2020)  2. Bed to chair transfer with Supervision using Rolling Walker/rollator. Me t(2020)  3. Gait  x 150 feet with Supervision using Rolling Walker/rollator.met 2020  3a. Gait x 150 ft with SBA and SPC. Met (3/2/2020)  *4. Ascend/Descend 2 inch curb step with Supervision using Rolling Walker/rollator.  5. Stand for 3 minutes with Supervision using Rolling Walker/rollator while performing a dynamic task. Met 2020  *6. Lower extremity exercise program x 20 reps per handout, with independence.                          PLAN:    Patient to be seen (5-7x/wk)  to address the above listed problems via gait training, therapeutic activities, therapeutic exercises, neuromuscular re-education, wheelchair management/training  Plan of Care expires: 20  Plan of Care reviewed with: patient    Ifeoma Rincon, PT  2020

## 2020-03-02 NOTE — PT/OT/SLP PROGRESS
Occupational Therapy  Treatment    Marycruz Ramirez   MRN: [id number]   Admitting Diagnosis: Acute on chronic combined systolic and diastolic heart failure    OT Date of Treatment: 03/02/20       Billable Minutes:  Self Care/Home Management 23 and Therapeutic Exercise 17    General Precautions: Standard, fall  Orthopedic Precautions: N/A  Braces: N/A    Spiritual, Cultural Beliefs, Gnosticism Practices, Values that Affect Care: no    Subjective:  Communicated with nurse prior to session.      Pain/Comfort  Pain Rating 1: 5/10  Location - Side 1: Bilateral  Location - Orientation 1: generalized  Location 1: (legs)  Pain Addressed 1: Reposition, Distraction, Pre-medicate for activity  Pain Rating Post-Intervention 1: 5/10    Objective:  Patient found with: (no lines and seated in bedside chair.)    Occupational Performance:    Bed Mobility:    · Pt seated in bedside chair at onset of therapy session.    Functional Mobility/Transfers:  · Patient completed Sit <> Stand Transfer with supervision  with  rolling walker   · Patient completed Bedside chair  <> Wheelchair Transfer using Step Transfer technique with supervision with rolling walker  · Patient completed Toilet Transfer Step Transfer technique with supervision with  rolling walker  · Functional Mobility: Pt ambulated from wheelchair to toilet in restroom with RW and (S) a distance of 14ft  With slow shashank and no loss of balance.    Activities of Daily Living:  · Grooming: supervision standing with RW sinkside to perform grooming.  · Toileting: supervision with Pt using raised toilet and RW for safety.    Additional Treatment:  Performed ergometer exercises 15 minutes with mod resistance. UE exercises performed to increase functional endurance and strength in order increase independence when performing self care tasks, functional ambulation, W/C propulsion , functional standing activities as well as when performing functional tasks.    Pt edu on Plan of care,  safety  when performing functional transfers, self care tasks and functional standing activities.  - White board updated  - Self care tasks completed-- as noted above     Patient left up in chair with call button in reach      ASSESSMENT:  Marycruz Ramirez is a 74 y.o. female with a medical diagnosis of Acute on chronic combined systolic and diastolic heart failure .   Pt tolerated Tx without incident despite reports of leg pain. She continues to make slow progress with self care tasks and functional transfers and would continue to benefit from OT intervention to further her functional (I)ce and safety.    Rehab identified problem list/impairments: weakness, impaired endurance, impaired self care skills, impaired functional mobilty, gait instability, impaired balance, decreased lower extremity function, decreased safety awareness, pain, decreased ROM, impaired cardiopulmonary response to activity    Rehab potential is good    Activity tolerance: Good    Discharge recommendations: home health OT     Barriers to discharge: None     Equipment recommendations: (TBD)     GOALS:   Multidisciplinary Problems     Occupational Therapy Goals        Problem: Occupational Therapy Goal    Goal Priority Disciplines Outcome Interventions   Occupational Therapy Goal     OT, PT/OT Ongoing, Progressing    Description:  Goals to be met by: 2/28/20     Patient will increase functional independence with ADLs by performing:    Feeding with Modified [place].- Met  UE Dressing with Modified [place].   -Met  LE Dressing with Supervision.  Grooming while standing with Supervision with RW to steady.  Toileting from bedside commode with Supervision for hygiene and clothing management and A/D to stand.   Bathing from sitting at sink with Supervision with DME for safety.  Supine to sit with Modified [place]. - Met  Step transfer with Modified [place] with A/D for safety.  Upper extremity exercise program x10 reps per handout, with  assistance as needed.  Pt's caregiver will be competent when performing self care tasks and functional transfers.                       Plan:  Patient to be seen 5 x/week, 6 x/week to address the above listed problems via self-care/home management, therapeutic activities, therapeutic exercises  Plan of Care expires: 03/18/20  Plan of Care reviewed with: patient    ELLIOT Zuniga/LILA  03/02/2020

## 2020-03-03 LAB
ANION GAP SERPL CALC-SCNC: 11 MMOL/L (ref 8–16)
ANISOCYTOSIS BLD QL SMEAR: SLIGHT
BASOPHILS # BLD AUTO: 0.05 K/UL (ref 0–0.2)
BASOPHILS NFR BLD: 0.8 % (ref 0–1.9)
BUN SERPL-MCNC: 15 MG/DL (ref 8–23)
CALCIUM SERPL-MCNC: 9.6 MG/DL (ref 8.7–10.5)
CHLORIDE SERPL-SCNC: 98 MMOL/L (ref 95–110)
CO2 SERPL-SCNC: 30 MMOL/L (ref 23–29)
CREAT SERPL-MCNC: 0.7 MG/DL (ref 0.5–1.4)
DIFFERENTIAL METHOD: ABNORMAL
EOSINOPHIL # BLD AUTO: 0.3 K/UL (ref 0–0.5)
EOSINOPHIL NFR BLD: 3.9 % (ref 0–8)
ERYTHROCYTE [DISTWIDTH] IN BLOOD BY AUTOMATED COUNT: 21.6 % (ref 11.5–14.5)
EST. GFR  (AFRICAN AMERICAN): >60 ML/MIN/1.73 M^2
EST. GFR  (NON AFRICAN AMERICAN): >60 ML/MIN/1.73 M^2
GLUCOSE SERPL-MCNC: 127 MG/DL (ref 70–110)
HCT VFR BLD AUTO: 32.5 % (ref 37–48.5)
HGB BLD-MCNC: 9.6 G/DL (ref 12–16)
IMM GRANULOCYTES # BLD AUTO: 0.02 K/UL (ref 0–0.04)
IMM GRANULOCYTES NFR BLD AUTO: 0.3 % (ref 0–0.5)
LYMPHOCYTES # BLD AUTO: 2.4 K/UL (ref 1–4.8)
LYMPHOCYTES NFR BLD: 38.2 % (ref 18–48)
MAGNESIUM SERPL-MCNC: 1.9 MG/DL (ref 1.6–2.6)
MCH RBC QN AUTO: 27.2 PG (ref 27–31)
MCHC RBC AUTO-ENTMCNC: 29.5 G/DL (ref 32–36)
MCV RBC AUTO: 92 FL (ref 82–98)
MONOCYTES # BLD AUTO: 0.8 K/UL (ref 0.3–1)
MONOCYTES NFR BLD: 12.4 % (ref 4–15)
NEUTROPHILS # BLD AUTO: 2.8 K/UL (ref 1.8–7.7)
NEUTROPHILS NFR BLD: 44.4 % (ref 38–73)
NRBC BLD-RTO: 0 /100 WBC
OVALOCYTES BLD QL SMEAR: ABNORMAL
PHOSPHATE SERPL-MCNC: 3.5 MG/DL (ref 2.7–4.5)
PLATELET # BLD AUTO: 260 K/UL (ref 150–350)
PLATELET BLD QL SMEAR: ABNORMAL
PMV BLD AUTO: 10.5 FL (ref 9.2–12.9)
POCT GLUCOSE: 135 MG/DL (ref 70–110)
POCT GLUCOSE: 187 MG/DL (ref 70–110)
POCT GLUCOSE: 189 MG/DL (ref 70–110)
POCT GLUCOSE: 231 MG/DL (ref 70–110)
POCT GLUCOSE: 94 MG/DL (ref 70–110)
POIKILOCYTOSIS BLD QL SMEAR: SLIGHT
POLYCHROMASIA BLD QL SMEAR: ABNORMAL
POTASSIUM SERPL-SCNC: 3.4 MMOL/L (ref 3.5–5.1)
RBC # BLD AUTO: 3.53 M/UL (ref 4–5.4)
SODIUM SERPL-SCNC: 139 MMOL/L (ref 136–145)
WBC # BLD AUTO: 6.36 K/UL (ref 3.9–12.7)

## 2020-03-03 PROCEDURE — 85025 COMPLETE CBC W/AUTO DIFF WBC: CPT | Mod: HCNC

## 2020-03-03 PROCEDURE — 25000003 PHARM REV CODE 250: Mod: HCNC | Performed by: NURSE PRACTITIONER

## 2020-03-03 PROCEDURE — 97116 GAIT TRAINING THERAPY: CPT | Mod: HCNC

## 2020-03-03 PROCEDURE — 97530 THERAPEUTIC ACTIVITIES: CPT | Mod: HCNC,CO

## 2020-03-03 PROCEDURE — 84100 ASSAY OF PHOSPHORUS: CPT | Mod: HCNC

## 2020-03-03 PROCEDURE — 80048 BASIC METABOLIC PNL TOTAL CA: CPT | Mod: HCNC

## 2020-03-03 PROCEDURE — 97530 THERAPEUTIC ACTIVITIES: CPT | Mod: HCNC

## 2020-03-03 PROCEDURE — 97535 SELF CARE MNGMENT TRAINING: CPT | Mod: HCNC,CO

## 2020-03-03 PROCEDURE — 83735 ASSAY OF MAGNESIUM: CPT | Mod: HCNC

## 2020-03-03 PROCEDURE — 11000004 HC SNF PRIVATE: Mod: HCNC

## 2020-03-03 PROCEDURE — 25000003 PHARM REV CODE 250: Mod: HCNC | Performed by: PHYSICIAN ASSISTANT

## 2020-03-03 PROCEDURE — 25000003 PHARM REV CODE 250: Mod: HCNC | Performed by: INTERNAL MEDICINE

## 2020-03-03 PROCEDURE — 63600175 PHARM REV CODE 636 W HCPCS: Mod: HCNC | Performed by: PHYSICIAN ASSISTANT

## 2020-03-03 PROCEDURE — 36415 COLL VENOUS BLD VENIPUNCTURE: CPT | Mod: HCNC

## 2020-03-03 PROCEDURE — 97110 THERAPEUTIC EXERCISES: CPT | Mod: HCNC,CO

## 2020-03-03 RX ORDER — HYDROCODONE BITARTRATE AND ACETAMINOPHEN 5; 325 MG/1; MG/1
1 TABLET ORAL EVERY 4 HOURS PRN
Qty: 30 TABLET | Refills: 0 | Status: SHIPPED | OUTPATIENT
Start: 2020-03-03 | End: 2020-06-24

## 2020-03-03 RX ORDER — POTASSIUM CHLORIDE 20 MEQ/1
20 TABLET, EXTENDED RELEASE ORAL 2 TIMES DAILY
Qty: 60 TABLET | Refills: 3 | Status: SHIPPED | OUTPATIENT
Start: 2020-03-03 | End: 2020-10-05

## 2020-03-03 RX ORDER — TRIAMCINOLONE ACETONIDE 1 MG/G
CREAM TOPICAL 2 TIMES DAILY
Qty: 1 TUBE | Refills: 3 | Status: SHIPPED | OUTPATIENT
Start: 2020-03-03

## 2020-03-03 RX ORDER — POTASSIUM CHLORIDE 750 MG/1
30 CAPSULE, EXTENDED RELEASE ORAL 2 TIMES DAILY
Status: DISCONTINUED | OUTPATIENT
Start: 2020-03-03 | End: 2020-03-04 | Stop reason: HOSPADM

## 2020-03-03 RX ADMIN — OXCARBAZEPINE 300 MG: 300 TABLET, FILM COATED ORAL at 08:03

## 2020-03-03 RX ADMIN — HYDROCODONE BITARTRATE AND ACETAMINOPHEN 1 TABLET: 10; 325 TABLET ORAL at 05:03

## 2020-03-03 RX ADMIN — INSULIN ASPART 1 UNITS: 100 INJECTION, SOLUTION INTRAVENOUS; SUBCUTANEOUS at 08:03

## 2020-03-03 RX ADMIN — PANTOPRAZOLE SODIUM 40 MG: 40 TABLET, DELAYED RELEASE ORAL at 08:03

## 2020-03-03 RX ADMIN — FENOFIBRATE 145 MG: 145 TABLET, FILM COATED ORAL at 08:03

## 2020-03-03 RX ADMIN — TRIAMCINOLONE ACETONIDE: 1 CREAM TOPICAL at 08:03

## 2020-03-03 RX ADMIN — OXCARBAZEPINE 600 MG: 600 TABLET, FILM COATED ORAL at 08:03

## 2020-03-03 RX ADMIN — FUROSEMIDE 60 MG: 40 TABLET ORAL at 08:03

## 2020-03-03 RX ADMIN — FUROSEMIDE 60 MG: 40 TABLET ORAL at 04:03

## 2020-03-03 RX ADMIN — HYDROCODONE BITARTRATE AND ACETAMINOPHEN 1 TABLET: 10; 325 TABLET ORAL at 09:03

## 2020-03-03 RX ADMIN — POTASSIUM CHLORIDE 30 MEQ: 750 CAPSULE, EXTENDED RELEASE ORAL at 08:03

## 2020-03-03 RX ADMIN — HYDROCODONE BITARTRATE AND ACETAMINOPHEN 1 TABLET: 10; 325 TABLET ORAL at 08:03

## 2020-03-03 RX ADMIN — CETIRIZINE HYDROCHLORIDE 10 MG: 5 TABLET ORAL at 08:03

## 2020-03-03 RX ADMIN — ENOXAPARIN SODIUM 40 MG: 100 INJECTION SUBCUTANEOUS at 04:03

## 2020-03-03 RX ADMIN — OXYBUTYNIN CHLORIDE 5 MG: 5 TABLET ORAL at 08:03

## 2020-03-03 RX ADMIN — POTASSIUM CHLORIDE 40 MEQ: 1500 TABLET, EXTENDED RELEASE ORAL at 08:03

## 2020-03-03 RX ADMIN — TRIAMCINOLONE ACETONIDE: 1 CREAM TOPICAL at 09:03

## 2020-03-03 NOTE — PLAN OF CARE
[facility]    HOME HEALTH ORDERS  FACE TO FACE ENCOUNTER    Patient Name: Marycruz Ramirez  YOB: 1945    PCP: Marcelino Calle MD   PCP Address: [address] [place] [address] [address] / [place] [address]  PCP Phone Number: [phone]  PCP Fax: [phone]    Encounter Date: 03/03/2020    Admit to Home Health    Diagnoses:  Active Hospital Problems    Diagnosis  POA    *Acute on chronic combined systolic and diastolic heart failure [I50.43]  Yes    Skin tear of lower leg without complication [S81.819A]  Yes    Debility [R53.81]  Yes    Multiple skin tears [T14.8XXA]  Yes    Alteration in skin integrity [R23.9]  Yes    Fall [W19.XXXA]  Yes    Aortic stenosis, severe [I35.0]  Yes    OAB (overactive bladder) [N32.81]  Yes     Cont ditropan working well       Chronic radicular lumbar pain [M54.16, G89.29]  Yes     Dr allen is managing pain cont current regimen       Mixed hyperlipidemia [E78.2]  Yes     Is on tricor currently cont current needs cmp and lipid      Bipolar disorder [F31.9]  Yes     Psych managing cont current trileptal        Resolved Hospital Problems   No resolved problems to display.       Future Appointments   Date Time Provider Department Center   3/4/2020  8:30 AM Zuhair Jacob MD [facility] ID [facility]   3/6/2020  1:00 PM ECHO, [facility] ECHOLAB [facility]   4/1/2020 10:20 AM Maria Guadalupe Shaver NP [facility] WOUND [facility]   4/8/2020  1:00 PM Fito Marquez MD [facility] CARDIO [facility]   7/9/2020 11:00 [facility]     I have seen and examined this patient face to face today. My clinical findings that support the need for the home health skilled services and home bound status are the following:  Weakness/numbness causing balance and gait disturbance due to Heart Failure making it taxing to leave home.    Allergies:Review of patient's allergies indicates:  No Known Allergies    Activities: activity as tolerated    Nursing:   SN to  complete comprehensive assessment including routine vital signs. Instruct on disease process and s/s of complications to report to MD. Review/verify medication list sent home with the patient at time of discharge  and instruct patient/caregiver as needed. Frequency may be adjusted depending on start of care date.    Notify MD if SBP > 160 or < 90; DBP > 90 or < 50; HR > 120 or < 50; Temp > 101      CONSULTS:    Physical Therapy to evaluate and treat. Evaluate for home safety and equipment needs; Establish/upgrade home exercise program. Perform / instruct on therapeutic exercises, gait training, transfer training, and Range of Motion.  Occupational Therapy to evaluate and treat. Evaluate home environment for safety and equipment needs. Perform/Instruct on transfers, ADL training, ROM, and therapeutic exercises.  Aide to provide assistance with personal care, ADLs, and vital signs.    MISCELLANEOUS CARE:  Heart Failure:      SN to instruct on the following:    Instruct on the definition of CHF.   Instruct on the signs/sympoms of CHF to be reported.   Instruct on and monitor daily weights.   Instruct on factors that cause exacerbation.   Instruct on action, dose, schedule, and side effects of medications.   Instruct on diet as prescribed.   Instruct on activity allowed.   Instruct on life-style modifications for life long management of CHF   SN to assess compliance with daily weights, diet, medications, fluid retention,    safety precautions, activities permitted and life-style modifications.   Additional 1-2 SN visits per week as needed for signs and symptoms     of CHF exacerbation.      For Weight Gain > 2-3 lbs in 1 day or 4-6 lbs over 1 week notify PCP:      WOUND CARE ORDERS    Apply triamcinolone cream twice daily to right lower leg.  Apply skin prep to periwound area.  Foam border dressings to skin tears bilateral lower legs.  Change dressings three times weekly.  Return to clinic in one month or prn of  healed.    Medications: Review discharge medications with patient and family and provide education.      I certify that this patient is confined to her home and needs intermittent skilled nursing care, physical therapy and occupational therapy.

## 2020-03-03 NOTE — TREATMENT PLAN
Rehab Services' DME recommendations    Marycruz Ramirez  MRN: [id number]    [x]  No DME needed    [x] Home health PT, OT      FABI Alfaro 3/3/2020

## 2020-03-03 NOTE — PROGRESS NOTES
[facility]                                  Skilled Nursing Facility                   Progress Note   DOS 3/3/2020  Admit Date: 2/17/2020  EVONNE   Principal Problem:  Acute on chronic combined systolic and diastolic heart failure   HPI obtained from patient interview and chart review     Chief Complaint:Revaluation of medical treatment and therapy status: Lab review, hyperkalemia    HPI: Ms Ramirez is a 73 yo female with sig pmhx of CHF(EF 30), Aortic stenosis, HLD, Bipolar and overactive bladder who presents to SNF s/p hospitalization for Acute on chronic combined systolic and diastolic heart failure. Patient initially presented with LE edema, weakness and a fall. Patient with recent hospitalizations/treatment for pyelonephritis with sepsis and Anemia with transfusion. This hospitalization patient diuresed well with lasix and was deemed medically stable and transferred to [facility] to participate in acute inpatient therapy. Admission to SNF for secondary weakness and debility.     Interval Hx: All of today's labs reviewed and are listed below.  24 hr vital sign ranges listed below.  K 3.4.  Reports much improvement to her lower extremity edema. Patient denies trouble sleeping at night, shortness of breath or cough, abdominal discomfort, nausea, or vomiting.  Patient reports an adequate appetite.  Patient denies dysuria.  Patient reports having regular bowel movements.  Patient progessing with PT/OT. Continuing to follow and treat all acute and chronic conditions.    Past Medical History: Patient has a past medical history of Bipolar 1 disorder, CHF (congestive heart failure), HLD (hyperlipidemia), and Overactive bladder.    Past Surgical History: Patient has a past surgical history that includes Knee surgery (Bilateral); Back surgery; Esophagogastroduodenoscopy (N/A, 1/20/2020); and Colonoscopy (N/A, 1/20/2020).    Social History: Patient reports that she has  been smoking cigarettes. She has a 75.00 pack-year smoking history. She has never used smokeless tobacco. She reports that she drinks alcohol. She reports that she does not use drugs.    Family History: No significant family hx reported.     Allergies: Patient has No Known Allergies.    ROS  Constitutional: Negative for fever and fatigue.   Eyes: Negative for blurred vision, double vision    Respiratory: Negative for cough, shortness of breath and wheezing.    Cardiovascular: Negative for chest pain, palpitations, and leg swelling.   Gastrointestinal: Negative for abdominal pain, constipation, diarrhea, nausea and vomiting.   Genitourinary: Negative for dysuria, frequency and urgency.   Musculoskeletal:  + generalized weakness. Negative for back pain and myalgias.   Skin: Negative for itching and rash.  Neurological: Negative for dizziness, speech change, and headaches.   Psychiatric/Behavioral: Negative for depression. The patient is not nervous/anxious.      PEx    Temp:  [97.8 °F (36.6 °C)-98.2 °F (36.8 °C)] 97.8 °F (36.6 °C)  Pulse:  [80-82] 82  Resp:  [18] 18  SpO2:  [97 %] 97 %  BP: (116-127)/(56-60) 127/56    Constitutional: Patient appears frail, elderly. no distress   HENT:   Head: Normocephalic and atraumatic.   Eyes: Pupils are equal, round, and reactive to light.   Neck: Normal range of motion. Neck supple.   Cardiovascular: Normal rate, regular rhythm and normal heart sounds. + lower extremity edema, improved  Pulmonary/Chest: Effort normal and breath sounds are clear  Abdominal: Soft. Bowel sounds are normal.   Musculoskeletal: Normal range of motion. + generalized weakness.  Neurological: Alert and oriented to person, place, and time.   Psychiatric: Normal mood and affect. Behavior is normal.   Wounds/Skin: Skin is warm and dry.     Wound location:   Multiple skin tears FABI with eschar, without redness or drainage around.  Following: This NP weekly- last observed on 3/3 and wound care team Prn.      Wound location:   Left LE hematoma with 90 % purplish-red full thickness tissue and 10 % tan with small amount of serosang drainage, pink edges; lateral feels slightly fluctuant and very tender.  Wound length: 6.5 cm   Wound width: 7 cm  Wound depth: 0.2 cm   Following: This NP weekly- last observed on 3/3 and wound care team Prn.     Wound location:   Right LE hematoma full thickness wound with 90 % tan/white and 10 % dark brown eschar with pink edges  Wound length: 4 cm   Wound width: 2 cm  Wound depth: 0.1 cm   Following: Ortho NP- weekly. This NP weekly- last observed on 3/3 and wound care team Prn.     Recent Labs   Lab 03/03/20  0443      K 3.4*   CL 98   CO2 30*   BUN 15   CREATININE 0.7   MG 1.9     Recent Labs   Lab 03/03/20  0443   WBC 6.36   RBC 3.53*   HGB 9.6*   HCT 32.5*      MCV 92   MCH 27.2   MCHC 29.5*         Assessment and Plan:         Problems addressed today    Hypokalemia  - initiated potassium 30 mEq BID    Alteration in skin integrity  -Multiple skin tears  -2/19 initiated consult to wound care  -2/20 stable pending wound care eval and continue dressing changes as ordered  -2/21 discontinued Medihoney and initiated Aquacel AG dressing to be changed every M,W,F  - 3/3 recommendations from wound care clinic-   Apply triamcinolone cream twice daily to right lower leg.  Apply skin prep to periwound area.  Foam border dressings to skin tears bilateral lower legs.  Change dressings three times weekly.  Return to clinic in one month or prn of healed.    Elevated glucose  - checking Accu-Cheks, 24 hr range is 194-272, instructed patient and her  to follow up with PCP regarding possible diabetes    Acute on chronic combined systolic and diastolic heart failure  -seen by cards on 2/6 and has TTE schedule march 6  -2/19 initiated additional dose of lasix 40 mg po x 1, initiated cardiac diet and discontinued regular diet, initiated BNP lab draw for the am, and reordered daily  weights and adrienne hose  -2/20 increase lasix to 60 mg bid wm   - 3/2 weight stable     Bilateral lower extremity edema  - improving, continue lasix to 60 mg bid wm      Microcytic anemia  -transfuse if Hgb < 7  -stable, continue ferrous sulfate 325 mg daily       Ongoing but Stable Problems    Debility  -Continue with PT/OT for gait training and strengthening and restoration of ADL's   -Encourage mobility, OOB in chair, and early ambulation as appropriate  -Fall precautions   -Monitor for bowel and bladder dysfunction  -Monitor for and prevent skin breakdown and pressure ulcers  -Continue DVT prophylaxis with Lovenox     Bipolar disorder  -Mood stable  -Continue oxcarbazepine     OAB (overactive bladder)  -Continue oxybutynin     Chronic radicular lumbar pain  -Continue hydrocodone, methocarbamol, and tylenol     Mixed hyperlipidemia  -Continue fenofibrate  -F/u with PCP as outpatient       Future Appointments   Date Time Provider Department Center   3/4/2020  8:30 AM Zuhair Jacob MD [facility] ID [facility]   3/6/2020  1:00 PM ECHO, [facility] ECHOLAB [facility]   4/1/2020 10:20 AM Maria Guadalupe Shaver NP [facility] WOUND [facility]   4/8/2020  1:00 PM Fito Marquez MD [facility] CARDIO [facility]   7/9/2020 11:00 AM Marcelino Calle MD [facility] FAMCTR Madhu Carrizales NP     DOS 3/3/2020    Patient note was created using MModal Dictation.  Any errors in syntax or even information may not have been identified and edited on initial review prior to signing this note.

## 2020-03-03 NOTE — PT/OT/SLP PROGRESS
"Occupational Therapy  Treatment    Marycruz Ramirez   MRN: [id number]   Admitting Diagnosis: Acute on chronic combined systolic and diastolic heart failure    OT Date of Treatment: 03/03/20     Billable Minutes:  Self Care/Home Management 8, Therapeutic Activity 15 and Therapeutic Exercise 15    General Precautions: Standard, fall  Orthopedic Precautions: N/A  Braces: N/A    Spiritual, Cultural Beliefs, Presybeterian Practices, Values that Affect Care: no    Subjective:  Communicated with patient prior to session.  Pt agreeable to therapy.  " I am so sick of all this, I am ready to go home."     Objective:   Pt found seated in bedside chair.     Occupational Performance:    Bed Mobility:    · Not performed    Functional Mobility/Transfers:  · Patient completed Sit <> Stand Transfer with supervision and stand by assistance  with  rolling walker multiple trials from chair and w/c  · Patient completed bedside chair <> w/c Transfer using Stand Pivot technique with supervision and stand by assistance with rolling walker    Activities of Daily Living:  · Pt declined the need to perform ADL's.    [facility] 6 Click:  [facility] Total Score: 21    OT Exercises: AROM BUE 2 x 20 reps with 2# dowel in all major planes of motion between rest Pt easily fatigued.    Additional Treatment:  -Dynamic standing activity: 2 trials of bean bag toss (~15 bags each trial) standing with SBA, unilateral support on RW for stability during task. No LOB, rest required between each trial.  -DME orders placed into epic: discussed TTB with pt, however, she adamantly declined. She reports she wants no equipment and her bathroom is handicapped accessible   -Frequent rest breaks required throughout session 2* pt fatigue and decreased endurance  -Discussed OT goals/progress with pt... She denies further questions/concernss at this time from OT standpoint. She feels confident about discharge tomorrow and is ready to go home.     Patient left up in bedside chair with call " button in reach    ASSESSMENT:  Marycruz Ramirez is a 74 y.o. female with a medical diagnosis of Acute on chronic combined systolic and diastolic heart failure. Pt has made steady progress toward all OT goals during SNF stay. She denied further questions/concerns from OT standpoint. She reports she is confident about discharge and is ready to go home. Pt will d/c home with assist from spouse and  HH PT/OT, no DME as pt declined.     Rehab identified problem list/impairments: weakness, impaired endurance, impaired self care skills, impaired functional mobilty, gait instability, impaired balance, decreased lower extremity function, decreased safety awareness, pain, decreased ROM, impaired cardiopulmonary response to activity    Rehab potential is good    Activity tolerance: Good    Discharge recommendations: home health OT     Barriers to discharge: None     Equipment recommendations: (TBD)     GOALS:   Multidisciplinary Problems     Occupational Therapy Goals        Problem: Occupational Therapy Goal    Goal Priority Disciplines Outcome Interventions   Occupational Therapy Goal     OT, PT/OT Ongoing, Progressing    Description:  Goals to be met by: 2/28/20     Patient will increase functional independence with ADLs by performing:    Feeding with Modified [place].- Met  UE Dressing with Modified [place].   -Met  LE Dressing with Supervision.  Grooming while standing with Supervision with RW to steady.  Toileting from bedside commode with Supervision for hygiene and clothing management and A/D to stand.   Bathing from sitting at sink with Supervision with DME for safety.  Supine to sit with Modified [place]. - Met  Step transfer with Modified [place] with A/D for safety.  Upper extremity exercise program x10 reps per handout, with assistance as needed.  Pt's caregiver will be competent when performing self care tasks and functional transfers.                       Plan:  Patient to be seen 5 x/week, 6  x/week to address the above listed problems via self-care/home management, therapeutic activities, therapeutic exercises  Plan of Care expires: 03/18/20  Plan of Care reviewed with: patient    FABI Alfaro  03/03/2020

## 2020-03-03 NOTE — PT/OT/SLP PROGRESS
"Physical Therapy  Treatment/DC summary    Marycruz Ramirez   MRN: [id number]   Admitting Diagnosis: Acute on chronic combined systolic and diastolic heart failure    PT Received On: 03/03/20          Billable Minutes:  Gait Training 15 and Therapeutic Activity 15    Treatment Type: Treatment  PT/PTA: PT     PTA Visit Number: 0       General Precautions: Standard, fall  Orthopedic Precautions: N/A   Braces: N/A    Spiritual, Cultural Beliefs, Alevism Practices, Values that Affect Care: no    Subjective:  Communicated with pt prior to session.  Agreeable to PT services. "I need to go home."    Pain/Comfort  Pain Rating 1: 5/10  Location - Side 1: Bilateral  Location - Orientation 1: generalized  Location 1: knee  Pain Addressed 1: Reposition  Pain Rating Post-Intervention 1: 5/10    Objective:  Patient found seated in WC.       AM-PAC 6 CLICK MOBILITY  Total Score:18    Transfers:  Sit<>Stand: supervision  Stand Pivot Transfer: SBA-CGA with UE support.    Gait:  Amb 95 ft with SPC, SBA     Advanced Gait:  Curb Step: 2 inch curb step with SPC, CGA      Balance:  SPC, SBA.     Additional Treatment:  Completed 8 minutes on recumbent stepper at level 2 to improve U/LE strength and endurance.       Patient left up in chair with call button in reach.    Assessment:  Marycruz Ramirez is a 74 y.o. female with a medical diagnosis of Acute on chronic combined systolic and diastolic heart failure.  Pt demonstrated decreased endurance the last few sessions and increased pain in her knees. However, she met all but one goal listed below and is appropriate to discharge home with help from her .    Rehab identified problem list/impairments: weakness, impaired endurance, impaired self care skills, gait instability, impaired functional mobilty, impaired balance, decreased lower extremity function, pain    Rehab potential is good.    Activity tolerance: fair    Discharge recommendations: home health PT(with light assistance) "     Barriers to discharge: None    Equipment recommendations: bedside commode, bath bench, walker, rolling(recommended;  owns rollator and SPC)     GOALS:   Multidisciplinary Problems     Physical Therapy Goals        Problem: Physical Therapy Goal    Goal Priority Disciplines Outcome Goal Variances Interventions   Physical Therapy Goal     PT, PT/OT Ongoing, Progressing     Description:  Goals to be met by: 20    Patient will increase functional independence with mobility by performin. Sit to stand transfer with Supervision. Met(2020)  2. Bed to chair transfer with Supervision using Rolling Walker/rollator. Me t(2020)  3. Gait  x 150 feet with Supervision using Rolling Walker/rollator.met 2020  3a. Gait x 150 ft with SBA and SPC. Met (3/2/2020)  *4. Ascend/Descend 2 inch curb step with Supervision using Rolling Walker/rollator. Met (3/3/2020)  5. Stand for 3 minutes with Supervision using Rolling Walker/rollator while performing a dynamic task. Met 2020  *6. Lower extremity exercise program x 20 reps per handout, with independence. Not met                           PLAN:    DC home with HHPT.  Ifeoma Rincon, PT  2020

## 2020-03-04 ENCOUNTER — TELEPHONE (OUTPATIENT)
Dept: INFECTIOUS DISEASES | Facility: CLINIC | Age: 75
End: 2020-03-04

## 2020-03-04 ENCOUNTER — PATIENT OUTREACH (OUTPATIENT)
Dept: ADMINISTRATIVE | Facility: OTHER | Age: 75
End: 2020-03-04

## 2020-03-04 ENCOUNTER — OFFICE VISIT (OUTPATIENT)
Dept: INFECTIOUS DISEASES | Facility: CLINIC | Age: 75
End: 2020-03-04
Payer: MEDICARE

## 2020-03-04 VITALS
BODY MASS INDEX: 21.18 KG/M2 | OXYGEN SATURATION: 100 % | HEIGHT: 61 IN | HEART RATE: 91 BPM | WEIGHT: 112.19 LBS | RESPIRATION RATE: 18 BRPM | TEMPERATURE: 98 F | SYSTOLIC BLOOD PRESSURE: 119 MMHG | DIASTOLIC BLOOD PRESSURE: 54 MMHG

## 2020-03-04 VITALS
HEART RATE: 79 BPM | HEIGHT: 61 IN | BODY MASS INDEX: 21.94 KG/M2 | SYSTOLIC BLOOD PRESSURE: 115 MMHG | DIASTOLIC BLOOD PRESSURE: 60 MMHG | TEMPERATURE: 99 F | WEIGHT: 116.19 LBS

## 2020-03-04 DIAGNOSIS — B96.20 E COLI BACTEREMIA: ICD-10-CM

## 2020-03-04 DIAGNOSIS — N12 PYELONEPHRITIS: Primary | ICD-10-CM

## 2020-03-04 DIAGNOSIS — R78.81 E COLI BACTEREMIA: ICD-10-CM

## 2020-03-04 LAB
POCT GLUCOSE: 164 MG/DL (ref 70–110)
POCT GLUCOSE: 173 MG/DL (ref 70–110)

## 2020-03-04 PROCEDURE — 99999 PR PBB SHADOW E&M-EST. PATIENT-LVL III: CPT | Mod: PBBFAC,HCNC,, | Performed by: INTERNAL MEDICINE

## 2020-03-04 PROCEDURE — 99213 OFFICE O/P EST LOW 20 MIN: CPT | Mod: HCNC,S$GLB,, | Performed by: INTERNAL MEDICINE

## 2020-03-04 PROCEDURE — 1159F PR MEDICATION LIST DOCUMENTED IN MEDICAL RECORD: ICD-10-PCS | Mod: HCNC,S$GLB,, | Performed by: INTERNAL MEDICINE

## 2020-03-04 PROCEDURE — 1159F MED LIST DOCD IN RCRD: CPT | Mod: HCNC,S$GLB,, | Performed by: INTERNAL MEDICINE

## 2020-03-04 PROCEDURE — 25000003 PHARM REV CODE 250: Mod: HCNC | Performed by: INTERNAL MEDICINE

## 2020-03-04 PROCEDURE — 1126F PR PAIN SEVERITY QUANTIFIED, NO PAIN PRESENT: ICD-10-PCS | Mod: HCNC,S$GLB,, | Performed by: INTERNAL MEDICINE

## 2020-03-04 PROCEDURE — 99999 PR PBB SHADOW E&M-EST. PATIENT-LVL III: ICD-10-PCS | Mod: PBBFAC,HCNC,, | Performed by: INTERNAL MEDICINE

## 2020-03-04 PROCEDURE — 1101F PR PT FALLS ASSESS DOC 0-1 FALLS W/OUT INJ PAST YR: ICD-10-PCS | Mod: HCNC,CPTII,S$GLB, | Performed by: INTERNAL MEDICINE

## 2020-03-04 PROCEDURE — 1101F PT FALLS ASSESS-DOCD LE1/YR: CPT | Mod: HCNC,CPTII,S$GLB, | Performed by: INTERNAL MEDICINE

## 2020-03-04 PROCEDURE — 25000003 PHARM REV CODE 250: Mod: HCNC | Performed by: NURSE PRACTITIONER

## 2020-03-04 PROCEDURE — 1126F AMNT PAIN NOTED NONE PRSNT: CPT | Mod: HCNC,S$GLB,, | Performed by: INTERNAL MEDICINE

## 2020-03-04 PROCEDURE — 25000003 PHARM REV CODE 250: Mod: HCNC | Performed by: PHYSICIAN ASSISTANT

## 2020-03-04 PROCEDURE — 99213 PR OFFICE/OUTPT VISIT, EST, LEVL III, 20-29 MIN: ICD-10-PCS | Mod: HCNC,S$GLB,, | Performed by: INTERNAL MEDICINE

## 2020-03-04 RX ADMIN — OXCARBAZEPINE 300 MG: 300 TABLET, FILM COATED ORAL at 08:03

## 2020-03-04 RX ADMIN — HYDROCODONE BITARTRATE AND ACETAMINOPHEN 1 TABLET: 10; 325 TABLET ORAL at 01:03

## 2020-03-04 RX ADMIN — TRIAMCINOLONE ACETONIDE: 1 CREAM TOPICAL at 08:03

## 2020-03-04 RX ADMIN — FENOFIBRATE 145 MG: 145 TABLET, FILM COATED ORAL at 08:03

## 2020-03-04 RX ADMIN — POLYETHYLENE GLYCOL 3350 17 G: 17 POWDER, FOR SOLUTION ORAL at 08:03

## 2020-03-04 RX ADMIN — DOCUSATE SODIUM - SENNOSIDES 1 TABLET: 50; 8.6 TABLET, FILM COATED ORAL at 08:03

## 2020-03-04 RX ADMIN — OXYBUTYNIN CHLORIDE 5 MG: 5 TABLET ORAL at 08:03

## 2020-03-04 RX ADMIN — HYDROCODONE BITARTRATE AND ACETAMINOPHEN 1 TABLET: 10; 325 TABLET ORAL at 06:03

## 2020-03-04 RX ADMIN — FUROSEMIDE 60 MG: 40 TABLET ORAL at 08:03

## 2020-03-04 RX ADMIN — POTASSIUM CHLORIDE 30 MEQ: 750 CAPSULE, EXTENDED RELEASE ORAL at 08:03

## 2020-03-04 RX ADMIN — PANTOPRAZOLE SODIUM 40 MG: 40 TABLET, DELAYED RELEASE ORAL at 08:03

## 2020-03-04 NOTE — PROGRESS NOTES
Discharge complete home health orders sent to [facility], no DME needed . Nurse informed discharge complete.

## 2020-03-04 NOTE — PROGRESS NOTES
Subjective:      Patient ID: Marycruz Ramirez is a 74 y.o. female.    Chief Complaint:Follow-up    History of Present Illness    75 y/o female with a history of HFrEF, chronic back pain who was admitted to [facility] with pyelonephritis and bacteremia secondary to E coli.  She was initially treated with IV antibiotics and then transitioned to oral ciprofloxacin.  She completed her course of antibiotics and is here for follow up.       Review of Systems   Constitution: Negative for chills, decreased appetite, fever, malaise/fatigue, night sweats, weight gain and weight loss.   HENT: Negative for congestion, ear pain, hearing loss, hoarse voice, sore throat and tinnitus.    Eyes: Negative for blurred vision, redness and visual disturbance.   Cardiovascular: Negative for chest pain, leg swelling and palpitations.   Respiratory: Negative for cough, hemoptysis, shortness of breath and sputum production.    Hematologic/Lymphatic: Negative for adenopathy. Does not bruise/bleed easily.   Skin: Negative for dry skin, itching, rash and suspicious lesions.   Musculoskeletal: Negative for back pain, joint pain, myalgias and neck pain.   Gastrointestinal: Negative for abdominal pain, constipation, diarrhea, heartburn, nausea and vomiting.   Genitourinary: Negative for dysuria, flank pain, frequency, hematuria, hesitancy and urgency.   Neurological: Negative for dizziness, headaches, numbness, paresthesias and weakness.   Psychiatric/Behavioral: Negative for depression and memory loss. The patient does not have insomnia and is not nervous/anxious.      Objective:   Physical Exam   Constitutional: She is oriented to person, place, and time. She appears well-developed and well-nourished. No distress.   HENT:   Head: Normocephalic and atraumatic.   Right Ear: External ear normal.   Left Ear: External ear normal.   Nose: Nose normal.   Mouth/Throat: Oropharynx is clear and moist. No oropharyngeal exudate.   Eyes: Pupils are equal, round,  and reactive to light. Conjunctivae and EOM are normal. Right eye exhibits no discharge. Left eye exhibits no discharge. No scleral icterus.   Neck: Normal range of motion. Neck supple. No JVD present. No tracheal deviation present. No thyromegaly present.   Cardiovascular: Normal rate, regular rhythm and intact distal pulses. Exam reveals no gallop and no friction rub.   No murmur heard.  Pulmonary/Chest: Effort normal and breath sounds normal. No stridor. No respiratory distress. She has no wheezes. She has no rales. She exhibits no tenderness.   Abdominal: Soft. Bowel sounds are normal. She exhibits no distension and no mass. There is no tenderness. There is no rebound and no guarding.   Musculoskeletal: Normal range of motion. She exhibits edema (Bilateral LE edema). She exhibits no tenderness.   Lymphadenopathy:     She has no cervical adenopathy.   Neurological: She is alert and oriented to person, place, and time. She displays normal reflexes. No cranial nerve deficit. She exhibits normal muscle tone. Coordination normal.   Skin: Skin is warm. No rash noted. She is not diaphoretic. No erythema. No pallor.   Dry scaly skin to lower legs.   Psychiatric: She has a normal mood and affect. Her behavior is normal. Judgment and thought content normal.   Nursing note and vitals reviewed.    Assessment:       1. Pyelonephritis    2. E coli bacteremia        75 y/o female with a history of HFrEF, chronic back pain who was admitted to [facility] with pyelonephritis and bacteremia secondary to E coli.  She was initially treated with IV antibiotics and then transitioned to oral ciprofloxacin.  She completed her course of antibiotics and is here for follow up.     She is doing well.  She doesn't have any flank pain.  She denies fevers. She is in very good spirits.  Her labs were reviewed and are okay.  She is to follow up as needed.  Plan:       Pyelonephritis   - follow up as needed    E coli bacteremia   - follow up as  needed

## 2020-03-04 NOTE — LETTER
March 4, 2020      Infectious Disease  [address] [facility] [address]  [facility] [address]           [facility] - Infectious Diseases  [address]  [facility] [address]  Phone: [phone]  Fax: [phone]          Patient: Marycruz Ramirez   MR Number: [id number]   YOB: 1945   Date of Visit: 3/4/2020       Dear Infectious Disease:    Thank you for referring Marycruz Ramierz to me for evaluation. Attached you will find relevant portions of my assessment and plan of care.    If you have questions, please do not hesitate to call me. I look forward to following Marycruz Ramirez along with you.    Sincerely,    Zuhair Jacob MD    Enclosure  CC:  No Recipients    If you would like to receive this communication electronically, please contact [email or web address] or [phone] to request more information on [employer] access.    For providers and/or their staff who would like to refer a patient to [facility], please contact us through our one-stop-shop provider referral line, Violette Edwards, at [phone].    If you feel you have received this communication in error or would no longer like to receive these types of communications, please e-mail [email or web address]

## 2020-03-04 NOTE — TELEPHONE ENCOUNTER
----- Message from Amena Dos Santos sent at 3/4/2020  1:29 PM CST -----  Contact: [facility] ([facility]) @ [phone]  Caller received a fax from Dr. Jacob's office (patient's notes), but says the patient is not one of their patients ([facility] in [place]).

## 2020-03-04 NOTE — PROGRESS NOTES
Discharge instruction given to patient and spouse verbalized understanding, where to  medications, follow up appointments, and home health care company name. Patient awake and alert, transported down to the front lobby via wheelchair into spouse car.

## 2020-03-04 NOTE — TELEPHONE ENCOUNTER
Spoke with Yary at [facility] and informed her that the pt is in skilled nursing and that our dept sent notes with the pt but did not fax anything. Spoke with Huey at [facility] and she sated that they did not fax anything either but she will check and see

## 2020-03-04 NOTE — DISCHARGE SUMMARY
[facility] Medicine  Discharge Summary      Patient Name: Marycruz Ramirez  MRN: [id number]  Admission Date: 2/17/2020  Hospital Length of Stay: 16 days  Discharge Date and Time:  03/04/2020 1:44 PM  Attending Physician: Purnima Carney MD   Discharging Provider: Dian Carrizales NP  Primary Care Provider: Marcelino Calle MD      HPI:    Ms Ramirez is a 73 yo female with sig pmhx of CHF(EF 30), Aortic stenosis, HLD, Bipolar and overactive bladder who presents to SNF s/p hospitalization for Acute on chronic combined systolic and diastolic heart failure. Patient initially presented with LE edema, weakness and a fall. Patient with recent hospitalizations/treatment for pyelonephritis with sepsis and Anemia with transfusion. This hospitalization patient diuresed well with lasix and was deemed medically stable and transferred to [facility] to participate in acute inpatient therapy. Admission to SNF for secondary weakness and debility.      Patient found to have 2-3 + edema to bilateral LE. All labs reviewed from 2/18. No leukocytosis. Hemoglobin stable at 9.1. Platelets stable at 231. Sodium at 138. Potasium 3.3, treated by MD. Creatinine stable at 0.7. 24 hr blood glucose 145. Afebrile. Patient's current blood pressure is at 121/83. Patient denies trouble sleeping at night, abdominal discomfort, nausea, or vomiting. Patient reports an adequate appetite. Patient denies dysuria. Patient reports having regular bowel movements. Patient will be treated at [facility] with PT and OT to improve functional status and ability to perform ADLs.      * No surgery found *      Hospital Course:   Patient progressed well with PT and OT. Patient had no significant events during their stay at [facility].  Patient went to ID follow-up while admitted SNF- she is clear discontinue antibiotics.  Patient also sent to Wound Care Clinic for lower extremity ulcers with fluctuation- wound care orders  placed, patient will follow up in wound Care Clinic.  Home health was set up. DME was ordered if needed. Follow up appointment to be made by patient within one week. All prescriptions and discharge instructions were ordered to be given to the patient prior to discharge.     PEx  Constitutional: Patient appears frail, elderly. no distress   HENT:   Head: Normocephalic and atraumatic.   Eyes: Pupils are equal, round, and reactive to light.   Neck: Normal range of motion. Neck supple.   Cardiovascular: Normal rate, regular rhythm and normal heart sounds. + lower extremity edema, improved  Pulmonary/Chest: Effort normal and breath sounds are clear  Abdominal: Soft. Bowel sounds are normal.   Musculoskeletal: Normal range of motion. + generalized weakness.  Neurological: Alert and oriented to person, place, and time.   Psychiatric: Normal mood and affect. Behavior is normal.   Wounds/Skin: Skin is warm and dry.   - Multiple skin tears FABI with eschar, without redness or drainage around.   -Left LE hematoma with 90 % purplish-red full thickness tissue and 10 % tan with small amount of serosang drainage, pink edges; lateral feels slightly fluctuant and very tender.  Wound length: 6.5 cm   Wound width: 7 cm  Wound depth: 0.2 cm    - Right LE hematoma full thickness wound with 90 % tan/white and 10 % dark brown eschar with pink edges  Wound length: 4 cm   Wound width: 2 cm  Wound depth: 0.1 cm        Consults:   Consults (From admission, onward)        Status Ordering Provider     Inpatient consult to Registered Dietitian/Nutritionist  Once     Provider:  (Not yet assigned)    Completed LEILANI DE LUNA     Inpatient consult to [facility]  Once     Provider:  (Not yet assigned)    Completed LEILANI DE LUNA          No new Assessment & Plan notes have been filed under this hospital service since the last note was generated.  Service: Hospital Medicine    Final Active Diagnoses:    Diagnosis Date Noted POA    PRINCIPAL  PROBLEM:  Acute on chronic combined systolic and diastolic heart failure [I50.43] 02/11/2020 Yes    Skin tear of lower leg without complication [S81.819A] 02/17/2020 Yes    Debility [R53.81] 02/14/2020 Yes    Multiple skin tears [T14.8XXA] 02/13/2020 Yes    Alteration in skin integrity [R23.9] 02/12/2020 Yes    Fall [W19.XXXA] 02/11/2020 Yes    Aortic stenosis, severe [I35.0] 01/27/2020 Yes    OAB (overactive bladder) [N32.81] 01/16/2020 Yes    Chronic radicular lumbar pain [M54.16, G89.29] 01/16/2020 Yes    Mixed hyperlipidemia [E78.2] 01/16/2020 Yes    Bipolar disorder [F31.9] 01/16/2020 Yes      Problems Resolved During this Admission:       Discharged Condition: good    Disposition: Home-Health Care [facility]    Follow Up:  Follow-up Information     Schedule an appointment as soon as possible for a visit with Marcelino Calle MD.    Specialty:  Internal Medicine  Why:  WITHIN 1-2 WEEKS  Contact information:  [id number] [place] [address]  [address]  [facility] [address]  [phone]             [facility].    Specialty:  Home Health Services  Why:  Agency will call pt to schedule a home health assessment.  Contact information:  [address] [facility]  [address]  [facility] [address]  [phone]                 Patient Instructions:      Ambulatory referral/consult to Wound Clinic   Standing Status: Future   Referral Priority: Urgent Referral Type: Consultation   Referral Reason: Specialty Services Required   Requested Specialty: Wound Care   Number of Visits Requested: 1     No driving until:   Order Comments: Cleared by PCP     Notify your health care provider if you experience any of the following:  temperature >100.4     Notify your health care provider if you experience any of the following:  persistent nausea and vomiting or diarrhea     Notify your health care provider if you experience any of the following:  severe uncontrolled pain     Notify your health care provider if you experience any of  the following:  redness, tenderness, or signs of infection (pain, swelling, redness, odor or green/yellow discharge around incision site)     Notify your health care provider if you experience any of the following:  difficulty breathing or increased cough     Notify your health care provider if you experience any of the following:  persistent dizziness, light-headedness, or visual disturbances     Notify your health care provider if you experience any of the following:  increased confusion or weakness     Activity as tolerated       Significant Diagnostic Studies: Labs:   BMP:   Recent Labs   Lab 03/03/20  0443   *      K 3.4*   CL 98   CO2 30*   BUN 15   CREATININE 0.7   CALCIUM 9.6   MG 1.9    and CBC   Recent Labs   Lab 03/03/20  0443   WBC 6.36   HGB 9.6*   HCT 32.5*          Pending Diagnostic Studies:     None         Medications:  Reconciled Home Medications:      Medication List      START taking these medications    HYDROcodone-acetaminophen 5-325 mg per tablet  Commonly known as:  NORCO  Take 1 tablet by mouth every 4 (four) hours as needed.  Replaces:  Norco  mg per tablet     potassium chloride SA 20 MEQ tablet  Commonly known as:  K-DUR,KLOR-CON  Take 1 tablet (20 mEq total) by mouth 2 (two) times daily.     triamcinolone acetonide 0.1% 0.1 % cream  Commonly known as:  KENALOG  Apply topically 2 (two) times daily.        CONTINUE taking these medications    b complex vitamins capsule  Take 1 capsule by mouth once daily.     BONE ESSENTIALS ORAL  Take by mouth.     cetirizine 10 MG tablet  Commonly known as:  ZYRTEC  Take by mouth.     diclofenac sodium 1 % Gel  Commonly known as:  VOLTAREN  Apply 2 g topically 3 (three) times daily as needed (pain).     fenofibrate 145 MG tablet  Commonly known as:  TRICOR     ferrous sulfate 325 mg (65 mg iron) Tab tablet  Commonly known as:  FEOSOL  Take 1 tablet (325 mg total) by mouth once daily.     Flonase Allergy Relief 50 mcg/actuation  nasal spray  Generic drug:  fluticasone propionate     furosemide 40 MG tablet  Commonly known as:  LASIX  Take 1 tablet (40 mg total) by mouth 2 (two) times daily.     multivitamin with minerals tablet  Take by mouth.     mupirocin 2 % ointment  Commonly known as:  BACTROBAN  by Nasal route.     omega-3 acid ethyl esters 1 gram capsule  Commonly known as:  LOVAZA  Take 2 g by mouth 2 (two) times daily.     OXcarbazepine 300 MG Tab  Commonly known as:  TRILEPTAL  Take 300 mg by mouth once daily. One in the morning, two in the evening     oxybutynin 5 MG Tab  Commonly known as:  DITROPAN  5 mg 2 (two) times daily.     pantoprazole 40 MG tablet  Commonly known as:  PROTONIX  Take 1 tablet (40 mg total) by mouth 2 (two) times daily before meals.        STOP taking these medications    methocarbamol 500 MG Tab  Commonly known as:  ROBAXIN     Norco  mg per tablet  Generic drug:  HYDROcodone-acetaminophen  Replaced by:  HYDROcodone-acetaminophen 5-325 mg per tablet            Indwelling Lines/Drains at time of discharge:   Lines/Drains/Airways     None                 Time spent on the discharge of patient: 35 minutes  Patient was seen and examined on the date of discharge and determined to be suitable for discharge.         Dian Carrizales NP  Department of Hospital Medicine  [facility] - Skilled Nursing Care

## 2020-03-04 NOTE — PROGRESS NOTES
Chart reviewed.   Requested updates from Care Everywhere.  Immunizations reconciled.    updated.  Mammogram previously ordered.

## 2020-03-04 NOTE — PLAN OF CARE
Problem: Adult Inpatient Plan of Care  Goal: Plan of Care Review  Outcome: Ongoing, Progressing     Problem: Adult Inpatient Plan of Care  Goal: Patient-Specific Goal (Individualization)  Outcome: Ongoing, Progressing     Problem: Adult Inpatient Plan of Care  Goal: Readiness for Transition of Care  Outcome: Ongoing, Progressing     Problem: Infection  Goal: Infection Symptom Resolution  Intervention: Prevent or Manage Infection  Flowsheets (Taken 3/4/2020 1119)  Fever Reduction/Comfort Measures: fluid intake increased; medication administered; lightweight clothing  Infection Management: aseptic technique maintained  Isolation Precautions: protective environment maintained     Problem: Fall Injury Risk  Goal: Absence of Fall and Fall-Related Injury  Intervention: Identify and Manage Contributors to Fall Injury Risk  Flowsheets (Taken 3/4/2020 1119)  Self-Care Promotion: independence encouraged; BADL personal objects within reach; BADL personal routines maintained; meal setup provided; safe use of adaptive equipment encouraged  Medication Review/Management: medications reviewed     Problem: Wound  Goal: Optimal Wound Healing  Intervention: Promote Effective Wound Healing  Flowsheets (Taken 3/4/2020 1119)  Oral Nutrition Promotion: rest periods promoted; safe use of adaptive equipment encouraged; social interaction promoted  Sleep/Rest Enhancement: regular sleep/rest pattern promoted; relaxation techniques promoted; consistent schedule promoted  Pain Management Interventions: care clustered; pain management plan reviewed with patient/caregiver; pillow support provided; quiet environment facilitated; relaxation techniques promoted

## 2020-03-05 ENCOUNTER — TELEPHONE (OUTPATIENT)
Dept: FAMILY MEDICINE | Facility: CLINIC | Age: 75
End: 2020-03-05

## 2020-03-05 NOTE — TELEPHONE ENCOUNTER
----- Message from Zenobia Quinones sent at 3/5/2020  2:28 PM CST -----  Contact:  El [phone]  Type:  Sooner Apoointment Request    Caller is requesting a sooner appointment.  Caller declined first available appointment listed below.  Caller will not accept being placed on the waitlist and is requesting a message be sent to doctor.  Name of Caller:Pt's    When is the first available appointment?04-02-20  Symptoms:Hospital f/u   Would the patient rather a call back or a response via [facility]? Call back   Best Call Back Number:[phone]  Additional Information:

## 2020-03-06 ENCOUNTER — HOSPITAL ENCOUNTER (OUTPATIENT)
Dept: CARDIOLOGY | Facility: CLINIC | Age: 75
Discharge: HOME | End: 2020-03-06
Attending: STUDENT IN AN ORGANIZED HEALTH CARE EDUCATION/TRAINING PROGRAM
Payer: MEDICARE

## 2020-03-06 VITALS
HEIGHT: 61 IN | SYSTOLIC BLOOD PRESSURE: 115 MMHG | WEIGHT: 116 LBS | DIASTOLIC BLOOD PRESSURE: 60 MMHG | BODY MASS INDEX: 21.9 KG/M2 | HEART RATE: 100 BPM

## 2020-03-06 DIAGNOSIS — I50.22 CHRONIC SYSTOLIC HEART FAILURE: ICD-10-CM

## 2020-03-06 LAB
ASCENDING AORTA: 2.87 CM
AV INDEX (PROSTH): 0.24
AV MEAN GRADIENT: 44 MMHG
AV PEAK GRADIENT: 76 MMHG
AV VALVE AREA: 0.73 CM2
AV VELOCITY RATIO: 0.22
BSA FOR ECHO PROCEDURE: 1.5 M2
CV ECHO LV RWT: 0.39 CM
DOP CALC AO PEAK VEL: 4.37 M/S
DOP CALC AO VTI: 69.33 CM
DOP CALC LVOT AREA: 3 CM2
DOP CALC LVOT DIAMETER: 1.97 CM
DOP CALC LVOT PEAK VEL: 0.98 M/S
DOP CALC LVOT STROKE VOLUME: 50.36 CM3
DOP CALCLVOT PEAK VEL VTI: 16.53 CM
E/E' RATIO: 21.27 M/S
ECHO LV POSTERIOR WALL: 1.01 CM (ref 0.6–1.1)
FRACTIONAL SHORTENING: 29 % (ref 28–44)
INTERVENTRICULAR SEPTUM: 0.89 CM (ref 0.6–1.1)
IVRT: 108.47 MSEC
LA MAJOR: 4.77 CM
LA MINOR: 4.65 CM
LA WIDTH: 4.28 CM
LEFT ATRIUM SIZE: 3.3 CM
LEFT ATRIUM VOLUME INDEX: 37.7 ML/M2
LEFT ATRIUM VOLUME: 56.54 CM3
LEFT INTERNAL DIMENSION IN SYSTOLE: 3.67 CM (ref 2.1–4)
LEFT VENTRICLE DIASTOLIC VOLUME INDEX: 85.99 ML/M2
LEFT VENTRICLE DIASTOLIC VOLUME: 128.88 ML
LEFT VENTRICLE MASS INDEX: 121 G/M2
LEFT VENTRICLE SYSTOLIC VOLUME INDEX: 38 ML/M2
LEFT VENTRICLE SYSTOLIC VOLUME: 56.89 ML
LEFT VENTRICULAR INTERNAL DIMENSION IN DIASTOLE: 5.19 CM (ref 3.5–6)
LEFT VENTRICULAR MASS: 180.81 G
LV LATERAL E/E' RATIO: 16.71 M/S
LV SEPTAL E/E' RATIO: 29.25 M/S
MV PEAK E VEL: 1.17 M/S
PISA TR MAX VEL: 2.86 M/S
PULM VEIN S/D RATIO: 1.06
PV PEAK D VEL: 0.51 M/S
PV PEAK S VEL: 0.54 M/S
RA MAJOR: 4.32 CM
RA PRESSURE: 3 MMHG
RA WIDTH: 3.37 CM
RIGHT VENTRICULAR END-DIASTOLIC DIMENSION: 3.98 CM
RV TISSUE DOPPLER FREE WALL SYSTOLIC VELOCITY 1 (APICAL 4 CHAMBER VIEW): 12.91 CM/S
SINUS: 2.76 CM
STJ: 2.73 CM
TDI LATERAL: 0.07 M/S
TDI SEPTAL: 0.04 M/S
TDI: 0.06 M/S
TR MAX PG: 33 MMHG
TRICUSPID ANNULAR PLANE SYSTOLIC EXCURSION: 2.5 CM
TV REST PULMONARY ARTERY PRESSURE: 36 MMHG

## 2020-03-06 PROCEDURE — 93306 TTE W/DOPPLER COMPLETE: CPT | Mod: HCNC,S$GLB,, | Performed by: INTERNAL MEDICINE

## 2020-03-06 PROCEDURE — 93306 ECHO (CUPID ONLY): ICD-10-PCS | Mod: HCNC,S$GLB,, | Performed by: INTERNAL MEDICINE

## 2020-03-07 ENCOUNTER — TELEPHONE (OUTPATIENT)
Dept: CARDIOLOGY | Facility: HOSPITAL | Age: 75
End: 2020-03-07

## 2020-03-07 NOTE — TELEPHONE ENCOUNTER
I called . 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 Regarding her severe AS, will re-evaluate the symptoms on her next visit and plan to improve in her strength prior referral to valve clinic.

## 2020-03-10 ENCOUNTER — OFFICE VISIT (OUTPATIENT)
Dept: FAMILY MEDICINE | Facility: CLINIC | Age: 75
End: 2020-03-10
Payer: MEDICARE

## 2020-03-10 VITALS
DIASTOLIC BLOOD PRESSURE: 70 MMHG | RESPIRATION RATE: 18 BRPM | HEIGHT: 61 IN | HEART RATE: 87 BPM | TEMPERATURE: 99 F | BODY MASS INDEX: 22.83 KG/M2 | SYSTOLIC BLOOD PRESSURE: 118 MMHG | OXYGEN SATURATION: 99 % | WEIGHT: 120.94 LBS

## 2020-03-10 DIAGNOSIS — R60.0 LOCALIZED EDEMA: ICD-10-CM

## 2020-03-10 DIAGNOSIS — F31.9 BIPOLAR AFFECTIVE DISORDER, REMISSION STATUS UNSPECIFIED: ICD-10-CM

## 2020-03-10 DIAGNOSIS — K92.2 GASTROINTESTINAL HEMORRHAGE, UNSPECIFIED GASTROINTESTINAL HEMORRHAGE TYPE: ICD-10-CM

## 2020-03-10 DIAGNOSIS — I50.43 ACUTE ON CHRONIC COMBINED SYSTOLIC AND DIASTOLIC HEART FAILURE: ICD-10-CM

## 2020-03-10 DIAGNOSIS — R23.9 ALTERATION IN SKIN INTEGRITY: ICD-10-CM

## 2020-03-10 DIAGNOSIS — N12 PYELONEPHRITIS: ICD-10-CM

## 2020-03-10 DIAGNOSIS — I35.0 AORTIC STENOSIS, SEVERE: Primary | ICD-10-CM

## 2020-03-10 PROCEDURE — 99999 PR PBB SHADOW E&M-EST. PATIENT-LVL IV: CPT | Mod: PBBFAC,HCNC,, | Performed by: INTERNAL MEDICINE

## 2020-03-10 PROCEDURE — 1126F AMNT PAIN NOTED NONE PRSNT: CPT | Mod: HCNC,S$GLB,, | Performed by: INTERNAL MEDICINE

## 2020-03-10 PROCEDURE — 99214 PR OFFICE/OUTPT VISIT, EST, LEVL IV, 30-39 MIN: ICD-10-PCS | Mod: HCNC,S$GLB,, | Performed by: INTERNAL MEDICINE

## 2020-03-10 PROCEDURE — 99499 RISK ADDL DX/OHS AUDIT: ICD-10-PCS | Mod: HCNC,95,, | Performed by: INTERNAL MEDICINE

## 2020-03-10 PROCEDURE — 99214 OFFICE O/P EST MOD 30 MIN: CPT | Mod: HCNC,S$GLB,, | Performed by: INTERNAL MEDICINE

## 2020-03-10 PROCEDURE — 99999 PR PBB SHADOW E&M-EST. PATIENT-LVL IV: ICD-10-PCS | Mod: PBBFAC,HCNC,, | Performed by: INTERNAL MEDICINE

## 2020-03-10 PROCEDURE — 1126F PR PAIN SEVERITY QUANTIFIED, NO PAIN PRESENT: ICD-10-PCS | Mod: HCNC,S$GLB,, | Performed by: INTERNAL MEDICINE

## 2020-03-10 PROCEDURE — 1159F PR MEDICATION LIST DOCUMENTED IN MEDICAL RECORD: ICD-10-PCS | Mod: HCNC,S$GLB,, | Performed by: INTERNAL MEDICINE

## 2020-03-10 PROCEDURE — 99499 UNLISTED E&M SERVICE: CPT | Mod: HCNC,95,, | Performed by: INTERNAL MEDICINE

## 2020-03-10 PROCEDURE — 1101F PR PT FALLS ASSESS DOC 0-1 FALLS W/OUT INJ PAST YR: ICD-10-PCS | Mod: HCNC,CPTII,S$GLB, | Performed by: INTERNAL MEDICINE

## 2020-03-10 PROCEDURE — 99499 RISK ADDL DX/OHS AUDIT: ICD-10-PCS | Mod: HCNC,S$GLB,, | Performed by: INTERNAL MEDICINE

## 2020-03-10 PROCEDURE — 1101F PT FALLS ASSESS-DOCD LE1/YR: CPT | Mod: HCNC,CPTII,S$GLB, | Performed by: INTERNAL MEDICINE

## 2020-03-10 PROCEDURE — 99499 UNLISTED E&M SERVICE: CPT | Mod: HCNC,S$GLB,, | Performed by: INTERNAL MEDICINE

## 2020-03-10 PROCEDURE — 1159F MED LIST DOCD IN RCRD: CPT | Mod: HCNC,S$GLB,, | Performed by: INTERNAL MEDICINE

## 2020-03-10 RX ORDER — FUROSEMIDE 40 MG/1
40 TABLET ORAL DAILY
Qty: 30 TABLET | Refills: 1 | Status: SHIPPED | OUTPATIENT
Start: 2020-03-10 | End: 2020-05-12

## 2020-03-10 RX ORDER — ASPIRIN 81 MG/1
81 TABLET ORAL DAILY
COMMUNITY

## 2020-03-10 NOTE — PROGRESS NOTES
[facility] Note    Chief Complaint      Chief Complaint   Patient presents with    Follow-up     follow up from ed        History of Present Illness      Marycruz Ramirez is a 74 y.o. female who presents today for   Chief Complaint   Patient presents with    Follow-up     follow up from ed    .  Patient comes to appointment here for hops f/u related to complications from her pyelonephritis and sepsis . She has subsequent cardiomyopathy with ef at 30 % her most recent ayaan done last week revealed normal systolic function and the severe aortic stenosis that is being watched for probable valve replacement soon . She is now getting wound care secondary to the edema and skin tears .     Problem List Items Addressed This Visit        Psychiatric    Bipolar disorder    Overview     Psych managing cont current trileptal            Derm    Alteration in skin integrity    Overview     Cont wound care as documented             Cardiac/Vascular    Aortic stenosis, severe - Primary    Overview     Dr cast cards managing . Has visit with him 3/14/2020          Acute on chronic combined systolic and diastolic heart failure    Overview     I have reviewed most recent ayaan , systolic failure resolved ef 63% dr cast is managing currently .             Renal/    Pyelonephritis    Overview     Resolved            GI    GI bleeding    Overview     Stable gi managing             Other    Localized edema    Overview     Refill lasix                  Past Medical History:  Past Medical History:   Diagnosis Date    Bipolar 1 disorder     CHF (congestive heart failure)     HLD (hyperlipidemia)     Overactive bladder        Past Surgical History:  Past Surgical History:   Procedure Laterality Date    BACK SURGERY      COLONOSCOPY N/A 1/20/2020    Procedure: COLONOSCOPY;  Surgeon: Micah Patel MD;  Location: [facility] ([address] [place] [address]);  Service: Endoscopy;  Laterality: N/A;    ESOPHAGOGASTRODUODENOSCOPY N/A  1/20/2020    Procedure: EGD (ESOPHAGOGASTRODUODENOSCOPY);  Surgeon: Micah Patel MD;  Location: [address]);  Service: Endoscopy;  Laterality: N/A;    KNEE SURGERY Bilateral        Family History:  family history is not on file.    Social History:  Social History     Socioeconomic History    Marital status:      Spouse name: Not on file    Number of children: Not on file    Years of education: Not on file    Highest education level: Not on file   Occupational History    Not on file   Social Needs    Financial resource strain: Not on file    Food insecurity:     Worry: Not on file     Inability: Not on file    Transportation needs:     Medical: Not on file     Non-medical: Not on file   Tobacco Use    Smoking status: Current Every Day Smoker     Packs/day: 1.50     Years: 50.00     Pack years: 75.00     Types: Cigarettes    Smokeless tobacco: Never Used   Substance and Sexual Activity    Alcohol use: Yes     Frequency: Never     Drinks per session: 1 or 2    Drug use: Never    Sexual activity: Not Currently   Lifestyle    Physical activity:     Days per week: Not on file     Minutes per session: Not on file    Stress: Not at all   Relationships    Social connections:     Talks on phone: Not on file     Gets together: Not on file     Attends [employer] service: Not on file     Active member of club or organization: Not on file     Attends meetings of clubs or organizations: Not on file     Relationship status: Not on file   Other Topics Concern    Not on file   Social History Narrative    Not on file       Review of Systems:   Review of Systems   Constitutional: Negative for fever and weight loss.   HENT: Negative for congestion, hearing loss and sore throat.    Eyes: Negative for blurred vision.   Respiratory: Negative for cough and shortness of breath.    Cardiovascular: Positive for leg swelling. Negative for chest pain, palpitations and claudication.   Gastrointestinal:  Negative for abdominal pain, constipation, diarrhea and heartburn.   Genitourinary: Negative for dysuria and urgency.   Musculoskeletal: Positive for back pain. Negative for myalgias.   Skin: Negative for rash.   Neurological: Negative for focal weakness and headaches.   Psychiatric/Behavioral: Negative for depression, memory loss and suicidal ideas. The patient is not nervous/anxious.          Medications:  Outpatient Encounter Medications as of 3/10/2020   Medication Sig Dispense Refill    aspirin (ECOTRIN) 81 MG EC tablet Take 81 mg by mouth once daily.      b complex vitamins capsule Take 1 capsule by mouth once daily.      calcium/vits D3/C/K2/minerals (BONE ESSENTIALS ORAL) Take by mouth.      cetirizine (ZYRTEC) 10 MG tablet Take by mouth.      fenofibrate (TRICOR) 145 MG tablet       ferrous sulfate (FEOSOL) 325 mg (65 mg iron) Tab tablet Take 1 tablet (325 mg total) by mouth once daily. 60 tablet 9    fluticasone propionate (FLONASE ALLERGY RELIEF) 50 mcg/actuation nasal spray       HYDROcodone-acetaminophen (NORCO) 5-325 mg per tablet Take 1 tablet by mouth every 4 (four) hours as needed. 30 tablet 0    multivitamin with minerals tablet Take by mouth.      omega-3 acid ethyl esters (LOVAZA) 1 gram capsule Take 2 g by mouth 2 (two) times daily.      OXcarbazepine (TRILEPTAL) 300 MG Tab Take 300 mg by mouth once daily. One in the morning, two in the evening      oxybutynin (DITROPAN) 5 MG Tab 5 mg 2 (two) times daily.       pantoprazole (PROTONIX) 40 MG tablet Take 1 tablet (40 mg total) by mouth 2 (two) times daily before meals. 60 tablet 11    potassium chloride SA (K-DUR,KLOR-CON) 20 MEQ tablet Take 1 tablet (20 mEq total) by mouth 2 (two) times daily. 60 tablet 3    triamcinolone acetonide 0.1% (KENALOG) 0.1 % cream Apply topically 2 (two) times daily. 1 Tube 3    diclofenac sodium (VOLTAREN) 1 % Gel Apply 2 g topically 3 (three) times daily as needed (pain). (Patient not taking: Reported  on 3/10/2020) 1 Tube 1    furosemide (LASIX) 40 MG tablet Take 1 tablet (40 mg total) by mouth once daily. 30 tablet 1    mupirocin (BACTROBAN) 2 % ointment by Nasal route.      [DISCONTINUED] furosemide (LASIX) 40 MG tablet Take 1 tablet (40 mg total) by mouth 2 (two) times daily. 60 tablet 1     No facility-administered encounter medications on file as of 3/10/2020.         Allergies:  Review of patient's allergies indicates:  No Known Allergies      Physical Exam      Vitals:    03/10/20 1105   BP: 118/70   Pulse: 87   Resp: 18   Temp: 98.8 °F (37.1 °C)      Body mass index is 22.85 kg/m².    Physical Exam   Constitutional: She is oriented to person, place, and time. She appears well-developed and well-nourished.   HENT:   Mouth/Throat: Oropharynx is clear and moist.   Eyes: Pupils are equal, round, and reactive to light. EOM are normal.   Neck: Normal range of motion. No thyromegaly present.   Cardiovascular: Normal rate and normal heart sounds. Exam reveals no gallop and no friction rub.   No murmur heard.  1 plus edema    Pulmonary/Chest: Breath sounds normal.   Abdominal: Soft. Bowel sounds are normal.   Musculoskeletal: Normal range of motion.   Lymphadenopathy:     She has no cervical adenopathy.   Neurological: She is alert and oriented to person, place, and time. No cranial nerve deficit.   Skin: Skin is warm. No rash noted.   Psychiatric: She has a normal mood and affect. Her behavior is normal.        Laboratory:  CBC:  Recent Labs   Lab Result Units 02/26/20  0505 02/28/20  0551 03/03/20  0443   WBC K/uL 7.17 8.18 6.36   RBC M/uL 3.72* 3.82* 3.53*   Hemoglobin g/dL 10.1* 10.6* 9.6*   Hematocrit % 34.7* 35.0* 32.5*   Platelets K/uL 318 334 260   Mean Corpuscular Volume fL 93 92 92   Mean Corpuscular Hemoglobin pg 27.2 27.7 27.2   Mean Corpuscular Hemoglobin Conc g/dL 29.1* 30.3* 29.5*     CMP:  Recent Labs   Lab Result Units 01/28/20  0441 01/29/20  0514 02/11/20  0928  03/03/20  0443   Glucose mg/dL  147* 172* 155*   < > 127*   Calcium mg/dL 8.9 8.8 9.5   < > 9.6   Albumin g/dL 2.3* 2.2* 3.3*  --   --    Total Protein g/dL 5.6* 5.3* 6.3  --   --    Sodium mmol/L 137 135* 139   < > 139   Potassium mmol/L 3.8 3.8 3.6   < > 3.4*   CO2 mmol/L 24 25 24   < > 30*   Chloride mmol/L 104 104 106   < > 98   BUN, Bld mg/dL 11 10 17   < > 15   Alkaline Phosphatase U/L 63 62 75  --   --    ALT U/L 25 22 15  --   --    AST U/L 29 20 20  --   --    Total Bilirubin mg/dL 0.6 0.5 0.4  --   --     < > = values in this interval not displayed.     URINALYSIS:  Recent Labs   Lab Result Units 01/25/20  2317 02/11/20  0945   Color, UA  Archana Yellow   Specific Gravity, UA  1.020 1.020   pH, UA  5.0 5.0   Protein, UA  2+* Negative   Bacteria /hpf Moderate*  --    Nitrite, UA  Positive* Negative   Leukocytes, UA  3+* Negative   Hyaline Casts, UA /lpf 3*  --       LIPIDS:  Recent Labs   Lab Result Units 01/17/20  0833   HDL mg/dL 52   Cholesterol mg/dL 102   Triglycerides mg/dL 59   LDL Calculated mg/dL 41   Non-HDL Cholesterol mg/dL 50*     TSH:  No results for input(s): TSH in the last 2160 hours.  A1C:  Recent Labs   Lab Result Units 01/27/20  0335 02/12/20  0521   Hemoglobin A1C % 5.8* 5.6       Radiology:        Assessment:     Marycruz Ramirez is a 74 y.o.female with:    Aortic stenosis, severe    Acute on chronic combined systolic and diastolic heart failure    Alteration in skin integrity    Gastrointestinal hemorrhage, unspecified gastrointestinal hemorrhage type    Bipolar affective disorder, remission status unspecified    Pyelonephritis    Localized edema  -     furosemide (LASIX) 40 MG tablet; Take 1 tablet (40 mg total) by mouth once daily.  Dispense: 30 tablet; Refill: 1          Plan:     Problem List Items Addressed This Visit        Psychiatric    Bipolar disorder    Overview     Psych managing cont current trileptal            Derm    Alteration in skin integrity    Overview     Cont wound care as documented              Cardiac/Vascular    Aortic stenosis, severe - Primary    Overview     Dr cast cards managing . Has visit with him 3/14/2020          Acute on chronic combined systolic and diastolic heart failure    Overview     I have reviewed most recent ayaan , systolic failure resolved ef 63% dr cast is managing currently .             Renal/    Pyelonephritis    Overview     Resolved            GI    GI bleeding    Overview     Stable gi managing             Other    Localized edema    Overview     Refill lasix                As above, continue current medications and maintain follow up with specialists.  Return to clinic in 3 months.    Frederick W Dantagnan [facility] [place]

## 2020-03-15 NOTE — PLAN OF CARE
Problem: Occupational Therapy Goal  Goal: Occupational Therapy Goal  Description  Goals to be met by: 2/28/20     Patient will increase functional independence with ADLs by performing:    Feeding with Modified Clay.  UE Dressing with Modified Clay.  LE Dressing with Supervision.  Grooming while standing with Supervision with RW to steady.  Toileting from bedside commode with Supervision for hygiene and clothing management and A/D to stand.   Bathing from  sitting at sink with Supervision with DME for safety.  Supine to sit with Modified Clay.  Step transfer with Modified Clay with A/D for safety.  Upper extremity exercise program x10 reps per handout, with assistance as needed.  Pt's caregiver will be competent when performing self care tasks and functional transfers.     Outcome: Ongoing, Progressing      R nare remains with slow bloody ooze. Gauze changed. Patient is calm with sedation. Family has been updated on patient status today via telephone.

## 2020-04-06 RX ORDER — OXYBUTYNIN CHLORIDE 5 MG/1
TABLET ORAL
Qty: 180 TABLET | Refills: 3 | Status: SHIPPED | OUTPATIENT
Start: 2020-04-06 | End: 2021-06-14

## 2020-04-07 ENCOUNTER — PATIENT OUTREACH (OUTPATIENT)
Dept: ADMINISTRATIVE | Facility: OTHER | Age: 75
End: 2020-04-07

## 2020-04-08 ENCOUNTER — OFFICE VISIT (OUTPATIENT)
Dept: CARDIOLOGY | Facility: CLINIC | Age: 75
End: 2020-04-08
Payer: MEDICARE

## 2020-04-08 DIAGNOSIS — E78.2 MIXED HYPERLIPIDEMIA: ICD-10-CM

## 2020-04-08 DIAGNOSIS — D64.9 SYMPTOMATIC ANEMIA: ICD-10-CM

## 2020-04-08 DIAGNOSIS — Z72.0 TOBACCO ABUSE: ICD-10-CM

## 2020-04-08 DIAGNOSIS — K92.2 GASTROINTESTINAL HEMORRHAGE, UNSPECIFIED GASTROINTESTINAL HEMORRHAGE TYPE: ICD-10-CM

## 2020-04-08 DIAGNOSIS — I35.0 AORTIC STENOSIS, SEVERE: Primary | ICD-10-CM

## 2020-04-08 PROBLEM — B96.20 E COLI BACTEREMIA: Status: RESOLVED | Noted: 2020-01-27 | Resolved: 2020-04-08

## 2020-04-08 PROBLEM — R78.81 E COLI BACTEREMIA: Status: RESOLVED | Noted: 2020-01-27 | Resolved: 2020-04-08

## 2020-04-08 PROBLEM — I50.43 ACUTE ON CHRONIC COMBINED SYSTOLIC AND DIASTOLIC HEART FAILURE: Status: RESOLVED | Noted: 2020-02-11 | Resolved: 2020-04-08

## 2020-04-08 PROBLEM — N12 PYELONEPHRITIS: Status: RESOLVED | Noted: 2020-01-26 | Resolved: 2020-04-08

## 2020-04-08 PROCEDURE — 1159F MED LIST DOCD IN RCRD: CPT | Mod: 95,HCNC,, | Performed by: INTERNAL MEDICINE

## 2020-04-08 PROCEDURE — 99442 PR PHYSICIAN TELEPHONE EVALUATION 11-20 MIN: ICD-10-PCS | Mod: 95,HCNC,, | Performed by: INTERNAL MEDICINE

## 2020-04-08 PROCEDURE — 1101F PT FALLS ASSESS-DOCD LE1/YR: CPT | Mod: 95,HCNC,CPTII, | Performed by: INTERNAL MEDICINE

## 2020-04-08 PROCEDURE — 1159F PR MEDICATION LIST DOCUMENTED IN MEDICAL RECORD: ICD-10-PCS | Mod: 95,HCNC,, | Performed by: INTERNAL MEDICINE

## 2020-04-08 PROCEDURE — 1101F PR PT FALLS ASSESS DOC 0-1 FALLS W/OUT INJ PAST YR: ICD-10-PCS | Mod: 95,HCNC,CPTII, | Performed by: INTERNAL MEDICINE

## 2020-04-08 PROCEDURE — 99442 PR PHYSICIAN TELEPHONE EVALUATION 11-20 MIN: CPT | Mod: 95,HCNC,, | Performed by: INTERNAL MEDICINE

## 2020-04-08 NOTE — PROGRESS NOTES
Subjective:    Patient ID:  Marycruz Ramirez is a 74 y.o. female who presents for follow-up of     [facility] Arist[facility] encounter 20 minutes    The patient is a 74 year old female with bipolar disorder was first admitted to [facility] 1/17/20 with Hgb 5.5%. EGD found erythema in esophagus and diverticulosis [ ]. She was again admitted 1/25/20 with E Coli sepsis due to pyelonephritis. An Echo done on that admit found a EF of 30% and severe AS [see below] . She was admitted with CHF and severe edema 2/12-17/20[]. Echo of 3/6/20 ordered by cardiology revealed recovered EF but significant AS remains.Care else where reveals admit to [facility] in May 2019 for anemia and Dx MVP was listed.She had never had an echo done before 1/26/20. The edema noted in the ED has resolved and denies SOB. She sleeps in a lounge chair chronically.      Conclusion 1/26/20    · Moderate left ventricular enlargement.  · Moderately decreased left ventricular systolic function. The estimated ejection fraction is 30%.  · Grade I (mild) left ventricular diastolic dysfunction consistent with impaired relaxation.  · Normal right ventricular systolic function.  · Moderate left atrial enlargement.  · Mild-to-moderate mitral regurgitation.  · Severe aortic valve stenosis. Aortic valve area is 0.64 cm2; peak velocity is 3.94 m/s; mean gradient is 43 mmHg.  · The estimated PA systolic pressure is 40 mmHg.  · Elevated central venous pressure (15 mmHg).        Conclusion 3/6/20    · Normal left ventricular systolic function. The estimated ejection fraction is 63%.  · Mild eccentric left ventricular hypertrophy.  · Local segmental wall motion abnormalities.  · Left ventricular diastolic dysfunction.  · Mild left atrial enlargement.  · Normal right ventricular systolic function.  · Severe aortic valve stenosis.  · Aortic valve area is 0.73 cm2; peak velocity is 4.37 m/s; mean gradient is 44 mmHg.  · Mild mitral regurgitation.  · Mild tricuspid  regurgitation.  · Normal central venous pressure (3 mmHg).  · The estimated PA systolic pressure is 36 mmHg.          Lab Results   Component Value Date     03/03/2020    K 3.4 (L) 03/03/2020    CL 98 03/03/2020    CO2 30 (H) 03/03/2020    BUN 15 03/03/2020    BUN 18 01/17/2020    CREATININE 0.7 03/03/2020     (H) 03/03/2020    HGBA1C 5.6 02/12/2020    MG 1.9 03/03/2020    AST 20 02/11/2020    ALT 15 02/11/2020    ALBUMIN 3.3 (L) 02/11/2020    PROT 6.3 02/11/2020    BILITOT 0.4 02/11/2020    WBC 6.36 03/03/2020    HGB 9.6 (L) 03/03/2020    HCT 32.5 (L) 03/03/2020    HCT 34 (L) 01/25/2020    MCV 92 03/03/2020     03/03/2020    INR 1.1 02/11/2020    TSH 0.31 (L) 08/31/2007         Lab Results   Component Value Date    CHOL 102 01/17/2020    HDL 52 01/17/2020    TRIG 59 01/17/2020       Lab Results   Component Value Date    LDLCALC 41 01/17/2020       Past Medical History:   Diagnosis Date    Bipolar 1 disorder     CHF (congestive heart failure)     HLD (hyperlipidemia)     Overactive bladder        Current Outpatient Medications:     aspirin (ECOTRIN) 81 MG EC tablet, Take 81 mg by mouth once daily., Disp: , Rfl:     b complex vitamins capsule, Take 1 capsule by mouth once daily., Disp: , Rfl:     calcium/vits D3/C/K2/minerals (BONE ESSENTIALS ORAL), Take by mouth., Disp: , Rfl:     cetirizine (ZYRTEC) 10 MG tablet, Take by mouth., Disp: , Rfl:     diclofenac sodium (VOLTAREN) 1 % Gel, Apply 2 g topically 3 (three) times daily as needed (pain). (Patient not taking: Reported on 3/10/2020), Disp: 1 Tube, Rfl: 1    fenofibrate (TRICOR) 145 MG tablet, , Disp: , Rfl:     ferrous sulfate (FEOSOL) 325 mg (65 mg iron) Tab tablet, Take 1 tablet (325 mg total) by mouth once daily., Disp: 60 tablet, Rfl: 9    fluticasone propionate (FLONASE ALLERGY RELIEF) 50 mcg/actuation nasal spray, , Disp: , Rfl:     furosemide (LASIX) 40 MG tablet, Take 1 tablet (40 mg total) by mouth once daily., Disp: 30  tablet, Rfl: 1    HYDROcodone-acetaminophen (NORCO) 5-325 mg per tablet, Take 1 tablet by mouth every 4 (four) hours as needed., Disp: 30 tablet, Rfl: 0    multivitamin with minerals tablet, Take by mouth., Disp: , Rfl:     mupirocin (BACTROBAN) 2 % ointment, by Nasal route., Disp: , Rfl:     omega-3 acid ethyl esters (LOVAZA) 1 gram capsule, Take 2 g by mouth 2 (two) times daily., Disp: , Rfl:     OXcarbazepine (TRILEPTAL) 300 MG Tab, Take 300 mg by mouth once daily. One in the morning, two in the evening, Disp: , Rfl:     oxybutynin (DITROPAN) 5 MG Tab, TAKE 1 TABLET TWICE DAILY, Disp: 180 tablet, Rfl: 3    pantoprazole (PROTONIX) 40 MG tablet, Take 1 tablet (40 mg total) by mouth 2 (two) times daily before meals., Disp: 60 tablet, Rfl: 11    potassium chloride SA (K-DUR,KLOR-CON) 20 MEQ tablet, Take 1 tablet (20 mEq total) by mouth 2 (two) times daily., Disp: 60 tablet, Rfl: 3    triamcinolone acetonide 0.1% (KENALOG) 0.1 % cream, Apply topically 2 (two) times daily., Disp: 1 Tube, Rfl: 3          Review of Systems   Constitution: Negative for decreased appetite, diaphoresis, fever, malaise/fatigue, weight gain and weight loss.   HENT: Negative for congestion, ear discharge, ear pain and nosebleeds.    Eyes: Negative for blurred vision, double vision and visual disturbance.   Cardiovascular: Negative for chest pain, claudication, cyanosis, dyspnea on exertion, irregular heartbeat, leg swelling, near-syncope, orthopnea, palpitations, paroxysmal nocturnal dyspnea and syncope.   Respiratory: Negative for cough, hemoptysis, shortness of breath, sleep disturbances due to breathing, snoring, sputum production and wheezing.    Endocrine: Negative for polydipsia, polyphagia and polyuria.   Hematologic/Lymphatic: Negative for adenopathy and bleeding problem. Does not bruise/bleed easily.   Skin: Negative for color change, nail changes, poor wound healing and rash.   Musculoskeletal: Negative for muscle cramps and  muscle weakness.   Gastrointestinal: Negative for abdominal pain, anorexia, change in bowel habit, hematochezia, nausea and vomiting.   Genitourinary: Negative for dysuria, frequency and hematuria.   Neurological: Negative for brief paralysis, difficulty with concentration, excessive daytime sleepiness, dizziness, focal weakness, headaches, light-headedness, seizures, vertigo and weakness.   Psychiatric/Behavioral: Negative for altered mental status and depression.   Allergic/Immunologic: Negative for persistent infections.        Objective:LMP  (LMP Unknown)             Physical Exam      Assessment:       1. Aortic stenosis, severe    2. Mixed hyperlipidemia    3. Symptomatic anemia    4. Gastrointestinal hemorrhage, unspecified gastrointestinal hemorrhage type    5. Tobacco abuse         Plan:       Diagnoses and all orders for this visit:    Aortic stenosis, severe  -     Comprehensive metabolic panel; Future; Expected date: 04/08/2020    Mixed hyperlipidemia  -     Lipid panel; Future; Expected date: 06/08/2020    Symptomatic anemia    Gastrointestinal hemorrhage, unspecified gastrointestinal hemorrhage type  -     CBC Without Differential; Future; Expected date: 04/08/2020    Tobacco abuse  -     Ambulatory referral/consult to Smoking Cessation Program; Future; Expected date: 07/08/2020

## 2020-04-08 NOTE — LETTER
April 8, 2020      Debo Springer MD  [address]  [facility] [address]           [facility]  [address]  [facility] [address]  Phone: [phone]          Patient: Marycruz Ramirez   MR Number: [id number]   YOB: 1945   Date of Visit: 4/8/2020       Dear Dr. Debo Springer:    Thank you for referring Marycruz Ramirez to me for evaluation. Attached you will find relevant portions of my assessment and plan of care.    If you have questions, please do not hesitate to call me. I look forward to following Marycruz Ramirez along with you.    Sincerely,    West Cifuentes MD    Enclosure  CC:  No Recipients    If you would like to receive this communication electronically, please contact [email or web address] or [phone] to request more information on [facility] access.    For providers and/or their staff who would like to refer a patient to [facility], please contact us through our one-stop-shop provider referral line, [facility] , at [phone].    If you feel you have received this communication in error or would no longer like to receive these types of communications, please e-mail [email or web address]

## 2020-04-13 ENCOUNTER — PATIENT OUTREACH (OUTPATIENT)
Dept: ADMINISTRATIVE | Facility: HOSPITAL | Age: 75
End: 2020-04-13

## 2020-04-13 ENCOUNTER — PATIENT MESSAGE (OUTPATIENT)
Dept: ADMINISTRATIVE | Facility: HOSPITAL | Age: 75
End: 2020-04-13

## 2020-04-21 ENCOUNTER — DOCUMENT SCAN (OUTPATIENT)
Dept: HOME HEALTH SERVICES | Facility: HOSPITAL | Age: 75
End: 2020-04-21
Payer: MEDICARE

## 2020-05-08 ENCOUNTER — TELEPHONE (OUTPATIENT)
Dept: FAMILY MEDICINE | Facility: CLINIC | Age: 75
End: 2020-05-08

## 2020-05-08 NOTE — TELEPHONE ENCOUNTER
----- Message from Ashlee Saleh sent at 5/8/2020  8:13 AM CDT -----  Contact: Pt  Mrs Ramirez   Pt  is requesting a call in regards to a medication his wife is on .   Mr Ramirez can be reached at [phone]    Thanks

## 2020-05-08 NOTE — TELEPHONE ENCOUNTER
Spoke to Mr Stalin, asking if pt is supposed continue taking Fenofibrate. Said his med list from April 2020 has the med on there but no sig,  said he thinks she was taken off of the med but cannot remember

## 2020-05-08 NOTE — TELEPHONE ENCOUNTER
----- Message from Susan Blank sent at 5/8/2020  9:12 AM CDT -----  Contact: [phone]/    Patient  called in returning your call. Please advise.

## 2020-05-08 NOTE — TELEPHONE ENCOUNTER
----- Message from [facility] Don sent at 5/8/2020  8:47 AM CDT -----  Contact: pt   Pt  is returning a missed call from the office pt can be reached at [phone]

## 2020-05-12 DIAGNOSIS — R60.0 LOCALIZED EDEMA: ICD-10-CM

## 2020-05-12 RX ORDER — FUROSEMIDE 40 MG/1
40 TABLET ORAL DAILY
Qty: 30 TABLET | Refills: 1 | Status: SHIPPED | OUTPATIENT
Start: 2020-05-12 | End: 2020-08-10

## 2020-06-01 ENCOUNTER — LAB VISIT (OUTPATIENT)
Dept: LAB | Facility: HOSPITAL | Age: 75
End: 2020-06-01
Attending: INTERNAL MEDICINE
Payer: MEDICARE

## 2020-06-01 DIAGNOSIS — I35.0 AORTIC STENOSIS, SEVERE: ICD-10-CM

## 2020-06-01 DIAGNOSIS — E78.2 MIXED HYPERLIPIDEMIA: ICD-10-CM

## 2020-06-01 DIAGNOSIS — K92.2 GASTROINTESTINAL HEMORRHAGE, UNSPECIFIED GASTROINTESTINAL HEMORRHAGE TYPE: ICD-10-CM

## 2020-06-01 LAB
ALBUMIN SERPL BCP-MCNC: 3.8 G/DL (ref 3.5–5.2)
ALP SERPL-CCNC: 103 U/L (ref 55–135)
ALT SERPL W/O P-5'-P-CCNC: 15 U/L (ref 10–44)
ANION GAP SERPL CALC-SCNC: 8 MMOL/L (ref 8–16)
AST SERPL-CCNC: 16 U/L (ref 10–40)
BILIRUB SERPL-MCNC: 0.3 MG/DL (ref 0.1–1)
BUN SERPL-MCNC: 11 MG/DL (ref 8–23)
CALCIUM SERPL-MCNC: 9.4 MG/DL (ref 8.7–10.5)
CHLORIDE SERPL-SCNC: 105 MMOL/L (ref 95–110)
CHOLEST SERPL-MCNC: 176 MG/DL (ref 120–199)
CHOLEST/HDLC SERPL: 2 {RATIO} (ref 2–5)
CO2 SERPL-SCNC: 26 MMOL/L (ref 23–29)
CREAT SERPL-MCNC: 0.6 MG/DL (ref 0.5–1.4)
ERYTHROCYTE [DISTWIDTH] IN BLOOD BY AUTOMATED COUNT: 15.1 % (ref 11.5–14.5)
EST. GFR  (AFRICAN AMERICAN): >60 ML/MIN/1.73 M^2
EST. GFR  (NON AFRICAN AMERICAN): >60 ML/MIN/1.73 M^2
GLUCOSE SERPL-MCNC: 111 MG/DL (ref 70–110)
HCT VFR BLD AUTO: 35 % (ref 37–48.5)
HDLC SERPL-MCNC: 86 MG/DL (ref 40–75)
HDLC SERPL: 48.9 % (ref 20–50)
HGB BLD-MCNC: 10.7 G/DL (ref 12–16)
LDLC SERPL CALC-MCNC: 81.4 MG/DL (ref 63–159)
MCH RBC QN AUTO: 31.8 PG (ref 27–31)
MCHC RBC AUTO-ENTMCNC: 30.6 G/DL (ref 32–36)
MCV RBC AUTO: 104 FL (ref 82–98)
NONHDLC SERPL-MCNC: 90 MG/DL
PLATELET # BLD AUTO: 153 K/UL (ref 150–350)
PMV BLD AUTO: 9.8 FL (ref 9.2–12.9)
POTASSIUM SERPL-SCNC: 3.9 MMOL/L (ref 3.5–5.1)
PROT SERPL-MCNC: 6.7 G/DL (ref 6–8.4)
RBC # BLD AUTO: 3.36 M/UL (ref 4–5.4)
SODIUM SERPL-SCNC: 139 MMOL/L (ref 136–145)
TRIGL SERPL-MCNC: 43 MG/DL (ref 30–150)
WBC # BLD AUTO: 6.65 K/UL (ref 3.9–12.7)

## 2020-06-01 PROCEDURE — 80061 LIPID PANEL: CPT | Mod: HCNC

## 2020-06-01 PROCEDURE — 80053 COMPREHEN METABOLIC PANEL: CPT | Mod: HCNC

## 2020-06-01 PROCEDURE — 85027 COMPLETE CBC AUTOMATED: CPT | Mod: HCNC

## 2020-06-01 PROCEDURE — 36415 COLL VENOUS BLD VENIPUNCTURE: CPT | Mod: HCNC,PO

## 2020-06-23 ENCOUNTER — TELEPHONE (OUTPATIENT)
Dept: CARDIOLOGY | Facility: CLINIC | Age: 75
End: 2020-06-23

## 2020-06-23 ENCOUNTER — PATIENT OUTREACH (OUTPATIENT)
Dept: ADMINISTRATIVE | Facility: OTHER | Age: 75
End: 2020-06-23

## 2020-06-23 DIAGNOSIS — R00.2 PALPITATIONS: Primary | ICD-10-CM

## 2020-06-24 ENCOUNTER — OFFICE VISIT (OUTPATIENT)
Dept: CARDIOLOGY | Facility: CLINIC | Age: 75
End: 2020-06-24
Payer: MEDICARE

## 2020-06-24 VITALS
HEART RATE: 84 BPM | SYSTOLIC BLOOD PRESSURE: 138 MMHG | HEIGHT: 60 IN | DIASTOLIC BLOOD PRESSURE: 62 MMHG | BODY MASS INDEX: 26.44 KG/M2 | WEIGHT: 134.69 LBS

## 2020-06-24 DIAGNOSIS — E78.2 MIXED HYPERLIPIDEMIA: ICD-10-CM

## 2020-06-24 DIAGNOSIS — I87.2 VENOUS INSUFFICIENCY (CHRONIC) (PERIPHERAL): ICD-10-CM

## 2020-06-24 DIAGNOSIS — I50.42 CHRONIC COMBINED SYSTOLIC AND DIASTOLIC HEART FAILURE: ICD-10-CM

## 2020-06-24 DIAGNOSIS — I35.0 AORTIC STENOSIS, SEVERE: Primary | ICD-10-CM

## 2020-06-24 DIAGNOSIS — Z72.0 TOBACCO ABUSE: ICD-10-CM

## 2020-06-24 PROCEDURE — 99214 PR OFFICE/OUTPT VISIT, EST, LEVL IV, 30-39 MIN: ICD-10-PCS | Mod: HCNC,S$GLB,, | Performed by: INTERNAL MEDICINE

## 2020-06-24 PROCEDURE — 1126F PR PAIN SEVERITY QUANTIFIED, NO PAIN PRESENT: ICD-10-PCS | Mod: HCNC,S$GLB,, | Performed by: INTERNAL MEDICINE

## 2020-06-24 PROCEDURE — 99214 OFFICE O/P EST MOD 30 MIN: CPT | Mod: HCNC,S$GLB,, | Performed by: INTERNAL MEDICINE

## 2020-06-24 PROCEDURE — 1159F PR MEDICATION LIST DOCUMENTED IN MEDICAL RECORD: ICD-10-PCS | Mod: HCNC,S$GLB,, | Performed by: INTERNAL MEDICINE

## 2020-06-24 PROCEDURE — 1101F PR PT FALLS ASSESS DOC 0-1 FALLS W/OUT INJ PAST YR: ICD-10-PCS | Mod: HCNC,CPTII,S$GLB, | Performed by: INTERNAL MEDICINE

## 2020-06-24 PROCEDURE — 99499 RISK ADDL DX/OHS AUDIT: ICD-10-PCS | Mod: S$GLB,,, | Performed by: INTERNAL MEDICINE

## 2020-06-24 PROCEDURE — 99499 UNLISTED E&M SERVICE: CPT | Mod: S$GLB,,, | Performed by: INTERNAL MEDICINE

## 2020-06-24 PROCEDURE — 99999 PR PBB SHADOW E&M-EST. PATIENT-LVL IV: ICD-10-PCS | Mod: PBBFAC,HCNC,, | Performed by: INTERNAL MEDICINE

## 2020-06-24 PROCEDURE — 99999 PR PBB SHADOW E&M-EST. PATIENT-LVL IV: CPT | Mod: PBBFAC,HCNC,, | Performed by: INTERNAL MEDICINE

## 2020-06-24 PROCEDURE — 1159F MED LIST DOCD IN RCRD: CPT | Mod: HCNC,S$GLB,, | Performed by: INTERNAL MEDICINE

## 2020-06-24 PROCEDURE — 1101F PT FALLS ASSESS-DOCD LE1/YR: CPT | Mod: HCNC,CPTII,S$GLB, | Performed by: INTERNAL MEDICINE

## 2020-06-24 PROCEDURE — 1126F AMNT PAIN NOTED NONE PRSNT: CPT | Mod: HCNC,S$GLB,, | Performed by: INTERNAL MEDICINE

## 2020-06-24 NOTE — PROGRESS NOTES
Subjective:    Patient ID:  Marycruz Ramirez is a 75 y.o. female who presents for follow-up of aortic stenosis    HPI   The patient is a 74 year old female with bipolar disorder , smoker,was first admitted to [facility] 1/17/20 with Hgb 5.5%. EGD found erythema in esophagus and diverticulosis [ ]. She was again admitted 1/25/20 with E Coli sepsis due to pyelonephritis. An Echo done on that admit found a EF of 30% and severe AS [see below] . She was admitted with CHF and severe edema 2/12-17/20[]. Echo of 3/6/20 ordered by cardiology revealed recovered EF ,63%, but significant AS. She denies SOB or FAY. She uses a walker at home and is active. She has chronic leg edema that appears to be venous insuffiencey .She no longer uses compression stocking.            Conclusion 3/6/20     · Normal left ventricular systolic function. The estimated ejection fraction is 63%.  · Mild eccentric left ventricular hypertrophy.  · Local segmental wall motion abnormalities.  · Left ventricular diastolic dysfunction.  · Mild left atrial enlargement.  · Normal right ventricular systolic function.  · Severe aortic valve stenosis.  · Aortic valve area is 0.73 cm2; peak velocity is 4.37 m/s; mean gradient is 44 mmHg.  · Mild mitral regurgitation.  · Mild tricuspid regurgitation.  · Normal central venous pressure (3 mmHg).  · The estimated PA systolic pressure is 36 mmHg.          Lab Results   Component Value Date     06/01/2020    K 3.9 06/01/2020     06/01/2020    CO2 26 06/01/2020    BUN 11 06/01/2020    BUN 18 01/17/2020    CREATININE 0.6 06/01/2020     (H) 06/01/2020    HGBA1C 5.6 02/12/2020    MG 1.9 03/03/2020    AST 16 06/01/2020    ALT 15 06/01/2020    ALBUMIN 3.8 06/01/2020    PROT 6.7 06/01/2020    BILITOT 0.3 06/01/2020    WBC 6.65 06/01/2020    HGB 10.7 (L) 06/01/2020    HCT 35.0 (L) 06/01/2020    HCT 34 (L) 01/25/2020     (H) 06/01/2020     06/01/2020    INR 1.1 02/11/2020    TSH 0.31  (L) 08/31/2007         Lab Results   Component Value Date    CHOL 176 06/01/2020    HDL 86 (H) 06/01/2020    TRIG 43 06/01/2020       Lab Results   Component Value Date    LDLCALC 81.4 06/01/2020       Past Medical History:   Diagnosis Date    Bipolar 1 disorder     CHF (congestive heart failure)     HLD (hyperlipidemia)     Overactive bladder        Current Outpatient Medications:     aspirin (ECOTRIN) 81 MG EC tablet, Take 81 mg by mouth once daily., Disp: , Rfl:     b complex vitamins capsule, Take 1 capsule by mouth once daily., Disp: , Rfl:     calcium/vits D3/C/K2/minerals (BONE ESSENTIALS ORAL), Take by mouth., Disp: , Rfl:     cetirizine (ZYRTEC) 10 MG tablet, Take by mouth., Disp: , Rfl:     diclofenac sodium (VOLTAREN) 1 % Gel, Apply 2 g topically 3 (three) times daily as needed (pain). (Patient not taking: Reported on 3/10/2020), Disp: 1 Tube, Rfl: 1    fenofibrate (TRICOR) 145 MG tablet, , Disp: , Rfl:     ferrous sulfate (FEOSOL) 325 mg (65 mg iron) Tab tablet, Take 1 tablet (325 mg total) by mouth once daily., Disp: 60 tablet, Rfl: 9    fluticasone propionate (FLONASE ALLERGY RELIEF) 50 mcg/actuation nasal spray, , Disp: , Rfl:     furosemide (LASIX) 40 MG tablet, Take 1 tablet (40 mg total) by mouth once daily., Disp: 30 tablet, Rfl: 1    HYDROcodone-acetaminophen (NORCO) 5-325 mg per tablet, Take 1 tablet by mouth every 4 (four) hours as needed., Disp: 30 tablet, Rfl: 0    multivitamin with minerals tablet, Take by mouth., Disp: , Rfl:     mupirocin (BACTROBAN) 2 % ointment, by Nasal route., Disp: , Rfl:     omega-3 acid ethyl esters (LOVAZA) 1 gram capsule, Take 2 g by mouth 2 (two) times daily., Disp: , Rfl:     OXcarbazepine (TRILEPTAL) 300 MG Tab, Take 300 mg by mouth once daily. One in the morning, two in the evening, Disp: , Rfl:     oxybutynin (DITROPAN) 5 MG Tab, TAKE 1 TABLET TWICE DAILY, Disp: 180 tablet, Rfl: 3    pantoprazole (PROTONIX) 40 MG tablet, Take 1 tablet (40  mg total) by mouth 2 (two) times daily before meals., Disp: 60 tablet, Rfl: 11    potassium chloride SA (K-DUR,KLOR-CON) 20 MEQ tablet, Take 1 tablet (20 mEq total) by mouth 2 (two) times daily., Disp: 60 tablet, Rfl: 3    triamcinolone acetonide 0.1% (KENALOG) 0.1 % cream, Apply topically 2 (two) times daily., Disp: 1 Tube, Rfl: 3          Review of Systems   Cardiovascular: Positive for dyspnea on exertion and leg swelling.   Respiratory: Positive for shortness of breath.         Objective:LMP  (LMP Unknown)             Physical Exam   Skin:              Assessment:       No diagnosis found.     Plan:       There are no diagnoses linked to this encounter.

## 2020-07-09 ENCOUNTER — OFFICE VISIT (OUTPATIENT)
Dept: FAMILY MEDICINE | Facility: CLINIC | Age: 75
End: 2020-07-09
Payer: MEDICARE

## 2020-07-09 VITALS
OXYGEN SATURATION: 96 % | HEART RATE: 95 BPM | TEMPERATURE: 98 F | WEIGHT: 139 LBS | RESPIRATION RATE: 18 BRPM | DIASTOLIC BLOOD PRESSURE: 70 MMHG | HEIGHT: 60 IN | BODY MASS INDEX: 27.29 KG/M2 | SYSTOLIC BLOOD PRESSURE: 122 MMHG

## 2020-07-09 DIAGNOSIS — E78.2 MIXED HYPERLIPIDEMIA: ICD-10-CM

## 2020-07-09 DIAGNOSIS — M54.16 CHRONIC RADICULAR LUMBAR PAIN: ICD-10-CM

## 2020-07-09 DIAGNOSIS — I50.42 CHRONIC COMBINED SYSTOLIC AND DIASTOLIC HEART FAILURE: ICD-10-CM

## 2020-07-09 DIAGNOSIS — G89.29 CHRONIC RADICULAR LUMBAR PAIN: ICD-10-CM

## 2020-07-09 DIAGNOSIS — R60.0 LOCALIZED EDEMA: ICD-10-CM

## 2020-07-09 DIAGNOSIS — I35.0 AORTIC STENOSIS, SEVERE: Primary | ICD-10-CM

## 2020-07-09 DIAGNOSIS — N32.81 OAB (OVERACTIVE BLADDER): ICD-10-CM

## 2020-07-09 DIAGNOSIS — F31.9 BIPOLAR AFFECTIVE DISORDER, REMISSION STATUS UNSPECIFIED: ICD-10-CM

## 2020-07-09 PROCEDURE — 1101F PT FALLS ASSESS-DOCD LE1/YR: CPT | Mod: HCNC,CPTII,S$GLB, | Performed by: INTERNAL MEDICINE

## 2020-07-09 PROCEDURE — 1159F MED LIST DOCD IN RCRD: CPT | Mod: HCNC,S$GLB,, | Performed by: INTERNAL MEDICINE

## 2020-07-09 PROCEDURE — 99214 OFFICE O/P EST MOD 30 MIN: CPT | Mod: HCNC,S$GLB,, | Performed by: INTERNAL MEDICINE

## 2020-07-09 PROCEDURE — 99499 RISK ADDL DX/OHS AUDIT: ICD-10-PCS | Mod: HCNC,S$GLB,, | Performed by: INTERNAL MEDICINE

## 2020-07-09 PROCEDURE — 1126F PR PAIN SEVERITY QUANTIFIED, NO PAIN PRESENT: ICD-10-PCS | Mod: HCNC,S$GLB,, | Performed by: INTERNAL MEDICINE

## 2020-07-09 PROCEDURE — 1126F AMNT PAIN NOTED NONE PRSNT: CPT | Mod: HCNC,S$GLB,, | Performed by: INTERNAL MEDICINE

## 2020-07-09 PROCEDURE — 99214 PR OFFICE/OUTPT VISIT, EST, LEVL IV, 30-39 MIN: ICD-10-PCS | Mod: HCNC,S$GLB,, | Performed by: INTERNAL MEDICINE

## 2020-07-09 PROCEDURE — 1159F PR MEDICATION LIST DOCUMENTED IN MEDICAL RECORD: ICD-10-PCS | Mod: HCNC,S$GLB,, | Performed by: INTERNAL MEDICINE

## 2020-07-09 PROCEDURE — 1101F PR PT FALLS ASSESS DOC 0-1 FALLS W/OUT INJ PAST YR: ICD-10-PCS | Mod: HCNC,CPTII,S$GLB, | Performed by: INTERNAL MEDICINE

## 2020-07-09 PROCEDURE — 99499 UNLISTED E&M SERVICE: CPT | Mod: HCNC,S$GLB,, | Performed by: INTERNAL MEDICINE

## 2020-07-09 PROCEDURE — 99999 PR PBB SHADOW E&M-EST. PATIENT-LVL IV: CPT | Mod: PBBFAC,HCNC,, | Performed by: INTERNAL MEDICINE

## 2020-07-09 PROCEDURE — 99999 PR PBB SHADOW E&M-EST. PATIENT-LVL IV: ICD-10-PCS | Mod: PBBFAC,HCNC,, | Performed by: INTERNAL MEDICINE

## 2020-07-09 NOTE — PROGRESS NOTES
[facility] Note    Chief Complaint      Chief Complaint   Patient presents with    Follow-up     6 month follow up     Leg Swelling     both legs        History of Present Illness      Marycruz Ramirez is a 75 y.o. female who presents today for   Chief Complaint   Patient presents with    Follow-up     6 month follow up     Leg Swelling     both legs    .  Patient comes to appointment here for 6 m checkup for chronic issues as below . She just had visit with dr caputo she will be needing valve procedure soon . She is dealing with her lower ext edema she denies sob or gonzalez . No labs needed with this visit .    Problem List Items Addressed This Visit        Neuro    Chronic radicular lumbar pain    Overview     Dr allen is managing pain cont current regimen             Psychiatric    Bipolar disorder    Overview     Psych managing cont current trileptal            Cardiac/Vascular    Mixed hyperlipidemia    Overview     Labs reviewed all stable cont same          Chronic combined systolic and diastolic heart failure    Overview     Cont current regimen  Dr Caputo managing          Aortic stenosis, severe - Primary    Overview     Dr caputo  cards managing . Just had visit with him last month , she will need correction in future with valvuloplasty . She needs to wear her compression stockings for her edema            Renal/    OAB (overactive bladder)    Overview     Cont ditropan working well             Other    Localized edema    Overview     Refill lasix encourage compression hose                 Past Medical History:  Past Medical History:   Diagnosis Date    Bipolar 1 disorder     CHF (congestive heart failure)     HLD (hyperlipidemia)     Overactive bladder        Past Surgical History:  Past Surgical History:   Procedure Laterality Date    BACK SURGERY      COLONOSCOPY N/A 1/20/2020    Procedure: COLONOSCOPY;  Surgeon: Micah Patel MD;  Location: [facility] ([address] [place] [address]);   Service: Endoscopy;  Laterality: N/A;    ESOPHAGOGASTRODUODENOSCOPY N/A 1/20/2020    Procedure: EGD (ESOPHAGOGASTRODUODENOSCOPY);  Surgeon: Micah Patel MD;  Location: [address];  Service: Endoscopy;  Laterality: N/A;    KNEE SURGERY Bilateral        Family History:  family history is not on file.    Social History:  Social History     Socioeconomic History    Marital status:      Spouse name: Not on file    Number of children: Not on file    Years of education: Not on file    Highest education level: Not on file   Occupational History    Not on file   Social Needs    Financial resource strain: Not on file    Food insecurity     Worry: Not on file     Inability: Not on file    Transportation needs     Medical: Not on file     Non-medical: Not on file   Tobacco Use    Smoking status: Current Every Day Smoker     Packs/day: 1.50     Years: 50.00     Pack years: 75.00     Types: Cigarettes    Smokeless tobacco: Never Used   Substance and Sexual Activity    Alcohol use: Yes     Frequency: Never     Drinks per session: 1 or 2    Drug use: Never    Sexual activity: Not Currently   Lifestyle    Physical activity     Days per week: Not on file     Minutes per session: Not on file    Stress: Not at all   Relationships    Social connections     Talks on phone: Not on file     Gets together: Not on file     Attends Gnosticism service: Not on file     Active member of club or organization: Not on file     Attends meetings of clubs or organizations: Not on file     Relationship status: Not on file   Other Topics Concern    Not on file   Social History Narrative    Not on file       Review of Systems:   Review of Systems   Constitutional: Negative for fever and weight loss.   HENT: Negative for congestion, hearing loss and sore throat.    Eyes: Negative for blurred vision.   Respiratory: Negative for cough and shortness of breath.    Cardiovascular: Positive for leg swelling. Negative for  chest pain, palpitations and claudication.   Gastrointestinal: Negative for abdominal pain, constipation, diarrhea and heartburn.   Genitourinary: Negative for dysuria.   Musculoskeletal: Negative for back pain and myalgias.   Skin: Negative for rash.   Neurological: Negative for focal weakness and headaches.   Psychiatric/Behavioral: Negative for depression and suicidal ideas. The patient is not nervous/anxious.          Medications:  Outpatient Encounter Medications as of 7/9/2020   Medication Sig Dispense Refill    aspirin (ECOTRIN) 81 MG EC tablet Take 81 mg by mouth once daily.      b complex vitamins capsule Take 1 capsule by mouth once daily.      cetirizine (ZYRTEC) 10 MG tablet Take by mouth.      ferrous sulfate (FEOSOL) 325 mg (65 mg iron) Tab tablet Take 1 tablet (325 mg total) by mouth once daily. 60 tablet 9    fluticasone propionate (FLONASE ALLERGY RELIEF) 50 mcg/actuation nasal spray       furosemide (LASIX) 40 MG tablet Take 1 tablet (40 mg total) by mouth once daily. 30 tablet 1    multivitamin with minerals tablet Take by mouth.      OXcarbazepine (TRILEPTAL) 300 MG Tab Take 300 mg by mouth once daily. One in the morning, two in the evening      oxybutynin (DITROPAN) 5 MG Tab TAKE 1 TABLET TWICE DAILY 180 tablet 3    pantoprazole (PROTONIX) 40 MG tablet Take 1 tablet (40 mg total) by mouth 2 (two) times daily before meals. 60 tablet 11    potassium chloride SA (K-DUR,KLOR-CON) 20 MEQ tablet Take 1 tablet (20 mEq total) by mouth 2 (two) times daily. 60 tablet 3    triamcinolone acetonide 0.1% (KENALOG) 0.1 % cream Apply topically 2 (two) times daily. 1 Tube 3    [DISCONTINUED] calcium/vits D3/C/K2/minerals (BONE ESSENTIALS ORAL) Take by mouth.      [DISCONTINUED] diclofenac sodium (VOLTAREN) 1 % Gel Apply 2 g topically 3 (three) times daily as needed (pain). (Patient not taking: Reported on 3/10/2020) 1 Tube 1    [DISCONTINUED] fenofibrate (TRICOR) 145 MG tablet        [DISCONTINUED] furosemide (LASIX) 40 MG tablet Take 1 tablet (40 mg total) by mouth once daily. 30 tablet 1    [DISCONTINUED] HYDROcodone-acetaminophen (NORCO) 5-325 mg per tablet Take 1 tablet by mouth every 4 (four) hours as needed. 30 tablet 0    [DISCONTINUED] mupirocin (BACTROBAN) 2 % ointment by Nasal route.      [DISCONTINUED] omega-3 acid ethyl esters (LOVAZA) 1 gram capsule Take 2 g by mouth 2 (two) times daily.       No facility-administered encounter medications on file as of 7/9/2020.         Allergies:  Review of patient's allergies indicates:  No Known Allergies      Physical Exam      Vitals:    07/09/20 1110   BP: 122/70   Pulse: 95   Resp: 18   Temp: 98.1 °F (36.7 °C)      Body mass index is 27.15 kg/m².    Physical Exam  Constitutional:       Appearance: She is well-developed.   Eyes:      Pupils: Pupils are equal, round, and reactive to light.   Neck:      Musculoskeletal: Normal range of motion.      Thyroid: No thyromegaly.   Cardiovascular:      Rate and Rhythm: Normal rate.      Heart sounds: Murmur present. Systolic murmur present with a grade of 4/6. No friction rub. No gallop.    Pulmonary:      Breath sounds: Normal breath sounds.   Abdominal:      General: Bowel sounds are normal.      Palpations: Abdomen is soft.   Musculoskeletal: Normal range of motion.      Right lower leg: Edema present.      Left lower leg: Edema present.   Lymphadenopathy:      Cervical: No cervical adenopathy.   Skin:     General: Skin is warm.      Findings: No rash.   Neurological:      Mental Status: She is alert and oriented to person, place, and time.      Cranial Nerves: No cranial nerve deficit.   Psychiatric:         Behavior: Behavior normal.          Laboratory:  CBC:  Recent Labs   Lab Result Units 06/01/20  0939   WBC K/uL 6.65   RBC M/uL 3.36*   Hemoglobin g/dL 10.7*   Hematocrit % 35.0*   Platelets K/uL 153   Mean Corpuscular Volume fL 104*   Mean Corpuscular Hemoglobin pg 31.8*   Mean Corpuscular  Hemoglobin Conc g/dL 30.6*     CMP:  Recent Labs   Lab Result Units 06/01/20  0939   Glucose mg/dL 111*   Calcium mg/dL 9.4   Albumin g/dL 3.8   Total Protein g/dL 6.7   Sodium mmol/L 139   Potassium mmol/L 3.9   CO2 mmol/L 26   Chloride mmol/L 105   BUN, Bld mg/dL 11   Alkaline Phosphatase U/L 103   ALT U/L 15   AST U/L 16   Total Bilirubin mg/dL 0.3     URINALYSIS:  No results for input(s): COLORU, CLARITYU, SPECGRAV, PHUR, PROTEINUA, GLUCOSEU, BILIRUBINCON, BLOODU, WBCU, RBCU, BACTERIA, MUCUS, NITRITE, LEUKOCYTESUR, UROBILINOGEN, HYALINECASTS in the last 2160 hours.   LIPIDS:  Recent Labs   Lab Result Units 06/01/20  0939   HDL mg/dL 86*   Cholesterol mg/dL 176   Triglycerides mg/dL 43   LDL Cholesterol mg/dL 81.4   Hdl/Cholesterol Ratio % 48.9   Non-HDL Cholesterol mg/dL 90   Total Cholesterol/HDL Ratio  2.0     TSH:  No results for input(s): TSH in the last 2160 hours.  A1C:  No results for input(s): HGBA1C in the last 2160 hours.    Radiology:        Assessment:     Marycruz Ramirez is a 75 y.o.female with:    Aortic stenosis, severe    Chronic combined systolic and diastolic heart failure    Localized edema    Bipolar affective disorder, remission status unspecified    Mixed hyperlipidemia    Chronic radicular lumbar pain    OAB (overactive bladder)          Plan:     Problem List Items Addressed This Visit        Neuro    Chronic radicular lumbar pain    Overview     Dr allen is managing pain cont current regimen             Psychiatric    Bipolar disorder    Overview     Psych managing cont current trileptal            Cardiac/Vascular    Mixed hyperlipidemia    Overview     Labs reviewed all stable cont same          Chronic combined systolic and diastolic heart failure    Overview     Cont current regimen  Dr Cifuentes managing          Aortic stenosis, severe - Primary    Overview     Dr cifuentes  cards managing . Just had visit with him last month , she will need correction in future with valvuloplasty . She  needs to wear her compression stockings for her edema            Renal/    OAB (overactive bladder)    Overview     Cont ditropan working well             Other    Localized edema    Overview     Refill lasix encourage compression hose               As above, continue current medications and maintain follow up with specialists.  Return to clinic in 6 months.    Frederick W Dantagnan [facility] [place]

## 2020-10-05 ENCOUNTER — TELEPHONE (OUTPATIENT)
Dept: SKILLED NURSING FACILITY | Facility: HOSPITAL | Age: 75
End: 2020-10-05

## 2020-10-05 DIAGNOSIS — E87.6 LOW BLOOD POTASSIUM: Primary | ICD-10-CM

## 2020-10-05 DIAGNOSIS — R60.0 LOCALIZED EDEMA: ICD-10-CM

## 2020-10-05 RX ORDER — POTASSIUM CHLORIDE 20 MEQ/1
20 TABLET, EXTENDED RELEASE ORAL 2 TIMES DAILY
Qty: 60 TABLET | Refills: 0 | Status: SHIPPED | OUTPATIENT
Start: 2020-10-05 | End: 2020-12-10

## 2020-10-05 RX ORDER — FUROSEMIDE 40 MG/1
TABLET ORAL
Qty: 30 TABLET | Refills: 1 | Status: SHIPPED | OUTPATIENT
Start: 2020-10-05 | End: 2021-01-08

## 2020-10-05 NOTE — TELEPHONE ENCOUNTER
Patient  will like to know should patient continue to take her potassium medication  / or should she get some labs done first to see if she needs to continue to stay on it/patient states when patient was in the hospital they put her on this medication and he is not sure if she needs to continue the medication or not

## 2020-10-05 NOTE — TELEPHONE ENCOUNTER
----- Message from Tonya Francis sent at 10/5/2020 12:02 PM CDT -----  Regarding: Medication  Contact: El  Type:  Needs Medical Advice    Who Called:Pt   Would the patient rather a call back or a response via [facility]? Callback   Best Call Back Number: [phone]  Additional Information: Need to know if she should stop taking the potassium chloride SA (K-DUR,KLOR-CON) 20 MEQ tablet

## 2020-10-06 ENCOUNTER — TELEPHONE (OUTPATIENT)
Dept: FAMILY MEDICINE | Facility: CLINIC | Age: 75
End: 2020-10-06

## 2020-10-06 ENCOUNTER — PATIENT MESSAGE (OUTPATIENT)
Dept: FAMILY MEDICINE | Facility: CLINIC | Age: 75
End: 2020-10-06

## 2020-10-06 DIAGNOSIS — E11.9 DIABETES MELLITUS WITHOUT COMPLICATION: Primary | ICD-10-CM

## 2020-10-06 LAB — HBA1C MFR BLD: 8.3 % (ref 4.5–5.7)

## 2020-10-06 RX ORDER — METFORMIN HYDROCHLORIDE 500 MG/1
500 TABLET ORAL
Qty: 30 TABLET | Refills: 3 | Status: SHIPPED | OUTPATIENT
Start: 2020-10-06 | End: 2021-01-11

## 2020-10-06 NOTE — TELEPHONE ENCOUNTER
----- Message from Mercedes Dalton sent at 10/6/2020  2:52 PM CDT -----  Contact: [phone]  Pt's  Stalin is calling in regards to Mary Ramirez calling regarding Patient returning the office call.    Please call

## 2020-10-07 ENCOUNTER — TELEPHONE (OUTPATIENT)
Dept: FAMILY MEDICINE | Facility: CLINIC | Age: 75
End: 2020-10-07

## 2020-10-07 NOTE — TELEPHONE ENCOUNTER
----- Message from Susanna Henriquez sent at 10/7/2020 11:26 AM CDT -----  Type:  Patient Returning Call    Who Called: Patient's  El  Who Left Message for Patient: unknown  Does the patient know what this is regarding?: test results  Would the patient rather a call back or a response via [facility]? Call back  Best Call Back Number:   Additional Information: n/a

## 2020-10-08 ENCOUNTER — TELEPHONE (OUTPATIENT)
Dept: FAMILY MEDICINE | Facility: CLINIC | Age: 75
End: 2020-10-08

## 2020-10-08 DIAGNOSIS — E11.9 DIABETES MELLITUS WITHOUT COMPLICATION: Primary | ICD-10-CM

## 2020-10-08 NOTE — TELEPHONE ENCOUNTER
Patient aware of test results, has appt scheduled for 01/11/2021 ordered A1C at [facility] to get drawn a couple days before appt.

## 2020-10-16 DIAGNOSIS — E11.9 TYPE 2 DIABETES MELLITUS WITHOUT COMPLICATION: ICD-10-CM

## 2020-12-17 ENCOUNTER — DOCUMENT SCAN (OUTPATIENT)
Dept: HOME HEALTH SERVICES | Facility: HOSPITAL | Age: 75
End: 2020-12-17
Payer: MEDICARE

## 2020-12-23 DIAGNOSIS — E11.9 TYPE 2 DIABETES MELLITUS WITHOUT COMPLICATION, UNSPECIFIED WHETHER LONG TERM INSULIN USE: ICD-10-CM

## 2021-01-04 ENCOUNTER — PATIENT MESSAGE (OUTPATIENT)
Dept: ADMINISTRATIVE | Facility: HOSPITAL | Age: 76
End: 2021-01-04

## 2021-01-07 ENCOUNTER — TELEPHONE (OUTPATIENT)
Dept: FAMILY MEDICINE | Facility: CLINIC | Age: 76
End: 2021-01-07

## 2021-01-08 LAB — HBA1C MFR BLD: 9.8 % (ref 4.5–5.7)

## 2021-01-11 ENCOUNTER — OFFICE VISIT (OUTPATIENT)
Dept: FAMILY MEDICINE | Facility: CLINIC | Age: 76
End: 2021-01-11
Payer: MEDICARE

## 2021-01-11 VITALS
SYSTOLIC BLOOD PRESSURE: 120 MMHG | TEMPERATURE: 97 F | OXYGEN SATURATION: 97 % | RESPIRATION RATE: 18 BRPM | HEART RATE: 70 BPM | BODY MASS INDEX: 28.87 KG/M2 | HEIGHT: 60 IN | WEIGHT: 147.06 LBS | DIASTOLIC BLOOD PRESSURE: 78 MMHG

## 2021-01-11 DIAGNOSIS — E11.9 DIABETES MELLITUS WITHOUT COMPLICATION: ICD-10-CM

## 2021-01-11 DIAGNOSIS — F31.9 BIPOLAR AFFECTIVE DISORDER, REMISSION STATUS UNSPECIFIED: ICD-10-CM

## 2021-01-11 DIAGNOSIS — I35.0 AORTIC STENOSIS, SEVERE: Primary | ICD-10-CM

## 2021-01-11 PROCEDURE — 1126F AMNT PAIN NOTED NONE PRSNT: CPT | Mod: HCNC,S$GLB,, | Performed by: INTERNAL MEDICINE

## 2021-01-11 PROCEDURE — 1157F PR ADVANCE CARE PLAN OR EQUIV PRESENT IN MEDICAL RECORD: ICD-10-PCS | Mod: HCNC,S$GLB,, | Performed by: INTERNAL MEDICINE

## 2021-01-11 PROCEDURE — 1101F PT FALLS ASSESS-DOCD LE1/YR: CPT | Mod: HCNC,CPTII,S$GLB, | Performed by: INTERNAL MEDICINE

## 2021-01-11 PROCEDURE — 1159F MED LIST DOCD IN RCRD: CPT | Mod: HCNC,S$GLB,, | Performed by: INTERNAL MEDICINE

## 2021-01-11 PROCEDURE — 99214 OFFICE O/P EST MOD 30 MIN: CPT | Mod: HCNC,S$GLB,, | Performed by: INTERNAL MEDICINE

## 2021-01-11 PROCEDURE — 1159F PR MEDICATION LIST DOCUMENTED IN MEDICAL RECORD: ICD-10-PCS | Mod: HCNC,S$GLB,, | Performed by: INTERNAL MEDICINE

## 2021-01-11 PROCEDURE — 3046F PR MOST RECENT HEMOGLOBIN A1C LEVEL > 9.0%: ICD-10-PCS | Mod: HCNC,CPTII,S$GLB, | Performed by: INTERNAL MEDICINE

## 2021-01-11 PROCEDURE — 3288F FALL RISK ASSESSMENT DOCD: CPT | Mod: HCNC,CPTII,S$GLB, | Performed by: INTERNAL MEDICINE

## 2021-01-11 PROCEDURE — 1157F ADVNC CARE PLAN IN RCRD: CPT | Mod: HCNC,S$GLB,, | Performed by: INTERNAL MEDICINE

## 2021-01-11 PROCEDURE — 1126F PR PAIN SEVERITY QUANTIFIED, NO PAIN PRESENT: ICD-10-PCS | Mod: HCNC,S$GLB,, | Performed by: INTERNAL MEDICINE

## 2021-01-11 PROCEDURE — 99214 PR OFFICE/OUTPT VISIT, EST, LEVL IV, 30-39 MIN: ICD-10-PCS | Mod: HCNC,S$GLB,, | Performed by: INTERNAL MEDICINE

## 2021-01-11 PROCEDURE — 3046F HEMOGLOBIN A1C LEVEL >9.0%: CPT | Mod: HCNC,CPTII,S$GLB, | Performed by: INTERNAL MEDICINE

## 2021-01-11 PROCEDURE — 99499 RISK ADDL DX/OHS AUDIT: ICD-10-PCS | Mod: S$GLB,,, | Performed by: INTERNAL MEDICINE

## 2021-01-11 PROCEDURE — 3288F PR FALLS RISK ASSESSMENT DOCUMENTED: ICD-10-PCS | Mod: HCNC,CPTII,S$GLB, | Performed by: INTERNAL MEDICINE

## 2021-01-11 PROCEDURE — 99999 PR PBB SHADOW E&M-EST. PATIENT-LVL IV: ICD-10-PCS | Mod: PBBFAC,HCNC,, | Performed by: INTERNAL MEDICINE

## 2021-01-11 PROCEDURE — 1101F PR PT FALLS ASSESS DOC 0-1 FALLS W/OUT INJ PAST YR: ICD-10-PCS | Mod: HCNC,CPTII,S$GLB, | Performed by: INTERNAL MEDICINE

## 2021-01-11 PROCEDURE — 99999 PR PBB SHADOW E&M-EST. PATIENT-LVL IV: CPT | Mod: PBBFAC,HCNC,, | Performed by: INTERNAL MEDICINE

## 2021-01-11 PROCEDURE — 99499 UNLISTED E&M SERVICE: CPT | Mod: S$GLB,,, | Performed by: INTERNAL MEDICINE

## 2021-01-11 RX ORDER — METFORMIN HYDROCHLORIDE 1000 MG/1
1000 TABLET ORAL 2 TIMES DAILY WITH MEALS
Qty: 60 TABLET | Refills: 3 | Status: SHIPPED | OUTPATIENT
Start: 2021-01-11 | End: 2021-04-07

## 2021-01-22 ENCOUNTER — PATIENT MESSAGE (OUTPATIENT)
Dept: ADMINISTRATIVE | Facility: OTHER | Age: 76
End: 2021-01-22

## 2021-01-25 ENCOUNTER — TELEPHONE (OUTPATIENT)
Dept: FAMILY MEDICINE | Facility: CLINIC | Age: 76
End: 2021-01-25

## 2021-02-05 LAB — HBA1C MFR BLD: 10 % (ref 4.5–5.7)

## 2021-02-15 ENCOUNTER — PATIENT MESSAGE (OUTPATIENT)
Dept: FAMILY MEDICINE | Facility: CLINIC | Age: 76
End: 2021-02-15

## 2021-02-19 ENCOUNTER — PATIENT MESSAGE (OUTPATIENT)
Dept: FAMILY MEDICINE | Facility: CLINIC | Age: 76
End: 2021-02-19

## 2021-02-19 DIAGNOSIS — E11.9 DIABETES MELLITUS WITHOUT COMPLICATION: Primary | ICD-10-CM

## 2021-02-22 RX ORDER — GLIMEPIRIDE 4 MG/1
4 TABLET ORAL
Qty: 90 TABLET | Refills: 3 | Status: SHIPPED | OUTPATIENT
Start: 2021-02-22

## 2021-03-03 ENCOUNTER — TELEPHONE (OUTPATIENT)
Dept: FAMILY MEDICINE | Facility: CLINIC | Age: 76
End: 2021-03-03

## 2021-03-08 ENCOUNTER — PATIENT MESSAGE (OUTPATIENT)
Dept: FAMILY MEDICINE | Facility: CLINIC | Age: 76
End: 2021-03-08

## 2021-03-17 DIAGNOSIS — E11.9 TYPE 2 DIABETES MELLITUS WITHOUT COMPLICATION: ICD-10-CM

## 2021-03-18 ENCOUNTER — TELEPHONE (OUTPATIENT)
Dept: ADMINISTRATIVE | Facility: HOSPITAL | Age: 76
End: 2021-03-18

## 2021-04-05 ENCOUNTER — PATIENT MESSAGE (OUTPATIENT)
Dept: ADMINISTRATIVE | Facility: HOSPITAL | Age: 76
End: 2021-04-05

## 2021-04-07 DIAGNOSIS — E11.9 DIABETES MELLITUS WITHOUT COMPLICATION: ICD-10-CM

## 2021-04-07 RX ORDER — METFORMIN HYDROCHLORIDE 1000 MG/1
TABLET ORAL
Qty: 180 TABLET | Refills: 3 | Status: SHIPPED | OUTPATIENT
Start: 2021-04-07

## 2021-05-19 DIAGNOSIS — E11.9 TYPE 2 DIABETES MELLITUS WITHOUT COMPLICATION: ICD-10-CM

## 2021-06-28 ENCOUNTER — PATIENT OUTREACH (OUTPATIENT)
Dept: ADMINISTRATIVE | Facility: HOSPITAL | Age: 76
End: 2021-06-28

## 2021-06-28 DIAGNOSIS — Z78.0 MENOPAUSE: Primary | ICD-10-CM

## 2021-06-30 ENCOUNTER — TELEPHONE (OUTPATIENT)
Dept: FAMILY MEDICINE | Facility: CLINIC | Age: 76
End: 2021-06-30

## 2021-07-06 ENCOUNTER — TELEPHONE (OUTPATIENT)
Dept: FAMILY MEDICINE | Facility: CLINIC | Age: 76
End: 2021-07-06

## 2021-08-04 ENCOUNTER — PATIENT MESSAGE (OUTPATIENT)
Dept: ADMINISTRATIVE | Facility: HOSPITAL | Age: 76
End: 2021-08-04

## 2021-11-03 ENCOUNTER — PATIENT MESSAGE (OUTPATIENT)
Dept: ADMINISTRATIVE | Facility: HOSPITAL | Age: 76
End: 2021-11-03
Payer: MEDICARE

## 2022-12-23 ENCOUNTER — TELEPHONE (OUTPATIENT)
Dept: FAMILY MEDICINE | Facility: CLINIC | Age: 77
End: 2022-12-23
Payer: MEDICARE